# Patient Record
Sex: MALE | Race: WHITE | NOT HISPANIC OR LATINO | Employment: OTHER | URBAN - METROPOLITAN AREA
[De-identification: names, ages, dates, MRNs, and addresses within clinical notes are randomized per-mention and may not be internally consistent; named-entity substitution may affect disease eponyms.]

---

## 2017-01-05 ENCOUNTER — OFFICE VISIT (OUTPATIENT)
Dept: FAMILY MEDICINE CLINIC | Facility: CLINIC | Age: 67
End: 2017-01-05

## 2017-01-05 VITALS
HEIGHT: 72 IN | BODY MASS INDEX: 29.8 KG/M2 | TEMPERATURE: 98.3 F | SYSTOLIC BLOOD PRESSURE: 170 MMHG | DIASTOLIC BLOOD PRESSURE: 88 MMHG | HEART RATE: 69 BPM | WEIGHT: 220 LBS | OXYGEN SATURATION: 98 %

## 2017-01-05 DIAGNOSIS — J40 BRONCHITIS: Primary | ICD-10-CM

## 2017-01-05 PROCEDURE — 99213 OFFICE O/P EST LOW 20 MIN: CPT | Performed by: NURSE PRACTITIONER

## 2017-01-05 RX ORDER — METHYLPREDNISOLONE 4 MG/1
TABLET ORAL
Qty: 21 TABLET | Refills: 0 | Status: SHIPPED | OUTPATIENT
Start: 2017-01-05 | End: 2017-01-23

## 2017-01-05 NOTE — MR AVS SNAPSHOT
William Ritchie   2017 11:00 AM   Office Visit    Provider:  HERLINDA Bourgeois   Department:  [facility] FAMILY MEDICINE   Dept Phone:  [phone]                Your Full Care Plan              Today's Medication Changes          These changes are accurate as of: 17 11:01 AM.  If you have any questions, ask your nurse or doctor.               New Medication(s)Ordered:     MethylPREDNISolone 4 MG tablet   Commonly known as:  MEDROL (MARIA EUGENIA)   Take as directed on package instructions.   Started by:  HERLINDA Bourgeois            Where to Get Your Medications      These medications were sent to [address] - [address] [facility] - [phone]  - [phone] [facility] [address]     Phone:  [phone]     MethylPREDNISolone 4 MG tablet                  Your Updated Medication List          This list is accurate as of: 17 11:01 AM.  Always use your most recent med list.                MethylPREDNISolone 4 MG tablet   Commonly known as:  MEDROL (MARIA EUGENIA)   Take as directed on package instructions.       metoprolol succinate  MG 24 hr tablet   Commonly known as:  TOPROL XL   Take 1 tablet by mouth daily.               You Were Diagnosed With        Codes Comments    Bronchitis    -  Primary ICD-10-CM: J40  ICD-9-CM: 490       Instructions     None    Patient Instructions History      [email or web address] Signup     [facility] [email or web address] allows you to send messages to your doctor, view your test results, renew your prescriptions, schedule appointments, and more. To sign up, go to [employer] and click on the Sign Up Now link in the New User? box. Enter your [email or web address] Activation Code exactly as it appears below along with the last four digits of your Social Security Number and your Date of Birth () to complete the sign-up process. If you do not sign up before the  "expiration date, you must request a new code.    [employer] Activation Code: [email or web address]  Expires: 1/19/2017 11:01 AM    If you have questions, you can email [email or web address] or call [phone] to talk to our [employer] staff. Remember, [employer] is NOT to be used for urgent needs. For medical emergencies, dial 911.               Other Info from Your Visit           Allergies     No Known Allergies      Reason for Visit     Bronchitis mucus with blood, few days      Vital Signs     Blood Pressure Pulse Temperature Height Weight Oxygen Saturation    170/88 69 98.3 °F (36.8 °C) 71.5\" (181.6 cm) 220 lb (99.8 kg) 98%    Body Mass Index Smoking Status                30.26 kg/m2 Never Smoker          Problems and Diagnoses Noted     Bronchitis    -  Primary      "

## 2017-01-05 NOTE — PROGRESS NOTES
Subjective   William Ritchie is a 66 y.o. male.     History of Present Illness   William Ritchie 66 y.o. male who presents for evaluation of acute bronchitis. Symptoms include productive cough.  Onset of symptoms was 3 days ago, gradually worsening since that time. Patient denies shortness of breath, wheezing, fever.   Evaluation to date: none Treatment to date:  none    The following portions of the patient's history were reviewed and updated as appropriate: allergies, current medications, past family history, past medical history, past social history, past surgical history and problem list.    Review of Systems   Constitutional: Negative for chills and fever.   HENT: Negative for congestion.    Respiratory: Positive for cough and chest tightness. Negative for shortness of breath and wheezing.        Objective   Physical Exam   Constitutional: He is oriented to person, place, and time. He appears well-developed and well-nourished.   HENT:   Head: Normocephalic and atraumatic.   Right Ear: Tympanic membrane, external ear and ear canal normal.   Left Ear: Tympanic membrane, external ear and ear canal normal.   Nose: Nose normal.   Mouth/Throat: Uvula is midline and oropharynx is clear and moist.   Cardiovascular: Normal rate and regular rhythm.    Pulmonary/Chest: Effort normal and breath sounds normal.   Neurological: He is alert and oriented to person, place, and time.   Skin: Skin is warm and dry.   Psychiatric: He has a normal mood and affect. Judgment normal.   Vitals reviewed.      Assessment/Plan   William was seen today for bronchitis.    Diagnoses and all orders for this visit:    Bronchitis  -     MethylPREDNISolone (MEDROL, MARIA EUGENIA,) 4 MG tablet; Take as directed on package instructions.

## 2017-01-10 ENCOUNTER — TELEPHONE (OUTPATIENT)
Dept: FAMILY MEDICINE CLINIC | Facility: CLINIC | Age: 67
End: 2017-01-10

## 2017-01-10 RX ORDER — AZITHROMYCIN 250 MG/1
TABLET, FILM COATED ORAL
Qty: 6 TABLET | Refills: 0 | Status: SHIPPED | OUTPATIENT
Start: 2017-01-10 | End: 2017-01-23

## 2017-01-16 DIAGNOSIS — I10 BENIGN HYPERTENSION: ICD-10-CM

## 2017-01-16 DIAGNOSIS — E78.5 HYPERLIPIDEMIA, UNSPECIFIED HYPERLIPIDEMIA TYPE: Primary | ICD-10-CM

## 2017-01-16 DIAGNOSIS — R73.01 IMPAIRED FASTING GLUCOSE: ICD-10-CM

## 2017-01-17 DIAGNOSIS — E78.5 HYPERLIPIDEMIA, UNSPECIFIED HYPERLIPIDEMIA TYPE: ICD-10-CM

## 2017-01-17 DIAGNOSIS — I10 BENIGN HYPERTENSION: ICD-10-CM

## 2017-01-17 DIAGNOSIS — R73.01 IMPAIRED FASTING GLUCOSE: ICD-10-CM

## 2017-01-17 LAB
ALBUMIN SERPL-MCNC: 4.5 G/DL (ref 3.5–5.2)
ALBUMIN/GLOB SERPL: 1.6 G/DL
ALP SERPL-CCNC: 97 U/L (ref 39–117)
ALT SERPL-CCNC: 51 U/L (ref 1–41)
AST SERPL-CCNC: 26 U/L (ref 1–40)
BASOPHILS # BLD AUTO: 0.05 10*3/MM3 (ref 0–0.2)
BASOPHILS NFR BLD AUTO: 0.6 % (ref 0–1.5)
BILIRUB SERPL-MCNC: 0.4 MG/DL (ref 0.1–1.2)
BUN SERPL-MCNC: 18 MG/DL (ref 8–23)
BUN/CREAT SERPL: 18.8 (ref 7–25)
CALCIUM SERPL-MCNC: 9.9 MG/DL (ref 8.6–10.5)
CHLORIDE SERPL-SCNC: 100 MMOL/L (ref 98–107)
CHOLEST SERPL-MCNC: 219 MG/DL (ref 0–200)
CO2 SERPL-SCNC: 29.6 MMOL/L (ref 22–29)
CREAT SERPL-MCNC: 0.96 MG/DL (ref 0.76–1.27)
EOSINOPHIL # BLD AUTO: 0.55 10*3/MM3 (ref 0–0.7)
EOSINOPHIL NFR BLD AUTO: 6.4 % (ref 0.3–6.2)
ERYTHROCYTE [DISTWIDTH] IN BLOOD BY AUTOMATED COUNT: 12.5 % (ref 11.5–14.5)
GLOBULIN SER CALC-MCNC: 2.8 GM/DL
GLUCOSE SERPL-MCNC: 118 MG/DL (ref 65–99)
HBA1C MFR BLD: 5.5 % (ref 4.8–5.6)
HCT VFR BLD AUTO: 47.6 % (ref 40.4–52.2)
HDLC SERPL-MCNC: 49 MG/DL (ref 40–60)
HGB BLD-MCNC: 15.2 G/DL (ref 13.7–17.6)
IMM GRANULOCYTES # BLD: 0.02 10*3/MM3 (ref 0–0.03)
IMM GRANULOCYTES NFR BLD: 0.2 % (ref 0–0.5)
LDLC SERPL CALC-MCNC: 130 MG/DL (ref 0–100)
LDLC/HDLC SERPL: 2.65 {RATIO}
LYMPHOCYTES # BLD AUTO: 2.99 10*3/MM3 (ref 0.9–4.8)
LYMPHOCYTES NFR BLD AUTO: 35 % (ref 19.6–45.3)
MCH RBC QN AUTO: 31.7 PG (ref 27–32.7)
MCHC RBC AUTO-ENTMCNC: 31.9 G/DL (ref 32.6–36.4)
MCV RBC AUTO: 99.2 FL (ref 79.8–96.2)
MONOCYTES # BLD AUTO: 0.51 10*3/MM3 (ref 0.2–1.2)
MONOCYTES NFR BLD AUTO: 6 % (ref 5–12)
NEUTROPHILS # BLD AUTO: 4.43 10*3/MM3 (ref 1.9–8.1)
NEUTROPHILS NFR BLD AUTO: 51.8 % (ref 42.7–76)
PLATELET # BLD AUTO: 282 10*3/MM3 (ref 140–500)
POTASSIUM SERPL-SCNC: 5.1 MMOL/L (ref 3.5–5.2)
PROT SERPL-MCNC: 7.3 G/DL (ref 6–8.5)
RBC # BLD AUTO: 4.8 10*6/MM3 (ref 4.6–6)
SODIUM SERPL-SCNC: 142 MMOL/L (ref 136–145)
TRIGL SERPL-MCNC: 201 MG/DL (ref 0–150)
TSH SERPL DL<=0.005 MIU/L-ACNC: 2.15 MIU/ML (ref 0.27–4.2)
VLDLC SERPL CALC-MCNC: 40.2 MG/DL (ref 5–40)
WBC # BLD AUTO: 8.55 10*3/MM3 (ref 4.5–10.7)

## 2017-01-22 PROCEDURE — 77014 CHG CT GUIDANCE RADIATION THERAPY FLDS PLACEMENT: CPT | Performed by: RADIOLOGY

## 2017-01-23 ENCOUNTER — OFFICE VISIT (OUTPATIENT)
Dept: FAMILY MEDICINE CLINIC | Facility: CLINIC | Age: 67
End: 2017-01-23

## 2017-01-23 VITALS
WEIGHT: 218 LBS | BODY MASS INDEX: 28.89 KG/M2 | TEMPERATURE: 97.6 F | HEIGHT: 73 IN | DIASTOLIC BLOOD PRESSURE: 73 MMHG | HEART RATE: 56 BPM | SYSTOLIC BLOOD PRESSURE: 146 MMHG | RESPIRATION RATE: 16 BRPM

## 2017-01-23 DIAGNOSIS — R73.01 IFG (IMPAIRED FASTING GLUCOSE): ICD-10-CM

## 2017-01-23 DIAGNOSIS — E78.2 MIXED HYPERLIPIDEMIA: Primary | ICD-10-CM

## 2017-01-23 DIAGNOSIS — I10 BENIGN ESSENTIAL HYPERTENSION: ICD-10-CM

## 2017-01-23 PROCEDURE — 99213 OFFICE O/P EST LOW 20 MIN: CPT | Performed by: FAMILY MEDICINE

## 2017-01-23 PROCEDURE — 77014 CHG CT GUIDANCE RADIATION THERAPY FLDS PLACEMENT: CPT | Performed by: RADIOLOGY

## 2017-01-23 RX ORDER — METOPROLOL SUCCINATE 100 MG/1
100 TABLET, EXTENDED RELEASE ORAL DAILY
Qty: 90 TABLET | Refills: 3 | Status: SHIPPED | OUTPATIENT
Start: 2017-01-23 | End: 2017-12-06

## 2017-01-23 NOTE — PROGRESS NOTES
"Subjective   William Ritchie is a 66 y.o. male.     History of Present Illness     Chief Complaint:   Chief Complaint   Patient presents with   • Hypertension     med reifll    • Hyperlipidemia   • lab results       William Ritchie 66 y.o. male who presents today for Medical Management of the below listed issues and medication refills.  he has a history of   Patient Active Problem List   Diagnosis   • Hyperlipidemia   • Benign essential hypertension   • IFG (impaired fasting glucose)   .  Since the last visit, he has overall felt well.  he has been compliant with   Current Outpatient Prescriptions:   •  metoprolol succinate XL (TOPROL XL) 100 MG 24 hr tablet, Take 1 tablet by mouth Daily., Disp: 90 tablet, Rfl: 3.  he denies medication side effects.    All of the chronic condition(s) listed above are stable w/o issues.    Visit Vitals   • /73   • Pulse 56   • Temp 97.6 °F (36.4 °C) (Oral)   • Resp 16   • Ht 73\" (185.4 cm)   • Wt 218 lb (98.9 kg)   • BMI 28.76 kg/m2       Results for orders placed or performed in visit on 01/17/17   Comprehensive metabolic panel   Result Value Ref Range    Glucose 118 (H) 65 - 99 mg/dL    BUN 18 8 - 23 mg/dL    Creatinine 0.96 0.76 - 1.27 mg/dL    eGFR Non African Am 78 >60 mL/min/1.73    eGFR African Am 94 >60 mL/min/1.73    BUN/Creatinine Ratio 18.8 7.0 - 25.0    Sodium 142 136 - 145 mmol/L    Potassium 5.1 3.5 - 5.2 mmol/L    Chloride 100 98 - 107 mmol/L    Total CO2 29.6 (H) 22.0 - 29.0 mmol/L    Calcium 9.9 8.6 - 10.5 mg/dL    Total Protein 7.3 6.0 - 8.5 g/dL    Albumin 4.50 3.50 - 5.20 g/dL    Globulin 2.8 gm/dL    A/G Ratio 1.6 g/dL    Total Bilirubin 0.4 0.1 - 1.2 mg/dL    Alkaline Phosphatase 97 39 - 117 U/L    AST (SGOT) 26 1 - 40 U/L    ALT (SGPT) 51 (H) 1 - 41 U/L   Lipid Panel With LDL/HDL Ratio   Result Value Ref Range    Total Cholesterol 219 (H) 0 - 200 mg/dL    Triglycerides 201 (H) 0 - 150 mg/dL    HDL Cholesterol 49 40 - 60 mg/dL    VLDL Cholesterol " 40.2 (H) 5 - 40 mg/dL    LDL Cholesterol  130 (H) 0 - 100 mg/dL    LDL/HDL Ratio 2.65    TSH   Result Value Ref Range    TSH 2.150 0.270 - 4.200 mIU/mL   Hemoglobin A1c   Result Value Ref Range    Hemoglobin A1C 5.50 4.80 - 5.60 %   CBC and Differential   Result Value Ref Range    WBC 8.55 4.50 - 10.70 10*3/mm3    RBC 4.80 4.60 - 6.00 10*6/mm3    Hemoglobin 15.2 13.7 - 17.6 g/dL    Hematocrit 47.6 40.4 - 52.2 %    MCV 99.2 (H) 79.8 - 96.2 fL    MCH 31.7 27.0 - 32.7 pg    MCHC 31.9 (L) 32.6 - 36.4 g/dL    RDW 12.5 11.5 - 14.5 %    Platelets 282 140 - 500 10*3/mm3    Neutrophil Rel % 51.8 42.7 - 76.0 %    Lymphocyte Rel % 35.0 19.6 - 45.3 %    Monocyte Rel % 6.0 5.0 - 12.0 %    Eosinophil Rel % 6.4 (H) 0.3 - 6.2 %    Basophil Rel % 0.6 0.0 - 1.5 %    Neutrophils Absolute 4.43 1.90 - 8.10 10*3/mm3    Lymphocytes Absolute 2.99 0.90 - 4.80 10*3/mm3    Monocytes Absolute 0.51 0.20 - 1.20 10*3/mm3    Eosinophils Absolute 0.55 0.00 - 0.70 10*3/mm3    Basophils Absolute 0.05 0.00 - 0.20 10*3/mm3    Immature Granulocyte Rel % 0.2 0.0 - 0.5 %    Immature Grans Absolute 0.02 0.00 - 0.03 10*3/mm3         The following portions of the patient's history were reviewed and updated as appropriate: allergies, current medications, past family history, past medical history, past social history, past surgical history and problem list.    Review of Systems   Constitutional: Negative for activity change, chills, fatigue and fever.   Respiratory: Negative for cough and chest tightness.    Cardiovascular: Negative for chest pain and palpitations.   Gastrointestinal: Negative for abdominal pain and nausea.   Endocrine: Negative for cold intolerance and polydipsia.   Psychiatric/Behavioral: Negative for behavioral problems and dysphoric mood.   All other systems reviewed and are negative.      Objective   Physical Exam   Constitutional: He appears well-developed and well-nourished.   Neck: Neck supple. No thyromegaly present.   Cardiovascular:  Normal rate and regular rhythm.    No murmur heard.  Pulmonary/Chest: Effort normal and breath sounds normal.   Abdominal: Bowel sounds are normal.   Psychiatric: He has a normal mood and affect. His behavior is normal.   Nursing note and vitals reviewed.  Labs reviewed with pt today during visit. All questions answered.      Assessment/Plan   William was seen today for hypertension, hyperlipidemia and lab results.    Diagnoses and all orders for this visit:    Mixed hyperlipidemia    Benign essential hypertension  -     metoprolol succinate XL (TOPROL XL) 100 MG 24 hr tablet; Take 1 tablet by mouth Daily.    IFG (impaired fasting glucose)      Diet/exercise/weight loss discussed.

## 2017-01-23 NOTE — MR AVS SNAPSHOT
William Ritchie   1/23/2017 10:00 AM   Office Visit    Provider:  Ameya Sheffield MD   Department:  [facility] FAM[facility] MEDICINE   Dept Phone:  [phone]                Your Full Care Plan              Today's Medication Changes          These changes are accurate as of: 1/23/17 10:32 AM.  If you have any questions, ask your nurse or doctor.               Stop taking medication(s)listed here:     azithromycin 250 MG tablet   Commonly known as:  ZITHROMAX Z-MARIA EUGENIA   Stopped by:  Ameya Sheffield MD           MethylPREDNISolone 4 MG tablet   Commonly known as:  MEDROL (MARIA EUGENIA)   Stopped by:  Ameya Sheffield MD                Where to Get Your Medications      These medications were sent to [facility] - [place], [place] - [address] AT [address] [facility] - [phone] [facility] [phone]   [address] [facility], [facility] [address]     Phone:  [phone]     metoprolol succinate  MG 24 hr tablet                  Your Updated Medication List          This list is accurate as of: 1/23/17 10:32 AM.  Always use your most recent med list.                metoprolol succinate  MG 24 hr tablet   Commonly known as:  TOPROL XL   Take 1 tablet by mouth Daily.               You Were Diagnosed With        Codes Comments    Mixed hyperlipidemia    -  Primary ICD-10-CM: E78.2  ICD-9-CM: 272.2     Benign essential hypertension     ICD-10-CM: I10  ICD-9-CM: 401.1     IFG (impaired fasting glucose)     ICD-10-CM: R73.01  ICD-9-CM: 790.21       Instructions     None    Patient Instructions History      [employer] Signup     [employer] allows you to send messages to your doctor, view your test results, renew your prescriptions, schedule appointments, and more. To sign up, go to [email or web address] and click on the Sign Up Now link in the New User? box. Enter your [employer] Activation Code exactly as it appears below along with the last  "four digits of your Social Security Number and your Date of Birth () to complete the sign-up process. If you do not sign up before the expiration date, you must request a new code.    [employer] Activation Code: [email or web address]  Expires: 2017 10:32 AM    If you have questions, you can email [email or web address] or call [phone] to talk to our [employer] staff. Remember, [employer] is NOT to be used for urgent needs. For medical emergencies, dial 911.               Other Info from Your Visit           Allergies     No Known Allergies      Reason for Visit     Hypertension med reifll     Hyperlipidemia     lab results           Vital Signs     Blood Pressure Pulse Temperature Respirations Height Weight    146/73 56 97.6 °F (36.4 °C) (Oral) 16 73\" (185.4 cm) 218 lb (98.9 kg)    Body Mass Index Smoking Status                28.76 kg/m2 Never Smoker          Problems and Diagnoses Noted     Benign essential hypertension    High cholesterol or triglycerides    IFG (impaired fasting glucose)      "

## 2017-03-01 ENCOUNTER — OFFICE VISIT (OUTPATIENT)
Dept: RETAIL CLINIC | Facility: CLINIC | Age: 67
End: 2017-03-01

## 2017-03-01 VITALS
SYSTOLIC BLOOD PRESSURE: 128 MMHG | DIASTOLIC BLOOD PRESSURE: 78 MMHG | RESPIRATION RATE: 18 BRPM | OXYGEN SATURATION: 95 % | HEART RATE: 56 BPM | TEMPERATURE: 98.4 F

## 2017-03-01 DIAGNOSIS — J40 BRONCHITIS: Primary | ICD-10-CM

## 2017-03-01 DIAGNOSIS — J30.9 ALLERGIC RHINITIS, UNSPECIFIED ALLERGIC RHINITIS TRIGGER, UNSPECIFIED RHINITIS SEASONALITY: ICD-10-CM

## 2017-03-01 PROCEDURE — 99213 OFFICE O/P EST LOW 20 MIN: CPT | Performed by: NURSE PRACTITIONER

## 2017-03-01 RX ORDER — PREDNISONE 20 MG/1
TABLET ORAL
Qty: 20 TABLET | Refills: 0 | Status: SHIPPED | OUTPATIENT
Start: 2017-03-01 | End: 2017-09-12

## 2017-03-01 RX ORDER — FLUTICASONE PROPIONATE 50 MCG
2 SPRAY, SUSPENSION (ML) NASAL DAILY
Qty: 1 BOTTLE | Refills: 0 | Status: SHIPPED | OUTPATIENT
Start: 2017-03-01 | End: 2017-11-16

## 2017-03-01 NOTE — PROGRESS NOTES
Subjective:     William Ritchie is a 66 y.o.     Bronchitis   This is a new problem. The current episode started in the past 7 days. Associated symptoms include coughing and headaches (resolved). Pertinent negatives include no congestion, fever, nausea, sore throat or vomiting. Treatments tried: cough medication prescription left over  The treatment provided mild relief.         The following portions of the patient's history were reviewed and updated as appropriate: allergies, current medications, past family history, past medical history, past social history, past surgical history and problem list.      Review of Systems   Constitutional: Negative for fever.   HENT: Negative for congestion and sore throat.    Respiratory: Positive for shortness of breath (mild). Negative for wheezing.    Gastrointestinal: Negative for nausea and vomiting.   Neurological: Positive for headaches (resolved).         Objective:      Physical Exam   Constitutional: He is oriented to person, place, and time. He appears well-developed and well-nourished. He is cooperative.   HENT:   Head: Normocephalic and atraumatic.   Right Ear: Tympanic membrane and ear canal normal.   Left Ear: Tympanic membrane and ear canal normal.   Nose: Nose normal.   Post nasal drip noted   Eyes: Conjunctivae, EOM and lids are normal. Pupils are equal, round, and reactive to light.   Neck: Normal range of motion. Neck supple.   Cardiovascular: Normal rate, regular rhythm, S1 normal, S2 normal and normal heart sounds.    Pulmonary/Chest: Effort normal. He has rhonchi in the right upper field and the left upper field.   Abdominal: Soft. Normal appearance and bowel sounds are normal. There is no tenderness.   Musculoskeletal: Normal range of motion.   Lymphadenopathy:     He has no cervical adenopathy.   Neurological: He is alert and oriented to person, place, and time.   Skin: Skin is warm, dry and intact.   Psychiatric: He has a normal mood and affect. His  speech is normal and behavior is normal.   Vitals reviewed.          William was seen today for bronchitis.    Diagnoses and all orders for this visit:    Bronchitis    Allergic rhinitis, unspecified allergic rhinitis trigger, unspecified rhinitis seasonality    Other orders  -     predniSONE (DELTASONE) 20 MG tablet; Prednisone 20mg tabs, 3 for 3 days, 2 for 3 days, 1 for 3 days, 1/2 for 3 days take with food or milk  -     fluticasone (FLONASE) 50 MCG/ACT nasal spray; 2 sprays into each nostril Daily. Administer 2 sprays in each nostril for each dose.

## 2017-03-01 NOTE — PATIENT INSTRUCTIONS
Allergic Rhinitis  Allergic rhinitis is when the mucous membranes in the nose respond to allergens. Allergens are particles in the air that cause your body to have an allergic reaction. This causes you to release allergic antibodies. Through a chain of events, these eventually cause you to release histamine into the blood stream. Although meant to protect the body, it is this release of histamine that causes your discomfort, such as frequent sneezing, congestion, and an itchy, runny nose.   CAUSES  Seasonal allergic rhinitis (hay fever) is caused by pollen allergens that may come from grasses, trees, and weeds. Year-round allergic rhinitis (perennial allergic rhinitis) is caused by allergens such as house dust mites, pet dander, and mold spores.  SYMPTOMS  · Nasal stuffiness (congestion).  · Itchy, runny nose with sneezing and tearing of the eyes.  DIAGNOSIS  Your health care provider can help you determine the allergen or allergens that trigger your symptoms. If you and your health care provider are unable to determine the allergen, skin or blood testing may be used. Your health care provider will diagnose your condition after taking your health history and performing a physical exam. Your health care provider may assess you for other related conditions, such as asthma, pink eye, or an ear infection.  TREATMENT  Allergic rhinitis does not have a cure, but it can be controlled by:  · Medicines that block allergy symptoms. These may include allergy shots, nasal sprays, and oral antihistamines.  · Avoiding the allergen.  Hay fever may often be treated with antihistamines in pill or nasal spray forms. Antihistamines block the effects of histamine. There are over-the-counter medicines that may help with nasal congestion and swelling around the eyes. Check with your health care provider before taking or giving this medicine.  If avoiding the allergen or the medicine prescribed do not work, there are many new medicines  your health care provider can prescribe. Stronger medicine may be used if initial measures are ineffective. Desensitizing injections can be used if medicine and avoidance does not work. Desensitization is when a patient is given ongoing shots until the body becomes less sensitive to the allergen. Make sure you follow up with your health care provider if problems continue.  HOME CARE INSTRUCTIONS  It is not possible to completely avoid allergens, but you can reduce your symptoms by taking steps to limit your exposure to them. It helps to know exactly what you are allergic to so that you can avoid your specific triggers.  SEEK MEDICAL CARE IF:  · You have a fever.  · You develop a cough that does not stop easily (persistent).  · You have shortness of breath.  · You start wheezing.  · Symptoms interfere with normal daily activities.     This information is not intended to replace advice given to you by your health care provider. Make sure you discuss any questions you have with your health care provider.     Document Released: 09/12/2002 Document Revised: 01/08/2016 Document Reviewed: 08/25/2014  [employer] Interactive Patient Education ©2016 [employer]  Acute Bronchitis  Bronchitis is inflammation of the airways that extend from the windpipe into the lungs (bronchi). The inflammation often causes mucus to develop. This leads to a cough, which is the most common symptom of bronchitis.   In acute bronchitis, the condition usually develops suddenly and goes away over time, usually in a couple weeks. Smoking, allergies, and asthma can make bronchitis worse. Repeated episodes of bronchitis may cause further lung problems.   CAUSES  Acute bronchitis is most often caused by the same virus that causes a cold. The virus can spread from person to person (contagious) through coughing, sneezing, and touching contaminated objects.  SIGNS AND SYMPTOMS   · Cough.    · Fever.    · Coughing up mucus.    · Body aches.    · Chest  congestion.    · Chills.    · Shortness of breath.    · Sore throat.    DIAGNOSIS   Acute bronchitis is usually diagnosed through a physical exam. Your health care provider will also ask you questions about your medical history. Tests, such as chest X-rays, are sometimes done to rule out other conditions.   TREATMENT   Acute bronchitis usually goes away in a couple weeks. Oftentimes, no medical treatment is necessary. Medicines are sometimes given for relief of fever or cough. Antibiotic medicines are usually not needed but may be prescribed in certain situations. In some cases, an inhaler may be recommended to help reduce shortness of breath and control the cough. A cool mist vaporizer may also be used to help thin bronchial secretions and make it easier to clear the chest.   HOME CARE INSTRUCTIONS  · Get plenty of rest.    · Drink enough fluids to keep your urine clear or pale yellow (unless you have a medical condition that requires fluid restriction). Increasing fluids may help thin your respiratory secretions (sputum) and reduce chest congestion, and it will prevent dehydration.    · Take medicines only as directed by your health care provider.  · If you were prescribed an antibiotic medicine, finish it all even if you start to feel better.  · Avoid smoking and secondhand smoke. Exposure to cigarette smoke or irritating chemicals will make bronchitis worse. If you are a smoker, consider using nicotine gum or skin patches to help control withdrawal symptoms. Quitting smoking will help your lungs heal faster.    · Reduce the chances of another bout of acute bronchitis by washing your hands frequently, avoiding people with cold symptoms, and trying not to touch your hands to your mouth, nose, or eyes.    · Keep all follow-up visits as directed by your health care provider.    SEEK MEDICAL CARE IF:  Your symptoms do not improve after 1 week of treatment.   SEEK IMMEDIATE MEDICAL CARE IF:  · You develop an increased  fever or chills.    · You have chest pain.    · You have severe shortness of breath.  · You have bloody sputum.    · You develop dehydration.  · You faint or repeatedly feel like you are going to pass out.  · You develop repeated vomiting.  · You develop a severe headache.  MAKE SURE YOU:   · Understand these instructions.  · Will watch your condition.  · Will get help right away if you are not doing well or get worse.     This information is not intended to replace advice given to you by your health care provider. Make sure you discuss any questions you have with your health care provider.     Document Released: 01/25/2006 Document Revised: 01/08/2016 Document Reviewed: 06/10/2014  [employer] Interactive Patient Education ©2016 [employer]

## 2017-09-12 ENCOUNTER — OFFICE VISIT (OUTPATIENT)
Dept: FAMILY MEDICINE CLINIC | Facility: CLINIC | Age: 67
End: 2017-09-12

## 2017-09-12 VITALS
DIASTOLIC BLOOD PRESSURE: 77 MMHG | TEMPERATURE: 98 F | BODY MASS INDEX: 28.76 KG/M2 | WEIGHT: 217 LBS | RESPIRATION RATE: 16 BRPM | HEIGHT: 73 IN | HEART RATE: 58 BPM | SYSTOLIC BLOOD PRESSURE: 152 MMHG

## 2017-09-12 DIAGNOSIS — R22.1 NECK MASS: ICD-10-CM

## 2017-09-12 DIAGNOSIS — Z00.00 MEDICARE ANNUAL WELLNESS VISIT, SUBSEQUENT: Primary | ICD-10-CM

## 2017-09-12 PROCEDURE — G0439 PPPS, SUBSEQ VISIT: HCPCS | Performed by: FAMILY MEDICINE

## 2017-09-12 PROCEDURE — 99213 OFFICE O/P EST LOW 20 MIN: CPT | Performed by: FAMILY MEDICINE

## 2017-09-12 NOTE — PROGRESS NOTES
QUICK REFERENCE INFORMATION:  The ABCs of the Annual Wellness Visit    Subsequent Medicare Wellness Visit    HEALTH RISK ASSESSMENT    1950    Recent Hospitalizations:  No hospitalization(s) within the last year..        Current Medical Providers:  Patient Care Team:  Ameya Sheffield MD as PCP - General (Family Medicine)  HERLINDA Robb as PCP - Claims Attributed        Smoking Status:  History   Smoking Status   • Never Smoker   Smokeless Tobacco   • Never Used       Alcohol Consumption:  History   Alcohol Use   • Yes     Comment: rare       Depression Screen:   PHQ-2/PHQ-9 Depression Screening 9/12/2017   Little interest or pleasure in doing things 0   Feeling down, depressed, or hopeless 0   Trouble falling or staying asleep, or sleeping too much -   Feeling tired or having little energy -   Poor appetite or overeating -   Feeling bad about yourself - or that you are a failure or have let yourself or your family down -   Trouble concentrating on things, such as reading the newspaper or watching television -   Moving or speaking so slowly that other people could have noticed. Or the opposite - being so fidgety or restless that you have been moving around a lot more than usual -   Thoughts that you would be better off dead, or of hurting yourself in some way -   Total Score 0       Health Habits and Functional and Cognitive Screening:  Functional & Cognitive Status 9/12/2017   Do you have difficulty preparing food and eating? No   Do you have difficulty bathing yourself? No   Do you have difficulty getting dressed? No   Do you have difficulty using the toilet? No   Do you have difficulty moving around from place to place? No   In the past year have you fallen or experienced a near fall? No   Do you need help using the phone?  No   Are you deaf or do you have serious difficulty hearing?  No   Do you need help with transportation? No   Do you need help shopping? No   Do you need help preparing meals?  No    Do you need help with housework?  No   Do you need help with laundry? No   Do you need help taking your medications? No   Do you need help managing money? No   Do you have difficulty concentrating, remembering or making decisions? No       Health Habits  Current Diet: Well Balanced Diet  Dental Exam: Unknown  Eye Exam: Up to date  Exercise (times per week): 7 times per week  Current Exercise Activities Include: Walking      Does the patient have evidence of cognitive impairment? No    Aspirin use counseling: Does not need ASA (and currently is not on it)      Recent Lab Results:  CMP:  Lab Results   Component Value Date     (H) 01/17/2017    BUN 18 01/17/2017    CREATININE 0.96 01/17/2017    EGFRIFNONA 78 01/17/2017    EGFRIFAFRI 94 01/17/2017    BCR 18.8 01/17/2017     01/17/2017    K 5.1 01/17/2017    CO2 29.6 (H) 01/17/2017    CALCIUM 9.9 01/17/2017    PROTENTOTREF 7.3 01/17/2017    ALBUMIN 4.50 01/17/2017    LABGLOBREF 2.8 01/17/2017    LABIL2 1.6 01/17/2017    BILITOT 0.4 01/17/2017    ALKPHOS 97 01/17/2017    AST 26 01/17/2017    ALT 51 (H) 01/17/2017     Lipid Panel:  Lab Results   Component Value Date    TRIG 201 (H) 01/17/2017    HDL 49 01/17/2017    VLDL 40.2 (H) 01/17/2017    LDLHDL 2.65 01/17/2017     HbA1c:  Lab Results   Component Value Date    HGBA1C 5.50 01/17/2017       Visual Acuity:  No exam data present    Age-appropriate Screening Schedule:  Refer to the list below for future screening recommendations based on patient's age, sex and/or medical conditions. Orders for these recommended tests are listed in the plan section. The patient has been provided with a written plan.    Health Maintenance   Topic Date Due   • INFLUENZA VACCINE  10/11/2017 (Originally 8/1/2017)   • PNEUMOCOCCAL VACCINES (65+ LOW/MEDIUM RISK) (2 of 2 - PPSV23) 10/24/2017 (Originally 1/1/2015)   • LIPID PANEL  01/17/2018   • COLONOSCOPY  10/08/2025   • ZOSTER VACCINE  Completed   • PROSTATE CANCER SCREENING   "Excluded   • TDAP/TD VACCINES  Excluded        Subjective   History of Present Illness    William Ritchie is a 67 y.o. male who presents for an Subsequent Wellness Visit.    The following portions of the patient's history were reviewed and updated as appropriate: allergies, current medications, past family history, past medical history, past social history, past surgical history and problem list.    Outpatient Medications Prior to Visit   Medication Sig Dispense Refill   • fluticasone (FLONASE) 50 MCG/ACT nasal spray 2 sprays into each nostril Daily. Administer 2 sprays in each nostril for each dose. 1 bottle 0   • metoprolol succinate XL (TOPROL XL) 100 MG 24 hr tablet Take 1 tablet by mouth Daily. 90 tablet 3   • predniSONE (DELTASONE) 20 MG tablet Prednisone 20mg tabs, 3 for 3 days, 2 for 3 days, 1 for 3 days, 1/2 for 3 days take with food or milk 20 tablet 0     No facility-administered medications prior to visit.        Patient Active Problem List   Diagnosis   • Hyperlipidemia   • Benign essential hypertension   • IFG (impaired fasting glucose)       Advance Care Planning:  has NO advance directive - information provided to the patient today    Identification of Risk Factors:  Risk factors include: cardiovascular risk.    Review of Systems    Compared to one year ago, the patient feels his physical health is the same.  Compared to one year ago, the patient feels his mental health is the same.    Objective     Physical Exam    Vitals:    09/12/17 1046   BP: 152/77   Pulse: 58   Resp: 16   Temp: 98 °F (36.7 °C)   TempSrc: Oral   Weight: 217 lb (98.4 kg)   Height: 73\" (185.4 cm)   PainSc: 2  Comment: left knee pain       Body mass index is 28.63 kg/(m^2).  Discussed the patient's BMI with him. The BMI is in the acceptable range.    Assessment/Plan   Patient Self-Management and Personalized Health Advice  The patient has been provided with information about: diet, exercise and weight management and preventive " services including:   · Exercise counseling provided, Fall Risk assessment done, Nutrition counseling provided.    Visit Diagnoses:    ICD-10-CM ICD-9-CM   1. Medicare annual wellness visit, subsequent Z00.00 V70.0   2. Neck mass R22.1 784.2       Orders Placed This Encounter   Procedures   • Ambulatory Referral to ENT (Otolaryngology)     Referral Priority:   Routine     Referral Type:   Consultation     Referral Reason:   Specialty Services Required     Requested Specialty:   Otolaryngology     Number of Visits Requested:   1       Outpatient Encounter Prescriptions as of 9/12/2017   Medication Sig Dispense Refill   • fluticasone (FLONASE) 50 MCG/ACT nasal spray 2 sprays into each nostril Daily. Administer 2 sprays in each nostril for each dose. 1 bottle 0   • metoprolol succinate XL (TOPROL XL) 100 MG 24 hr tablet Take 1 tablet by mouth Daily. 90 tablet 3   • [DISCONTINUED] predniSONE (DELTASONE) 20 MG tablet Prednisone 20mg tabs, 3 for 3 days, 2 for 3 days, 1 for 3 days, 1/2 for 3 days take with food or milk 20 tablet 0     No facility-administered encounter medications on file as of 9/12/2017.        Reviewed use of high risk medication in the elderly: not applicable  Reviewed for potential of harmful drug interactions in the elderly: not applicable    Follow Up:  No Follow-up on file.     An After Visit Summary and PPPS with all of these plans were given to the patient.

## 2017-09-12 NOTE — PROGRESS NOTES
Subjective   William Ritchie is a 67 y.o. male.     CC: Neck Nodule    History of Present Illness     Pt comes in today after a nodule popped up on the left side of the neck 2 weeks ago w/o pain. Has not changed since first noticed it. No recent illness. No swallowing issues or ST. No f/c.       The following portions of the patient's history were reviewed and updated as appropriate: allergies, current medications, past family history, past medical history, past social history, past surgical history and problem list.    Review of Systems   Constitutional: Negative for activity change, chills, fatigue and fever.   HENT:        Neck nodule   Respiratory: Negative for cough and shortness of breath.    Cardiovascular: Negative for chest pain and palpitations.   Gastrointestinal: Negative for abdominal pain.   Endocrine: Negative for cold intolerance.   Psychiatric/Behavioral: Negative for behavioral problems and dysphoric mood. The patient is not nervous/anxious.        Objective   Physical Exam   Constitutional: He appears well-developed and well-nourished.   Neck: Neck supple. No thyromegaly present.   3 x 4 cm semi-firm nodule at the mandibular angle.   Cardiovascular: Normal rate and regular rhythm.    No murmur heard.  Pulmonary/Chest: Effort normal and breath sounds normal.   Abdominal: Bowel sounds are normal.   Psychiatric: He has a normal mood and affect. His behavior is normal.   Nursing note and vitals reviewed.      Assessment/Plan   William was seen today for nodule left side of neck and medicare wellness exam.    Diagnoses and all orders for this visit:    Medicare annual wellness visit, subsequent    Neck mass  -     Ambulatory Referral to ENT (Otolaryngology)

## 2017-09-12 NOTE — PATIENT INSTRUCTIONS
Medicare Wellness  Personal Prevention Plan of Service     Date of Office Visit:  2017  Encounter Provider:  Ameya Sheffield MD  Place of Service:  [facility]  Patient Name: William Ritchie  :  1950    As part of the Medicare Wellness portion of your visit today, we are providing you with this personalized preventive plan of services (PPPS). This plan is based upon recommendations of the [place] Preventive Services Task Force (USPSTF) and the Advisory Committee on Immunization Practices (ACIP).    This lists the preventive care services that should be considered, and provides dates of when you are due. Items listed as completed are up-to-date and do not require any further intervention.    Health Maintenance   Topic Date Due   • INFLUENZA VACCINE  10/11/2017 (Originally 2017)   • PNEUMOCOCCAL VACCINES (65+ LOW/MEDIUM RISK) (2 of 2 - PPSV23) 10/24/2017 (Originally 2015)   • LIPID PANEL  2018   • MEDICARE ANNUAL WELLNESS  2018   • COLONOSCOPY  10/08/2025   • ZOSTER VACCINE  Completed   • HEPATITIS C SCREENING  Excluded   • PROSTATE CANCER SCREENING  Excluded   • TDAP/TD VACCINES  Excluded       Orders Placed This Encounter   Procedures   • Ambulatory Referral to ENT (Otolaryngology)     Referral Priority:   Routine     Referral Type:   Consultation     Referral Reason:   Specialty Services Required     Requested Specialty:   Otolaryngology     Number of Visits Requested:   1       Return if symptoms worsen or fail to improve.

## 2017-10-26 ENCOUNTER — LAB (OUTPATIENT)
Dept: LAB | Facility: HOSPITAL | Age: 67
End: 2017-10-26
Attending: OTOLARYNGOLOGY

## 2017-10-26 ENCOUNTER — HOSPITAL ENCOUNTER (OUTPATIENT)
Dept: CARDIOLOGY | Facility: HOSPITAL | Age: 67
Discharge: HOME | End: 2017-10-26
Attending: OTOLARYNGOLOGY | Admitting: OTOLARYNGOLOGY

## 2017-10-26 ENCOUNTER — TRANSCRIBE ORDERS (OUTPATIENT)
Dept: LAB | Facility: HOSPITAL | Age: 67
End: 2017-10-26

## 2017-10-26 ENCOUNTER — HOSPITAL ENCOUNTER (OUTPATIENT)
Dept: GENERAL RADIOLOGY | Facility: HOSPITAL | Age: 67
Discharge: HOME | End: 2017-10-26
Attending: OTOLARYNGOLOGY

## 2017-10-26 DIAGNOSIS — C80.1 DILATED CARDIOMYOPATHY SECONDARY TO MALIGNANCY (HCC): ICD-10-CM

## 2017-10-26 DIAGNOSIS — R22.1 NECK MASS: ICD-10-CM

## 2017-10-26 DIAGNOSIS — I42.0 DILATED CARDIOMYOPATHY SECONDARY TO MALIGNANCY (HCC): ICD-10-CM

## 2017-10-26 DIAGNOSIS — R22.1 NECK MASS: Primary | ICD-10-CM

## 2017-10-26 LAB
ANION GAP SERPL CALCULATED.3IONS-SCNC: 14.4 MMOL/L
BASOPHILS # BLD AUTO: 0.04 10*3/MM3 (ref 0–0.2)
BASOPHILS NFR BLD AUTO: 0.5 % (ref 0–1.5)
BUN BLD-MCNC: 13 MG/DL (ref 8–23)
BUN/CREAT SERPL: 15.5 (ref 7–25)
CALCIUM SPEC-SCNC: 9.7 MG/DL (ref 8.6–10.5)
CHLORIDE SERPL-SCNC: 98 MMOL/L (ref 98–107)
CO2 SERPL-SCNC: 24.6 MMOL/L (ref 22–29)
CREAT BLD-MCNC: 0.84 MG/DL (ref 0.76–1.27)
DEPRECATED RDW RBC AUTO: 41.4 FL (ref 37–54)
EOSINOPHIL # BLD AUTO: 0.35 10*3/MM3 (ref 0–0.7)
EOSINOPHIL NFR BLD AUTO: 4.5 % (ref 0.3–6.2)
ERYTHROCYTE [DISTWIDTH] IN BLOOD BY AUTOMATED COUNT: 12.2 % (ref 11.5–14.5)
GFR SERPL CREATININE-BSD FRML MDRD: 91 ML/MIN/1.73
GLUCOSE BLD-MCNC: 111 MG/DL (ref 65–99)
HCT VFR BLD AUTO: 46.3 % (ref 40.4–52.2)
HGB BLD-MCNC: 15.6 G/DL (ref 13.7–17.6)
IMM GRANULOCYTES # BLD: 0 10*3/MM3 (ref 0–0.03)
IMM GRANULOCYTES NFR BLD: 0 % (ref 0–0.5)
LYMPHOCYTES # BLD AUTO: 2.15 10*3/MM3 (ref 0.9–4.8)
LYMPHOCYTES NFR BLD AUTO: 27.8 % (ref 19.6–45.3)
MCH RBC QN AUTO: 31.8 PG (ref 27–32.7)
MCHC RBC AUTO-ENTMCNC: 33.7 G/DL (ref 32.6–36.4)
MCV RBC AUTO: 94.3 FL (ref 79.8–96.2)
MONOCYTES # BLD AUTO: 0.42 10*3/MM3 (ref 0.2–1.2)
MONOCYTES NFR BLD AUTO: 5.4 % (ref 5–12)
NEUTROPHILS # BLD AUTO: 4.76 10*3/MM3 (ref 1.9–8.1)
NEUTROPHILS NFR BLD AUTO: 61.8 % (ref 42.7–76)
PLATELET # BLD AUTO: 240 10*3/MM3 (ref 140–500)
PMV BLD AUTO: 10.4 FL (ref 6–12)
POTASSIUM BLD-SCNC: 4.3 MMOL/L (ref 3.5–5.2)
RBC # BLD AUTO: 4.91 10*6/MM3 (ref 4.6–6)
SODIUM BLD-SCNC: 137 MMOL/L (ref 136–145)
WBC NRBC COR # BLD: 7.72 10*3/MM3 (ref 4.5–10.7)

## 2017-10-26 PROCEDURE — 93005 ELECTROCARDIOGRAM TRACING: CPT | Performed by: OTOLARYNGOLOGY

## 2017-10-26 PROCEDURE — 80048 BASIC METABOLIC PNL TOTAL CA: CPT

## 2017-10-26 PROCEDURE — 93010 ELECTROCARDIOGRAM REPORT: CPT | Performed by: INTERNAL MEDICINE

## 2017-10-26 PROCEDURE — 85025 COMPLETE CBC W/AUTO DIFF WBC: CPT

## 2017-10-26 PROCEDURE — 36415 COLL VENOUS BLD VENIPUNCTURE: CPT

## 2017-10-26 PROCEDURE — 71020 HC CHEST PA AND LATERAL: CPT

## 2017-10-30 ENCOUNTER — OFFICE VISIT (OUTPATIENT)
Dept: FAMILY MEDICINE CLINIC | Facility: CLINIC | Age: 67
End: 2017-10-30

## 2017-10-30 VITALS
RESPIRATION RATE: 16 BRPM | WEIGHT: 216 LBS | HEART RATE: 70 BPM | TEMPERATURE: 98.3 F | DIASTOLIC BLOOD PRESSURE: 67 MMHG | HEIGHT: 73 IN | BODY MASS INDEX: 28.63 KG/M2 | SYSTOLIC BLOOD PRESSURE: 141 MMHG

## 2017-10-30 DIAGNOSIS — C76.0 HEAD AND NECK CANCER (HCC): Primary | ICD-10-CM

## 2017-10-30 PROCEDURE — 99213 OFFICE O/P EST LOW 20 MIN: CPT | Performed by: FAMILY MEDICINE

## 2017-10-30 NOTE — PROGRESS NOTES
"Subjective   William Ritchie is a 67 y.o. male.     CC: Management of Neck Mass    History of Present Illness     Pt returns today after a whirlwind w/u at the ENT for a left neck mass. Was dx with cancer of the region with unknown primary and is scheduled for surgery Wednesday. He will need an oncologist to f/u with for probable radiation treatments afterwards.      The following portions of the patient's history were reviewed and updated as appropriate: allergies, current medications, past family history, past medical history, past social history, past surgical history and problem list.    Review of Systems   Constitutional: Negative for activity change, chills, fatigue and fever.   Respiratory: Negative for cough and shortness of breath.    Cardiovascular: Negative for chest pain and palpitations.   Gastrointestinal: Negative for abdominal pain.   Endocrine: Negative for cold intolerance.   Psychiatric/Behavioral: Negative for behavioral problems and dysphoric mood. The patient is not nervous/anxious.      /67  Pulse 70  Temp 98.3 °F (36.8 °C) (Oral)   Resp 16  Ht 73\" (185.4 cm)  Wt 216 lb (98 kg)  BMI 28.5 kg/m2    Objective   Physical Exam   Constitutional: He appears well-developed and well-nourished.   Neck: Neck supple. No thyromegaly present.   Cardiovascular: Normal rate and regular rhythm.    No murmur heard.  Pulmonary/Chest: Effort normal and breath sounds normal.   Abdominal: Bowel sounds are normal.   Psychiatric: He has a normal mood and affect. His behavior is normal.   Nursing note and vitals reviewed.  ENT notes independently reviewed today.    Assessment/Plan   William was seen today for to discuss surgery.    Diagnoses and all orders for this visit:    Head and neck cancer  -     Ambulatory Referral to Hematology / Oncology               "

## 2017-11-01 ENCOUNTER — APPOINTMENT (OUTPATIENT)
Dept: ONCOLOGY | Facility: CLINIC | Age: 67
End: 2017-11-01

## 2017-11-01 ENCOUNTER — APPOINTMENT (OUTPATIENT)
Dept: LAB | Facility: HOSPITAL | Age: 67
End: 2017-11-01

## 2017-11-09 ENCOUNTER — TRANSCRIBE ORDERS (OUTPATIENT)
Dept: ADMINISTRATIVE | Facility: HOSPITAL | Age: 67
End: 2017-11-09

## 2017-11-09 DIAGNOSIS — C80.1 MALIGNANT NEOPLASM (HCC): ICD-10-CM

## 2017-11-09 DIAGNOSIS — R22.1 NECK MASS: Primary | ICD-10-CM

## 2017-11-14 ENCOUNTER — HOSPITAL ENCOUNTER (OUTPATIENT)
Dept: PET IMAGING | Facility: HOSPITAL | Age: 67
Discharge: HOME | End: 2017-11-14
Attending: OTOLARYNGOLOGY | Admitting: OTOLARYNGOLOGY

## 2017-11-14 ENCOUNTER — HOSPITAL ENCOUNTER (OUTPATIENT)
Dept: PET IMAGING | Facility: HOSPITAL | Age: 67
Discharge: HOME | End: 2017-11-14
Attending: OTOLARYNGOLOGY

## 2017-11-14 DIAGNOSIS — C80.1 MALIGNANT NEOPLASM (HCC): ICD-10-CM

## 2017-11-14 DIAGNOSIS — R22.1 NECK MASS: ICD-10-CM

## 2017-11-14 PROCEDURE — 82962 GLUCOSE BLOOD TEST: CPT

## 2017-11-14 PROCEDURE — 0 FLUDEOXYGLUCOSE F18 SOLUTION: Performed by: OTOLARYNGOLOGY

## 2017-11-14 PROCEDURE — A9552 F18 FDG: HCPCS | Performed by: OTOLARYNGOLOGY

## 2017-11-14 PROCEDURE — 78815 PET IMAGE W/CT SKULL-THIGH: CPT

## 2017-11-14 RX ADMIN — FLUDEOXYGLUCOSE F18 1 DOSE: 300 INJECTION INTRAVENOUS at 10:55

## 2017-11-15 LAB — GLUCOSE BLDC GLUCOMTR-MCNC: 115 MG/DL (ref 70–130)

## 2017-11-16 ENCOUNTER — CONSULT (OUTPATIENT)
Dept: RADIATION ONCOLOGY | Facility: CLINIC | Age: 67
End: 2017-11-16

## 2017-11-16 ENCOUNTER — LAB (OUTPATIENT)
Dept: LAB | Facility: HOSPITAL | Age: 67
End: 2017-11-16

## 2017-11-16 ENCOUNTER — CONSULT (OUTPATIENT)
Dept: ONCOLOGY | Facility: CLINIC | Age: 67
End: 2017-11-16

## 2017-11-16 ENCOUNTER — APPOINTMENT (OUTPATIENT)
Dept: ONCOLOGY | Facility: CLINIC | Age: 67
End: 2017-11-16

## 2017-11-16 ENCOUNTER — APPOINTMENT (OUTPATIENT)
Dept: RADIATION ONCOLOGY | Facility: HOSPITAL | Age: 67
End: 2017-11-16

## 2017-11-16 VITALS
SYSTOLIC BLOOD PRESSURE: 147 MMHG | OXYGEN SATURATION: 98 % | HEIGHT: 73 IN | DIASTOLIC BLOOD PRESSURE: 82 MMHG | TEMPERATURE: 97.8 F | HEART RATE: 63 BPM | WEIGHT: 206 LBS | BODY MASS INDEX: 27.3 KG/M2

## 2017-11-16 VITALS
SYSTOLIC BLOOD PRESSURE: 130 MMHG | WEIGHT: 206.4 LBS | BODY MASS INDEX: 27.35 KG/M2 | HEART RATE: 72 BPM | TEMPERATURE: 98.4 F | RESPIRATION RATE: 16 BRPM | DIASTOLIC BLOOD PRESSURE: 80 MMHG | HEIGHT: 73 IN

## 2017-11-16 DIAGNOSIS — C10.9 OROPHARYNGEAL CANCER (HCC): Primary | ICD-10-CM

## 2017-11-16 DIAGNOSIS — C76.0 HEAD AND NECK CANCER (HCC): Primary | ICD-10-CM

## 2017-11-16 PROBLEM — C01 CANCER OF BASE OF TONGUE (HCC): Status: ACTIVE | Noted: 2017-11-16

## 2017-11-16 LAB
BASOPHILS # BLD AUTO: 0.03 10*3/MM3 (ref 0–0.1)
BASOPHILS NFR BLD AUTO: 0.4 % (ref 0–1.1)
DEPRECATED RDW RBC AUTO: 37.4 FL (ref 37–49)
EOSINOPHIL # BLD AUTO: 0.11 10*3/MM3 (ref 0–0.36)
EOSINOPHIL NFR BLD AUTO: 1.3 % (ref 1–5)
ERYTHROCYTE [DISTWIDTH] IN BLOOD BY AUTOMATED COUNT: 11.5 % (ref 11.7–14.5)
HCT VFR BLD AUTO: 42.7 % (ref 40–49)
HGB BLD-MCNC: 15.2 G/DL (ref 13.5–16.5)
IMM GRANULOCYTES # BLD: 0.04 10*3/MM3 (ref 0–0.03)
IMM GRANULOCYTES NFR BLD: 0.5 % (ref 0–0.5)
LYMPHOCYTES # BLD AUTO: 2.1 10*3/MM3 (ref 1–3.5)
LYMPHOCYTES NFR BLD AUTO: 25.6 % (ref 20–49)
MCH RBC QN AUTO: 32 PG (ref 27–33)
MCHC RBC AUTO-ENTMCNC: 35.6 G/DL (ref 32–35)
MCV RBC AUTO: 89.9 FL (ref 83–97)
MONOCYTES # BLD AUTO: 0.62 10*3/MM3 (ref 0.25–0.8)
MONOCYTES NFR BLD AUTO: 7.6 % (ref 4–12)
NEUTROPHILS # BLD AUTO: 5.31 10*3/MM3 (ref 1.5–7)
NEUTROPHILS NFR BLD AUTO: 64.6 % (ref 39–75)
NRBC BLD MANUAL-RTO: 0 /100 WBC (ref 0–0)
PLATELET # BLD AUTO: 266 10*3/MM3 (ref 150–375)
PMV BLD AUTO: 9.3 FL (ref 8.9–12.1)
RBC # BLD AUTO: 4.75 10*6/MM3 (ref 4.3–5.5)
WBC NRBC COR # BLD: 8.21 10*3/MM3 (ref 4–10)

## 2017-11-16 PROCEDURE — 99205 OFFICE O/P NEW HI 60 MIN: CPT | Performed by: INTERNAL MEDICINE

## 2017-11-16 PROCEDURE — 77263 THER RADIOLOGY TX PLNG CPLX: CPT | Performed by: RADIOLOGY

## 2017-11-16 PROCEDURE — 77334 RADIATION TREATMENT AID(S): CPT | Performed by: RADIOLOGY

## 2017-11-16 PROCEDURE — 77290 THER RAD SIMULAJ FIELD CPLX: CPT | Performed by: RADIOLOGY

## 2017-11-16 PROCEDURE — G0463 HOSPITAL OUTPT CLINIC VISIT: HCPCS | Performed by: RADIOLOGY

## 2017-11-16 PROCEDURE — 99204 OFFICE O/P NEW MOD 45 MIN: CPT | Performed by: RADIOLOGY

## 2017-11-16 PROCEDURE — 85025 COMPLETE CBC W/AUTO DIFF WBC: CPT | Performed by: INTERNAL MEDICINE

## 2017-11-16 PROCEDURE — 36416 COLLJ CAPILLARY BLOOD SPEC: CPT | Performed by: INTERNAL MEDICINE

## 2017-11-16 PROCEDURE — 77370 RADIATION PHYSICS CONSULT: CPT | Performed by: RADIOLOGY

## 2017-11-16 RX ORDER — DEXAMETHASONE 4 MG/1
TABLET ORAL
Qty: 12 TABLET | Refills: 2 | Status: SHIPPED | OUTPATIENT
Start: 2017-11-16 | End: 2018-02-13

## 2017-11-16 RX ORDER — ONDANSETRON HYDROCHLORIDE 8 MG/1
8 TABLET, FILM COATED ORAL 3 TIMES DAILY PRN
Qty: 30 TABLET | Refills: 5 | Status: SHIPPED | OUTPATIENT
Start: 2017-11-16 | End: 2018-03-02

## 2017-11-16 RX ORDER — PROCHLORPERAZINE MALEATE 10 MG
10 TABLET ORAL EVERY 6 HOURS PRN
Qty: 60 TABLET | Refills: 5 | Status: SHIPPED | OUTPATIENT
Start: 2017-11-16 | End: 2018-03-02

## 2017-11-16 NOTE — PROGRESS NOTES
Subjective     Ameya Sheffield MD     Diagnosis Plan   1. 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 Ritchie is a  67-year-old white male who first noticed a mass in his left neck while shaving in early September of this year.  He was seen by his PCP Dr. Ameya Sheffield and referred to Dr. Zane Del Toro.  A CT of the  the neck at [place] and open on 9/22 described a 3.5 x 3.1 x 2.4 cm mass in the level II jugular chain.  On 11/1 he underwent bilateral tonsillectomy and adenoidectomy with panendoscopy, blind biopsies of the left base of tongue came  back as squamous cell carcinoma.  A PET scan was done 2 days ago however the report is not yet complete.  We are asked to evaluate the patient for radiation therapy as part of the approach to his disease.  As he is a stage YEN, the approach would be combined modality therapy with concurrent radiation therapy and chemotherapy, likely cisplatin.  I discussed the case with Dr. BENOIT Iniguez at the [facility] group and he will be bringing the patient in the next few days.  Will also have to set up a dental consult for dental trays to be made for fluoride gels and a surgical consult for feeding tube placement.  We also arranged for him to be seen in our department at [facility] this morning for  mask construction and CT simulation.                  Review of Systems   Constitutional: Negative.    HENT: Positive for tinnitus.         Left neck mass   Respiratory: Negative.    Cardiovascular: Negative.    Musculoskeletal: Negative.    Neurological: Negative.    Hematological: Positive for adenopathy.         Past Medical History:   Diagnosis Date   • Benign essential hypertension    • H/O complete eye exam due   • Hyperlipidemia    • IFG (impaired fasting glucose)          Past Surgical History:   Procedure Laterality Date   • COLONOSCOPY  2015   • HERNIA REPAIR Left 1994    inguinal   • HERNIA REPAIR Right 1993    inguinal         Social  "History     Social History   • Marital status:      Spouse name: N/A   • Number of children: N/A   • Years of education: N/A     Social History Main Topics   • Smoking status: Never Smoker   • Smokeless tobacco: Never Used   • Alcohol use Yes      Comment: rare   • Drug use: No   • Sexual activity: No     Other Topics Concern   • None     Social History Narrative         Family History   Problem Relation Age of Onset   • Alcohol abuse Father    • Diabetes Father           Objective    Physical Exam  Spontaneous, alert, in no apparent distress.  Palpable mass in left neck.  No supraclavicular lymphadenopathy.    Current Outpatient Prescriptions on File Prior to Visit   Medication Sig Dispense Refill   • fluticasone (FLONASE) 50 MCG/ACT nasal spray 2 sprays into each nostril Daily. Administer 2 sprays in each nostril for each dose. 1 bottle 0   • metoprolol succinate XL (TOPROL XL) 100 MG 24 hr tablet Take 1 tablet by mouth Daily. 90 tablet 3     No current facility-administered medications on file prior to visit.        ALLERGIES:  No Known Allergies    /82  Pulse 63  Temp 97.8 °F (36.6 °C) (Oral)   Ht 73\" (185.4 cm)  Wt 206 lb (93.4 kg)  SpO2 98%  BMI 27.18 kg/m2     No flowsheet data found.      Assessment/Plan   Stage YEN oropharyngeal squamous cell carcinoma, for combined modality therapy.  We will set up a dental consult, feeding tube placement, and medical oncology consult.  Mask and CT simulation have been completed today.  My dose aim would be 70 Gy in 35 fractions.  It may take some time to get the aforementioned consults completed, especially with Thanksgiving next week I think a likely start date would be Monday, December 4.          I spent greater than 45 minutes and face-to-face time with the patient, and greater than 40 minutes of that time was spent in counseling and coordination of care, including review of films, as well as indications, goals, logistics, alternatives, risks both " common and rare, as well as surveillance and potential outcomes.             Ameya Aguirre MD

## 2017-11-16 NOTE — PROGRESS NOTES
Subjective .     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                              REQUESTING PHYSICIAN: Ameya Aguirre MD     RECORDS OBTAINED:  Records of the patients history including those obtained from the referring provider were reviewed and summarized in detail.    HISTORY OF PRESENT ILLNESS:  The patient is a 67 y.o. year old male who is here for initial evaluation with the above-mentioned history.    Mr. Ritchie is here for evaluation accompanied by his wife, referred by radiation oncologist, Dr. Aguirre, because of newly diagnosed squamous cell carcinoma of the left tongue base, stage YEN, [id number].     Patient previously only had history of mild hypertension which was controlled. Recently in early 09/2017 when he was on vacation, he felt a left neck nodule when he was shaving. He denies any pain associated with that. Patient was seen by his primary care physician, Dr. Sheffield, for evaluation and was referred to ENT, Dr. Zane Del Toro. Patient subsequently had CT of the neck examination at [facility] on 09/22/2017. This study reported a left neck mass measuring 3.5 x 3.1 x 2.4 cm with cystic/necrotic changes located at the region of the left level II jugular chain. There were additional small homogeneous cervical lymph nodes but possibly reactive.     Patient subsequently had ultrasound of the neck on 10/13/2017 associated with fine-needle aspiration biopsy at Dr. Del Toro' office. The ultrasound reported 2 nodules in left neck. The large one measured about 3.28 cm x 2.67 cm x 3.28 cm. The 2nd smaller one measures 1.56 cm x 0.99 cm x 1.48 cm, just below the large mass. Patient had fine-needle aspiration biopsy at the same time. Pathology evaluation from the [facility] reported poorly  differentiated squamous cell carcinoma with degeneration and necrosis. The viable tumor cells exhibit strong nuclear reactivity for both p40 and p63.    Patient subsequently had tonsillectomy, biopsy of the left tongue base and adenoidectomy on 11/01/2017 by Dr. Del Toro. Pathology evaluation from [facility] reported moderately differentiated squamous cell carcinoma. The left tonsil has no evidence of malignancy. Right tonsil also was benign. The adenoid tissue was also benign.     Patient reports the left neck mass is growing. He also reports poor appetite after tonsillectomy. For the past couple of weeks since the biopsy, he lost about 6 pounds. He denies nausea or vomiting. He has actually started feeling better with improved appetite. Denies pain. Denies fever or sweating.     This patient reports he never smoked cigarette. He is only a very rare social drinker. He told me the test for HPV is ongoing.     Patient also had PET scan examination obtained on 11/14/2017. This study reported focal hypermetabolism with SUV 15.9 corresponding to the left-sided base of the tongue. There was moderate enlarged left jugular chain lymph node which is hypermetabolic but without measuring SUV. There was also mild hypermetabolism identified within several additional smaller left jugular chain lymph nodes. There was no hypermetabolic lymphadenopathy elsewhere in the neck nor foci in the chest, abdomen or pelvis.        Past Medical History:   Diagnosis Date   • Benign essential hypertension    • H/O complete eye exam due   • Hyperlipidemia    • IFG (impaired fasting glucose)      Past Surgical History:   Procedure Laterality Date   • COLONOSCOPY  2015   • HERNIA REPAIR Left 1994    inguinal   • HERNIA REPAIR Right 1993    inguinal       HEMATOLOGIC/ONCOLOGIC HISTORY:  (History from previous dates can be found in the separate document.)  See history of present illness.     MEDICATIONS    Current Outpatient Prescriptions:   •   fluticasone (FLONASE) 50 MCG/ACT nasal spray, 2 sprays into each nostril Daily. Administer 2 sprays in each nostril for each dose., Disp: 1 bottle, Rfl: 0  •  metoprolol succinate XL (TOPROL XL) 100 MG 24 hr tablet, Take 1 tablet by mouth Daily., Disp: 90 tablet, Rfl: 3    ALLERGIES:   No Known Allergies.  No drug allergy.  Patient has seasonal allergy.    SOCIAL HISTORY:       Social History     Social History   • Marital status:      Spouse name: Janiya    • Number of children: 2    • Years of education: High school education      Occupational History   • Retired  from [employer]      Social History Main Topics   • Smoking status: Never Smoker   • Smokeless tobacco: Never Used   • Alcohol use Yes      Comment: rare   • Drug use: No   • Sexual activity:          FAMILY HISTORY:  Family History   Problem Relation Age of Onset   • Alcohol abuse Father,  in his 80s because combination from alcohol.      • Diabetes Father    Mother  in her 80s because of how sinus disease.  Has 1 brother and 3 sisters in fair or good conditions.  No family history of malignancy.    REVIEW OF SYSTEMS:  GENERAL: See history of present illness.  No fevers, chills, sweats.    SKIN: No nonhealing lesions.  No rashes.  HEME/LYMPH: No easy bruising, bleeding. See history of present illness.   EYES: No vision changes or diplopia.   ENT: No tinnitus, hearing loss, gum bleeding, epistaxis, hoarseness or dysphagia.   RESPIRATORY: No cough, shortness of breath, hemoptysis or wheezing.   CVS: No chest pain, palpitations, orthopnea, dyspnea on exertion or PND.   GI: No melena or hematochezia. No abdominal pain.  No nausea, vomiting, constipation, diarrhea  : No lower tract obstructive symptoms, dysuria or hematuria.   MUSCULOSKELETAL: No bone pain.  No joint stiffness.   NEUROLOGICAL: No global weakness, loss of consciousness or seizures.   PSYCHIATRIC: No increased nervousness, mood changes or depression.  "    Objective    Vitals:    17 1549   BP: 130/80   Pulse: 72   Resp: 16   Temp: 98.4 °F (36.9 °C)   Weight: 206 lb 6.4 oz (93.6 kg)   Height: 72.84\" (185 cm)   PainSc: 1  Comment: throat   ECO       PHYSICAL EXAM:    GENERAL:  Well-developed, well-nourished  male in no acute distress.   SKIN:  Warm, dry without rashes, purpura or petechiae.  EYES:  Pupils equal, round and reactive to light.  EOMs intact.  Conjunctivae normal.  EARS:  Hearing intact.  NOSE:   No nasal discharge.  MOUTH:  Tongue is well-papillated; no stomatitis or ulcers.  Lips normal.  THROAT:  Oropharynx without lesions or exudates.  The left tonsil bed has little yellowish mucus.   NECK:  Supple with good range of motion; no thyromegaly.  Left neck has mass measuring about 4.5 cm in diameter.   LYMPHATICS:  No other cervical, supraclavicular, axillary or inguinal adenopathy.  CHEST:  Lungs clear to auscultation. Good airflow.  CARDIAC:  Regular rate and rhythm without murmurs, rubs or gallops. Normal S1,S2.  ABDOMEN:  Soft, nontender with no hepatosplenomegaly or masses.  Bowel sounds normal.   EXTREMITIES:  No clubbing, cyanosis or edema.  NEUROLOGICAL:  Cranial Nerves II-XII grossly intact.  No focal neurological deficits.  PSYCHIATRIC:  Normal affect and mood.    RECENT LABS:    Lab Results   Component Value Date    WBC 8.21 2017    HGB 15.2 2017    HCT 42.7 2017    MCV 89.9 2017     2017     Lab Results   Component Value Date    NEUTROABS 5.31 2017     Lab Results   Component Value Date    GLUCOSE 111 (H) 10/26/2017    BUN 13 10/26/2017    CREATININE 0.84 10/26/2017    EGFRIFNONA 91 10/26/2017    EGFRIFAFRI 94 2017    BCR 15.5 10/26/2017    K 4.3 10/26/2017    CO2 24.6 10/26/2017    CALCIUM 9.7 10/26/2017    PROTENTOTREF 7.3 2017    ALBUMIN 4.50 2017    LABIL2 1.6 2017    AST 26 2017    ALT 51 (H) 2017     Sodium   Date Value Ref Range Status "   10/26/2017 137 136 - 145 mmol/L Final     Potassium   Date Value Ref Range Status   10/26/2017 4.3 3.5 - 5.2 mmol/L Final     Total Bilirubin   Date Value Ref Range Status   01/17/2017 0.4 0.1 - 1.2 mg/dL Final     Alkaline Phosphatase   Date Value Ref Range Status   01/17/2017 97 39 - 117 U/L Final   ]      Assessment/Plan      Newly diagnosed squamous cell carcinoma of the left base of the tongue, stage YEN (T1Nb) disease.     Patient never smoked cigarettes and he drinks alcohol beverage very rarely. His tumor sample is being tested for HPV. Nevertheless, that will not interfere with the treatment modality. This patient will need concurrent chemoradiation therapy. Because the patient has otherwise pretty healthy medical history except mild hypertension, we will treat him with intention to cure. I recommended cisplatin once every 3 weeks for 3 doses with concurrent radiation therapy. Discussed with the patient and his wife about the side effects associated with cisplatin such as significant nausea, vomiting, and also fatigue, marrow suppression, dehydration, kidney injury/kidney failure among others. I will arrange chemotherapy teaching lesson to further discuss side effects associated with treatment.     This patient will need Port-A-Cath placement for chemotherapy. According to Dr. gAuirre, the time to start radiation therapy would be 12/04/2017 because of the Thanksgiving holiday. We will be on board to start chemotherapy the same time. We will refer patient to Dr. Fischer for Port-A-Cath placement.     PLAN:   1. Arrange chemotherapy teaching lesson for cisplatin 100 mg/cm2. Repeat every 3 weeks for 3 doses.   2. Refer to general surgeon, Dr. Fischer for PowerPort placement. I am aware patient also will need a PEG tube placement for feeding purpose; likely will also be placed by Dr. Fischer.   3. I E-scribed the following medication to his local pharmacy:   1). Dexamethasone 8 mg starting night before  chemotherapy then 8 mg the day after chemotherapy then 8 mg b.i.d. for the next 2 days.   2). Compazine 10 mg p.o. q.6 h. p.r.n. for nausea or vomiting from chemotherapy.   3). Zofran 8 mg q.8 h. p.r.n. for nausea or vomiting.     4. We will arrange patient for reevaluation in the morning on 12/04/2017 prior to starting chemotherapy. We will obtain routine laboratory study.    I independently reviewed his PET scan images.  I also shared those images with the patient and his wife today.     More than 80 min, over half of that time were for counseling.    I discussed the management of this case with Dr. Aguirre today.     DOMENICA LEAL M.D., Ph.D.    11/16/2017        Dictated using Dragon Dictation.

## 2017-11-20 ENCOUNTER — DOCUMENTATION (OUTPATIENT)
Dept: ONCOLOGY | Facility: CLINIC | Age: 67
End: 2017-11-20

## 2017-11-20 NOTE — PROGRESS NOTES
Pt was seen at [facility] on 11/16/17 for an initial radiation oncology consultation by Dr. Aguirre for base of tongue cancer. The pt completed the Distress Questionnaire and scored 0/10, marking no items. The pt will be treated at the [facility] location. Currently, the pt does not have an advanced care directive scanned into his medical record.     Fauzia Dunn [facility]  Oncology Social Worker

## 2017-11-21 ENCOUNTER — DOCUMENTATION (OUTPATIENT)
Dept: ONCOLOGY | Facility: CLINIC | Age: 67
End: 2017-11-21

## 2017-11-21 NOTE — PROGRESS NOTES
LUIS ANTONIO met with the patient and his wife Janiya prior to his consultation with Dr. Ballesteros. His appointment was added to the schedule during the day, in order to have him seen quickly. Pt noticed a lump on the side of his neck on 9/1/17. He went to a doctor and eventually the cancer was diagnosed. He had a tonsillectomy 2 weeks ago. He was told by 2 doctors that there was a 99% chance that this was not malignant. They were very surprised when it was. Pt has been to the radiation center and had a mask made today. A CT has also been done. He said he was surprised to learn that he may need a feeding tube.    Pt and his wife have been  for 21 years, but together for 35. She has a son from an earlier relationship. He has 2 daughters, who are in the area and who are calling daily to check on him. He retired from [employer] in 2009. Janiya works 3 days a week as a dental .    Pt was surprised at this diagnosis, as he had been expecting that it would be benign. He said he is recovering well from surgery. Pt was very open and friendly. He is alert and oriented x 3. LUIS ANTONIO services were explained and assistance offered as needed in the future.

## 2017-11-22 ENCOUNTER — OFFICE VISIT (OUTPATIENT)
Dept: SURGERY | Facility: CLINIC | Age: 67
End: 2017-11-22

## 2017-11-22 ENCOUNTER — OFFICE VISIT (OUTPATIENT)
Dept: ONCOLOGY | Facility: CLINIC | Age: 67
End: 2017-11-22

## 2017-11-22 ENCOUNTER — APPOINTMENT (OUTPATIENT)
Dept: LAB | Facility: HOSPITAL | Age: 67
End: 2017-11-22

## 2017-11-22 VITALS — HEART RATE: 55 BPM | OXYGEN SATURATION: 97 % | WEIGHT: 206 LBS | HEIGHT: 73 IN | BODY MASS INDEX: 27.3 KG/M2

## 2017-11-22 VITALS — WEIGHT: 207.1 LBS | BODY MASS INDEX: 27.45 KG/M2

## 2017-11-22 DIAGNOSIS — C01 CANCER OF BASE OF TONGUE (HCC): ICD-10-CM

## 2017-11-22 DIAGNOSIS — C09.9 CANCER OF TONSIL (HCC): Primary | ICD-10-CM

## 2017-11-22 DIAGNOSIS — C10.9 OROPHARYNGEAL CANCER (HCC): Primary | ICD-10-CM

## 2017-11-22 PROCEDURE — 99203 OFFICE O/P NEW LOW 30 MIN: CPT | Performed by: SURGERY

## 2017-11-22 PROCEDURE — 99215 OFFICE O/P EST HI 40 MIN: CPT | Performed by: NURSE PRACTITIONER

## 2017-11-22 PROCEDURE — G0463 HOSPITAL OUTPT CLINIC VISIT: HCPCS | Performed by: NURSE PRACTITIONER

## 2017-11-22 NOTE — PROGRESS NOTES
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  He underwent a subsequent workup and evaluation which culminated with a tonsillectomy and endoscopy which demonstrated a cancer of the base of the tongue.  He has recovered nicely from his tonsillectomy and still has some mild discomfort but overall is eating pretty well at this time.  Plans going forward though are for radiation and chemotherapy.  The anticipation is that he will have difficulty swallowing and eating and as such I then asked to see him for placement of a percutaneous feeding access and [id number] for chemotherapy.    Past Medical History: Squamous cell cancer of the base of the tongue, hyperlipidemia, hypertension    Past Surgical History: Colonoscopy, bilateral inguinal hernia repair, endoscopy and biopsy of tongue, tonsillectomy, adenoidectomy    Medications: Metoprolol    Allergies: None known    Social History: Nonsmoker, rarely uses alcohol, recently retired    Family History: Positive for diabetes, negative for throat cancer    Review of Systems:  Constitutional: Negative for change in activity, fever, change in appetite or weight  Neck: Positive for runny nose and tinnitus, negative for mouth sores or change in voice  Respiratory: negative for SOB, cough, hemoptysis or wheezing  Cardiovascular: negative for chest pain, palpitations or peripheral edema  Gastrointestinal: Negative for abdominal pain, abdominal distention, constipation or diarrhea      Physical Exam:    General: alert and oriented, appropriate, no acute distress  Neck: Supple without lymphadenopathy or thyromegaly, trachea is in the midline  Respiratory: Lungs are clear bilaterally without wheezing, no use of accessory muscles is noted  Cardiovascular: Regular rate and rhythm without murmur, no peripheral  edema  Gastrointestinal: Soft, benign, no hernia or hepatosplenomegaly, bowel sounds positive    Laboratory data: White blood cell count normal at 8.2.  Hemoglobin 15.2.  Chemistry unremarkable.    Imaging data: PET/CT reviewed.  Hypermetabolic focus in the left base of tongue.  Possible enlarged lymph node in the left jugular chain.  No other evidence of distant metastatic disease.:  Does not appear to interpose between the stomach and anterior abdominal wall.      Assessment and plan:   Squamous cell cancer of the head and neck with plans for further treatment of chemotherapy and radiation.  Need for [id number] placement due to chemotherapy.  Likely need for feeding access percutaneous due to anticipated swallowing difficulties and malnutrition.    Risks and benefits of percutaneous feeding access and port placement discussed with the patient.  Patient and his wife expressed understanding of complications including, but not limited to, bleeding, pneumothorax, infection, device failure and pain.  They also understand that with regard to the feeding access dislodgment of the tube, infectious problems, injury to other intra-abdominal structures including the liver, colon, small intestine and the possible need for revisional surgeries were discussed.      Isma Hercules MD, FACS  General, Minimally Invasive and Endoscopic Surgery  [facility] [place], operated by [facility]    [address], [address]  [place], [place], [address]  P: [phone]  F: [phone]

## 2017-11-28 ENCOUNTER — ANESTHESIA (OUTPATIENT)
Dept: PERIOP | Facility: HOSPITAL | Age: 67
End: 2017-11-28

## 2017-11-28 ENCOUNTER — APPOINTMENT (OUTPATIENT)
Dept: GENERAL RADIOLOGY | Facility: HOSPITAL | Age: 67
End: 2017-11-28

## 2017-11-28 ENCOUNTER — ANESTHESIA EVENT (OUTPATIENT)
Dept: PERIOP | Facility: HOSPITAL | Age: 67
End: 2017-11-28

## 2017-11-28 ENCOUNTER — HOSPITAL ENCOUNTER (OUTPATIENT)
Facility: HOSPITAL | Age: 67
Setting detail: HOSPITAL OUTPATIENT SURGERY
Discharge: HOME | End: 2017-11-28
Attending: SURGERY | Admitting: SURGERY

## 2017-11-28 VITALS
BODY MASS INDEX: 27.21 KG/M2 | DIASTOLIC BLOOD PRESSURE: 82 MMHG | OXYGEN SATURATION: 98 % | SYSTOLIC BLOOD PRESSURE: 151 MMHG | RESPIRATION RATE: 16 BRPM | WEIGHT: 205.31 LBS | TEMPERATURE: 97.5 F | HEART RATE: 54 BPM | HEIGHT: 73 IN

## 2017-11-28 DIAGNOSIS — C09.9 CANCER OF TONSIL (HCC): ICD-10-CM

## 2017-11-28 LAB
ANION GAP SERPL CALCULATED.3IONS-SCNC: 11.4 MMOL/L
BUN BLD-MCNC: 11 MG/DL (ref 8–23)
BUN/CREAT SERPL: 12.9 (ref 7–25)
CALCIUM SPEC-SCNC: 9.8 MG/DL (ref 8.6–10.5)
CHLORIDE SERPL-SCNC: 100 MMOL/L (ref 98–107)
CO2 SERPL-SCNC: 28.6 MMOL/L (ref 22–29)
CREAT BLD-MCNC: 0.85 MG/DL (ref 0.76–1.27)
GFR SERPL CREATININE-BSD FRML MDRD: 89 ML/MIN/1.73
GLUCOSE BLD-MCNC: 122 MG/DL (ref 65–99)
POTASSIUM BLD-SCNC: 4.9 MMOL/L (ref 3.5–5.2)
SODIUM BLD-SCNC: 140 MMOL/L (ref 136–145)

## 2017-11-28 PROCEDURE — 25010000002 PROPOFOL 10 MG/ML EMULSION: Performed by: NURSE ANESTHETIST, CERTIFIED REGISTERED

## 2017-11-28 PROCEDURE — 25010000002 MIDAZOLAM PER 1 MG: Performed by: ANESTHESIOLOGY

## 2017-11-28 PROCEDURE — 77001 FLUOROGUIDE FOR VEIN DEVICE: CPT

## 2017-11-28 PROCEDURE — 25010000002 HEPARIN (PORCINE) PER 1000 UNITS: Performed by: SURGERY

## 2017-11-28 PROCEDURE — 80048 BASIC METABOLIC PNL TOTAL CA: CPT | Performed by: SURGERY

## 2017-11-28 PROCEDURE — C1788 PORT, INDWELLING, IMP: HCPCS | Performed by: SURGERY

## 2017-11-28 PROCEDURE — 43246 EGD PLACE GASTROSTOMY TUBE: CPT | Performed by: SURGERY

## 2017-11-28 PROCEDURE — 25010000002 FENTANYL CITRATE (PF) 100 MCG/2ML SOLUTION: Performed by: NURSE ANESTHETIST, CERTIFIED REGISTERED

## 2017-11-28 PROCEDURE — 36561 INSERT TUNNELED CV CATH: CPT | Performed by: SURGERY

## 2017-11-28 DEVICE — POWERPORT CLEARVUE IMPLANTABLE PORT WITH ATTACHABLE 8F POLYURETHANE OPEN-ENDED SINGLE-LUMEN VENOUS CATHETER INTERMEDIATE KIT
Type: IMPLANTABLE DEVICE | Site: ESOPHAGUS | Status: FUNCTIONAL
Brand: POWERPORT CLEARVUE

## 2017-11-28 RX ORDER — FENTANYL CITRATE 50 UG/ML
50 INJECTION, SOLUTION INTRAMUSCULAR; INTRAVENOUS
Status: DISCONTINUED | OUTPATIENT
Start: 2017-11-28 | End: 2017-11-28 | Stop reason: HOSPADM

## 2017-11-28 RX ORDER — ONDANSETRON 2 MG/ML
4 INJECTION INTRAMUSCULAR; INTRAVENOUS ONCE AS NEEDED
Status: DISCONTINUED | OUTPATIENT
Start: 2017-11-28 | End: 2017-11-28 | Stop reason: HOSPADM

## 2017-11-28 RX ORDER — PROPOFOL 10 MG/ML
VIAL (ML) INTRAVENOUS CONTINUOUS PRN
Status: DISCONTINUED | OUTPATIENT
Start: 2017-11-28 | End: 2017-11-28 | Stop reason: SURG

## 2017-11-28 RX ORDER — FAMOTIDINE 10 MG/ML
20 INJECTION, SOLUTION INTRAVENOUS ONCE
Status: COMPLETED | OUTPATIENT
Start: 2017-11-28 | End: 2017-11-28

## 2017-11-28 RX ORDER — PROMETHAZINE HYDROCHLORIDE 25 MG/ML
5 INJECTION, SOLUTION INTRAMUSCULAR; INTRAVENOUS
Status: DISCONTINUED | OUTPATIENT
Start: 2017-11-28 | End: 2017-11-28 | Stop reason: HOSPADM

## 2017-11-28 RX ORDER — MIDAZOLAM HYDROCHLORIDE 1 MG/ML
2 INJECTION INTRAMUSCULAR; INTRAVENOUS
Status: DISCONTINUED | OUTPATIENT
Start: 2017-11-28 | End: 2017-11-28 | Stop reason: HOSPADM

## 2017-11-28 RX ORDER — HYDROMORPHONE HYDROCHLORIDE 1 MG/ML
0.5 INJECTION, SOLUTION INTRAMUSCULAR; INTRAVENOUS; SUBCUTANEOUS
Status: DISCONTINUED | OUTPATIENT
Start: 2017-11-28 | End: 2017-11-28 | Stop reason: HOSPADM

## 2017-11-28 RX ORDER — MIDAZOLAM HYDROCHLORIDE 1 MG/ML
1 INJECTION INTRAMUSCULAR; INTRAVENOUS
Status: DISCONTINUED | OUTPATIENT
Start: 2017-11-28 | End: 2017-11-28 | Stop reason: HOSPADM

## 2017-11-28 RX ORDER — PROMETHAZINE HYDROCHLORIDE 25 MG/1
25 TABLET ORAL ONCE AS NEEDED
Status: DISCONTINUED | OUTPATIENT
Start: 2017-11-28 | End: 2017-11-28 | Stop reason: HOSPADM

## 2017-11-28 RX ORDER — SODIUM CHLORIDE, SODIUM LACTATE, POTASSIUM CHLORIDE, CALCIUM CHLORIDE 600; 310; 30; 20 MG/100ML; MG/100ML; MG/100ML; MG/100ML
9 INJECTION, SOLUTION INTRAVENOUS CONTINUOUS
Status: DISCONTINUED | OUTPATIENT
Start: 2017-11-28 | End: 2017-11-28 | Stop reason: HOSPADM

## 2017-11-28 RX ORDER — HYDROCODONE BITARTRATE AND ACETAMINOPHEN 7.5; 325 MG/1; MG/1
1 TABLET ORAL ONCE AS NEEDED
Status: DISCONTINUED | OUTPATIENT
Start: 2017-11-28 | End: 2017-11-28 | Stop reason: HOSPADM

## 2017-11-28 RX ORDER — SODIUM CHLORIDE 0.9 % (FLUSH) 0.9 %
1-10 SYRINGE (ML) INJECTION AS NEEDED
Status: DISCONTINUED | OUTPATIENT
Start: 2017-11-28 | End: 2017-11-28 | Stop reason: HOSPADM

## 2017-11-28 RX ORDER — LABETALOL HYDROCHLORIDE 5 MG/ML
5 INJECTION, SOLUTION INTRAVENOUS
Status: DISCONTINUED | OUTPATIENT
Start: 2017-11-28 | End: 2017-11-28 | Stop reason: HOSPADM

## 2017-11-28 RX ORDER — PROMETHAZINE HYDROCHLORIDE 25 MG/1
12.5 TABLET ORAL ONCE AS NEEDED
Status: DISCONTINUED | OUTPATIENT
Start: 2017-11-28 | End: 2017-11-28 | Stop reason: HOSPADM

## 2017-11-28 RX ORDER — FENTANYL CITRATE 50 UG/ML
INJECTION, SOLUTION INTRAMUSCULAR; INTRAVENOUS AS NEEDED
Status: DISCONTINUED | OUTPATIENT
Start: 2017-11-28 | End: 2017-11-28 | Stop reason: SURG

## 2017-11-28 RX ORDER — PROMETHAZINE HYDROCHLORIDE 25 MG/ML
12.5 INJECTION, SOLUTION INTRAMUSCULAR; INTRAVENOUS ONCE AS NEEDED
Status: DISCONTINUED | OUTPATIENT
Start: 2017-11-28 | End: 2017-11-28 | Stop reason: HOSPADM

## 2017-11-28 RX ORDER — FLUMAZENIL 0.1 MG/ML
0.2 INJECTION INTRAVENOUS AS NEEDED
Status: DISCONTINUED | OUTPATIENT
Start: 2017-11-28 | End: 2017-11-28 | Stop reason: HOSPADM

## 2017-11-28 RX ORDER — DIPHENHYDRAMINE HYDROCHLORIDE 50 MG/ML
12.5 INJECTION INTRAMUSCULAR; INTRAVENOUS
Status: DISCONTINUED | OUTPATIENT
Start: 2017-11-28 | End: 2017-11-28 | Stop reason: HOSPADM

## 2017-11-28 RX ORDER — NALOXONE HCL 0.4 MG/ML
0.2 VIAL (ML) INJECTION AS NEEDED
Status: DISCONTINUED | OUTPATIENT
Start: 2017-11-28 | End: 2017-11-28 | Stop reason: HOSPADM

## 2017-11-28 RX ORDER — EPHEDRINE SULFATE 50 MG/ML
5 INJECTION, SOLUTION INTRAVENOUS ONCE AS NEEDED
Status: DISCONTINUED | OUTPATIENT
Start: 2017-11-28 | End: 2017-11-28 | Stop reason: HOSPADM

## 2017-11-28 RX ORDER — PROMETHAZINE HYDROCHLORIDE 25 MG/1
25 SUPPOSITORY RECTAL ONCE AS NEEDED
Status: DISCONTINUED | OUTPATIENT
Start: 2017-11-28 | End: 2017-11-28 | Stop reason: HOSPADM

## 2017-11-28 RX ORDER — HYDRALAZINE HYDROCHLORIDE 20 MG/ML
5 INJECTION INTRAMUSCULAR; INTRAVENOUS
Status: DISCONTINUED | OUTPATIENT
Start: 2017-11-28 | End: 2017-11-28 | Stop reason: HOSPADM

## 2017-11-28 RX ADMIN — PROPOFOL 200 MCG/KG/MIN: 10 INJECTION, EMULSION INTRAVENOUS at 13:23

## 2017-11-28 RX ADMIN — SODIUM CHLORIDE, POTASSIUM CHLORIDE, SODIUM LACTATE AND CALCIUM CHLORIDE: 600; 310; 30; 20 INJECTION, SOLUTION INTRAVENOUS at 14:15

## 2017-11-28 RX ADMIN — MIDAZOLAM 2 MG: 1 INJECTION INTRAMUSCULAR; INTRAVENOUS at 12:45

## 2017-11-28 RX ADMIN — FAMOTIDINE 20 MG: 10 INJECTION, SOLUTION INTRAVENOUS at 12:44

## 2017-11-28 RX ADMIN — FENTANYL CITRATE 50 MCG: 50 INJECTION, SOLUTION INTRAMUSCULAR; INTRAVENOUS at 14:31

## 2017-11-28 RX ADMIN — SODIUM CHLORIDE, POTASSIUM CHLORIDE, SODIUM LACTATE AND CALCIUM CHLORIDE 9 ML/HR: 600; 310; 30; 20 INJECTION, SOLUTION INTRAVENOUS at 12:44

## 2017-11-28 RX ADMIN — AMPICILLIN SODIUM AND SULBACTAM SODIUM 3 G: 2; 1 INJECTION, POWDER, FOR SOLUTION INTRAMUSCULAR; INTRAVENOUS at 13:18

## 2017-11-28 NOTE — DISCHARGE INSTRUCTIONS
Change gauze around Gastrostomy Tube site if drainage present. Call Dr. Hercules if signs and symptoms of infections present (redness around surgical site, increase in drainage, increase in pain, fever, tube becomes dislodged, cannot flush tube, or if you have any questions).

## 2017-11-28 NOTE — ANESTHESIA PREPROCEDURE EVALUATION
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

## 2017-11-28 NOTE — OP NOTE
Operative Note :   MD William Oleary  1950    Procedure Date: 11/28/17    Pre-op Diagnosis:  Squamous cell cancer of the head and neck    Post-Op Diagnosis:  Squamous cell cancer of the head and neck    Procedure:   · Esophagogastroduodenoscopy with placement of percutaneous endoscopic gastrostomy tube  · Left subclavian vein approach [id number] placement    Surgeon: Isma Hercules MD    Assistant: None    Anesthesia:  MAC (monitored anesthetic care)    EBL:   minimal    Specimens:   None    Indications:  · 67-year-old gentleman with squamous cell cancer of the base of the tongue who presents for port placement for chemotherapy and feeding access for anticipated radiation swallowing problems    Findings:   · None    Recommendations:   · Routine care    Description of procedure:    After obtaining informed consent, patient brought to the operating room and was sedated.  The flexible endoscope was inserted into the patient's mouth and the cricopharyngeus into the esophagus and through the GE junction into the stomach and then through the pylorus and into the duodenum.  The junction of the second and third portion of the duodenum was reached.  The duodenum was unremarkable as was the duodenal bulb and pyloric channel.  Stomach was unremarkable.  Stomach was insufflated completely and retroflexed.  No findings were noted.  The anterior wall of the stomach was identified.  I was able to easily palpate an area a couple of centimeters below the costal margin and just to the left of the midline with good one-to-one visualization we were able to transilluminate as well.  The hairs on his abdomen with and clipped.  This area was then sterilely prepped and draped and then infiltrated with a 1% lidocaine solution.  A 5 mm incision was made with an 11 blade and then an 18-gauge needle and sheath were passed through this incision and under visualization into the stomach.  A wire was passed through  the needle and then grasped with the snare and pulled up through the patient's mouth.  The feeding tube was then fed over the wire until the wire was grasped on the other end of the tube.  The wire was then pulled back and the feeding tube was pulled up so that the bumper was snug against the abdominal wall.  The endoscope was then reinserted and the bumper of the G-tube was documented to be against the wall of the stomach at about 3-3-1/2 cm.  Scope was removed after desufflated the stomach.  The feeding tube was secured at 3-1/2 cm.  It was clamped.      Attention was then turned to the patient's chest.  Shoulder roll was placed.  The hairs were clipped and his chest was sterilely prepped and draped.   Head was placed down in the Trendelenburg position.  Left subclavian vein was easily accessed after anesthetizing the region below the left clavicle with a mixture of lidocaine and Marcaine.  A wire was passed under fluoroscopic guidance through the needle and subclavian vein and then into the superior vena cava and right atrium.  The needle was then removed.  A site for the pocket was marked and a skin incision was made and carried through the subcutaneous tissues down onto the pectoralis fascia.  Pocket was bluntly created.  Hemostasis was gained.  The pocket was packed with a dry gauze.  The stick site was then enlarged with a #11 blade to about 3 mm and the vein dilator and sheath were passed over the wire under fluoroscopic guidance into position.  The vein dilator and wire were then removed and were replaced with the 8 Czech catheter which was extended under fluoroscopic guidance to the level of the atrial caval junction.  The peel-away sheath was then removed.  The tunneler was used to pass the catheter down into the pocket.  The catheter was then cut to the appropriate length and attached to the Bard Clearvue port which was then secured to the chest wall with two 3-0 Prolene sutures.  The port was flushed  with the diluted heparin solution and then locked with the concentrated solution.  The incision was closed with interrupted 3-0 Vicryl's and skin glue.  Sterile dressings were applied and the patient was brought back to recovery in good condition.  He tolerated the procedure well.      Isma Hercules MD  General and Endoscopic Surgery  [facility]    [address], [address]  [place], [place], [address]  P: [phone]  F: [phone]

## 2017-11-28 NOTE — INTERVAL H&P NOTE
H&P reviewed. The patient was examined and there are no changes to the H&P.     Isma Hercules MD  General and Endoscopic Surgery  [facility]    [address], [address]  [place], [place], [address]  P: [phone]  F: [phone]

## 2017-11-28 NOTE — ANESTHESIA POSTPROCEDURE EVALUATION
Patient: William Ritchie    Procedure Summary     Date Anesthesia Start Anesthesia Stop Room / Location    11/28/17 1317 1439  [facility] / [facility]       Procedure Diagnosis Surgeon Provider    INSERTION VENOUS ACCESS DEVICE (Left ); ESOPHAGOGASTRODUODENOSCOPY WITH PERCUTANEOUS ENDOSCOPIC GASTROSTOMY TUBE INSERTION (N/A Esophagus) Cancer of tonsil  (Cancer of tonsil [C09.9]) MD Katerine Ga MD          Anesthesia Type: MAC  Last vitals  BP   110/67 (11/28/17 1435)   Temp   36.6 °C (97.8 °F) (11/28/17 1136)   Pulse   68 (11/28/17 1435)   Resp   16 (11/28/17 1435)     SpO2   99 % (11/28/17 1435)     Post Anesthesia Care and Evaluation    Patient location during evaluation: PHASE II  Patient participation: complete - patient participated  Level of consciousness: awake  Pain score: 1  Pain management: adequate  Anesthetic complications: No anesthetic complications  PONV Status: none  Cardiovascular status: acceptable  Respiratory status: acceptable  Hydration status: acceptable

## 2017-11-28 NOTE — PLAN OF CARE
Problem: Patient Care Overview (Adult)  Goal: Plan of Care Review  Outcome: Ongoing (interventions implemented as appropriate)    11/28/17 1200   Coping/Psychosocial Response Interventions   Plan Of Care Reviewed With patient   Patient Care Overview   Progress no change       Goal: Adult Individualization and Mutuality  Outcome: Ongoing (interventions implemented as appropriate)    11/28/17 1200   Individualization   Patient Specific Preferences PT GOES BY [facility]       Goal: Discharge Needs Assessment  Outcome: Ongoing (interventions implemented as appropriate)    Problem: Perioperative Period (Adult)  Goal: Signs and Symptoms of Listed Potential Problems Will be Absent or Manageable (Perioperative Period)  Outcome: Ongoing (interventions implemented as appropriate)    11/28/17 1200   Perioperative Period   Problems Assessed (Perioperative Period) pain;infection   Problems Present (Perioperative Period) none

## 2017-11-28 NOTE — PLAN OF CARE
Problem: Patient Care Overview (Adult)  Goal: Plan of Care Review  Outcome: Outcome(s) achieved Date Met:  11/28/17 11/28/17 1631   Coping/Psychosocial Response Interventions   Plan Of Care Reviewed With patient;spouse   Patient Care Overview   Progress improving   Outcome Evaluation   Outcome Summary/Follow up Plan ready for discharge       Goal: Adult Individualization and Mutuality  Outcome: Outcome(s) achieved Date Met:  11/28/17  Goal: Discharge Needs Assessment  Outcome: Outcome(s) achieved Date Met:  11/28/17    Problem: Perioperative Period (Adult)  Goal: Signs and Symptoms of Listed Potential Problems Will be Absent or Manageable (Perioperative Period)  Outcome: Outcome(s) achieved Date Met:  11/28/17 11/28/17 1631   Perioperative Period   Problems Assessed (Perioperative Period) pain;wound complications;hemorrhage;hypoxia/hypoxemia;perioperative injury;situational response;physiologic stress response   Problems Present (Perioperative Period) pain

## 2017-11-28 NOTE — H&P (VIEW-ONLY)
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  He underwent a subsequent workup and evaluation which culminated with a tonsillectomy and endoscopy which demonstrated a cancer of the base of the tongue.  He has recovered nicely from his tonsillectomy and still has some mild discomfort but overall is eating pretty well at this time.  Plans going forward though are for radiation and chemotherapy.  The anticipation is that he will have difficulty swallowing and eating and as such I then asked to see him for placement of a percutaneous feeding access and [id number]-[id number] for chemotherapy.    Past Medical History: Squamous cell cancer of the base of the tongue, hyperlipidemia, hypertension    Past Surgical History: Colonoscopy, bilateral inguinal hernia repair, endoscopy and biopsy of tongue, tonsillectomy, adenoidectomy    Medications: Metoprolol    Allergies: None known    Social History: Nonsmoker, rarely uses alcohol, recently retired    Family History: Positive for diabetes, negative for throat cancer    Review of Systems:  Constitutional: Negative for change in activity, fever, change in appetite or weight  Neck: Positive for runny nose and tinnitus, negative for mouth sores or change in voice  Respiratory: negative for SOB, cough, hemoptysis or wheezing  Cardiovascular: negative for chest pain, palpitations or peripheral edema  Gastrointestinal: Negative for abdominal pain, abdominal distention, constipation or diarrhea      Physical Exam:    General: alert and oriented, appropriate, no acute distress  Neck: Supple without lymphadenopathy or thyromegaly, trachea is in the midline  Respiratory: Lungs are clear bilaterally without wheezing, no use of accessory muscles is noted  Cardiovascular: Regular rate and rhythm without murmur, no peripheral  edema  Gastrointestinal: Soft, benign, no hernia or hepatosplenomegaly, bowel sounds positive    Laboratory data: White blood cell count normal at 8.2.  Hemoglobin 15.2.  Chemistry unremarkable.    Imaging data: PET/CT reviewed.  Hypermetabolic focus in the left base of tongue.  Possible enlarged lymph node in the left jugular chain.  No other evidence of distant metastatic disease.:  Does not appear to interpose between the stomach and anterior abdominal wall.      Assessment and plan:   Squamous cell cancer of the head and neck with plans for further treatment of chemotherapy and radiation.  Need for Rfftdk-c-Vqcp placement due to chemotherapy.  Likely need for feeding access percutaneous due to anticipated swallowing difficulties and malnutrition.    Risks and benefits of percutaneous feeding access and port placement discussed with the patient.  Patient and his wife expressed understanding of complications including, but not limited to, bleeding, pneumothorax, infection, device failure and pain.  They also understand that with regard to the feeding access dislodgment of the tube, infectious problems, injury to other intra-abdominal structures including the liver, colon, small intestine and the possible need for revisional surgeries were discussed.      Isma Hercules MD, FACS  General, Minimally Invasive and Endoscopic Surgery  [facility]    [address], [address]  [place], [place], [address]  P: [phone]  F: [phone]

## 2017-11-29 ENCOUNTER — DOCUMENTATION (OUTPATIENT)
Dept: NUTRITION | Facility: HOSPITAL | Age: 67
End: 2017-11-29

## 2017-11-29 NOTE — PROGRESS NOTES
"Oncology Nutrition Screening    Patient Name:  William Ritchie  YOB: 1950  MRN: [id number]  Date:  11/29/17  Physician:  Carla Iniguez    Type of Cancer Treatment:   Surgery:   Type:  tonsillectomy  Radiation/Cyberknife: Number of Treatments:  35  Chemotherapy:  Type: Cisplatin  Treatment Schedule: Begins 12/4    Patient Active Problem List   Diagnosis   • Hyperlipidemia   • Benign essential hypertension   • IFG (impaired fasting glucose)   • Oropharyngeal cancer   • Cancer of base of tongue   • Cancer of tonsil       Current Outpatient Prescriptions   Medication Sig Dispense Refill   • dexamethasone (DECADRON) 4 MG tablet Take 2 tablets the night before chemo and the morning after chemo (Day 2), then take 2 tablets twice daily on days 3 & 4.  Take with food. (Patient taking differently: Take 4 mg by mouth. Take 2 tablets the night before chemo and the morning after chemo (Day 2), then take 2 tablets twice daily on days 3 & 4.  Take with food.) 12 tablet 2   • metoprolol succinate XL (TOPROL XL) 100 MG 24 hr tablet Take 1 tablet by mouth Daily. 90 tablet 3   • ondansetron (ZOFRAN) 8 MG tablet Take 1 tablet by mouth 3 (Three) Times a Day As Needed for Nausea or Vomiting. 30 tablet 5   • prochlorperazine (COMPAZINE) 10 MG tablet Take 1 tablet by mouth Every 6 (Six) Hours As Needed for Nausea or Vomiting for up to 60 doses. 60 tablet 5     No current facility-administered medications for this visit.        Glycemic Risk:   Steriods    Weight:   Height: 73\"  Weight:  205 lbs.  Usual Body Weight: 215  lbs.   BMI:27   Weight has decreased 10 pounds over last 2 weeks    Oral Food Intake:  Regular Diet - No Restrictions    Hydration Status:   How many 8 ounce glass of water of fluid do you drink per day?  8    Enteral Feeding:   Location of Feeding Tube:  Stomach  Date of Tube Placement: 11/28/17  PEG and port placed yesterday    Nutrition Symptoms:   Altered Apetite  Constipation  constipation with pain " meds after surgery    Activity:   Normal with no limitations     reports that he has never smoked. He has never used smokeless tobacco. He reports that he drinks alcohol. He reports that he does not use illicit drugs.    Evaluation of Nutritional Risk:   Patient identified to be at nutritional risk and/or for nutritional consultation; will follow patient through course of treatment.   Diagnosis  Nutrition Impact Symptoms  Patient Education     Spoke to patient on the phone today.  He had a PEG and port placed yesterday.  Will begin treatment on Monday 12/4.  I have referred the patient to Jennifer to contact patient for PEG feeding instructions so that he is prepared to use the tube when needed.  I have also sent the patient a packet of information on nutrition during chemoradiation treatment.  Will plan to see patient next week in radiation oncology but encouraged him to call with any questions.  He has lost about 10# after his tonsillectomy and was eating soft foods. He is eating regular food at this time.    Needs assessed at 8148-6007 calories per day, 110-115 grams protein per day, 2400cc water.    Will follow.         Electronically signed by:  Dana Liz RD  1:06 PM

## 2017-11-30 DIAGNOSIS — C10.9 OROPHARYNGEAL CANCER (HCC): ICD-10-CM

## 2017-11-30 PROBLEM — Z45.2 FITTING AND ADJUSTMENT OF VASCULAR CATHETER: Status: ACTIVE | Noted: 2017-11-30

## 2017-11-30 RX ORDER — SODIUM CHLORIDE 0.9 % (FLUSH) 0.9 %
10 SYRINGE (ML) INJECTION AS NEEDED
Status: CANCELLED | OUTPATIENT
Start: 2017-12-28

## 2017-12-04 ENCOUNTER — APPOINTMENT (OUTPATIENT)
Dept: RADIATION ONCOLOGY | Facility: HOSPITAL | Age: 67
End: 2017-12-04

## 2017-12-04 ENCOUNTER — INFUSION (OUTPATIENT)
Dept: ONCOLOGY | Facility: HOSPITAL | Age: 67
End: 2017-12-04

## 2017-12-04 ENCOUNTER — OFFICE VISIT (OUTPATIENT)
Dept: ONCOLOGY | Facility: CLINIC | Age: 67
End: 2017-12-04

## 2017-12-04 VITALS
HEIGHT: 73 IN | RESPIRATION RATE: 16 BRPM | HEART RATE: 74 BPM | TEMPERATURE: 97.9 F | BODY MASS INDEX: 26.93 KG/M2 | WEIGHT: 203.2 LBS | SYSTOLIC BLOOD PRESSURE: 110 MMHG | DIASTOLIC BLOOD PRESSURE: 62 MMHG

## 2017-12-04 VITALS — BODY MASS INDEX: 27.84 KG/M2 | WEIGHT: 210 LBS

## 2017-12-04 DIAGNOSIS — C10.9 OROPHARYNGEAL CANCER (HCC): Primary | ICD-10-CM

## 2017-12-04 DIAGNOSIS — C10.9 OROPHARYNGEAL CANCER (HCC): ICD-10-CM

## 2017-12-04 DIAGNOSIS — R73.9 STEROID-INDUCED HYPERGLYCEMIA: ICD-10-CM

## 2017-12-04 DIAGNOSIS — T38.0X5A STEROID-INDUCED HYPERGLYCEMIA: ICD-10-CM

## 2017-12-04 DIAGNOSIS — E78.5 HYPERLIPIDEMIA, UNSPECIFIED HYPERLIPIDEMIA TYPE: Primary | ICD-10-CM

## 2017-12-04 PROBLEM — C09.9 CANCER OF TONSIL (HCC): Status: RESOLVED | Noted: 2017-11-22 | Resolved: 2017-12-04

## 2017-12-04 LAB
ALBUMIN SERPL-MCNC: 3.9 G/DL (ref 3.5–5.2)
ALBUMIN/GLOB SERPL: 1.2 G/DL (ref 1.1–2.4)
ALP SERPL-CCNC: 107 U/L (ref 38–116)
ALT SERPL W P-5'-P-CCNC: 37 U/L (ref 0–41)
ANION GAP SERPL CALCULATED.3IONS-SCNC: 15.6 MMOL/L
AST SERPL-CCNC: 24 U/L (ref 0–40)
BASOPHILS # BLD AUTO: 0 10*3/MM3 (ref 0–0.1)
BASOPHILS NFR BLD AUTO: 0 % (ref 0–1.1)
BILIRUB SERPL-MCNC: 0.4 MG/DL (ref 0.1–1.2)
BUN BLD-MCNC: 18 MG/DL (ref 6–20)
BUN/CREAT SERPL: 22.2 (ref 7.3–30)
CALCIUM SPEC-SCNC: 9.5 MG/DL (ref 8.5–10.2)
CHLORIDE SERPL-SCNC: 100 MMOL/L (ref 98–107)
CO2 SERPL-SCNC: 22.4 MMOL/L (ref 22–29)
CREAT BLD-MCNC: 0.81 MG/DL (ref 0.7–1.3)
DEPRECATED RDW RBC AUTO: 39.8 FL (ref 37–49)
EOSINOPHIL # BLD AUTO: 0 10*3/MM3 (ref 0–0.36)
EOSINOPHIL NFR BLD AUTO: 0 % (ref 1–5)
ERYTHROCYTE [DISTWIDTH] IN BLOOD BY AUTOMATED COUNT: 11.7 % (ref 11.7–14.5)
GFR SERPL CREATININE-BSD FRML MDRD: 94 ML/MIN/1.73
GLOBULIN UR ELPH-MCNC: 3.2 GM/DL (ref 1.8–3.5)
GLUCOSE BLD-MCNC: 252 MG/DL (ref 74–124)
HCT VFR BLD AUTO: 41.6 % (ref 40–49)
HGB BLD-MCNC: 14.2 G/DL (ref 13.5–16.5)
HOLD SPECIMEN: NORMAL
IMM GRANULOCYTES # BLD: 0.03 10*3/MM3 (ref 0–0.03)
IMM GRANULOCYTES NFR BLD: 0.6 % (ref 0–0.5)
LYMPHOCYTES # BLD AUTO: 0.77 10*3/MM3 (ref 1–3.5)
LYMPHOCYTES NFR BLD AUTO: 14.7 % (ref 20–49)
MAGNESIUM SERPL-MCNC: 2.1 MG/DL (ref 1.8–2.5)
MCH RBC QN AUTO: 32 PG (ref 27–33)
MCHC RBC AUTO-ENTMCNC: 34.1 G/DL (ref 32–35)
MCV RBC AUTO: 93.7 FL (ref 83–97)
MONOCYTES # BLD AUTO: 0.04 10*3/MM3 (ref 0.25–0.8)
MONOCYTES NFR BLD AUTO: 0.8 % (ref 4–12)
NEUTROPHILS # BLD AUTO: 4.4 10*3/MM3 (ref 1.5–7)
NEUTROPHILS NFR BLD AUTO: 83.9 % (ref 39–75)
NRBC BLD MANUAL-RTO: 0 /100 WBC (ref 0–0)
PLATELET # BLD AUTO: 238 10*3/MM3 (ref 150–375)
PMV BLD AUTO: 9.5 FL (ref 8.9–12.1)
POTASSIUM BLD-SCNC: 4.3 MMOL/L (ref 3.5–4.7)
PROT SERPL-MCNC: 7.1 G/DL (ref 6.3–8)
RBC # BLD AUTO: 4.44 10*6/MM3 (ref 4.3–5.5)
SODIUM BLD-SCNC: 138 MMOL/L (ref 134–145)
WBC NRBC COR # BLD: 5.24 10*3/MM3 (ref 4–10)

## 2017-12-04 PROCEDURE — 77338 DESIGN MLC DEVICE FOR IMRT: CPT | Performed by: RADIOLOGY

## 2017-12-04 PROCEDURE — 77301 RADIOTHERAPY DOSE PLAN IMRT: CPT | Performed by: RADIOLOGY

## 2017-12-04 PROCEDURE — 80053 COMPREHEN METABOLIC PANEL: CPT

## 2017-12-04 PROCEDURE — 25010000002 DEXAMETHASONE SODIUM PHOSPHATE 10 MG/ML SOLUTION 1 ML VIAL: Performed by: INTERNAL MEDICINE

## 2017-12-04 PROCEDURE — 77427 RADIATION TX MANAGEMENT X5: CPT | Performed by: RADIOLOGY

## 2017-12-04 PROCEDURE — 25010000002 CISPLATIN PER 50 MG: Performed by: INTERNAL MEDICINE

## 2017-12-04 PROCEDURE — 77386: CPT | Performed by: RADIOLOGY

## 2017-12-04 PROCEDURE — 96367 TX/PROPH/DG ADDL SEQ IV INF: CPT | Performed by: INTERNAL MEDICINE

## 2017-12-04 PROCEDURE — 99215 OFFICE O/P EST HI 40 MIN: CPT | Performed by: INTERNAL MEDICINE

## 2017-12-04 PROCEDURE — 96413 CHEMO IV INFUSION 1 HR: CPT | Performed by: INTERNAL MEDICINE

## 2017-12-04 PROCEDURE — 25010000002 MAGNESIUM SULFATE PER 500 MG OF MAGNESIUM: Performed by: INTERNAL MEDICINE

## 2017-12-04 PROCEDURE — 25010000002 POTASSIUM CHLORIDE PER 2 MEQ OF POTASSIUM: Performed by: INTERNAL MEDICINE

## 2017-12-04 PROCEDURE — 85025 COMPLETE CBC W/AUTO DIFF WBC: CPT

## 2017-12-04 PROCEDURE — 77300 RADIATION THERAPY DOSE PLAN: CPT | Performed by: RADIOLOGY

## 2017-12-04 PROCEDURE — 77386 CHG INTENSITY MODULATED RADIATION TX DLVR COMPLEX: CPT | Performed by: RADIOLOGY

## 2017-12-04 PROCEDURE — 25010000002 FOSAPREPITANT PER 1 MG: Performed by: INTERNAL MEDICINE

## 2017-12-04 PROCEDURE — 96366 THER/PROPH/DIAG IV INF ADDON: CPT | Performed by: INTERNAL MEDICINE

## 2017-12-04 PROCEDURE — 83735 ASSAY OF MAGNESIUM: CPT

## 2017-12-04 PROCEDURE — 25010000002 PALONOSETRON PER 25 MCG: Performed by: INTERNAL MEDICINE

## 2017-12-04 PROCEDURE — 96375 TX/PRO/DX INJ NEW DRUG ADDON: CPT | Performed by: INTERNAL MEDICINE

## 2017-12-04 RX ORDER — PALONOSETRON 0.05 MG/ML
0.25 INJECTION, SOLUTION INTRAVENOUS ONCE
Status: COMPLETED | OUTPATIENT
Start: 2017-12-04 | End: 2017-12-04

## 2017-12-04 RX ORDER — PALONOSETRON 0.05 MG/ML
0.25 INJECTION, SOLUTION INTRAVENOUS ONCE
Status: CANCELLED | OUTPATIENT
Start: 2017-12-04

## 2017-12-04 RX ADMIN — SODIUM CHLORIDE 250 ML: 900 INJECTION, SOLUTION INTRAVENOUS at 09:04

## 2017-12-04 RX ADMIN — SODIUM CHLORIDE 150 MG: 900 INJECTION, SOLUTION INTRAVENOUS at 11:36

## 2017-12-04 RX ADMIN — DEXAMETHASONE SODIUM PHOSPHATE 12 MG: 10 INJECTION, SOLUTION INTRAMUSCULAR; INTRAVENOUS at 12:11

## 2017-12-04 RX ADMIN — MAGNESIUM SULFATE HEPTAHYDRATE 1000 ML: 500 INJECTION, SOLUTION INTRAMUSCULAR; INTRAVENOUS at 13:33

## 2017-12-04 RX ADMIN — MAGNESIUM SULFATE HEPTAHYDRATE 1000 ML: 500 INJECTION, SOLUTION INTRAMUSCULAR; INTRAVENOUS at 09:27

## 2017-12-04 RX ADMIN — PALONOSETRON HYDROCHLORIDE 0.25 MG: 0.25 INJECTION INTRAVENOUS at 12:09

## 2017-12-04 RX ADMIN — CISPLATIN 218 MG: 1 INJECTION, SOLUTION INTRAVENOUS at 12:28

## 2017-12-04 NOTE — PROGRESS NOTES
Subjective .     REASON FOR CONSULTATION:     1.  Provide an opinion on any further workup or treatment of concurrent chemoradiation therapy for newly diagnosed squamous cell carcinoma of the left base of the tongue, stage YEN ([id number]) disease.   2. PEG tube and and left subclavian vein Lyqhtu-g-Tqgm placement on 11/28/2017. 3.  Patient is scheduled to started concurrent chemoradiotherapy on 12/4/2017, with cisplatin repeat every 3 weeks.                               HISTORY OF PRESENT ILLNESS:  The patient is a 67 y.o. year old male who is here for re-evaluation with the above-mentioned history.  Patient is accompanied of eye his wife.     Recently patient had both EGD and a left upper chest Darryl catheter placed by Dr. Hercules on 11/28/2017.  Patient has a well-healed wound.  Denies fever sweating chills.      He is here for reevaluation, prior to initiation of cisplatin chemotherapy.  Radiation therapy will also be started later today.  Patient has no specific complaints.  He already started dexamethasone last night as instructed in preparing for today's chemotherapy.  Laboratory study showed a normal CBC and CMP and magnesium level, except elevated glucose at 252.  He has no history of diabetes.      Past Medical History:   Diagnosis Date   • Benign essential hypertension    • H/O complete eye exam due   • H/O Neck mass     left   • Hyperlipidemia    • IFG (impaired fasting glucose)    • Seasonal allergies    • Squamous cell carcinoma 2017    left base on tongue, stage YEN     Past Surgical History:   Procedure Laterality Date   • COLONOSCOPY N/A 2015    normal colonoscopy-Dr. Galen Morrissey   • COLONOSCOPY N/A 05/04/2011    Normal colonoscopy-Dr. Tobias Emerson   • ENDOSCOPY W/ PEG TUBE PLACEMENT N/A 11/28/2017    Procedure: ESOPHAGOGASTRODUODENOSCOPY WITH PERCUTANEOUS ENDOSCOPIC GASTROSTOMY TUBE INSERTION;  Surgeon: Isma Hercules MD;  Location: [facility];  Service:    • INGUINAL HERNIA REPAIR Left  1994    Dr. Peres   • INGUINAL HERNIA REPAIR Right 1993    Dr. Peres   • LARYNGOPLASTY      Laryngoplasty with biopsy-Dr. Del Toro   • SD INSJ TUNNELED CVC W/O SUBQ PORT/ AGE 5 YR/> Left 11/28/2017    Procedure: INSERTION VENOUS ACCESS DEVICE;  Surgeon: Isma Hercules MD;  Location: [facility];  Service: General   • TONGUE BIOPSY / EXCISION N/A 11/01/2017    Biopsy of the left tongue base   • TONSILLECTOMY AND ADENOIDECTOMY Bilateral 11/01/2017    Dr. Del Toro       HEMATOLOGIC/ONCOLOGIC HISTORY:  Mr. Ritchie is a 67 y.o. year old male who is here on 11/16/2017 for evaluation accompanied by his wife, referred by radiation oncologist, Dr. Aguirre, because of newly diagnosed squamous cell carcinoma of the left tongue base, stage YEN, [id number].     Patient previously only had history of mild hypertension which was controlled. Recently in early 09/2017 when he was on vacation, he felt a left neck nodule when he was shaving. He denies any pain associated with that. Patient was seen by his primary care physician, Dr. Sheffield, for evaluation and was referred to ENT, Dr. Zane Del Toro. Patient subsequently had CT of the neck examination at the [facility] on 09/22/2017. This study reported a left neck mass measuring 3.5 x 3.1 x 2.4 cm with cystic/necrotic changes located at the region of the left level II jugular chain. There were additional small homogeneous cervical lymph nodes but possibly reactive.     Patient subsequently had ultrasound of the neck on 10/13/2017 associated with fine-needle aspiration biopsy at Dr. Del Toro' office. The ultrasound reported 2 nodules in left neck. The large one measured about 3.28 cm x 2.67 cm x 3.28 cm. The 2nd smaller one measures 1.56 cm x 0.99 cm x 1.48 cm, just below the large mass. Patient had fine-needle aspiration biopsy at the same time. Pathology evaluation from the [facility] reported poorly differentiated squamous cell carcinoma with  degeneration and necrosis. The viable tumor cells exhibit strong nuclear reactivity for both p40 and p63.    Patient subsequently had tonsillectomy, biopsy of the left tongue base and adenoidectomy on 11/01/2017 by Dr. Del Toro. Pathology evaluation from [facility] reported moderately differentiated squamous cell carcinoma. The left tonsil has no evidence of malignancy. Right tonsil also was benign. The adenoid tissue was also benign.     Patient reports the left neck mass is growing. He also reports poor appetite after tonsillectomy. For the past couple of weeks since the biopsy, he lost about 6 pounds. He denies nausea or vomiting. He has actually started feeling better with improved appetite. Denies pain. Denies fever or sweating.     This patient reports he never smoked cigarette. He is only a very rare social drinker. He told me the test for HPV is ongoing.     Patient also had PET scan examination obtained on 11/14/2017. This study reported focal hypermetabolism with SUV 15.9 corresponding to the left-sided base of the tongue. There was moderate enlarged left jugular chain lymph node which is hypermetabolic but without measuring SUV. There was also mild hypermetabolism identified within several additional smaller left jugular chain lymph nodes. There was no hypermetabolic lymphadenopathy elsewhere in the neck nor foci in the chest, abdomen or pelvis.        MEDICATIONS    Current Outpatient Prescriptions:   •  dexamethasone (DECADRON) 4 MG tablet, Take 2 tablets the night before chemo and the morning after chemo (Day 2), then take 2 tablets twice daily on days 3 & 4.  Take with food. (Patient taking differently: Take 4 mg by mouth. Take 2 tablets the night before chemo and the morning after chemo (Day 2), then take 2 tablets twice daily on days 3 & 4.  Take with food.), Disp: 12 tablet, Rfl: 2  •  metoprolol succinate XL (TOPROL XL) 100 MG 24 hr tablet, Take 1 tablet by mouth Daily., Disp: 90 tablet, Rfl: 3  •   ondansetron (ZOFRAN) 8 MG tablet, Take 1 tablet by mouth 3 (Three) Times a Day As Needed for Nausea or Vomiting., Disp: 30 tablet, Rfl: 5  •  prochlorperazine (COMPAZINE) 10 MG tablet, Take 1 tablet by mouth Every 6 (Six) Hours As Needed for Nausea or Vomiting for up to 60 doses., Disp: 60 tablet, Rfl: 5  No current facility-administered medications for this visit.     Facility-Administered Medications Ordered in Other Visits:   •  CISplatin (PLATINOL) 218 mg in sodium chloride 0.9 % 718 mL chemo IVPB, 100 mg/m2 (Treatment Plan Recorded), Intravenous, Once, Nuvia Ballesteros MD PhD  •  dexamethasone sodium phosphate 12 mg in sodium chloride 0.9 % IVPB, 12 mg, Intravenous, Once, Nuvia Ballesteros MD PhD, Last Rate: 200 mL/hr at 17 121, 12 mg at 17 1211  •  sodium chloride 0.9 % 1,000 mL with potassium chloride 20 mEq, magnesium sulfate 1 g infusion, 1,000 mL, Intravenous, Once, Nuvia Ballesteros MD PhD    ALLERGIES:   No Known Allergies.  No drug allergy.  Patient has seasonal allergy.    SOCIAL HISTORY:       Social History     Social History   • Marital status:      Spouse name: Janiya    • Number of children: 2    • Years of education: High school education      Occupational History   • Retired  from [employer]      Social History Main Topics   • Smoking status: Never Smoker   • Smokeless tobacco: Never Used   • Alcohol use Yes      Comment: rare   • Drug use: No   • Sexual activity:          FAMILY HISTORY:  Family History   Problem Relation Age of Onset   • Alcohol abuse Father,  in his 80s because combination from alcohol.      • Diabetes Father    Mother  in her 80s because of how sinus disease.  Has 1 brother and 3 sisters in fair or good conditions.  No family history of malignancy.    REVIEW OF SYSTEMS:  GENERAL: See history of present illness.  No fevers, chills, sweats.    SKIN: No nonhealing lesions.  No rashes.  HEME/LYMPH: No easy bruising, bleeding. See  "history of present illness.   EYES: No vision changes or diplopia.   ENT: No tinnitus, hearing loss, gum bleeding, epistaxis, hoarseness or dysphagia.   RESPIRATORY: No cough, shortness of breath, hemoptysis or wheezing.   CVS: No chest pain, palpitations, orthopnea, dyspnea on exertion or PND.   GI: No melena or hematochezia. No abdominal pain.  No nausea, vomiting, constipation, diarrhea  : No lower tract obstructive symptoms, dysuria or hematuria.   MUSCULOSKELETAL: No bone pain.  No joint stiffness.   NEUROLOGICAL: No global weakness, loss of consciousness or seizures.   PSYCHIATRIC: No increased nervousness, mood changes or depression.     Objective    Vitals:    17 0801   BP: 110/62   Pulse: 74   Resp: 16   Temp: 97.9 °F (36.6 °C)   Weight: 203 lb 3.2 oz (92.2 kg)   Height: 72.83\" (185 cm)   PainSc: 0-No pain   ECO       PHYSICAL EXAM:    GENERAL:  Well-developed, well-nourished  male in no acute distress.   SKIN:  Warm, dry without rashes, purpura or petechiae. Left upper chest Port-A-Cath looks benign no evidence of infection.  Abdomen PEG tube in place, no evidence of infection.   EYES:  Pupils equal, round and reactive to light.  EOMs intact.  Conjunctivae normal.  EARS:  Hearing intact.  NOSE:   No nasal discharge.  MOUTH:  Tongue is well-papillated; no stomatitis or ulcers.  Lips normal.  THROAT:  Oropharynx without lesions or exudates.  The left tonsil bed has little yellowish mucus.   NECK:  Supple with good range of motion; no thyromegaly.  Left neck has mass measuring about 4.5 cm in diameter.   LYMPHATICS:  No other cervical, supraclavicular, axillary or inguinal adenopathy.  CHEST:  Lungs clear to auscultation. Good airflow.  CARDIAC:  Regular rate and rhythm without murmurs, rubs or gallops. Normal S1,S2.  ABDOMEN:  Soft, nontender with no hepatosplenomegaly or masses.  Bowel sounds normal.   EXTREMITIES:  No clubbing, cyanosis or edema.  NEUROLOGICAL:  Cranial Nerves II-XII " grossly intact.  No focal neurological deficits.  PSYCHIATRIC:  Normal affect and mood.    RECENT LABS:    Lab Results   Component Value Date    WBC 5.24 12/04/2017    HGB 14.2 12/04/2017    HCT 41.6 12/04/2017    MCV 93.7 12/04/2017     12/04/2017     Lab Results   Component Value Date    NEUTROABS 4.40 12/04/2017     Lab Results   Component Value Date    GLUCOSE 252 (H) 12/04/2017    BUN 18 12/04/2017    CREATININE 0.81 12/04/2017    EGFRIFNONA 94 12/04/2017    EGFRIFAFRI 94 01/17/2017    BCR 22.2 12/04/2017    K 4.3 12/04/2017    CO2 22.4 12/04/2017    CALCIUM 9.5 12/04/2017    PROTENTOTREF 7.3 01/17/2017    ALBUMIN 3.90 12/04/2017    LABIL2 1.2 12/04/2017    AST 24 12/04/2017    ALT 37 12/04/2017     Sodium   Date Value Ref Range Status   12/04/2017 138 134 - 145 mmol/L Final     Potassium   Date Value Ref Range Status   12/04/2017 4.3 3.5 - 4.7 mmol/L Final     Total Bilirubin   Date Value Ref Range Status   12/04/2017 0.4 0.1 - 1.2 mg/dL Final     Alkaline Phosphatase   Date Value Ref Range Status   12/04/2017 107 38 - 116 U/L Final   ]      Assessment/Plan      1.  Newly diagnosed squamous cell carcinoma of the left base of the tongue, stage YEN (T1Nb) disease.     Patient never smoked cigarettes and he drinks alcohol beverage very rarely. His tumor sample is being tested for HPV. Nevertheless, that will not interfere with the treatment modality. This patient will need concurrent chemoradiation therapy. Because the patient has otherwise pretty healthy medical history except mild hypertension, we will treat him with intention to cure. I recommended cisplatin once every 3 weeks for 3 doses with concurrent radiation therapy. Discussed with the patient and his wife about the side effects associated with cisplatin such as significant nausea, vomiting, and also fatigue, marrow suppression, dehydration, kidney injury/kidney failure among others. I will arrange chemotherapy teaching lesson to further discuss  side effects associated with treatment.     This patient had Port-A-Cath and PEG placement on 11/28/2017.     We'll was started chemotherapy today.  Patient already had a chemotherapy teaching lesson.  I discussed with the patient and his wife again today, as him to use antiemetics traditionally, if he is not able to tolerate oral input, he should call us and we will can arrange him for outpatient IV hydration.  If he develops fever over 100.5 Fahrenheit, and is also cause to get a medical attention.      2.  Hyperglycemia secondary to steroids.  No history of diabetes.  We'll monitor in one week.     PLAN:   1.  Proceed ahead with first cycle chemotherapy today with cisplatin 100 mg/cm2.  Repeat every 3 weeks for 3 doses.   2.  Patient was started radiation later today and daily under direction of Dr. Aguirre.  3.  IV hydration was normal saline, together with magnesium sulfate 1 g and a potassium chloride 20 mEq today per protocol.  4.  Continue Dexamethasone 8 mg this evening then 8 mg b.i.d. for the next 2 days.   5. Use Zofran 8 mg q.8 h. scheduled for prophylaxis for nausea or vomiting.  If he has no side effects, can change Zofran to as needed.   6. Use Compazine 10 mg p.o. q.6 h. p.r.n. for nausea or vomiting from chemotherapy.   7. We will arrange patient for toxicity check reevaluation in one week, he will have laboratory study including CBC, BMP and magnesium level.  Labs will be checked weekly.   8.  See M.D. in 3 weeks, on day of scheduled cycle #2 cisplatin.       More than 40 min, over half of that time were for counseling.      DOMENICA LEAL M.D., Ph.D.    12/4/2017        Dictated using Dragon Dictation.

## 2017-12-04 NOTE — PROGRESS NOTES
Lungs clear at dc. Post weight 210. No swelling, voiding well, lungs clear and no SOB. Reviewed with . No new orders. Encouraged pt to call us if he felt SOB or noticed swelling in extremities or felt like he wasn't voiding well.

## 2017-12-05 ENCOUNTER — TELEPHONE (OUTPATIENT)
Dept: FAMILY MEDICINE CLINIC | Facility: CLINIC | Age: 67
End: 2017-12-05

## 2017-12-05 DIAGNOSIS — I10 BENIGN ESSENTIAL HYPERTENSION: ICD-10-CM

## 2017-12-05 PROCEDURE — 77386: CPT | Performed by: RADIOLOGY

## 2017-12-05 PROCEDURE — 77470 SPECIAL RADIATION TREATMENT: CPT | Performed by: RADIOLOGY

## 2017-12-05 PROCEDURE — 77386 CHG INTENSITY MODULATED RADIATION TX DLVR COMPLEX: CPT | Performed by: RADIOLOGY

## 2017-12-05 PROCEDURE — 77014 CHG CT GUIDANCE RADIATION THERAPY FLDS PLACEMENT: CPT | Performed by: RADIOLOGY

## 2017-12-05 RX ORDER — METOPROLOL TARTRATE 50 MG/1
50 TABLET, FILM COATED ORAL 2 TIMES DAILY
Qty: 180 TABLET | Refills: 3 | Status: SHIPPED | OUTPATIENT
Start: 2017-12-05 | End: 2018-08-13 | Stop reason: SDUPTHER

## 2017-12-05 NOTE — TELEPHONE ENCOUNTER
Pt calls and states that he is receiving chemo and radiation now. He states his oncologist wants him to be on a b/p med that he can crush just in case he becomes incapable of swallowing. pls advise?

## 2017-12-05 NOTE — TELEPHONE ENCOUNTER
Good question. I would have him talk with his pharmacist and ask if the Toprol XL he takes now can be crushed. If not, could we change him to the BID version and crush that. Let me know.

## 2017-12-06 ENCOUNTER — RADIATION ONCOLOGY WEEKLY ASSESSMENT (OUTPATIENT)
Dept: RADIATION ONCOLOGY | Facility: HOSPITAL | Age: 67
End: 2017-12-06

## 2017-12-06 VITALS
HEART RATE: 58 BPM | WEIGHT: 214 LBS | SYSTOLIC BLOOD PRESSURE: 191 MMHG | RESPIRATION RATE: 16 BRPM | HEIGHT: 73 IN | BODY MASS INDEX: 28.36 KG/M2 | DIASTOLIC BLOOD PRESSURE: 87 MMHG | OXYGEN SATURATION: 97 %

## 2017-12-06 DIAGNOSIS — C10.9 OROPHARYNGEAL CANCER (HCC): Primary | ICD-10-CM

## 2017-12-06 PROCEDURE — 77386: CPT | Performed by: RADIOLOGY

## 2017-12-06 PROCEDURE — 77014 CHG CT GUIDANCE RADIATION THERAPY FLDS PLACEMENT: CPT | Performed by: RADIOLOGY

## 2017-12-06 PROCEDURE — 77386 CHG INTENSITY MODULATED RADIATION TX DLVR COMPLEX: CPT | Performed by: RADIOLOGY

## 2017-12-06 NOTE — PROGRESS NOTES
Physician Weekly Management Note    Diagnosis:     Diagnosis Plan   1. Oropharyngeal cancer         RT Details:   treatment #3/35    Notes on Treatment course, Films, Medical progress:    Discussed the use of dental fluorides, no treatment-related issues at this time.  Continue as planned.    Weekly Management:  Medication reviewed?   Yes  New medications given?   No  Problemlist reviewed?   Yes  Problem added?   No      Technical aspects reviewed:  Weekly OBI approved?   Yes  Weekly port films approved?   Yes  Change requests noted on port film?   No  Patient setup and plan reviewed?   Yes    Chart Reviewed:  Continue current treatment plan?   Yes  Treatment plan change requested?   No  CBC reviewed?   Yes  Concurrent Chemo?   Yes    Objective     Toxicities:   None     Review of Systems   As above    Vitals:    12/06/17 1505   BP: (!) 191/87   Pulse: 58   Resp: 16   SpO2: 97%       Current Status 12/4/2017   ECOG score 0       Physical Exam  As above      Problem Summary List    Diagnosis:     Diagnosis Plan   1. Oropharyngeal cancer       Pathology:       Past Medical History:   Diagnosis Date   • Benign essential hypertension    • H/O complete eye exam due   • H/O Neck mass     left   • Hyperlipidemia    • IFG (impaired fasting glucose)    • Seasonal allergies    • Squamous cell carcinoma 2017    left base on tongue, stage YEN         Past Surgical History:   Procedure Laterality Date   • COLONOSCOPY N/A 2015    normal colonoscopy-Dr. Galen Morrissey   • COLONOSCOPY N/A 05/04/2011    Normal colonoscopy-Dr. Tobias Emerson   • ENDOSCOPY W/ PEG TUBE PLACEMENT N/A 11/28/2017    Procedure: ESOPHAGOGASTRODUODENOSCOPY WITH PERCUTANEOUS ENDOSCOPIC GASTROSTOMY TUBE INSERTION;  Surgeon: Isma Hercules MD;  Location: [facility];  Service:    • INGUINAL HERNIA REPAIR Left 1994    Dr. Peres   • INGUINAL HERNIA REPAIR Right 1993    Dr. Peres   • LARYNGOPLASTY      Laryngoplasty with biopsy-Dr. Del Toro   • [place] INSJ  TUNNELED CVC W/O SUBQ PORT/ AGE 3 YR/> Left 11/28/2017    Procedure: INSERTION VENOUS ACCESS DEVICE;  Surgeon: Isma Hercules MD;  Location: [facility];  Service: General   • TONGUE BIOPSY / EXCISION N/A 11/01/2017    Biopsy of the left tongue base   • TONSILLECTOMY AND ADENOIDECTOMY Bilateral 11/01/2017    Dr. Del Toro         Current Outpatient Prescriptions on File Prior to Visit   Medication Sig Dispense Refill   • dexamethasone (DECADRON) 4 MG tablet Take 2 tablets the night before chemo and the morning after chemo (Day 2), then take 2 tablets twice daily on days 3 & 4.  Take with food. (Patient taking differently: Take 4 mg by mouth. Take 2 tablets the night before chemo and the morning after chemo (Day 2), then take 2 tablets twice daily on days 3 & 4.  Take with food.) 12 tablet 2   • metoprolol tartrate (LOPRESSOR) 50 MG tablet Take 1 tablet by mouth 2 (Two) Times a Day. 180 tablet 3   • ondansetron (ZOFRAN) 8 MG tablet Take 1 tablet by mouth 3 (Three) Times a Day As Needed for Nausea or Vomiting. 30 tablet 5   • prochlorperazine (COMPAZINE) 10 MG tablet Take 1 tablet by mouth Every 6 (Six) Hours As Needed for Nausea or Vomiting for up to 60 doses. 60 tablet 5   • [DISCONTINUED] metoprolol succinate XL (TOPROL XL) 100 MG 24 hr tablet Take 1 tablet by mouth Daily. 90 tablet 3     No current facility-administered medications on file prior to visit.        No Known Allergies      Primary care MD:    Ameya Sheffield MD    Oncologist: HAFSA Ballesteros M.D.    Seen and approved by:  Ameya Aguirre MD  12/06/2017

## 2017-12-07 ENCOUNTER — DOCUMENTATION (OUTPATIENT)
Dept: NUTRITION | Facility: HOSPITAL | Age: 67
End: 2017-12-07

## 2017-12-07 PROCEDURE — 77386: CPT | Performed by: RADIOLOGY

## 2017-12-07 PROCEDURE — 77014 CHG CT GUIDANCE RADIATION THERAPY FLDS PLACEMENT: CPT | Performed by: RADIOLOGY

## 2017-12-07 PROCEDURE — 77386 CHG INTENSITY MODULATED RADIATION TX DLVR COMPLEX: CPT | Performed by: RADIOLOGY

## 2017-12-07 NOTE — PROGRESS NOTES
ONC Nutrition Follow Up:     Weight 210#. Started treatment on Monday 12/4.      Eating regular diet without difficulty. Has been educated on feeding tube by home infusion company.  He received mailed info.   Will follow closely.

## 2017-12-08 PROCEDURE — 77386: CPT | Performed by: RADIOLOGY

## 2017-12-08 PROCEDURE — 77386 CHG INTENSITY MODULATED RADIATION TX DLVR COMPLEX: CPT | Performed by: RADIOLOGY

## 2017-12-08 PROCEDURE — 77014 CHG CT GUIDANCE RADIATION THERAPY FLDS PLACEMENT: CPT | Performed by: RADIOLOGY

## 2017-12-11 ENCOUNTER — OFFICE VISIT (OUTPATIENT)
Dept: ONCOLOGY | Facility: CLINIC | Age: 67
End: 2017-12-11

## 2017-12-11 ENCOUNTER — DOCUMENTATION (OUTPATIENT)
Dept: NUTRITION | Facility: HOSPITAL | Age: 67
End: 2017-12-11

## 2017-12-11 ENCOUNTER — RADIATION ONCOLOGY WEEKLY ASSESSMENT (OUTPATIENT)
Dept: RADIATION ONCOLOGY | Facility: HOSPITAL | Age: 67
End: 2017-12-11

## 2017-12-11 ENCOUNTER — LAB (OUTPATIENT)
Dept: LAB | Facility: HOSPITAL | Age: 67
End: 2017-12-11

## 2017-12-11 VITALS
HEIGHT: 73 IN | RESPIRATION RATE: 16 BRPM | HEART RATE: 60 BPM | DIASTOLIC BLOOD PRESSURE: 66 MMHG | OXYGEN SATURATION: 97 % | WEIGHT: 199 LBS | BODY MASS INDEX: 26.37 KG/M2 | SYSTOLIC BLOOD PRESSURE: 108 MMHG | TEMPERATURE: 98.2 F

## 2017-12-11 VITALS
HEART RATE: 60 BPM | HEIGHT: 73 IN | SYSTOLIC BLOOD PRESSURE: 108 MMHG | OXYGEN SATURATION: 97 % | DIASTOLIC BLOOD PRESSURE: 66 MMHG | BODY MASS INDEX: 26.32 KG/M2 | WEIGHT: 198.6 LBS | TEMPERATURE: 98.2 F | RESPIRATION RATE: 14 BRPM

## 2017-12-11 DIAGNOSIS — C10.9 OROPHARYNGEAL CANCER (HCC): Primary | ICD-10-CM

## 2017-12-11 DIAGNOSIS — R11.0 CHEMOTHERAPY-INDUCED NAUSEA: ICD-10-CM

## 2017-12-11 DIAGNOSIS — T45.1X5A CHEMOTHERAPY-INDUCED NAUSEA: ICD-10-CM

## 2017-12-11 DIAGNOSIS — N17.9 ACUTE RENAL INJURY (HCC): ICD-10-CM

## 2017-12-11 DIAGNOSIS — C01 CANCER OF BASE OF TONGUE (HCC): Primary | ICD-10-CM

## 2017-12-11 DIAGNOSIS — E86.0 DEHYDRATION: ICD-10-CM

## 2017-12-11 DIAGNOSIS — K21.9 GASTROESOPHAGEAL REFLUX DISEASE, ESOPHAGITIS PRESENCE NOT SPECIFIED: ICD-10-CM

## 2017-12-11 DIAGNOSIS — T45.1X5A ADVERSE EFFECT OF ANTINEOPLASTIC DRUG, INITIAL ENCOUNTER: ICD-10-CM

## 2017-12-11 LAB
ANION GAP SERPL CALCULATED.3IONS-SCNC: 11 MMOL/L
BASOPHILS # BLD AUTO: 0.01 10*3/MM3 (ref 0–0.1)
BASOPHILS NFR BLD AUTO: 0.1 % (ref 0–1.1)
BUN BLD-MCNC: 28 MG/DL (ref 6–20)
BUN/CREAT SERPL: 18.9 (ref 7.3–30)
CALCIUM SPEC-SCNC: 9.5 MG/DL (ref 8.5–10.2)
CHLORIDE SERPL-SCNC: 94 MMOL/L (ref 98–107)
CO2 SERPL-SCNC: 28 MMOL/L (ref 22–29)
CREAT BLD-MCNC: 1.48 MG/DL (ref 0.7–1.3)
DEPRECATED RDW RBC AUTO: 38.5 FL (ref 37–49)
EOSINOPHIL # BLD AUTO: 0.1 10*3/MM3 (ref 0–0.36)
EOSINOPHIL NFR BLD AUTO: 1.2 % (ref 1–5)
ERYTHROCYTE [DISTWIDTH] IN BLOOD BY AUTOMATED COUNT: 11.5 % (ref 11.7–14.5)
GFR SERPL CREATININE-BSD FRML MDRD: 47 ML/MIN/1.73
GLUCOSE BLD-MCNC: 116 MG/DL (ref 74–124)
HCT VFR BLD AUTO: 42.6 % (ref 40–49)
HGB BLD-MCNC: 14.7 G/DL (ref 13.5–16.5)
HOLD SPECIMEN: NORMAL
IMM GRANULOCYTES # BLD: 0.06 10*3/MM3 (ref 0–0.03)
IMM GRANULOCYTES NFR BLD: 0.7 % (ref 0–0.5)
LYMPHOCYTES # BLD AUTO: 1.15 10*3/MM3 (ref 1–3.5)
LYMPHOCYTES NFR BLD AUTO: 13.4 % (ref 20–49)
MAGNESIUM SERPL-MCNC: 2 MG/DL (ref 1.8–2.5)
MCH RBC QN AUTO: 31.5 PG (ref 27–33)
MCHC RBC AUTO-ENTMCNC: 34.5 G/DL (ref 32–35)
MCV RBC AUTO: 91.2 FL (ref 83–97)
MONOCYTES # BLD AUTO: 0.73 10*3/MM3 (ref 0.25–0.8)
MONOCYTES NFR BLD AUTO: 8.5 % (ref 4–12)
NEUTROPHILS # BLD AUTO: 6.54 10*3/MM3 (ref 1.5–7)
NEUTROPHILS NFR BLD AUTO: 76.1 % (ref 39–75)
NRBC BLD MANUAL-RTO: 0 /100 WBC (ref 0–0)
PLATELET # BLD AUTO: 223 10*3/MM3 (ref 150–375)
PMV BLD AUTO: 9.5 FL (ref 8.9–12.1)
POTASSIUM BLD-SCNC: 4.5 MMOL/L (ref 3.5–4.7)
RBC # BLD AUTO: 4.67 10*6/MM3 (ref 4.3–5.5)
SODIUM BLD-SCNC: 133 MMOL/L (ref 134–145)
WBC NRBC COR # BLD: 8.59 10*3/MM3 (ref 4–10)

## 2017-12-11 PROCEDURE — 99214 OFFICE O/P EST MOD 30 MIN: CPT | Performed by: INTERNAL MEDICINE

## 2017-12-11 PROCEDURE — 77336 RADIATION PHYSICS CONSULT: CPT | Performed by: RADIOLOGY

## 2017-12-11 PROCEDURE — 77427 RADIATION TX MANAGEMENT X5: CPT | Performed by: RADIOLOGY

## 2017-12-11 PROCEDURE — 77386 CHG INTENSITY MODULATED RADIATION TX DLVR COMPLEX: CPT | Performed by: RADIOLOGY

## 2017-12-11 PROCEDURE — 77386: CPT | Performed by: RADIOLOGY

## 2017-12-11 PROCEDURE — 83735 ASSAY OF MAGNESIUM: CPT | Performed by: INTERNAL MEDICINE

## 2017-12-11 PROCEDURE — 77014 CHG CT GUIDANCE RADIATION THERAPY FLDS PLACEMENT: CPT | Performed by: RADIOLOGY

## 2017-12-11 PROCEDURE — 36415 COLL VENOUS BLD VENIPUNCTURE: CPT | Performed by: INTERNAL MEDICINE

## 2017-12-11 PROCEDURE — 80048 BASIC METABOLIC PNL TOTAL CA: CPT | Performed by: INTERNAL MEDICINE

## 2017-12-11 PROCEDURE — 85025 COMPLETE CBC W/AUTO DIFF WBC: CPT | Performed by: INTERNAL MEDICINE

## 2017-12-11 RX ORDER — PANTOPRAZOLE SODIUM 40 MG/1
40 TABLET, DELAYED RELEASE ORAL DAILY
Qty: 30 TABLET | Refills: 5 | Status: SHIPPED | OUTPATIENT
Start: 2017-12-11 | End: 2018-03-02

## 2017-12-11 NOTE — PROGRESS NOTES
ONC Nutrition Follow Up:     Weight 198#, decreased about 12# x 1 week. He complains today of a pain in his esophagus when eating. States he was prescribed something from the physician.    Patient has not used PEG for any supplemental feeding. Suggested that he begin to use tube, starting with 60cc for now, increasing gradually each time.  He was given Ensure Enlive and Boost plus last week to use via the tube for now.  Encouraged him to continue to try to eat small frequent meals, optimizing calories and protein.    Will follow closely and be in contact with [facility] when enteral product is needed.

## 2017-12-11 NOTE — PROGRESS NOTES
Physician Weekly Management Note    Diagnosis:     Diagnosis Plan   1. Cancer of base of tongue         RT Details:  Treatment #6/35    Notes on Treatment course, Films, Medical progress:  Overall doing well with us 4 he reports some reflux type symptoms,some minor taste changes, and changes in his saliva becoming a little more foamy.  No throat pain. List is working on obtaining Mugard.  Energy level is intact.    Weekly Management:  Medication reviewed?   Yes  New medications given?   No  Problemlist reviewed?   Yes  Problem added?   No      Technical aspects reviewed:  Weekly OBI approved?   Yes  Weekly port films approved?   Yes  Change requests noted on port film?   No  Patient setup and plan reviewed?   Yes    Chart Reviewed:  Continue current treatment plan?   Yes  Treatment plan change requested?   No  CBC reviewed?   Yes  Concurrent Chemo?   Yes    Objective     Toxicities:   As above     Review of Systems   As above    Vitals:    12/11/17 0856   BP: 108/66   Pulse: 60   Resp: 16   Temp: 98.2 °F (36.8 °C)   SpO2: 97%       Current Status 12/11/2017   ECOG score 0       Physical Exam  As above      Problem Summary List    Diagnosis:     Diagnosis Plan   1. Cancer of base of tongue       Pathology:       Past Medical History:   Diagnosis Date   • Benign essential hypertension    • H/O complete eye exam due   • H/O Neck mass     left   • Hyperlipidemia    • IFG (impaired fasting glucose)    • Seasonal allergies    • Squamous cell carcinoma 2017    left base on tongue, stage YEN         Past Surgical History:   Procedure Laterality Date   • COLONOSCOPY N/A 2015    normal colonoscopy-Dr. Galen Morrissey   • COLONOSCOPY N/A 05/04/2011    Normal colonoscopy-Dr. Tobias Emerson   • ENDOSCOPY W/ PEG TUBE PLACEMENT N/A 11/28/2017    Procedure: ESOPHAGOGASTRODUODENOSCOPY WITH PERCUTANEOUS ENDOSCOPIC GASTROSTOMY TUBE INSERTION;  Surgeon: Isma Hercules MD;  Location: [facility];  Service:    • INGUINAL HERNIA REPAIR  Left 1994    Dr. Peres   • INGUINAL HERNIA REPAIR Right 1993    Dr. Peres   • LARYNGOPLASTY      Laryngoplasty with biopsy-Dr. Del Toro   • [place] INSJ TUNNELED CVC W/O SUBQ PORT/ AGE 3 YR/> Left 11/28/2017    Procedure: INSERTION VENOUS ACCESS DEVICE;  Surgeon: Isma Hercules MD;  Location: [facility];  Service: General   • TONGUE BIOPSY / EXCISION N/A 11/01/2017    Biopsy of the left tongue base   • TONSILLECTOMY AND ADENOIDECTOMY Bilateral 11/01/2017    Dr. Del Toro         Current Outpatient Prescriptions on File Prior to Visit   Medication Sig Dispense Refill   • dexamethasone (DECADRON) 4 MG tablet Take 2 tablets the night before chemo and the morning after chemo (Day 2), then take 2 tablets twice daily on days 3 & 4.  Take with food. (Patient taking differently: Take 4 mg by mouth. Take 2 tablets the night before chemo and the morning after chemo (Day 2), then take 2 tablets twice daily on days 3 & 4.  Take with food.) 12 tablet 2   • metoprolol tartrate (LOPRESSOR) 50 MG tablet Take 1 tablet by mouth 2 (Two) Times a Day. 180 tablet 3   • ondansetron (ZOFRAN) 8 MG tablet Take 1 tablet by mouth 3 (Three) Times a Day As Needed for Nausea or Vomiting. 30 tablet 5   • pantoprazole (PROTONIX) 40 MG EC tablet Take 1 tablet by mouth Daily. 30 tablet 5   • prochlorperazine (COMPAZINE) 10 MG tablet Take 1 tablet by mouth Every 6 (Six) Hours As Needed for Nausea or Vomiting for up to 60 doses. 60 tablet 5     No current facility-administered medications on file prior to visit.        No Known Allergies     Primary care MD:    Ameya Sheffield MD    Oncologist:  HAFSA Ballesteros M.D.    Seen and approved by:  Ameya Aguirre MD  12/11/2017

## 2017-12-12 ENCOUNTER — INFUSION (OUTPATIENT)
Dept: ONCOLOGY | Facility: HOSPITAL | Age: 67
End: 2017-12-12

## 2017-12-12 VITALS
DIASTOLIC BLOOD PRESSURE: 73 MMHG | SYSTOLIC BLOOD PRESSURE: 150 MMHG | HEART RATE: 63 BPM | BODY MASS INDEX: 26.75 KG/M2 | WEIGHT: 201.8 LBS | TEMPERATURE: 98 F

## 2017-12-12 DIAGNOSIS — E86.0 DEHYDRATION: Primary | ICD-10-CM

## 2017-12-12 DIAGNOSIS — C10.9 OROPHARYNGEAL CANCER (HCC): ICD-10-CM

## 2017-12-12 PROBLEM — T45.1X5A ADVERSE EFFECT OF ANTINEOPLASTIC DRUG: Status: ACTIVE | Noted: 2017-12-12

## 2017-12-12 PROBLEM — T45.1X5A CHEMOTHERAPY-INDUCED NAUSEA: Status: ACTIVE | Noted: 2017-12-12

## 2017-12-12 PROBLEM — K21.9 GERD (GASTROESOPHAGEAL REFLUX DISEASE): Status: ACTIVE | Noted: 2017-12-12

## 2017-12-12 PROBLEM — N17.9 ACUTE RENAL INJURY: Status: ACTIVE | Noted: 2017-12-12

## 2017-12-12 PROBLEM — R11.0 CHEMOTHERAPY-INDUCED NAUSEA: Status: ACTIVE | Noted: 2017-12-12

## 2017-12-12 PROCEDURE — 96360 HYDRATION IV INFUSION INIT: CPT | Performed by: INTERNAL MEDICINE

## 2017-12-12 PROCEDURE — 96361 HYDRATE IV INFUSION ADD-ON: CPT | Performed by: INTERNAL MEDICINE

## 2017-12-12 PROCEDURE — 77386 CHG INTENSITY MODULATED RADIATION TX DLVR COMPLEX: CPT | Performed by: RADIOLOGY

## 2017-12-12 PROCEDURE — 77386: CPT | Performed by: RADIOLOGY

## 2017-12-12 PROCEDURE — 77014 CHG CT GUIDANCE RADIATION THERAPY FLDS PLACEMENT: CPT | Performed by: RADIOLOGY

## 2017-12-12 RX ADMIN — SODIUM CHLORIDE 1000 ML: 900 INJECTION, SOLUTION INTRAVENOUS at 13:55

## 2017-12-12 NOTE — PROGRESS NOTES
Subjective .     REASON FOR CONSULTATION:     1.  Provide an opinion on any further workup or treatment of concurrent chemoradiation therapy for newly diagnosed squamous cell carcinoma of the left base of the tongue, stage YEN ([id number]) disease.   2. PEG tube and and left subclavian vein [id number] placement on 11/28/2017. 3.  Patient was started concurrent chemoradiotherapy on 12/4/2017, with cisplatin repeat every 3 weeks.                               HISTORY OF PRESENT ILLNESS:  The patient is a 67 y.o. year old male who is here for toxicity evaluation with the above-mentioned history.      Patient reports he had some nausea after chemotherapy, and was on scheduled every 8 hours Zofran with some relief.  He denies vomiting.  He reports acid reflex symptoms since started concurrent chemoradiation therapy, however denies odynophagia.  He denies oral mucositis, no pains in the mouth.  His appetite is reasonable.  According to a computer test, he does have 3 Ib. weight loss compared to prior chemotherapy time.    Patient otherwise has no other specific complaints.       Past Medical History:   Diagnosis Date   • Benign essential hypertension    • H/O complete eye exam due   • H/O Neck mass     left   • Hyperlipidemia    • IFG (impaired fasting glucose)    • Seasonal allergies    • Squamous cell carcinoma 2017    left base on tongue, stage YEN     Past Surgical History:   Procedure Laterality Date   • COLONOSCOPY N/A 2015    normal colonoscopy-Dr. Galen Morrissey   • COLONOSCOPY N/A 05/04/2011    Normal colonoscopy-Dr. Tobias Emerson   • ENDOSCOPY W/ PEG TUBE PLACEMENT N/A 11/28/2017    Procedure: ESOPHAGOGASTRODUODENOSCOPY WITH PERCUTANEOUS ENDOSCOPIC GASTROSTOMY TUBE INSERTION;  Surgeon: Isma Hercules MD;  Location: [facility] OR;  Service:    • INGUINAL HERNIA REPAIR Left 1994    Dr. Peres   • INGUINAL HERNIA REPAIR Right 1993    Dr. Peres   • LARYNGOPLASTY      Laryngoplasty with biopsy-  [facility]   • [place] INSJ TUNNELED CVC W/O SUBQ PORT/ AGE 3 YR/> Left 11/28/2017    Procedure: INSERTION VENOUS ACCESS DEVICE;  Surgeon: Isma Hercules MD;  Location: [facility] OR;  Service: General   • TONGUE BIOPSY / EXCISION N/A 11/01/2017    Biopsy of the left tongue base   • TONSILLECTOMY AND ADENOIDECTOMY Bilateral 11/01/2017    Dr. Del Toro       HEMATOLOGIC/ONCOLOGIC HISTORY:  Mr. Ritchie is a 67 y.o. year old male who is here on 11/16/2017 for evaluation accompanied by his wife, referred by radiation oncologist, Dr. Aguirre, because of newly diagnosed squamous cell carcinoma of the left tongue base, stage YEN, [id number].     Patient previously only had history of mild hypertension which was controlled. Recently in early 09/2017 when he was on vacation, he felt a left neck nodule when he was shaving. He denies any pain associated with that. Patient was seen by his primary care physician, Dr. Sheffield, for evaluation and was referred to ENT, Dr. Zane Del Toro. Patient subsequently had CT of the neck examination at the [facility] on 09/22/2017. This study reported a left neck mass measuring 3.5 x 3.1 x 2.4 cm with cystic/necrotic changes located at the region of the left level II jugular chain. There were additional small homogeneous cervical lymph nodes but possibly reactive.     Patient subsequently had ultrasound of the neck on 10/13/2017 associated with fine-needle aspiration biopsy at Dr. Del Toro' office. The ultrasound reported 2 nodules in left neck. The large one measured about 3.28 cm x 2.67 cm x 3.28 cm. The 2nd smaller one measures 1.56 cm x 0.99 cm x 1.48 cm, just below the large mass. Patient had fine-needle aspiration biopsy at the same time. Pathology evaluation from the [facility] reported poorly differentiated squamous cell carcinoma with degeneration and necrosis. The viable tumor cells exhibit strong nuclear reactivity for both p40 and p63.    Patient subsequently  had tonsillectomy, biopsy of the left tongue base and adenoidectomy on 11/01/2017 by Dr. Del Toro. Pathology evaluation from [facility] reported moderately differentiated squamous cell carcinoma. The left tonsil has no evidence of malignancy. Right tonsil also was benign. The adenoid tissue was also benign.     Patient reports the left neck mass is growing. He also reports poor appetite after tonsillectomy. For the past couple of weeks since the biopsy, he lost about 6 pounds. He denies nausea or vomiting. He has actually started feeling better with improved appetite. Denies pain. Denies fever or sweating.     This patient reports he never smoked cigarette. He is only a very rare social drinker. He told me the test for HPV is ongoing.     Patient also had PET scan examination obtained on 11/14/2017. This study reported focal hypermetabolism with SUV 15.9 corresponding to the left-sided base of the tongue. There was moderate enlarged left jugular chain lymph node which is hypermetabolic but without measuring SUV. There was also mild hypermetabolism identified within several additional smaller left jugular chain lymph nodes. There was no hypermetabolic lymphadenopathy elsewhere in the neck nor foci in the chest, abdomen or pelvis.        MEDICATIONS    Current Outpatient Prescriptions:   •  dexamethasone (DECADRON) 4 MG tablet, Take 2 tablets the night before chemo and the morning after chemo (Day 2), then take 2 tablets twice daily on days 3 & 4.  Take with food. (Patient taking differently: Take 4 mg by mouth. Take 2 tablets the night before chemo and the morning after chemo (Day 2), then take 2 tablets twice daily on days 3 & 4.  Take with food.), Disp: 12 tablet, Rfl: 2  •  metoprolol tartrate (LOPRESSOR) 50 MG tablet, Take 1 tablet by mouth 2 (Two) Times a Day., Disp: 180 tablet, Rfl: 3  •  ondansetron (ZOFRAN) 8 MG tablet, Take 1 tablet by mouth 3 (Three) Times a Day As Needed for Nausea or Vomiting., Disp: 30  tablet, Rfl: 5  •  prochlorperazine (COMPAZINE) 10 MG tablet, Take 1 tablet by mouth Every 6 (Six) Hours As Needed for Nausea or Vomiting for up to 60 doses., Disp: 60 tablet, Rfl: 5  •  pantoprazole (PROTONIX) 40 MG EC tablet, Take 1 tablet by mouth Daily., Disp: 30 tablet, Rfl: 5    ALLERGIES:   No Known Allergies.  No drug allergy.  Patient has seasonal allergy.    SOCIAL HISTORY:       Social History     Social History   • Marital status:      Spouse name: Janiya    • Number of children: 2    • Years of education: High school education      Occupational History   • Retired  from [employer]      Social History Main Topics   • Smoking status: Never Smoker   • Smokeless tobacco: Never Used   • Alcohol use Yes      Comment: rare   • Drug use: No   • Sexual activity:          FAMILY HISTORY:  Family History   Problem Relation Age of Onset   • Alcohol abuse Father,  in his 80s because combination from alcohol.      • Diabetes Father    Mother  in her 80s because of how sinus disease.  Has 1 brother and 3 sisters in fair or good conditions.  No family history of malignancy.    REVIEW OF SYSTEMS:  GENERAL: See history of present illness.  No fevers, chills, sweats.    SKIN: No nonhealing lesions.  No rashes.  HEME/LYMPH: No easy bruising, bleeding. See history of present illness.   EYES: No vision changes or diplopia.   ENT: No tinnitus, hearing loss, gum bleeding, epistaxis, hoarseness or dysphagia.   RESPIRATORY: No cough, shortness of breath, hemoptysis or wheezing.   CVS: No chest pain, palpitations, orthopnea, dyspnea on exertion or PND.   GI: No melena or hematochezia. No abdominal pain.  No nausea, vomiting, constipation, diarrhea  : No lower tract obstructive symptoms, dysuria or hematuria.   MUSCULOSKELETAL: No bone pain.  No joint stiffness.   NEUROLOGICAL: No global weakness, loss of consciousness or seizures.   PSYCHIATRIC: No increased nervousness, mood changes or  "depression.     Objective    Vitals:    17 0759   BP: 108/66   Pulse: 60   Resp: 14   Temp: 98.2 °F (36.8 °C)   TempSrc: Oral   SpO2: 97%   Weight: 90.1 kg (198 lb 9.6 oz)   Height: 185 cm (72.84\")   PainSc: 0-No pain   ECO       PHYSICAL EXAM:    GENERAL:  Well-developed, well-nourished  male in no acute distress.   SKIN:  Warm, dry without rashes, purpura or petechiae. Left upper chest Port-A-Cath looks benign no evidence of infection.  Abdomen PEG tube in place, no evidence of infection.   EYES:  Pupils equal, round and reactive to light.  EOMs intact.  Conjunctivae normal.  EARS:  Hearing intact.  NOSE:   No nasal discharge.  MOUTH:  Tongue is well-papillated; no stomatitis or ulcers.  Lips normal.  THROAT:  Oropharynx without lesions or exudates.  The left tonsil bed has little yellowish mucus.   NECK:  Supple with good range of motion; no thyromegaly.  Left neck has mass measuring about 4.5 cm in diameter.   LYMPHATICS:  No other cervical, supraclavicular, axillary or inguinal adenopathy.  CHEST:  Lungs clear to auscultation. Good airflow.  CARDIAC:  Regular rate and rhythm without murmurs, rubs or gallops. Normal S1,S2.  ABDOMEN:  Soft, nontender with no hepatosplenomegaly or masses.  Bowel sounds normal.   EXTREMITIES:  No clubbing, cyanosis or edema.  NEUROLOGICAL:  Cranial Nerves II-XII grossly intact.  No focal neurological deficits.  PSYCHIATRIC:  Normal affect and mood.    RECENT LABS:    Lab Results   Component Value Date    WBC 8.59 2017    HGB 14.7 2017    HCT 42.6 2017    MCV 91.2 2017     2017     Lab Results   Component Value Date    NEUTROABS 6.54 2017     CMP and mag level are pending.    Assessment/Plan      1.  Newly diagnosed squamous cell carcinoma of the left base of the tongue, stage YEN (T1Nb) disease.    This patient was started on concurrent chemoradiation therapy on 2017.   He reports some nausea but are manageable " with medication including Zofran and the Compazine.  He denies vomiting.  He is able to eat and drink probably without difficulty.  He does report acid reflex symptom.    His chemistry lab is still pending.  He has normal CBC.    2.  Hyperglycemia secondary to steroids.  No history of diabetes.  Laboratory results is pending today.      3.  Symptom of acid reflex, started after chemoradiation therapy.  Discussed with patient, I recommended starting Protonix 40 mg daily.      PLAN:   1.  Continue daily radiation under direction of Dr. Aguirre.  2.  Laboratory results is pending today.  I will call him with results available.   3.  starting Protonix 40 mg daily, I e- scribed to his pharmacy.    4.  Use Zofran 8 mg q.8 h. as needed for nausea or vomiting.   5. Use Compazine 10 mg p.o. q.6 h. p.r.n. for nausea or vomiting from chemotherapy.   6.Continue weekly CBC, BMP and magnesium level.   7.  See Dr. Ortega in 2 weeks, on day of scheduled cycle #2 cisplatin.  This is one-day delayed because of the Christmas holiday.       More than 25 min were used for patient care, over half of that time were for counseling.      DOMENICA LEAL M.D., Ph.D.    12/11/2017        Addendum:     Lab Results   Component Value Date    GLUCOSE 116 12/11/2017    BUN 28 (H) 12/11/2017    CREATININE 1.48 (H) 12/11/2017    EGFRIFNONA 47 (L) 12/11/2017    EGFRIFAFRI 94 01/17/2017    BCR 18.9 12/11/2017    K 4.5 12/11/2017    CO2 28.0 12/11/2017    CALCIUM 9.5 12/11/2017    PROTENTOTREF 7.3 01/17/2017    ALBUMIN 3.90 12/04/2017    LABIL2 1.2 12/04/2017    AST 24 12/04/2017    ALT 37 12/04/2017     Sodium   Date Value Ref Range Status   12/11/2017 133 (L) 134 - 145 mmol/L Final     Potassium   Date Value Ref Range Status   12/11/2017 4.5 3.5 - 4.7 mmol/L Final     Total Bilirubin   Date Value Ref Range Status   12/04/2017 0.4 0.1 - 1.2 mg/dL Final     Alkaline Phosphatase   Date Value Ref Range Status   12/04/2017 107 38 - 116 U/L Final   ]  Mag  2.0    Patient has evidence of renal injury secondary from chemotherapy cisplatin, and the likely contributed by his dehydration because of nausea, and despite he claims has been drinking water properly, I do think he does not drink as much as he could.  I called and spoke to patient, recommending intravenous IV hydration with normal saline one liter each time for twice a week.  In the meantime I encourage him to drink more water.  Patient voiced understanding.      DOMENICA LEAL M.D., Ph.D.    12/11/2017  At 12:10 PM        Dictated using Dragon Dictation.

## 2017-12-13 PROCEDURE — 77014 CHG CT GUIDANCE RADIATION THERAPY FLDS PLACEMENT: CPT | Performed by: RADIOLOGY

## 2017-12-13 PROCEDURE — 77386: CPT | Performed by: RADIOLOGY

## 2017-12-13 PROCEDURE — 77386 CHG INTENSITY MODULATED RADIATION TX DLVR COMPLEX: CPT | Performed by: RADIOLOGY

## 2017-12-14 ENCOUNTER — DOCUMENTATION (OUTPATIENT)
Dept: RADIATION ONCOLOGY | Facility: HOSPITAL | Age: 67
End: 2017-12-14

## 2017-12-14 ENCOUNTER — TELEPHONE (OUTPATIENT)
Dept: RADIATION ONCOLOGY | Facility: HOSPITAL | Age: 67
End: 2017-12-14

## 2017-12-14 ENCOUNTER — INFUSION (OUTPATIENT)
Dept: ONCOLOGY | Facility: HOSPITAL | Age: 67
End: 2017-12-14

## 2017-12-14 VITALS
BODY MASS INDEX: 26.69 KG/M2 | SYSTOLIC BLOOD PRESSURE: 148 MMHG | DIASTOLIC BLOOD PRESSURE: 76 MMHG | TEMPERATURE: 98.4 F | HEART RATE: 67 BPM | WEIGHT: 201.4 LBS

## 2017-12-14 DIAGNOSIS — E86.0 DEHYDRATION: Primary | ICD-10-CM

## 2017-12-14 DIAGNOSIS — C10.9 OROPHARYNGEAL CANCER (HCC): ICD-10-CM

## 2017-12-14 PROCEDURE — 96361 HYDRATE IV INFUSION ADD-ON: CPT | Performed by: NURSE PRACTITIONER

## 2017-12-14 PROCEDURE — 77386 CHG INTENSITY MODULATED RADIATION TX DLVR COMPLEX: CPT | Performed by: RADIOLOGY

## 2017-12-14 PROCEDURE — 96360 HYDRATION IV INFUSION INIT: CPT | Performed by: NURSE PRACTITIONER

## 2017-12-14 PROCEDURE — 77014 CHG CT GUIDANCE RADIATION THERAPY FLDS PLACEMENT: CPT | Performed by: RADIOLOGY

## 2017-12-14 PROCEDURE — 77386: CPT | Performed by: RADIOLOGY

## 2017-12-14 RX ADMIN — SODIUM CHLORIDE 1000 ML: 900 INJECTION, SOLUTION INTRAVENOUS at 13:46

## 2017-12-14 NOTE — TELEPHONE ENCOUNTER
Returned patient call regarding Mugard and informed him I contacted company and we should hear reply today. Mr Ritchie will go for fluids today a CBC and I instructed him to ask if they have any samples of Mugard since our department is currently out of samples.

## 2017-12-14 NOTE — PROGRESS NOTES
Placed order for Ruth on Dec 7 . Patient calls on Dec 13 and has not heard from company. Contacted [facility] provider this am and spoke to representative Dennys. Dennys states patient is on top of the list and we should be contacted today.

## 2017-12-15 PROCEDURE — 77386: CPT | Performed by: RADIOLOGY

## 2017-12-15 PROCEDURE — 77014 CHG CT GUIDANCE RADIATION THERAPY FLDS PLACEMENT: CPT | Performed by: RADIOLOGY

## 2017-12-15 PROCEDURE — 77386 CHG INTENSITY MODULATED RADIATION TX DLVR COMPLEX: CPT | Performed by: RADIOLOGY

## 2017-12-18 ENCOUNTER — CLINICAL SUPPORT (OUTPATIENT)
Dept: ONCOLOGY | Facility: HOSPITAL | Age: 67
End: 2017-12-18

## 2017-12-18 ENCOUNTER — LAB (OUTPATIENT)
Dept: LAB | Facility: HOSPITAL | Age: 67
End: 2017-12-18

## 2017-12-18 ENCOUNTER — DOCUMENTATION (OUTPATIENT)
Dept: NUTRITION | Facility: HOSPITAL | Age: 67
End: 2017-12-18

## 2017-12-18 VITALS — HEART RATE: 55 BPM | TEMPERATURE: 98.6 F | DIASTOLIC BLOOD PRESSURE: 73 MMHG | SYSTOLIC BLOOD PRESSURE: 145 MMHG

## 2017-12-18 DIAGNOSIS — C10.9 OROPHARYNGEAL CANCER (HCC): ICD-10-CM

## 2017-12-18 LAB
ALBUMIN SERPL-MCNC: 3.6 G/DL (ref 3.5–5.2)
ALBUMIN/GLOB SERPL: 1.3 G/DL (ref 1.1–2.4)
ALP SERPL-CCNC: 77 U/L (ref 38–116)
ALT SERPL W P-5'-P-CCNC: 17 U/L (ref 0–41)
ANION GAP SERPL CALCULATED.3IONS-SCNC: 10.7 MMOL/L
AST SERPL-CCNC: 13 U/L (ref 0–40)
BASOPHILS # BLD AUTO: 0.02 10*3/MM3 (ref 0–0.1)
BASOPHILS NFR BLD AUTO: 0.4 % (ref 0–1.1)
BILIRUB SERPL-MCNC: 0.4 MG/DL (ref 0.1–1.2)
BUN BLD-MCNC: 13 MG/DL (ref 6–20)
BUN/CREAT SERPL: 12.6 (ref 7.3–30)
CALCIUM SPEC-SCNC: 9.2 MG/DL (ref 8.5–10.2)
CHLORIDE SERPL-SCNC: 97 MMOL/L (ref 98–107)
CO2 SERPL-SCNC: 28.3 MMOL/L (ref 22–29)
CREAT BLD-MCNC: 1.03 MG/DL (ref 0.7–1.3)
DEPRECATED RDW RBC AUTO: 40.1 FL (ref 37–49)
EOSINOPHIL # BLD AUTO: 0.05 10*3/MM3 (ref 0–0.36)
EOSINOPHIL NFR BLD AUTO: 1 % (ref 1–5)
ERYTHROCYTE [DISTWIDTH] IN BLOOD BY AUTOMATED COUNT: 11.5 % (ref 11.7–14.5)
GFR SERPL CREATININE-BSD FRML MDRD: 72 ML/MIN/1.73
GLOBULIN UR ELPH-MCNC: 2.7 GM/DL (ref 1.8–3.5)
GLUCOSE BLD-MCNC: 93 MG/DL (ref 74–124)
HCT VFR BLD AUTO: 36.9 % (ref 40–49)
HGB BLD-MCNC: 12.4 G/DL (ref 13.5–16.5)
IMM GRANULOCYTES # BLD: 0.03 10*3/MM3 (ref 0–0.03)
IMM GRANULOCYTES NFR BLD: 0.6 % (ref 0–0.5)
LYMPHOCYTES # BLD AUTO: 0.77 10*3/MM3 (ref 1–3.5)
LYMPHOCYTES NFR BLD AUTO: 14.7 % (ref 20–49)
MAGNESIUM SERPL-MCNC: 1.9 MG/DL (ref 1.8–2.5)
MCH RBC QN AUTO: 31.6 PG (ref 27–33)
MCHC RBC AUTO-ENTMCNC: 33.6 G/DL (ref 32–35)
MCV RBC AUTO: 93.9 FL (ref 83–97)
MONOCYTES # BLD AUTO: 0.54 10*3/MM3 (ref 0.25–0.8)
MONOCYTES NFR BLD AUTO: 10.3 % (ref 4–12)
NEUTROPHILS # BLD AUTO: 3.82 10*3/MM3 (ref 1.5–7)
NEUTROPHILS NFR BLD AUTO: 73 % (ref 39–75)
NRBC BLD MANUAL-RTO: 0 /100 WBC (ref 0–0)
PLATELET # BLD AUTO: 181 10*3/MM3 (ref 150–375)
PMV BLD AUTO: 8.5 FL (ref 8.9–12.1)
POTASSIUM BLD-SCNC: 4.6 MMOL/L (ref 3.5–4.7)
PROT SERPL-MCNC: 6.3 G/DL (ref 6.3–8)
RBC # BLD AUTO: 3.93 10*6/MM3 (ref 4.3–5.5)
SODIUM BLD-SCNC: 136 MMOL/L (ref 134–145)
WBC NRBC COR # BLD: 5.23 10*3/MM3 (ref 4–10)

## 2017-12-18 PROCEDURE — 77014 CHG CT GUIDANCE RADIATION THERAPY FLDS PLACEMENT: CPT | Performed by: RADIOLOGY

## 2017-12-18 PROCEDURE — 36415 COLL VENOUS BLD VENIPUNCTURE: CPT | Performed by: INTERNAL MEDICINE

## 2017-12-18 PROCEDURE — 77336 RADIATION PHYSICS CONSULT: CPT | Performed by: RADIOLOGY

## 2017-12-18 PROCEDURE — 77427 RADIATION TX MANAGEMENT X5: CPT | Performed by: RADIOLOGY

## 2017-12-18 PROCEDURE — 80053 COMPREHEN METABOLIC PANEL: CPT | Performed by: INTERNAL MEDICINE

## 2017-12-18 PROCEDURE — 77386: CPT | Performed by: RADIOLOGY

## 2017-12-18 PROCEDURE — 77386 CHG INTENSITY MODULATED RADIATION TX DLVR COMPLEX: CPT | Performed by: RADIOLOGY

## 2017-12-18 PROCEDURE — 83735 ASSAY OF MAGNESIUM: CPT | Performed by: INTERNAL MEDICINE

## 2017-12-18 PROCEDURE — 85025 COMPLETE CBC W/AUTO DIFF WBC: CPT | Performed by: INTERNAL MEDICINE

## 2017-12-18 NOTE — PROGRESS NOTES
Pt here today for CBC, CMP and Mg. CBC stable, VSS. Pt states he is feeling well. He is drinking plenty of water however he states nothing is tasting good. He is giving himself Ensure via feeding tube per the instruction of the registered dietician. CMP and Mg pending today. Advised pt I would call him with results once available. He v/u. Pt will return tomorrow and Thursday for IVF.    CMP and Mg reviewed and WNL. Called pt and informed him on this. He v/u. No further questions/concerns.

## 2017-12-18 NOTE — PROGRESS NOTES
"Oncology Nutrition  Follow up    Patient Name:  William Ritchie  YOB: 1950  MRN: [id number]  Date:  12/18/17  Physician:  Carla Ballesteros    Type of Cancer Treatment:   Radiation/Cyberknife: Definitive  Number of Treatments:  35  Chemotherapy:  Type: Cisplatin      Patient Active Problem List   Diagnosis   • Hyperlipidemia   • Benign essential hypertension   • IFG (impaired fasting glucose)   • Oropharyngeal cancer   • Cancer of base of tongue   • Fitting and adjustment of vascular catheter   • Steroid-induced hyperglycemia   • Dehydration   • Acute renal injury   • Adverse effect of antineoplastic drug   • Chemotherapy-induced nausea   • GERD (gastroesophageal reflux disease)       Current Outpatient Prescriptions   Medication Sig Dispense Refill   • dexamethasone (DECADRON) 4 MG tablet Take 2 tablets the night before chemo and the morning after chemo (Day 2), then take 2 tablets twice daily on days 3 & 4.  Take with food. (Patient taking differently: Take 4 mg by mouth. Take 2 tablets the night before chemo and the morning after chemo (Day 2), then take 2 tablets twice daily on days 3 & 4.  Take with food.) 12 tablet 2   • metoprolol tartrate (LOPRESSOR) 50 MG tablet Take 1 tablet by mouth 2 (Two) Times a Day. 180 tablet 3   • ondansetron (ZOFRAN) 8 MG tablet Take 1 tablet by mouth 3 (Three) Times a Day As Needed for Nausea or Vomiting. 30 tablet 5   • pantoprazole (PROTONIX) 40 MG EC tablet Take 1 tablet by mouth Daily. 30 tablet 5   • prochlorperazine (COMPAZINE) 10 MG tablet Take 1 tablet by mouth Every 6 (Six) Hours As Needed for Nausea or Vomiting for up to 60 doses. 60 tablet 5     No current facility-administered medications for this visit.        Glycemic Risk:   Steriods    Weight:   Height: 73\"  Weight: 201 lbs.  Usual Body Weight: 212 lbs.   BMI: 27  Normal  Weight has decreased 8 pounds over last 2 weeks    Oral Food Intake:  Regular Diet - No Restrictions  Compared to normal intake, " current food intake is less than normal    Hydration Status:   How many 8 ounce glass of water of fluid do you drink per day?  8    Enteral Feeding:   Location of Feeding Tube:  Stomach  Date of Tube Placement: 11/28    Nutrition Symptoms:   Altered Apetite  Problems Chewing/Swallowing  Altered Taste Perception    Activity:   Not my normal self, but able to be up and about with fairly normal activities     reports that he has never smoked. He has never used smokeless tobacco. He reports that he drinks alcohol. He reports that he does not use illicit drugs.    Evaluation of Nutritional Risk:   Patient identified to be at nutritional risk and/or for nutritional consultation; will follow patient through course of treatment.   Diagnosis  Weight Change  Nutrition Impact Symptoms  Patient Education     Notes: Patient complains of  taste changes and early satiety. PO intake has significantly decreased in the lasst few days.  He was given  gelclair today for mucositis. He is requiring  IVF twice a week for dehydration. The patient is unable to meet his nutrition needs with po alone due to taste changes, reflux symptoms and developing mucositis.  Recommend that patient begin to use the PEG for supplemental feeding due to  decreased intake.  Twocal HN , 5cans per day would provide 2375calories, (100% of calorie needs),  100 grams protein, (100% protein needs), and 830cc free fluid. Suggest 120cc water before and after each bolus ( 4 times per day) plus an additional 240cc water for a total of 2030cc total free water.    Will follow.          Electronically signed by:  Dana Liz RD  3:23 PM

## 2017-12-19 ENCOUNTER — RADIATION ONCOLOGY WEEKLY ASSESSMENT (OUTPATIENT)
Dept: RADIATION ONCOLOGY | Facility: HOSPITAL | Age: 67
End: 2017-12-19

## 2017-12-19 ENCOUNTER — INFUSION (OUTPATIENT)
Dept: ONCOLOGY | Facility: HOSPITAL | Age: 67
End: 2017-12-19

## 2017-12-19 VITALS
HEART RATE: 70 BPM | DIASTOLIC BLOOD PRESSURE: 71 MMHG | BODY MASS INDEX: 26.56 KG/M2 | TEMPERATURE: 98.4 F | SYSTOLIC BLOOD PRESSURE: 128 MMHG | WEIGHT: 200.4 LBS

## 2017-12-19 VITALS
DIASTOLIC BLOOD PRESSURE: 71 MMHG | WEIGHT: 200.4 LBS | BODY MASS INDEX: 26.56 KG/M2 | SYSTOLIC BLOOD PRESSURE: 128 MMHG | HEART RATE: 70 BPM | TEMPERATURE: 98.4 F

## 2017-12-19 DIAGNOSIS — C01 CANCER OF BASE OF TONGUE (HCC): Primary | ICD-10-CM

## 2017-12-19 DIAGNOSIS — C10.9 OROPHARYNGEAL CANCER (HCC): Primary | ICD-10-CM

## 2017-12-19 DIAGNOSIS — E86.0 DEHYDRATION: ICD-10-CM

## 2017-12-19 PROCEDURE — 77386 CHG INTENSITY MODULATED RADIATION TX DLVR COMPLEX: CPT | Performed by: RADIOLOGY

## 2017-12-19 PROCEDURE — 96360 HYDRATION IV INFUSION INIT: CPT | Performed by: NURSE PRACTITIONER

## 2017-12-19 PROCEDURE — 77014 CHG CT GUIDANCE RADIATION THERAPY FLDS PLACEMENT: CPT | Performed by: RADIOLOGY

## 2017-12-19 PROCEDURE — 77386: CPT | Performed by: RADIOLOGY

## 2017-12-19 RX ADMIN — SODIUM CHLORIDE 1000 ML: 900 INJECTION, SOLUTION INTRAVENOUS at 13:48

## 2017-12-19 NOTE — PROGRESS NOTES
Physician Weekly Management Note    Diagnosis:     Diagnosis Plan   1. Cancer of base of tongue         RT Details:  Treatment #12/35    Notes on Treatment course, Films, Medical progress:   Reports xerostomia and taste changes -  energy is intactevery week, no swallowing changes,  no throat pain.  He is using fluoride gels mu garde and salt and soda.  No skin changes on exam left neck mass may be a slightly flatter. Weight stable. Continue as planned.    Weekly Management:  Medication reviewed?   Yes  New medications given?   No  Problemlist reviewed?   Yes  Problem added?   No      Technical aspects reviewed:  Weekly OBI approved?   Yes  Weekly port films approved?   Yes  Change requests noted on port film?   No  Patient setup and plan reviewed?   Yes    Chart Reviewed:  Continue current treatment plan?   Yes  Treatment plan change requested?   No  CBC reviewed?   Yes  Concurrent Chemo?   Yes    Objective     Toxicities:   as above   Review of Systems   As above    Vitals:    12/19/17 1520   BP: 128/71   Pulse: 70   Temp: 98.4 °F (36.9 °C)       Current Status 12/11/2017   ECOG score 0       Physical Exam  As above      Problem Summary List    Diagnosis:     Diagnosis Plan   1. Cancer of base of tongue       Pathology:       Past Medical History:   Diagnosis Date   • Benign essential hypertension    • H/O complete eye exam due   • H/O Neck mass     left   • Hyperlipidemia    • IFG (impaired fasting glucose)    • Seasonal allergies    • Squamous cell carcinoma 2017    left base on tongue, stage YEN         Past Surgical History:   Procedure Laterality Date   • COLONOSCOPY N/A 2015    normal colonoscopy-Dr. Galen Morrissey   • COLONOSCOPY N/A 05/04/2011    Normal colonoscopy-Dr. Tobias Emerson   • ENDOSCOPY W/ PEG TUBE PLACEMENT N/A 11/28/2017    Procedure: ESOPHAGOGASTRODUODENOSCOPY WITH PERCUTANEOUS ENDOSCOPIC GASTROSTOMY TUBE INSERTION;  Surgeon: Isma Hercules MD;  Location: [facility] OR;  Service:    •  INGUINAL HERNIA REPAIR Left 1994    Dr. Peres   • INGUINAL HERNIA REPAIR Right 1993    Dr. Peres   • LARYNGOPLASTY      Laryngoplasty with biopsy-Dr. Del Toro   • [place] INSJ TUNNELED CVC W/O SUBQ PORT/ AGE 3 YR/> Left 11/28/2017    Procedure: INSERTION VENOUS ACCESS DEVICE;  Surgeon: Isma Hercules MD;  Location: [facility];  Service: General   • TONGUE BIOPSY / EXCISION N/A 11/01/2017    Biopsy of the left tongue base   • TONSILLECTOMY AND ADENOIDECTOMY Bilateral 11/01/2017    Dr. Del Toro         Current Outpatient Prescriptions on File Prior to Visit   Medication Sig Dispense Refill   • dexamethasone (DECADRON) 4 MG tablet Take 2 tablets the night before chemo and the morning after chemo (Day 2), then take 2 tablets twice daily on days 3 & 4.  Take with food. (Patient taking differently: Take 4 mg by mouth. Take 2 tablets the night before chemo and the morning after chemo (Day 2), then take 2 tablets twice daily on days 3 & 4.  Take with food.) 12 tablet 2   • metoprolol tartrate (LOPRESSOR) 50 MG tablet Take 1 tablet by mouth 2 (Two) Times a Day. 180 tablet 3   • ondansetron (ZOFRAN) 8 MG tablet Take 1 tablet by mouth 3 (Three) Times a Day As Needed for Nausea or Vomiting. 30 tablet 5   • pantoprazole (PROTONIX) 40 MG EC tablet Take 1 tablet by mouth Daily. 30 tablet 5   • prochlorperazine (COMPAZINE) 10 MG tablet Take 1 tablet by mouth Every 6 (Six) Hours As Needed for Nausea or Vomiting for up to 60 doses. 60 tablet 5     No current facility-administered medications on file prior to visit.        No Known Allergies     Primary care MD:    Ameya Sheffield MD    Oncologist: HAFSA Ballesteros M.D.    Seen and approved by:  Ameya Aguirre MD  12/19/2017

## 2017-12-20 DIAGNOSIS — E86.0 DEHYDRATION: ICD-10-CM

## 2017-12-20 DIAGNOSIS — C10.9 OROPHARYNGEAL CANCER (HCC): ICD-10-CM

## 2017-12-20 PROCEDURE — 77014 CHG CT GUIDANCE RADIATION THERAPY FLDS PLACEMENT: CPT | Performed by: RADIOLOGY

## 2017-12-20 PROCEDURE — 77386: CPT | Performed by: RADIOLOGY

## 2017-12-20 PROCEDURE — 77386 CHG INTENSITY MODULATED RADIATION TX DLVR COMPLEX: CPT | Performed by: RADIOLOGY

## 2017-12-21 ENCOUNTER — INFUSION (OUTPATIENT)
Dept: ONCOLOGY | Facility: HOSPITAL | Age: 67
End: 2017-12-21

## 2017-12-21 VITALS
DIASTOLIC BLOOD PRESSURE: 68 MMHG | HEART RATE: 66 BPM | TEMPERATURE: 98.6 F | SYSTOLIC BLOOD PRESSURE: 131 MMHG | WEIGHT: 200.6 LBS | BODY MASS INDEX: 26.59 KG/M2

## 2017-12-21 DIAGNOSIS — E86.0 DEHYDRATION: Primary | ICD-10-CM

## 2017-12-21 DIAGNOSIS — C10.9 OROPHARYNGEAL CANCER (HCC): ICD-10-CM

## 2017-12-21 PROCEDURE — 96360 HYDRATION IV INFUSION INIT: CPT | Performed by: NURSE PRACTITIONER

## 2017-12-21 PROCEDURE — 77386: CPT | Performed by: RADIOLOGY

## 2017-12-21 PROCEDURE — 77386 CHG INTENSITY MODULATED RADIATION TX DLVR COMPLEX: CPT | Performed by: RADIOLOGY

## 2017-12-21 PROCEDURE — 77014 CHG CT GUIDANCE RADIATION THERAPY FLDS PLACEMENT: CPT | Performed by: RADIOLOGY

## 2017-12-21 RX ADMIN — SODIUM CHLORIDE 1000 ML: 900 INJECTION, SOLUTION INTRAVENOUS at 13:52

## 2017-12-22 PROCEDURE — 77386: CPT | Performed by: RADIOLOGY

## 2017-12-22 PROCEDURE — 77014 CHG CT GUIDANCE RADIATION THERAPY FLDS PLACEMENT: CPT | Performed by: RADIOLOGY

## 2017-12-22 PROCEDURE — 77386 CHG INTENSITY MODULATED RADIATION TX DLVR COMPLEX: CPT | Performed by: RADIOLOGY

## 2017-12-26 ENCOUNTER — INFUSION (OUTPATIENT)
Dept: ONCOLOGY | Facility: HOSPITAL | Age: 67
End: 2017-12-26

## 2017-12-26 ENCOUNTER — RADIATION ONCOLOGY WEEKLY ASSESSMENT (OUTPATIENT)
Dept: RADIATION ONCOLOGY | Facility: HOSPITAL | Age: 67
End: 2017-12-26

## 2017-12-26 ENCOUNTER — OFFICE VISIT (OUTPATIENT)
Dept: ONCOLOGY | Facility: CLINIC | Age: 67
End: 2017-12-26

## 2017-12-26 VITALS
RESPIRATION RATE: 18 BRPM | WEIGHT: 194.8 LBS | TEMPERATURE: 97.7 F | SYSTOLIC BLOOD PRESSURE: 124 MMHG | HEART RATE: 69 BPM | OXYGEN SATURATION: 98 % | DIASTOLIC BLOOD PRESSURE: 62 MMHG | BODY MASS INDEX: 25.82 KG/M2 | HEIGHT: 73 IN

## 2017-12-26 VITALS — WEIGHT: 202.8 LBS | BODY MASS INDEX: 26.88 KG/M2

## 2017-12-26 DIAGNOSIS — C10.9 OROPHARYNGEAL CANCER (HCC): ICD-10-CM

## 2017-12-26 DIAGNOSIS — E86.0 DEHYDRATION: ICD-10-CM

## 2017-12-26 DIAGNOSIS — C01 CANCER OF BASE OF TONGUE (HCC): Primary | ICD-10-CM

## 2017-12-26 DIAGNOSIS — E86.0 DEHYDRATION: Primary | ICD-10-CM

## 2017-12-26 LAB
ALBUMIN SERPL-MCNC: 3.8 G/DL (ref 3.5–5.2)
ALBUMIN/GLOB SERPL: 1.4 G/DL (ref 1.1–2.4)
ALP SERPL-CCNC: 83 U/L (ref 38–116)
ALT SERPL W P-5'-P-CCNC: 13 U/L (ref 0–41)
ANION GAP SERPL CALCULATED.3IONS-SCNC: 12.8 MMOL/L
AST SERPL-CCNC: 12 U/L (ref 0–40)
BASOPHILS # BLD AUTO: 0 10*3/MM3 (ref 0–0.1)
BASOPHILS NFR BLD AUTO: 0 % (ref 0–1.1)
BILIRUB SERPL-MCNC: 0.3 MG/DL (ref 0.1–1.2)
BUN BLD-MCNC: 19 MG/DL (ref 6–20)
BUN/CREAT SERPL: 24.1 (ref 7.3–30)
CALCIUM SPEC-SCNC: 9.2 MG/DL (ref 8.5–10.2)
CHLORIDE SERPL-SCNC: 95 MMOL/L (ref 98–107)
CO2 SERPL-SCNC: 25.2 MMOL/L (ref 22–29)
CREAT BLD-MCNC: 0.79 MG/DL (ref 0.7–1.3)
DEPRECATED RDW RBC AUTO: 39.1 FL (ref 37–49)
EOSINOPHIL # BLD AUTO: 0 10*3/MM3 (ref 0–0.36)
EOSINOPHIL NFR BLD AUTO: 0 % (ref 1–5)
ERYTHROCYTE [DISTWIDTH] IN BLOOD BY AUTOMATED COUNT: 11.6 % (ref 11.7–14.5)
GFR SERPL CREATININE-BSD FRML MDRD: 97 ML/MIN/1.73
GLOBULIN UR ELPH-MCNC: 2.8 GM/DL (ref 1.8–3.5)
GLUCOSE BLD-MCNC: 312 MG/DL (ref 74–124)
HCT VFR BLD AUTO: 36.3 % (ref 40–49)
HGB BLD-MCNC: 12.3 G/DL (ref 13.5–16.5)
HOLD SPECIMEN: NORMAL
IMM GRANULOCYTES # BLD: 0.02 10*3/MM3 (ref 0–0.03)
IMM GRANULOCYTES NFR BLD: 0.6 % (ref 0–0.5)
LYMPHOCYTES # BLD AUTO: 0.28 10*3/MM3 (ref 1–3.5)
LYMPHOCYTES NFR BLD AUTO: 8.3 % (ref 20–49)
MAGNESIUM SERPL-MCNC: 2.1 MG/DL (ref 1.8–2.5)
MCH RBC QN AUTO: 31.1 PG (ref 27–33)
MCHC RBC AUTO-ENTMCNC: 33.9 G/DL (ref 32–35)
MCV RBC AUTO: 91.7 FL (ref 83–97)
MONOCYTES # BLD AUTO: 0.26 10*3/MM3 (ref 0.25–0.8)
MONOCYTES NFR BLD AUTO: 7.7 % (ref 4–12)
NEUTROPHILS # BLD AUTO: 2.82 10*3/MM3 (ref 1.5–7)
NEUTROPHILS NFR BLD AUTO: 83.4 % (ref 39–75)
NRBC BLD MANUAL-RTO: 0 /100 WBC (ref 0–0)
PLATELET # BLD AUTO: 211 10*3/MM3 (ref 150–375)
PMV BLD AUTO: 8.6 FL (ref 8.9–12.1)
POTASSIUM BLD-SCNC: 4.4 MMOL/L (ref 3.5–4.7)
PROT SERPL-MCNC: 6.6 G/DL (ref 6.3–8)
RBC # BLD AUTO: 3.96 10*6/MM3 (ref 4.3–5.5)
SODIUM BLD-SCNC: 133 MMOL/L (ref 134–145)
WBC NRBC COR # BLD: 3.38 10*3/MM3 (ref 4–10)

## 2017-12-26 PROCEDURE — 96413 CHEMO IV INFUSION 1 HR: CPT | Performed by: INTERNAL MEDICINE

## 2017-12-26 PROCEDURE — 25010000002 PALONOSETRON PER 25 MCG: Performed by: INTERNAL MEDICINE

## 2017-12-26 PROCEDURE — 99215 OFFICE O/P EST HI 40 MIN: CPT | Performed by: INTERNAL MEDICINE

## 2017-12-26 PROCEDURE — 77336 RADIATION PHYSICS CONSULT: CPT | Performed by: RADIOLOGY

## 2017-12-26 PROCEDURE — 25010000002 FOSAPREPITANT PER 1 MG: Performed by: INTERNAL MEDICINE

## 2017-12-26 PROCEDURE — 77427 RADIATION TX MANAGEMENT X5: CPT | Performed by: RADIOLOGY

## 2017-12-26 PROCEDURE — 25010000002 DEXAMETHASONE SODIUM PHOSPHATE 10 MG/ML SOLUTION 1 ML VIAL: Performed by: INTERNAL MEDICINE

## 2017-12-26 PROCEDURE — 96366 THER/PROPH/DIAG IV INF ADDON: CPT | Performed by: INTERNAL MEDICINE

## 2017-12-26 PROCEDURE — 80053 COMPREHEN METABOLIC PANEL: CPT | Performed by: INTERNAL MEDICINE

## 2017-12-26 PROCEDURE — 77014 CHG CT GUIDANCE RADIATION THERAPY FLDS PLACEMENT: CPT | Performed by: RADIOLOGY

## 2017-12-26 PROCEDURE — 85025 COMPLETE CBC W/AUTO DIFF WBC: CPT | Performed by: INTERNAL MEDICINE

## 2017-12-26 PROCEDURE — 83735 ASSAY OF MAGNESIUM: CPT | Performed by: INTERNAL MEDICINE

## 2017-12-26 PROCEDURE — 96367 TX/PROPH/DG ADDL SEQ IV INF: CPT | Performed by: INTERNAL MEDICINE

## 2017-12-26 PROCEDURE — 77386 CHG INTENSITY MODULATED RADIATION TX DLVR COMPLEX: CPT | Performed by: RADIOLOGY

## 2017-12-26 PROCEDURE — 25010000002 MAGNESIUM SULFATE PER 500 MG OF MAGNESIUM: Performed by: INTERNAL MEDICINE

## 2017-12-26 PROCEDURE — 25010000002 POTASSIUM CHLORIDE PER 2 MEQ OF POTASSIUM: Performed by: INTERNAL MEDICINE

## 2017-12-26 PROCEDURE — 77386: CPT | Performed by: RADIOLOGY

## 2017-12-26 PROCEDURE — 36415 COLL VENOUS BLD VENIPUNCTURE: CPT | Performed by: INTERNAL MEDICINE

## 2017-12-26 PROCEDURE — 25010000002 CISPLATIN PER 50 MG: Performed by: INTERNAL MEDICINE

## 2017-12-26 PROCEDURE — 96375 TX/PRO/DX INJ NEW DRUG ADDON: CPT | Performed by: INTERNAL MEDICINE

## 2017-12-26 RX ORDER — PALONOSETRON 0.05 MG/ML
0.25 INJECTION, SOLUTION INTRAVENOUS ONCE
Status: COMPLETED | OUTPATIENT
Start: 2017-12-26 | End: 2017-12-26

## 2017-12-26 RX ORDER — PALONOSETRON 0.05 MG/ML
0.25 INJECTION, SOLUTION INTRAVENOUS ONCE
Status: CANCELLED | OUTPATIENT
Start: 2017-12-26

## 2017-12-26 RX ADMIN — DEXAMETHASONE SODIUM PHOSPHATE 12 MG: 10 INJECTION, SOLUTION INTRAMUSCULAR; INTRAVENOUS at 12:23

## 2017-12-26 RX ADMIN — PALONOSETRON HYDROCHLORIDE 0.25 MG: 0.25 INJECTION INTRAVENOUS at 11:38

## 2017-12-26 RX ADMIN — POTASSIUM CHLORIDE 1000 ML: 2 INJECTION, SOLUTION, CONCENTRATE INTRAVENOUS at 09:42

## 2017-12-26 RX ADMIN — SODIUM CHLORIDE 250 ML: 900 INJECTION, SOLUTION INTRAVENOUS at 09:40

## 2017-12-26 RX ADMIN — POTASSIUM CHLORIDE 1000 ML: 2 INJECTION, SOLUTION, CONCENTRATE INTRAVENOUS at 13:56

## 2017-12-26 RX ADMIN — SODIUM CHLORIDE 150 MG: 900 INJECTION, SOLUTION INTRAVENOUS at 11:38

## 2017-12-26 RX ADMIN — CISPLATIN 218 MG: 1 INJECTION, SOLUTION INTRAVENOUS at 12:41

## 2017-12-26 NOTE — PROGRESS NOTES
REVIEWED GLUCOSE  WITH  FAINA HWANG NP.   PATIENT HAS APPT TO SEE FAINA WITH LABS AND FLUIDS ON 1/2/18. SHE WILL REVIEW LABS ON THAT DAY.

## 2017-12-26 NOTE — PROGRESS NOTES
Physician Weekly Management Note    Diagnosis:     Diagnosis Plan   1. Cancer of base of tongue         RT Details:  Treatment #16/35    Notes on Treatment course, Films, Medical progress:   Patient reports complete taste loss, foamy saliva, no throat pain, he is using the feeding tube, is lost about 10 pounds or so since he started.  He reports some constipation is using Senokot.  Neck mass appears to be flattening out, some moderate skin erythema and small rash-like areas on exam.  Continue as planned.     Weekly Management:  Medication reviewed?   Yes  New medications given?   No  Problemlist reviewed?   Yes  Problem added?   No      Technical aspects reviewed:  Weekly OBI approved?   Yes  Weekly port films approved?   Yes  Change requests noted on port film?   No  Patient setup and plan reviewed?   Yes    Chart Reviewed:  Continue current treatment plan?   Yes  Treatment plan change requested?   No  CBC reviewed?   Yes  Concurrent Chemo?   Yes    Objective     Toxicities:   As above     Review of Systems   As above    There were no vitals filed for this visit.    Current Status 12/26/2017   ECOG score 0       Physical Exam  As above      Problem Summary List    Diagnosis:     Diagnosis Plan   1. Cancer of base of tongue       Pathology:       Past Medical History:   Diagnosis Date   • Benign essential hypertension    • H/O complete eye exam due   • H/O Neck mass     left   • Hyperlipidemia    • IFG (impaired fasting glucose)    • Seasonal allergies    • Squamous cell carcinoma 2017    left base on tongue, stage YEN         Past Surgical History:   Procedure Laterality Date   • COLONOSCOPY N/A 2015    normal colonoscopy-Dr. Galen Morrissey   • COLONOSCOPY N/A 05/04/2011    Normal colonoscopy-Dr. Tobias Emerson   • ENDOSCOPY W/ PEG TUBE PLACEMENT N/A 11/28/2017    Procedure: ESOPHAGOGASTRODUODENOSCOPY WITH PERCUTANEOUS ENDOSCOPIC GASTROSTOMY TUBE INSERTION;  Surgeon: Isma Hercules MD;  Location: [facility] OR;   Service:    • INGUINAL HERNIA REPAIR Left 1994    Dr. Peres   • INGUINAL HERNIA REPAIR Right 1993    Dr. Peres   • LARYNGOPLASTY      Laryngoplasty with biopsy-Dr. Del Toro   • [place] INSJ TUNNELED CVC W/O SUBQ PORT/ AGE 3 YR/> Left 11/28/2017    Procedure: INSERTION VENOUS ACCESS DEVICE;  Surgeon: Isma Hercules MD;  Location: [facility];  Service: General   • TONGUE BIOPSY / EXCISION N/A 11/01/2017    Biopsy of the left tongue base   • TONSILLECTOMY AND ADENOIDECTOMY Bilateral 11/01/2017    Dr. Del Toro         Current Outpatient Prescriptions on File Prior to Visit   Medication Sig Dispense Refill   • dexamethasone (DECADRON) 4 MG tablet Take 2 tablets the night before chemo and the morning after chemo (Day 2), then take 2 tablets twice daily on days 3 & 4.  Take with food. (Patient taking differently: Take 4 mg by mouth. Take 2 tablets the night before chemo and the morning after chemo (Day 2), then take 2 tablets twice daily on days 3 & 4.  Take with food.) 12 tablet 2   • metoprolol tartrate (LOPRESSOR) 50 MG tablet Take 1 tablet by mouth 2 (Two) Times a Day. 180 tablet 3   • ondansetron (ZOFRAN) 8 MG tablet Take 1 tablet by mouth 3 (Three) Times a Day As Needed for Nausea or Vomiting. 30 tablet 5   • pantoprazole (PROTONIX) 40 MG EC tablet Take 1 tablet by mouth Daily. 30 tablet 5   • prochlorperazine (COMPAZINE) 10 MG tablet Take 1 tablet by mouth Every 6 (Six) Hours As Needed for Nausea or Vomiting for up to 60 doses. 60 tablet 5     Current Facility-Administered Medications on File Prior to Visit   Medication Dose Route Frequency Provider Last Rate Last Dose   • [COMPLETED] CISplatin (PLATINOL) 218 mg in sodium chloride 0.9 % 718 mL chemo IVPB  100 mg/m2 (Treatment Plan Recorded) Intravenous Once Ameya Ortega II, MD   Stopped at 12/26/17 1350   • [COMPLETED] dexamethasone sodium phosphate 12 mg in sodium chloride 0.9 % IVPB  12 mg Intravenous Once Ameya Ortega II, MD   Stopped at 12/26/17  1238   • [COMPLETED] fosaprepitant (EMEND) 150 mg in sodium chloride 0.9 % 100 mL IVPB  150 mg Intravenous Once Ameya Ortega II, MD   Stopped at 12/26/17 1216   • [COMPLETED] palonosetron (ALOXI) injection 0.25 mg  0.25 mg Intravenous Once Ameya Ortega II, MD   0.25 mg at 12/26/17 1138   • [COMPLETED] sodium chloride 0.9 % 1,000 mL with potassium chloride 20 mEq, magnesium sulfate 1 g infusion  1,000 mL Intravenous Once Ameya Ortega II, MD   Stopped at 12/26/17 1142   • [COMPLETED] sodium chloride 0.9 % 1,000 mL with potassium chloride 20 mEq, magnesium sulfate 1 g infusion  1,000 mL Intravenous Once Ameya Ortega II, MD   Stopped at 12/26/17 1556   • [COMPLETED] sodium chloride 0.9 % 250 mL infusion  250 mL Intravenous Once Ameya Ortega II, MD   Stopped at 12/26/17 1556       No Known Allergies      Primary care MD:    Ameya Sheffield MD    Oncologist:   HAFSA Ballesteros M.D.    Seen and approved by:  Ameya Aguirre MD  12/26/2017

## 2017-12-26 NOTE — PROGRESS NOTES
Subjective .     REASONS FOR FOLLOWUP:  Base of tongue cancer    HISTORY OF PRESENT ILLNESS:  The patient is a 67 y.o. year old male  who is here for follow-up with the above-mentioned history.    States chemotherapy went well.  Has some occasional mild ringing in years but states this was present prior to receiving cisplatin and it did not worsen with cisplatin.  Denies hearing loss.  Denies neuropathy.    Has had some constipation.  Dulcolax and milk of magnesia not quite helped enough.    No significant problems with nausea.  No significant problems with pain.    Past Medical History:   Diagnosis Date   • Benign essential hypertension    • H/O complete eye exam due   • H/O Neck mass     left   • Hyperlipidemia    • IFG (impaired fasting glucose)    • Seasonal allergies    • Squamous cell carcinoma 2017    left base on tongue, stage YEN     Past Surgical History:   Procedure Laterality Date   • COLONOSCOPY N/A 2015    normal colonoscopy-Dr. Galen Morrissey   • COLONOSCOPY N/A 05/04/2011    Normal colonoscopy-Dr. Tobias Emerson   • ENDOSCOPY W/ PEG TUBE PLACEMENT N/A 11/28/2017    Procedure: ESOPHAGOGASTRODUODENOSCOPY WITH PERCUTANEOUS ENDOSCOPIC GASTROSTOMY TUBE INSERTION;  Surgeon: Isma Hercules MD;  Location: [facility];  Service:    • INGUINAL HERNIA REPAIR Left 1994    Dr. Peres   • INGUINAL HERNIA REPAIR Right 1993    Dr. Peres   • LARYNGOPLASTY      Laryngoplasty with biopsy-Dr. Del Toro   • [place] INSJ TUNNELED CVC W/O SUBQ PORT/ AGE 3 YR/> Left 11/28/2017    Procedure: INSERTION VENOUS ACCESS DEVICE;  Surgeon: Isma Hercules MD;  Location: [facility];  Service: General   • TONGUE BIOPSY / EXCISION N/A 11/01/2017    Biopsy of the left tongue base   • TONSILLECTOMY AND ADENOIDECTOMY Bilateral 11/01/2017    Dr. Del Toro       HEMATOLOGIC/ONCOLOGIC HISTORY:  (History from previous dates can be found in the separate document.)    MEDICATIONS    Current Outpatient Prescriptions:   •   dexamethasone (DECADRON) 4 MG tablet, Take 2 tablets the night before chemo and the morning after chemo (Day 2), then take 2 tablets twice daily on days 3 & 4.  Take with food. (Patient taking differently: Take 4 mg by mouth. Take 2 tablets the night before chemo and the morning after chemo (Day 2), then take 2 tablets twice daily on days 3 & 4.  Take with food.), Disp: 12 tablet, Rfl: 2  •  metoprolol tartrate (LOPRESSOR) 50 MG tablet, Take 1 tablet by mouth 2 (Two) Times a Day., Disp: 180 tablet, Rfl: 3  •  ondansetron (ZOFRAN) 8 MG tablet, Take 1 tablet by mouth 3 (Three) Times a Day As Needed for Nausea or Vomiting., Disp: 30 tablet, Rfl: 5  •  pantoprazole (PROTONIX) 40 MG EC tablet, Take 1 tablet by mouth Daily., Disp: 30 tablet, Rfl: 5  •  prochlorperazine (COMPAZINE) 10 MG tablet, Take 1 tablet by mouth Every 6 (Six) Hours As Needed for Nausea or Vomiting for up to 60 doses., Disp: 60 tablet, Rfl: 5  No current facility-administered medications for this visit.     Facility-Administered Medications Ordered in Other Visits:   •  CISplatin (PLATINOL) 218 mg in sodium chloride 0.9 % 718 mL chemo IVPB, 100 mg/m2 (Treatment Plan Recorded), Intravenous, Once, Ameya Ortega II, MD  •  dexamethasone sodium phosphate 12 mg in sodium chloride 0.9 % IVPB, 12 mg, Intravenous, Once, Ameya Ortega II, MD  •  fosaprepitant (EMEND) 150 mg in sodium chloride 0.9 % 100 mL IVPB, 150 mg, Intravenous, Once, Ameya Ortega II, MD  •  palonosetron (ALOXI) injection 0.25 mg, 0.25 mg, Intravenous, Once, Ameya Ortega II, MD  •  sodium chloride 0.9 % 1,000 mL with potassium chloride 20 mEq, magnesium sulfate 1 g infusion, 1,000 mL, Intravenous, Once, Ameya Ortega II, MD, Last Rate: 500 mL/hr at 12/26/17 0942, 1,000 mL at 12/26/17 0942  •  sodium chloride 0.9 % 1,000 mL with potassium chloride 20 mEq, magnesium sulfate 1 g infusion, 1,000 mL, Intravenous, Once, Ameya Ortega II, MD    ALLERGIES:   No Known Allergies    SOCIAL HISTORY:   "     Social History     Social History   • Marital status:      Spouse name: Janiya   • Number of children: 2   • Years of education: High School     Occupational History   •  Retired     [employer]     Social History Main Topics   • Smoking status: Never Smoker   • Smokeless tobacco: Never Used   • Alcohol use Yes      Comment: occassional   • Drug use: No   • Sexual activity: No     Other Topics Concern   • Not on file     Social History Narrative         FAMILY HISTORY:  Family History   Problem Relation Age of Onset   • Alcohol abuse Father    • Diabetes Father    • Cancer Neg Hx    • Malig Hyperthermia Neg Hx        REVIEW OF SYSTEMS:  GENERAL: No change in appetite or weight;   No fevers, chills, sweats.    SKIN: No nonhealing lesions.   No rashes.  HEME/LYMPH: No easy bruising, bleeding.   No swollen nodes.   EYES: No vision changes or diplopia.   ENT: No hearing loss, gum bleeding, epistaxis, hoarseness or dysphagia.   RESPIRATORY: No cough, shortness of breath, hemoptysis or wheezing.   CVS: No chest pain, palpitations, orthopnea, dyspnea on exertion or PND.   GI: No melena or hematochezia.   No abdominal pain.  No nausea, vomiting, constipation, diarrhea  : No lower tract obstructive symptoms, dysuria or hematuria.   MUSCULOSKELETAL: No bone pain.  No joint stiffness.   NEUROLOGICAL: No global weakness, loss of consciousness or seizures.   PSYCHIATRIC: No increased nervousness, mood changes or depression.     Objective    Vitals:    12/26/17 0839   BP: 124/62   Pulse: 69   Resp: 18   Temp: 97.7 °F (36.5 °C)   TempSrc: Oral   SpO2: 98%   Weight: 88.4 kg (194 lb 12.8 oz)   Height: 185 cm (72.84\")   PainSc: 4  Comment: Rash around neck x4 days.   PainLoc: Throat     Current Status 12/26/2017   ECOG score 0      PHYSICAL EXAM:    GENERAL:  Well-developed, well-nourished in no acute distress.   SKIN:  Warm, dry without rashes, purpura or petechiae.  EYES:  Pupils equal, round and reactive to light.  EOMs " intact.  Conjunctivae normal.  EARS:  Hearing intact.  NOSE:  Septum midline.  No excoriations or nasal discharge.  MOUTH:  Tongue is well-papillated; no stomatitis or ulcers.  Lips normal.  THROAT:  Oropharynx without lesions or exudates.  NECK:  Supple with good range of motion; no thyromegaly or masses, no JVD.  LYMPHATICS:  No cervical, supraclavicular, axillary or inguinal adenopathy.  CHEST:  Lungs clear to auscultation. Good airflow.  CARDIAC:  Regular rate and rhythm without murmurs, rubs or gallops. Normal S1,S2.  ABDOMEN:  Soft, nontender with no hepatosplenomegaly or masses.  EXTREMITIES:  No clubbing, cyanosis or edema.  NEUROLOGICAL:  Cranial Nerves II-XII grossly intact.  No focal neurological deficits.  PSYCHIATRIC:  Normal affect and mood.     RECENT LABS:        WBC   Date/Time Value Ref Range Status   12/26/2017 08:10 AM 3.38 (L) 4.00 - 10.00 10*3/mm3 Final     Hemoglobin   Date/Time Value Ref Range Status   12/26/2017 08:10 AM 12.3 (L) 13.5 - 16.5 g/dL Final     Platelets   Date/Time Value Ref Range Status   12/26/2017 08:10  150 - 375 10*3/mm3 Final       Assessment/Plan   Cancer of base of tongue  - Comprehensive Metabolic Panel  - Comprehensive Metabolic Panel  - CBC & Differential  - CBC & Differential    Dehydration    Oropharyngeal cancer    *Left base of tongue cancer.  Stage IV A.  Cisplatin day 1, 22, 43, concurrent with radiation.    *Leukocytopenia, due to chemotherapy.  Not neutropenic.    *Anemia, due to chemotherapy.  Monitor.    *Tinnitus.  He states he had ringing in his years prior to cisplatin.  Ringing did not worsen with cisplatin.   However, I told him more cisplatin increases the risk of permanent worsening of tinnitus, which can be quite troublesome.  He denies hearing loss.  He wants to proceed with cisplatin.  Since he did fine with the first dose of cisplatin, I think it is reasonable to proceed.  However, if ringing worsens, we may need to drop dose  #3.    *Nutrition.  Has PEG.  Has met with Dana.  Goal tube feed is 5 cans per day.  Total of 2 L free water.    *Acute kidney injury due to cisplatin with cycle 1.  Creatinine increased to 1.5.  With IV hydration, this resolved.  He does not think he can adequately keep up with IV hydration at home.  Therefore, he wants to maintain twice per week IV fluids.    *GERD.  Dr. Ballesteros started Protonix 40 mg daily.    Plan  · Continue day 1, 22, 43 cisplatin concurrent with radiation.  · Day 22 cisplatin today  · Weekly CBC and stat CMP.  · M.D. or APRN in 1 week (creatinine increased previously 1 week after cisplatin)  · M.D. or APRN with cisplatin (last dose), 3 weeks  · Maintain every Tuesday, Thursday, 1 L normal saline    We did more today than just assess side effects of chemotherapy.  We also reviewed nutrition.  He is on drug therapy requiring intensive monitoring for toxicity.  Family member assisted with history.

## 2017-12-27 PROCEDURE — 77386: CPT | Performed by: RADIOLOGY

## 2017-12-27 PROCEDURE — 77300 RADIATION THERAPY DOSE PLAN: CPT | Performed by: RADIOLOGY

## 2017-12-27 PROCEDURE — 77386 CHG INTENSITY MODULATED RADIATION TX DLVR COMPLEX: CPT | Performed by: RADIOLOGY

## 2017-12-27 PROCEDURE — 77014 CHG CT GUIDANCE RADIATION THERAPY FLDS PLACEMENT: CPT | Performed by: RADIOLOGY

## 2017-12-28 ENCOUNTER — INFUSION (OUTPATIENT)
Dept: ONCOLOGY | Facility: HOSPITAL | Age: 67
End: 2017-12-28

## 2017-12-28 ENCOUNTER — DOCUMENTATION (OUTPATIENT)
Dept: NUTRITION | Facility: HOSPITAL | Age: 67
End: 2017-12-28

## 2017-12-28 VITALS
TEMPERATURE: 98.6 F | BODY MASS INDEX: 27.14 KG/M2 | DIASTOLIC BLOOD PRESSURE: 81 MMHG | SYSTOLIC BLOOD PRESSURE: 173 MMHG | WEIGHT: 204.8 LBS | HEART RATE: 62 BPM

## 2017-12-28 DIAGNOSIS — E86.0 DEHYDRATION: Primary | ICD-10-CM

## 2017-12-28 DIAGNOSIS — C10.9 OROPHARYNGEAL CANCER (HCC): ICD-10-CM

## 2017-12-28 PROCEDURE — 77014 CHG CT GUIDANCE RADIATION THERAPY FLDS PLACEMENT: CPT | Performed by: RADIOLOGY

## 2017-12-28 PROCEDURE — 96361 HYDRATE IV INFUSION ADD-ON: CPT | Performed by: INTERNAL MEDICINE

## 2017-12-28 PROCEDURE — 96360 HYDRATION IV INFUSION INIT: CPT | Performed by: INTERNAL MEDICINE

## 2017-12-28 PROCEDURE — 77386: CPT | Performed by: RADIOLOGY

## 2017-12-28 PROCEDURE — 77386 CHG INTENSITY MODULATED RADIATION TX DLVR COMPLEX: CPT | Performed by: RADIOLOGY

## 2017-12-28 RX ADMIN — SODIUM CHLORIDE 1000 ML: 900 INJECTION, SOLUTION INTRAVENOUS at 12:50

## 2017-12-28 NOTE — PROGRESS NOTES
"Oncology Nutrition Screening - Follow up    Patient Name:  William Ritchie  YOB: 1950  MRN: [id number]  Date:  12/28/17  Physician:  Carla Ortega    Type of Cancer Treatment:   Surgery:   Type:  tonsillectomy  Radiation/Cyberknife: Number of Treatments:  33  Chemotherapy:  Type: Cisplatin      Patient Active Problem List   Diagnosis   • Hyperlipidemia   • Benign essential hypertension   • IFG (impaired fasting glucose)   • Oropharyngeal cancer   • Cancer of base of tongue   • Fitting and adjustment of vascular catheter   • Steroid-induced hyperglycemia   • Dehydration   • Acute renal injury   • Adverse effect of antineoplastic drug   • Chemotherapy-induced nausea   • GERD (gastroesophageal reflux disease)       Current Outpatient Prescriptions   Medication Sig Dispense Refill   • dexamethasone (DECADRON) 4 MG tablet Take 2 tablets the night before chemo and the morning after chemo (Day 2), then take 2 tablets twice daily on days 3 & 4.  Take with food. (Patient taking differently: Take 4 mg by mouth. Take 2 tablets the night before chemo and the morning after chemo (Day 2), then take 2 tablets twice daily on days 3 & 4.  Take with food.) 12 tablet 2   • metoprolol tartrate (LOPRESSOR) 50 MG tablet Take 1 tablet by mouth 2 (Two) Times a Day. 180 tablet 3   • ondansetron (ZOFRAN) 8 MG tablet Take 1 tablet by mouth 3 (Three) Times a Day As Needed for Nausea or Vomiting. 30 tablet 5   • pantoprazole (PROTONIX) 40 MG EC tablet Take 1 tablet by mouth Daily. 30 tablet 5   • prochlorperazine (COMPAZINE) 10 MG tablet Take 1 tablet by mouth Every 6 (Six) Hours As Needed for Nausea or Vomiting for up to 60 doses. 60 tablet 5     No current facility-administered medications for this visit.        Glycemic Risk:   Steriods-- gluc on 12/26 was 312.  CMP results pending.      Weight:   Height: 72\"  Weight: 204 lbs.  Usual Body Weight: 200 lbs.   BMI:25.8   Weight has been stable    Oral Food Intake:  Compared to " normal intake, current food intake is less than normal    Hydration Status:   How many 8 ounce glass of water of fluid do you drink per day?  6    Enteral Feeding:   Name of Enteral Formula: two toi HN  Method of Delivery:  Bolus  Amount of Enteral Formula Administered per Day: 5 cans per day, 240cc water q bolus, plus 240cc water throughout the day    Nutrition Symptoms:   Altered Apetite  Altered Taste Perception  Esophagitis  Constipation    Activity:   Not my normal self, but able to be up and about with fairly normal activities     reports that he has never smoked. He has never used smokeless tobacco. He reports that he drinks alcohol. He reports that he does not use illicit drugs.    Evaluation of Nutritional Risk:   Patient identified to be at nutritional risk and/or for nutritional consultation; will follow patient through course of treatment.   Glycemic Risk  Nutrition Impact Symptoms  Patient Education    Notes:  The patient is using PEG daily, averaging about 2-3 cans Two Toi per day, with water flushes.  He is attempting to eat but has lost all sense of taste, appetite is decreased.  Noted glucose level 313 on 12/26.  Using senekot daily now.    Weight stabilized.  Will follow for labs, etc.       Electronically signed by:  Dana Liz, ESTEPHANIE  3:28 PM

## 2017-12-29 PROCEDURE — 77386: CPT | Performed by: RADIOLOGY

## 2017-12-29 PROCEDURE — 77386 CHG INTENSITY MODULATED RADIATION TX DLVR COMPLEX: CPT | Performed by: RADIOLOGY

## 2017-12-29 PROCEDURE — 77014 CHG CT GUIDANCE RADIATION THERAPY FLDS PLACEMENT: CPT | Performed by: RADIOLOGY

## 2018-01-02 ENCOUNTER — OFFICE VISIT (OUTPATIENT)
Dept: ONCOLOGY | Facility: CLINIC | Age: 68
End: 2018-01-02

## 2018-01-02 ENCOUNTER — INFUSION (OUTPATIENT)
Dept: ONCOLOGY | Facility: HOSPITAL | Age: 68
End: 2018-01-02

## 2018-01-02 ENCOUNTER — APPOINTMENT (OUTPATIENT)
Dept: RADIATION ONCOLOGY | Facility: HOSPITAL | Age: 68
End: 2018-01-02

## 2018-01-02 VITALS
WEIGHT: 195 LBS | HEART RATE: 59 BPM | HEIGHT: 73 IN | DIASTOLIC BLOOD PRESSURE: 81 MMHG | SYSTOLIC BLOOD PRESSURE: 160 MMHG | BODY MASS INDEX: 25.84 KG/M2 | RESPIRATION RATE: 12 BRPM | OXYGEN SATURATION: 99 %

## 2018-01-02 DIAGNOSIS — N17.9 ACUTE RENAL INJURY (HCC): ICD-10-CM

## 2018-01-02 DIAGNOSIS — E86.0 DEHYDRATION: ICD-10-CM

## 2018-01-02 DIAGNOSIS — C10.9 OROPHARYNGEAL CANCER (HCC): ICD-10-CM

## 2018-01-02 DIAGNOSIS — C10.9 OROPHARYNGEAL CANCER (HCC): Primary | ICD-10-CM

## 2018-01-02 DIAGNOSIS — E86.0 DEHYDRATION: Primary | ICD-10-CM

## 2018-01-02 LAB
ALBUMIN SERPL-MCNC: 3.7 G/DL (ref 3.5–5.2)
ALBUMIN/GLOB SERPL: 1.4 G/DL (ref 1.1–2.4)
ALP SERPL-CCNC: 76 U/L (ref 38–116)
ALT SERPL W P-5'-P-CCNC: 35 U/L (ref 0–41)
ANION GAP SERPL CALCULATED.3IONS-SCNC: 9.7 MMOL/L
AST SERPL-CCNC: 17 U/L (ref 0–40)
BASOPHILS # BLD AUTO: 0.01 10*3/MM3 (ref 0–0.1)
BASOPHILS NFR BLD AUTO: 0.1 % (ref 0–1.1)
BILIRUB SERPL-MCNC: 0.2 MG/DL (ref 0.1–1.2)
BUN BLD-MCNC: 26 MG/DL (ref 6–20)
BUN/CREAT SERPL: 28 (ref 7.3–30)
CALCIUM SPEC-SCNC: 9.1 MG/DL (ref 8.5–10.2)
CHLORIDE SERPL-SCNC: 93 MMOL/L (ref 98–107)
CO2 SERPL-SCNC: 28.3 MMOL/L (ref 22–29)
CREAT BLD-MCNC: 0.93 MG/DL (ref 0.7–1.3)
DEPRECATED RDW RBC AUTO: 38.5 FL (ref 37–49)
EOSINOPHIL # BLD AUTO: 0.03 10*3/MM3 (ref 0–0.36)
EOSINOPHIL NFR BLD AUTO: 0.4 % (ref 1–5)
ERYTHROCYTE [DISTWIDTH] IN BLOOD BY AUTOMATED COUNT: 11.7 % (ref 11.7–14.5)
GFR SERPL CREATININE-BSD FRML MDRD: 81 ML/MIN/1.73
GLOBULIN UR ELPH-MCNC: 2.7 GM/DL (ref 1.8–3.5)
GLUCOSE BLD-MCNC: 101 MG/DL (ref 74–124)
HCT VFR BLD AUTO: 37 % (ref 40–49)
HGB BLD-MCNC: 12.9 G/DL (ref 13.5–16.5)
IMM GRANULOCYTES # BLD: 0.07 10*3/MM3 (ref 0–0.03)
IMM GRANULOCYTES NFR BLD: 1 % (ref 0–0.5)
LYMPHOCYTES # BLD AUTO: 0.65 10*3/MM3 (ref 1–3.5)
LYMPHOCYTES NFR BLD AUTO: 9.7 % (ref 20–49)
MCH RBC QN AUTO: 31.5 PG (ref 27–33)
MCHC RBC AUTO-ENTMCNC: 34.9 G/DL (ref 32–35)
MCV RBC AUTO: 90.5 FL (ref 83–97)
MONOCYTES # BLD AUTO: 0.84 10*3/MM3 (ref 0.25–0.8)
MONOCYTES NFR BLD AUTO: 12.5 % (ref 4–12)
NEUTROPHILS # BLD AUTO: 5.11 10*3/MM3 (ref 1.5–7)
NEUTROPHILS NFR BLD AUTO: 76.3 % (ref 39–75)
NRBC BLD MANUAL-RTO: 0 /100 WBC (ref 0–0)
PLATELET # BLD AUTO: 230 10*3/MM3 (ref 150–375)
PMV BLD AUTO: 8.7 FL (ref 8.9–12.1)
POTASSIUM BLD-SCNC: 4.4 MMOL/L (ref 3.5–4.7)
PROT SERPL-MCNC: 6.4 G/DL (ref 6.3–8)
RBC # BLD AUTO: 4.09 10*6/MM3 (ref 4.3–5.5)
SODIUM BLD-SCNC: 131 MMOL/L (ref 134–145)
WBC NRBC COR # BLD: 6.71 10*3/MM3 (ref 4–10)

## 2018-01-02 PROCEDURE — 77386 CHG INTENSITY MODULATED RADIATION TX DLVR COMPLEX: CPT | Performed by: RADIOLOGY

## 2018-01-02 PROCEDURE — 85025 COMPLETE CBC W/AUTO DIFF WBC: CPT | Performed by: INTERNAL MEDICINE

## 2018-01-02 PROCEDURE — 36415 COLL VENOUS BLD VENIPUNCTURE: CPT | Performed by: INTERNAL MEDICINE

## 2018-01-02 PROCEDURE — 77336 RADIATION PHYSICS CONSULT: CPT | Performed by: RADIOLOGY

## 2018-01-02 PROCEDURE — 77014 CHG CT GUIDANCE RADIATION THERAPY FLDS PLACEMENT: CPT | Performed by: RADIOLOGY

## 2018-01-02 PROCEDURE — 77386: CPT | Performed by: RADIOLOGY

## 2018-01-02 PROCEDURE — 96361 HYDRATE IV INFUSION ADD-ON: CPT | Performed by: NURSE PRACTITIONER

## 2018-01-02 PROCEDURE — 99212 OFFICE O/P EST SF 10 MIN: CPT | Performed by: NURSE PRACTITIONER

## 2018-01-02 PROCEDURE — 96360 HYDRATION IV INFUSION INIT: CPT | Performed by: NURSE PRACTITIONER

## 2018-01-02 PROCEDURE — 80053 COMPREHEN METABOLIC PANEL: CPT

## 2018-01-02 RX ADMIN — SODIUM CHLORIDE 1000 ML: 900 INJECTION, SOLUTION INTRAVENOUS at 15:26

## 2018-01-02 NOTE — PROGRESS NOTES
Subjective .     REASONS FOR FOLLOWUP:  Base of tongue cancer    HISTORY OF PRESENT ILLNESS:  The patient is a 67 y.o. year old male  who is here for follow-up, having received cycle 2 of cisplatin last week.  The patient returns today specifically for scheduled IV fluids.  We reviewed his labs, showing that his BUN has bumped up to 26 and he would indeed benefit from IV fluids.  He is tentatively scheduled for repeated IV fluids later this week on 1/4/18.    The patient is beginning to note a raspy voice but denies any actual pain in his throat.  He is taking very little by mouth this point.  He is using his PEG tube for nutrition taking in 5 bottles of feeding daily.  He denies other concerns today.    Past Medical History:   Diagnosis Date   • Benign essential hypertension    • H/O complete eye exam due   • H/O Neck mass     left   • Hyperlipidemia    • IFG (impaired fasting glucose)    • Seasonal allergies    • Squamous cell carcinoma 2017    left base on tongue, stage YEN     Past Surgical History:   Procedure Laterality Date   • COLONOSCOPY N/A 2015    normal colonoscopy-Dr. aGlen Morrissey   • COLONOSCOPY N/A 05/04/2011    Normal colonoscopy-Dr. Tobias Emerson   • ENDOSCOPY W/ PEG TUBE PLACEMENT N/A 11/28/2017    Procedure: ESOPHAGOGASTRODUODENOSCOPY WITH PERCUTANEOUS ENDOSCOPIC GASTROSTOMY TUBE INSERTION;  Surgeon: Isma Hercules MD;  Location: [facility];  Service:    • INGUINAL HERNIA REPAIR Left 1994    Dr. Peres   • INGUINAL HERNIA REPAIR Right 1993    Dr. Peres   • LARYNGOPLASTY      Laryngoplasty with biopsy-Dr. Del Toro   • [place] INSJ TUNNELED CVC W/O SUBQ PORT/ AGE 3 YR/> Left 11/28/2017    Procedure: INSERTION VENOUS ACCESS DEVICE;  Surgeon: Isma Hercules MD;  Location: [facility];  Service: General   • TONGUE BIOPSY / EXCISION N/A 11/01/2017    Biopsy of the left tongue base   • TONSILLECTOMY AND ADENOIDECTOMY Bilateral 11/01/2017    Dr. Del Toro       HEMATOLOGIC/ONCOLOGIC  HISTORY:  (History from previous dates can be found in the separate document.)    MEDICATIONS    Current Outpatient Prescriptions:   •  dexamethasone (DECADRON) 4 MG tablet, Take 2 tablets the night before chemo and the morning after chemo (Day 2), then take 2 tablets twice daily on days 3 & 4.  Take with food. (Patient taking differently: Take 4 mg by mouth. Take 2 tablets the night before chemo and the morning after chemo (Day 2), then take 2 tablets twice daily on days 3 & 4.  Take with food.), Disp: 12 tablet, Rfl: 2  •  metoprolol tartrate (LOPRESSOR) 50 MG tablet, Take 1 tablet by mouth 2 (Two) Times a Day., Disp: 180 tablet, Rfl: 3  •  ondansetron (ZOFRAN) 8 MG tablet, Take 1 tablet by mouth 3 (Three) Times a Day As Needed for Nausea or Vomiting., Disp: 30 tablet, Rfl: 5  •  pantoprazole (PROTONIX) 40 MG EC tablet, Take 1 tablet by mouth Daily., Disp: 30 tablet, Rfl: 5  •  prochlorperazine (COMPAZINE) 10 MG tablet, Take 1 tablet by mouth Every 6 (Six) Hours As Needed for Nausea or Vomiting for up to 60 doses., Disp: 60 tablet, Rfl: 5  No current facility-administered medications for this visit.     Facility-Administered Medications Ordered in Other Visits:   •  sodium chloride 0.9 % bolus 1,000 mL, 1,000 mL, Intravenous, Once, Ameya Ortega II, MD, Last Rate: 500 mL/hr at 01/02/18 1526, 1,000 mL at 01/02/18 1526    ALLERGIES:   No Known Allergies    SOCIAL HISTORY:       Social History     Social History   • Marital status:      Spouse name: Janiya   • Number of children: 2   • Years of education: High School     Occupational History   •  Retired     [employer]     Social History Main Topics   • Smoking status: Never Smoker   • Smokeless tobacco: Never Used   • Alcohol use Yes      Comment: occassional   • Drug use: No   • Sexual activity: No     Other Topics Concern   • Not on file     Social History Narrative         FAMILY HISTORY:  Family History   Problem Relation Age of Onset   • Alcohol abuse Father   "  • Diabetes Father    • Cancer Neg Hx    • Malig Hyperthermia Neg Hx        REVIEW OF SYSTEMS:  GENERAL: No change in appetite or weight;   No fevers, chills, sweats.    SKIN: No nonhealing lesions.   No rashes.  HEME/LYMPH: No easy bruising, bleeding.   No swollen nodes.   EYES: No vision changes or diplopia.   ENT: See HPI. No hearing loss, gum bleeding, epistaxis, +hoarseness, mild dysphagia.   RESPIRATORY: No cough, shortness of breath, hemoptysis or wheezing.   CVS: No chest pain, palpitations, orthopnea, dyspnea on exertion or PND.   GI: No melena or hematochezia.   No abdominal pain.  No nausea, vomiting, constipation, diarrhea  : No lower tract obstructive symptoms, dysuria or hematuria.   MUSCULOSKELETAL: No bone pain.  No joint stiffness.   NEUROLOGICAL: No global weakness, loss of consciousness or seizures.   PSYCHIATRIC: No increased nervousness, mood changes or depression.     Objective    Vitals:    01/02/18 1523   BP: 160/81   Pulse: 59   Resp: 12   SpO2: 99%   Weight: 88.5 kg (195 lb)   Height: 185 cm (72.84\")   PainSc: 5  Comment: THROAT PAIN     Current Status 1/2/2018   ECOG score 0     PHYSICAL EXAM:    GENERAL:  Well-developed, well-nourished in no acute distress.   SKIN:  Warm, dry without rashes, purpura or petechiae.  MOUTH:  Tongue is well-papillated; mild breakdown of the posterior soft palate  (white patches/erythema). Lips normal.  THROAT: Oropharynx with mild breakdown (white patches/erythema).  NECK:  Supple with good range of motion; no thyromegaly or masses, no JVD. Mild erythroderma noted.  NEUROLOGICAL:  No focal neurological deficits.  PSYCHIATRIC:  Normal affect and mood.     RECENT LABS:        WBC   Date/Time Value Ref Range Status   01/02/2018 02:09 PM 6.71 4.00 - 10.00 10*3/mm3 Final     Hemoglobin   Date/Time Value Ref Range Status   01/02/2018 02:09 PM 12.9 (L) 13.5 - 16.5 g/dL Final     Platelets   Date/Time Value Ref Range Status   01/02/2018 02:09  150 - 375 " 10*3/mm3 Final     Lab Results   Component Value Date    GLUCOSE 101 01/02/2018    BUN 26 (H) 01/02/2018    CREATININE 0.93 01/02/2018    EGFRIFNONA 81 01/02/2018    EGFRIFAFRI 94 01/17/2017    BCR 28.0 01/02/2018    K 4.4 01/02/2018    CO2 28.3 01/02/2018    CALCIUM 9.1 01/02/2018    PROTENTOTREF 7.3 01/17/2017    ALBUMIN 3.70 01/02/2018    LABIL2 1.4 01/02/2018    AST 17 01/02/2018    ALT 35 01/02/2018       Assessment/Plan   There are no diagnoses linked to this encounter.  *Left base of tongue cancer.  Stage IV A.  Cisplatin day 1, 22, 43, concurrent with radiation.    *Leukocytopenia, due to chemotherapy.  Not neutropenic today.    *Anemia, due to chemotherapy.  Stable. Monitor.    *Tinnitus.  He states he had ringing in his years prior to cisplatin.  Ringing did not worsen with cisplatin.   However, I told him more cisplatin increases the risk of permanent worsening of tinnitus, which can be quite troublesome.  He denies hearing loss.  He wants to proceed with cisplatin.  Since he did fine with the first dose of cisplatin, I think it is reasonable to proceed.  However, if ringing worsens, we may need to drop dose #3.    *Nutrition.  Has PEG.  Has met with Dana Liz RD.  Goal tube feed is 5 cans per day.  Total of 2 L free water. Patient managing tube feeds.    *Acute kidney injury due to cisplatin with cycle 1.  Creatinine increased to 1.5.  With IV hydration, this resolved.  Patient therefore scheduled for IV hydration this week and next week.  Creatinine is 0.9 and BUN have bumped up to 26.  Overall not as bad as after cycle 1.  Patient will receive 1000 mL normal saline today and is scheduled again for this on Thursday, 1/4/18.  He'll see Cherry Willard, nurse practitioner on 1/9/18 in follow-up.    *GERD.  Dr. Ballesteros started Protonix 40 mg daily.    Plan  · Continue day 1, 22, 43 cisplatin concurrent with radiation.  · Maintain every Tuesday, Thursday, 1 L normal saline  · APRN in 1 week  · M.D. With  cisplatin (last dose) in 3 weeks

## 2018-01-03 ENCOUNTER — RADIATION ONCOLOGY WEEKLY ASSESSMENT (OUTPATIENT)
Dept: RADIATION ONCOLOGY | Facility: HOSPITAL | Age: 68
End: 2018-01-03

## 2018-01-03 VITALS
RESPIRATION RATE: 16 BRPM | HEART RATE: 59 BPM | BODY MASS INDEX: 25.86 KG/M2 | DIASTOLIC BLOOD PRESSURE: 89 MMHG | OXYGEN SATURATION: 99 % | SYSTOLIC BLOOD PRESSURE: 160 MMHG | WEIGHT: 195.11 LBS

## 2018-01-03 DIAGNOSIS — C10.9 OROPHARYNGEAL CANCER (HCC): Primary | ICD-10-CM

## 2018-01-03 PROCEDURE — 77386: CPT | Performed by: RADIOLOGY

## 2018-01-03 PROCEDURE — 77386 CHG INTENSITY MODULATED RADIATION TX DLVR COMPLEX: CPT | Performed by: RADIOLOGY

## 2018-01-03 PROCEDURE — 77427 RADIATION TX MANAGEMENT X5: CPT | Performed by: RADIOLOGY

## 2018-01-03 PROCEDURE — 77014 CHG CT GUIDANCE RADIATION THERAPY FLDS PLACEMENT: CPT | Performed by: RADIOLOGY

## 2018-01-03 NOTE — PROGRESS NOTES
Physician Weekly Management Note    Diagnosis:   Oropharyngeal cancer   Clinical Stage YEN (T1, N2b, M0)     Reason for Visit: Radiation (21/35)    Concurrent Chemo:       Notes on Treatment course, Films, Medical progress and Plan:  Doing fairly well. Expected hoarseness, no pain - eating some, using feeding tube. Feels pretty well. Encouraged - discussed fluid intake, remedies for thick saliva, etc. Cont on.    ROS -  Other than those listed above, as follows:  Constitutional - Normal - no complaints of fatigue, denies lack of appetite, fever, night sweats or change in weight.  Neck - Normal - denies neck masses, muscle weakness, neck pain, decreased range of motion or swelling.  Cardiovascular - Normal - denies arrhythmias, chest pain, dyspnea, edema, orthopnea or palpitations.  Respiratory - Normal - denies cough, dyspnea, hemoptysis, hiccoughs, pleuritic chest pain or wheezing.  Gastrointestinal - Normal - no complaints of constipation, abdominal pain, diarrhea, heartburn/dyspepsia, hematemesis, hemorrhoids, melena or GI bleeding, nausea, pain or cramping or vomiting.  Neuro - Normal - no new complaints of vision change, headache, nausea, cranial nerve deficit, gait abnormality, syncope, seizure.    PHYSICAL EXAM - Other than changes listed above, as follows:  Vitals:    01/03/18 1527   BP: 160/89   Pulse: 59   Resp: 16   SpO2: 99%   Weight: 88.5 kg (195 lb 1.7 oz)   PainSc: 0-No pain       Constitutional - Normal - no evidence of impaired alertness, inadequate appearance, premature or advanced chronologic age, uncooperativeness, altered mood, affect or disorientation.  Neck - Normal - no evidence of tender or enlarged lymph nodes, neck abnormalities, restricted range of motion or enlarged thyroid.  Chest - Normal - no evidence of chest abnormalities, tender or enlarged lymph nodes.  Respiratory - Normal - no evidence of abnormal breat sounds, chest abnormalities on palpation and chest abnormalities on  "percussion and normal breath sounds.  Hematologic/Lymphatic - Normal - no evidence of tender or enlarged axillary lymph nodes nor tender or enlarged neck nodes.  Neuro - Normal - no evidence of cranial nerve deficit, gait abnormality.    Performance Status:  (2) Ambulatory and capable of self care, unable to carry out work activity, up and about > 50% or waking hours    Problem added:  No problems updated.  Medications added: No orders of the defined types were placed in this encounter.    Ancillary referrals made: No orders of the defined types were placed in this encounter.      Technical aspects reviewed:  Weekly OBI approved if applicable? Yes   Weekly port films approved?  Yes  Change requests noted if applicable?  Yes   Patient setup and plan reviewed?  Yes     Chart Reviewed:  Continue current treatment plan?  Yes  Treatment plan change requested? No    I have reviewed and marked as \"reviewed\" the current medications, allergies and problem list in the patients EMR.    I have reviewed the patient's medical, surgical  history in detail, reviewed any pertinent lab work  and updated the computerized patient record if needed.    Patient's Care Team:  Patient Care Team:  Ameya Sheffield MD as PCP - General (Family Medicine)  Ameya Aguirre MD as Referring Physician (Radiation Oncology)  Nuvia Ballesteros MD PhD as Consulting Physician (Hematology and Oncology)  Ameya Ortega II, MD as Consulting Physician (Hematology and Oncology)    Seen and approved by:  Kyara Thomas MD, 01/03/2018  "

## 2018-01-04 ENCOUNTER — DOCUMENTATION (OUTPATIENT)
Dept: NUTRITION | Facility: HOSPITAL | Age: 68
End: 2018-01-04

## 2018-01-04 ENCOUNTER — INFUSION (OUTPATIENT)
Dept: ONCOLOGY | Facility: HOSPITAL | Age: 68
End: 2018-01-04

## 2018-01-04 VITALS
BODY MASS INDEX: 26 KG/M2 | SYSTOLIC BLOOD PRESSURE: 151 MMHG | WEIGHT: 196.2 LBS | HEART RATE: 67 BPM | DIASTOLIC BLOOD PRESSURE: 72 MMHG | TEMPERATURE: 98.4 F

## 2018-01-04 DIAGNOSIS — R30.0 DYSURIA: ICD-10-CM

## 2018-01-04 DIAGNOSIS — E86.0 DEHYDRATION: Primary | ICD-10-CM

## 2018-01-04 DIAGNOSIS — C10.9 OROPHARYNGEAL CANCER (HCC): ICD-10-CM

## 2018-01-04 LAB
BILIRUB UR QL STRIP: NEGATIVE
CLARITY UR: CLEAR
COLOR UR: YELLOW
GLUCOSE UR STRIP-MCNC: ABNORMAL MG/DL
HGB UR QL STRIP.AUTO: NEGATIVE
KETONES UR QL STRIP: NEGATIVE
LEUKOCYTE ESTERASE UR QL STRIP.AUTO: NEGATIVE
NITRITE UR QL STRIP: NEGATIVE
PH UR STRIP.AUTO: 6 [PH] (ref 4.5–8)
PROT UR QL STRIP: NEGATIVE
SP GR UR STRIP: <=1.005 (ref 1–1.03)
UROBILINOGEN UR QL STRIP: ABNORMAL

## 2018-01-04 PROCEDURE — 96361 HYDRATE IV INFUSION ADD-ON: CPT | Performed by: NURSE PRACTITIONER

## 2018-01-04 PROCEDURE — 87086 URINE CULTURE/COLONY COUNT: CPT | Performed by: NURSE PRACTITIONER

## 2018-01-04 PROCEDURE — 96360 HYDRATION IV INFUSION INIT: CPT | Performed by: NURSE PRACTITIONER

## 2018-01-04 PROCEDURE — 81003 URINALYSIS AUTO W/O SCOPE: CPT

## 2018-01-04 PROCEDURE — 77386: CPT | Performed by: RADIOLOGY

## 2018-01-04 PROCEDURE — 77386 CHG INTENSITY MODULATED RADIATION TX DLVR COMPLEX: CPT | Performed by: RADIOLOGY

## 2018-01-04 PROCEDURE — 77014 CHG CT GUIDANCE RADIATION THERAPY FLDS PLACEMENT: CPT | Performed by: RADIOLOGY

## 2018-01-04 RX ADMIN — SODIUM CHLORIDE 1000 ML: 900 INJECTION, SOLUTION INTRAVENOUS at 12:57

## 2018-01-04 NOTE — PROGRESS NOTES
Pt c/o feeling like he needs to urinate and not being able to much. Also, has some burning and pain with urination. Spoke with HERLINDA Chadwick and per her order UA and she will see pt in infusion area. UA came back fine. HERLINDA Chadwick saw pt in infusion area. Instructed him to increase fluids and call if anything changes. HERLINDA Chadwick sent UA for culture. Pt V/U.

## 2018-01-04 NOTE — PROGRESS NOTES
ONC Nutrition Follow Up:     Weight 196# today.  States he is using 4-5 cans of Two aditi per day on average, drinking ensure too.  He cannot taste anything.  C/o constipation.  Taking senekot and plans to increase dose as discussed with MD.    He is not taking any pain meds.  Labs reviewed.    Will continue to monitor. Encouraged patient to aim for 5 cans Two aditi per day, 2 L water per day. ( 2375cals, 100 g pro, 830cc free water).  Also encouraged patient to continue to eat and drink what he can, despite loss of taste.

## 2018-01-05 PROCEDURE — 77014 CHG CT GUIDANCE RADIATION THERAPY FLDS PLACEMENT: CPT | Performed by: RADIOLOGY

## 2018-01-05 PROCEDURE — 77386: CPT | Performed by: RADIOLOGY

## 2018-01-05 PROCEDURE — 77386 CHG INTENSITY MODULATED RADIATION TX DLVR COMPLEX: CPT | Performed by: RADIOLOGY

## 2018-01-06 LAB — BACTERIA SPEC AEROBE CULT: NO GROWTH

## 2018-01-07 DIAGNOSIS — I10 BENIGN ESSENTIAL HYPERTENSION: ICD-10-CM

## 2018-01-08 ENCOUNTER — RADIATION ONCOLOGY WEEKLY ASSESSMENT (OUTPATIENT)
Dept: RADIATION ONCOLOGY | Facility: HOSPITAL | Age: 68
End: 2018-01-08

## 2018-01-08 VITALS
RESPIRATION RATE: 16 BRPM | DIASTOLIC BLOOD PRESSURE: 76 MMHG | BODY MASS INDEX: 25.71 KG/M2 | HEART RATE: 66 BPM | SYSTOLIC BLOOD PRESSURE: 142 MMHG | OXYGEN SATURATION: 100 % | TEMPERATURE: 98.1 F | WEIGHT: 194 LBS

## 2018-01-08 DIAGNOSIS — C01 CANCER OF BASE OF TONGUE (HCC): Primary | ICD-10-CM

## 2018-01-08 PROCEDURE — 77014 CHG CT GUIDANCE RADIATION THERAPY FLDS PLACEMENT: CPT | Performed by: RADIOLOGY

## 2018-01-08 PROCEDURE — 77386 CHG INTENSITY MODULATED RADIATION TX DLVR COMPLEX: CPT | Performed by: RADIOLOGY

## 2018-01-08 PROCEDURE — 77386: CPT | Performed by: RADIOLOGY

## 2018-01-08 RX ORDER — METOPROLOL SUCCINATE 100 MG/1
TABLET, EXTENDED RELEASE ORAL
Qty: 90 TABLET | Refills: 3 | OUTPATIENT
Start: 2018-01-08

## 2018-01-08 NOTE — PROGRESS NOTES
Physician Weekly Management Note    Diagnosis:     Diagnosis Plan   1. Cancer of base of tongue         RT Details:  Treatment #24/35    Notes on Treatment course, Films, Medical progress:   Reports foamy, thick saliva.  Complete taste loss.  He is being compliant with using the Mugard.  Only minor throat pain.  Energy level is being maintained approximately 90%.  3+ erythema in the neck, no peeling as yet .  That mass continues to flatten.  Discussed probable PET scan .  Small area of mucositis the posterior  Oropharynx. Continue as planned.    Weekly Management:  Medication reviewed?   Yes  New medications given?   No  Problemlist reviewed?   Yes  Problem added?   No      Technical aspects reviewed:  Weekly OBI approved?   Yes  Weekly port films approved?   Yes  Change requests noted on port film?   No  Patient setup and plan reviewed?   Yes    Chart Reviewed:  Continue current treatment plan?   Yes  Treatment plan change requested?   No  CBC reviewed?   Yes  Concurrent Chemo?   Yes    Objective     Toxicities:   As above     Review of Systems   As above    Vitals:    01/08/18 1508   BP: 142/76   Pulse: 66   Resp: 16   Temp: 98.1 °F (36.7 °C)   SpO2: 100%       Current Status 1/2/2018   ECOG score 0       Physical Exam  As above      Problem Summary List    Diagnosis:     Diagnosis Plan   1. Cancer of base of tongue       Pathology:       Past Medical History:   Diagnosis Date   • Benign essential hypertension    • H/O complete eye exam due   • H/O Neck mass     left   • Hyperlipidemia    • IFG (impaired fasting glucose)    • Seasonal allergies    • Squamous cell carcinoma 2017    left base on tongue, stage YEN         Past Surgical History:   Procedure Laterality Date   • COLONOSCOPY N/A 2015    normal colonoscopy-Dr. Galen Morrissey   • COLONOSCOPY N/A 05/04/2011    Normal colonoscopy-Dr. Tobias Emerson   • ENDOSCOPY W/ PEG TUBE PLACEMENT N/A 11/28/2017    Procedure: ESOPHAGOGASTRODUODENOSCOPY WITH PERCUTANEOUS  ENDOSCOPIC GASTROSTOMY TUBE INSERTION;  Surgeon: Isma Hercules MD;  Location: [facility] OR;  Service:    • INGUINAL HERNIA REPAIR Left 1994    Dr. Peres   • INGUINAL HERNIA REPAIR Right 1993    Dr. Peres   • LARYNGOPLASTY      Laryngoplasty with biopsy-Dr. Del Toro   • [place] INSJ TUNNELED CVC W/O SUBQ PORT/ AGE 3 YR/> Left 11/28/2017    Procedure: INSERTION VENOUS ACCESS DEVICE;  Surgeon: Isma Hercules MD;  Location: [facility] OR;  Service: General   • TONGUE BIOPSY / EXCISION N/A 11/01/2017    Biopsy of the left tongue base   • TONSILLECTOMY AND ADENOIDECTOMY Bilateral 11/01/2017    Dr. Del Toro         Current Outpatient Prescriptions on File Prior to Visit   Medication Sig Dispense Refill   • dexamethasone (DECADRON) 4 MG tablet Take 2 tablets the night before chemo and the morning after chemo (Day 2), then take 2 tablets twice daily on days 3 & 4.  Take with food. (Patient taking differently: Take 4 mg by mouth. Take 2 tablets the night before chemo and the morning after chemo (Day 2), then take 2 tablets twice daily on days 3 & 4.  Take with food.) 12 tablet 2   • metoprolol tartrate (LOPRESSOR) 50 MG tablet Take 1 tablet by mouth 2 (Two) Times a Day. 180 tablet 3   • ondansetron (ZOFRAN) 8 MG tablet Take 1 tablet by mouth 3 (Three) Times a Day As Needed for Nausea or Vomiting. 30 tablet 5   • pantoprazole (PROTONIX) 40 MG EC tablet Take 1 tablet by mouth Daily. 30 tablet 5   • prochlorperazine (COMPAZINE) 10 MG tablet Take 1 tablet by mouth Every 6 (Six) Hours As Needed for Nausea or Vomiting for up to 60 doses. 60 tablet 5     No current facility-administered medications on file prior to visit.        No Known Allergies     Primary care MD:    Ameya Sheffield MD    Oncologist: HAFSA Ballesteros M.D.    Seen and approved by:  Ameya Aguirre MD  01/08/2018

## 2018-01-09 ENCOUNTER — INFUSION (OUTPATIENT)
Dept: ONCOLOGY | Facility: HOSPITAL | Age: 68
End: 2018-01-09

## 2018-01-09 ENCOUNTER — OFFICE VISIT (OUTPATIENT)
Dept: ONCOLOGY | Facility: CLINIC | Age: 68
End: 2018-01-09

## 2018-01-09 VITALS
SYSTOLIC BLOOD PRESSURE: 122 MMHG | HEART RATE: 72 BPM | TEMPERATURE: 98.2 F | RESPIRATION RATE: 16 BRPM | HEIGHT: 73 IN | WEIGHT: 192.4 LBS | DIASTOLIC BLOOD PRESSURE: 68 MMHG | BODY MASS INDEX: 25.5 KG/M2

## 2018-01-09 DIAGNOSIS — E86.0 DEHYDRATION: Primary | ICD-10-CM

## 2018-01-09 DIAGNOSIS — C01 CANCER OF BASE OF TONGUE (HCC): Primary | ICD-10-CM

## 2018-01-09 DIAGNOSIS — C10.9 OROPHARYNGEAL CANCER (HCC): ICD-10-CM

## 2018-01-09 DIAGNOSIS — E86.0 DEHYDRATION: ICD-10-CM

## 2018-01-09 LAB
ALBUMIN SERPL-MCNC: 3.7 G/DL (ref 3.5–5.2)
ALBUMIN/GLOB SERPL: 1.4 G/DL (ref 1.1–2.4)
ALP SERPL-CCNC: 84 U/L (ref 38–116)
ALT SERPL W P-5'-P-CCNC: 21 U/L (ref 0–41)
ANION GAP SERPL CALCULATED.3IONS-SCNC: 9.8 MMOL/L
AST SERPL-CCNC: 19 U/L (ref 0–40)
BASOPHILS # BLD AUTO: 0.01 10*3/MM3 (ref 0–0.1)
BASOPHILS NFR BLD AUTO: 0.2 % (ref 0–1.1)
BILIRUB SERPL-MCNC: 0.4 MG/DL (ref 0.1–1.2)
BUN BLD-MCNC: 19 MG/DL (ref 6–20)
BUN/CREAT SERPL: 25.3 (ref 7.3–30)
CALCIUM SPEC-SCNC: 9.1 MG/DL (ref 8.5–10.2)
CHLORIDE SERPL-SCNC: 94 MMOL/L (ref 98–107)
CO2 SERPL-SCNC: 28.2 MMOL/L (ref 22–29)
CREAT BLD-MCNC: 0.75 MG/DL (ref 0.7–1.3)
DEPRECATED RDW RBC AUTO: 39.9 FL (ref 37–49)
EOSINOPHIL # BLD AUTO: 0.01 10*3/MM3 (ref 0–0.36)
EOSINOPHIL NFR BLD AUTO: 0.2 % (ref 1–5)
ERYTHROCYTE [DISTWIDTH] IN BLOOD BY AUTOMATED COUNT: 11.9 % (ref 11.7–14.5)
GFR SERPL CREATININE-BSD FRML MDRD: 103 ML/MIN/1.73
GLOBULIN UR ELPH-MCNC: 2.7 GM/DL (ref 1.8–3.5)
GLUCOSE BLD-MCNC: 230 MG/DL (ref 74–124)
HCT VFR BLD AUTO: 33.3 % (ref 40–49)
HGB BLD-MCNC: 11.5 G/DL (ref 13.5–16.5)
IMM GRANULOCYTES # BLD: 0.01 10*3/MM3 (ref 0–0.03)
IMM GRANULOCYTES NFR BLD: 0.2 % (ref 0–0.5)
LYMPHOCYTES # BLD AUTO: 0.33 10*3/MM3 (ref 1–3.5)
LYMPHOCYTES NFR BLD AUTO: 7.4 % (ref 20–49)
MCH RBC QN AUTO: 31.9 PG (ref 27–33)
MCHC RBC AUTO-ENTMCNC: 34.5 G/DL (ref 32–35)
MCV RBC AUTO: 92.5 FL (ref 83–97)
MONOCYTES # BLD AUTO: 0.4 10*3/MM3 (ref 0.25–0.8)
MONOCYTES NFR BLD AUTO: 8.9 % (ref 4–12)
NEUTROPHILS # BLD AUTO: 3.72 10*3/MM3 (ref 1.5–7)
NEUTROPHILS NFR BLD AUTO: 83.1 % (ref 39–75)
NRBC BLD MANUAL-RTO: 0 /100 WBC (ref 0–0)
PLATELET # BLD AUTO: 159 10*3/MM3 (ref 150–375)
PMV BLD AUTO: 8.4 FL (ref 8.9–12.1)
POTASSIUM BLD-SCNC: 4.6 MMOL/L (ref 3.5–4.7)
PROT SERPL-MCNC: 6.4 G/DL (ref 6.3–8)
RBC # BLD AUTO: 3.6 10*6/MM3 (ref 4.3–5.5)
SODIUM BLD-SCNC: 132 MMOL/L (ref 134–145)
WBC NRBC COR # BLD: 4.48 10*3/MM3 (ref 4–10)

## 2018-01-09 PROCEDURE — 99214 OFFICE O/P EST MOD 30 MIN: CPT | Performed by: NURSE PRACTITIONER

## 2018-01-09 PROCEDURE — 77014 CHG CT GUIDANCE RADIATION THERAPY FLDS PLACEMENT: CPT | Performed by: RADIOLOGY

## 2018-01-09 PROCEDURE — 96361 HYDRATE IV INFUSION ADD-ON: CPT | Performed by: NURSE PRACTITIONER

## 2018-01-09 PROCEDURE — 77336 RADIATION PHYSICS CONSULT: CPT | Performed by: RADIOLOGY

## 2018-01-09 PROCEDURE — 96360 HYDRATION IV INFUSION INIT: CPT | Performed by: NURSE PRACTITIONER

## 2018-01-09 PROCEDURE — 77386: CPT | Performed by: RADIOLOGY

## 2018-01-09 PROCEDURE — 77386 CHG INTENSITY MODULATED RADIATION TX DLVR COMPLEX: CPT | Performed by: RADIOLOGY

## 2018-01-09 PROCEDURE — 80053 COMPREHEN METABOLIC PANEL: CPT

## 2018-01-09 PROCEDURE — 85025 COMPLETE CBC W/AUTO DIFF WBC: CPT

## 2018-01-09 RX ADMIN — SODIUM CHLORIDE 1000 ML: 900 INJECTION, SOLUTION INTRAVENOUS at 12:02

## 2018-01-09 NOTE — PROGRESS NOTES
Subjective .     REASONS FOR FOLLOWUP:  Base of tongue cancer    HISTORY OF PRESENT ILLNESS:  The patient is a 67 y.o. year old male  who is here for follow-up, having received cycle 2 of cisplatin on 12/26/2017.  He has been scheduled for additional IV fluids with each cycle due to a bump in his creatinine from 0.81-1.48 on 12/11/2017.  His creatinine has steadily come down and he is making an effort to drink as much water as possible.  He continues to take all of his nourishment through his G-tube his regimen currently consists of 5 cans daily with 60 cc flush before and after.    Ports feeling relatively well today.  He denies new pain, swelling, shortness of breath, fever, chills, skin changes.  His bowels are moving regularly.  He is urinating regularly.  He does report increased clear secretions but denies mouth ulceration or irritation.          Past Medical History:   Diagnosis Date   • Benign essential hypertension    • H/O complete eye exam due   • H/O Neck mass     left   • Hyperlipidemia    • IFG (impaired fasting glucose)    • Seasonal allergies    • Squamous cell carcinoma 2017    left base on tongue, stage YEN     Past Surgical History:   Procedure Laterality Date   • COLONOSCOPY N/A 2015    normal colonoscopy-Dr. Galen Morrissey   • COLONOSCOPY N/A 05/04/2011    Normal colonoscopy-Dr. Tobias Emerson   • ENDOSCOPY W/ PEG TUBE PLACEMENT N/A 11/28/2017    Procedure: ESOPHAGOGASTRODUODENOSCOPY WITH PERCUTANEOUS ENDOSCOPIC GASTROSTOMY TUBE INSERTION;  Surgeon: Isma Hercules MD;  Location: [facility];  Service:    • INGUINAL HERNIA REPAIR Left 1994    Dr. Peres   • INGUINAL HERNIA REPAIR Right 1993    Dr. Peres   • LARYNGOPLASTY      Laryngoplasty with biopsy-Dr. Del Toro   • [place] INSJ TUNNELED CVC W/O SUBQ PORT/ AGE 3 YR/> Left 11/28/2017    Procedure: INSERTION VENOUS ACCESS DEVICE;  Surgeon: Isma Hercules MD;  Location: [facility];  Service: General   • TONGUE BIOPSY / EXCISION  N/A 11/01/2017    Biopsy of the left tongue base   • TONSILLECTOMY AND ADENOIDECTOMY Bilateral 11/01/2017    Dr. Del Toro       HEMATOLOGIC/ONCOLOGIC HISTORY:  (History from previous dates can be found in the separate document.)    MEDICATIONS    Current Outpatient Prescriptions:   •  dexamethasone (DECADRON) 4 MG tablet, Take 2 tablets the night before chemo and the morning after chemo (Day 2), then take 2 tablets twice daily on days 3 & 4.  Take with food. (Patient taking differently: Take 4 mg by mouth. Take 2 tablets the night before chemo and the morning after chemo (Day 2), then take 2 tablets twice daily on days 3 & 4.  Take with food.), Disp: 12 tablet, Rfl: 2  •  metoprolol tartrate (LOPRESSOR) 50 MG tablet, Take 1 tablet by mouth 2 (Two) Times a Day., Disp: 180 tablet, Rfl: 3  •  ondansetron (ZOFRAN) 8 MG tablet, Take 1 tablet by mouth 3 (Three) Times a Day As Needed for Nausea or Vomiting., Disp: 30 tablet, Rfl: 5  •  pantoprazole (PROTONIX) 40 MG EC tablet, Take 1 tablet by mouth Daily., Disp: 30 tablet, Rfl: 5  •  prochlorperazine (COMPAZINE) 10 MG tablet, Take 1 tablet by mouth Every 6 (Six) Hours As Needed for Nausea or Vomiting for up to 60 doses., Disp: 60 tablet, Rfl: 5    ALLERGIES:   No Known Allergies    SOCIAL HISTORY:       Social History     Social History   • Marital status:      Spouse name: Janiya   • Number of children: 2   • Years of education: High School     Occupational History   •  Retired     [employer]     Social History Main Topics   • Smoking status: Never Smoker   • Smokeless tobacco: Never Used   • Alcohol use Yes      Comment: occassional   • Drug use: No   • Sexual activity: No     Other Topics Concern   • Not on file     Social History Narrative         FAMILY HISTORY:  Family History   Problem Relation Age of Onset   • Alcohol abuse Father    • Diabetes Father    • Cancer Neg Hx    • Malig Hyperthermia Neg Hx        REVIEW OF SYSTEMS:  GENERAL: No change in appetite or weight;  "  No fevers, chills, sweats.    SKIN: No nonhealing lesions.   No rashes.  HEME/LYMPH: No easy bruising, bleeding.   No swollen nodes.   EYES: No vision changes or diplopia.   ENT: See HPI.  RESPIRATORY: No cough, shortness of breath, hemoptysis or wheezing.   CVS: No chest pain, palpitations, orthopnea, dyspnea on exertion or PND.   GI: See history of present illness  No nausea, vomiting, constipation, diarrhea  : No lower tract obstructive symptoms, dysuria or hematuria.   MUSCULOSKELETAL: No bone pain.  No joint stiffness.   NEUROLOGICAL: No global weakness, loss of consciousness or seizures.   PSYCHIATRIC: No increased nervousness, mood changes or depression.     Objective    Vitals:    01/09/18 1133   BP: 122/68   Pulse: 72   Resp: 16   Temp: 98.2 °F (36.8 °C)   Weight: 87.3 kg (192 lb 6.4 oz)   Height: 185 cm (72.84\")   PainSc: 6  Comment: throat     Current Status 1/9/2018   ECOG score 0     PHYSICAL EXAM:    GENERAL:  Well-developed, well-nourished in no acute distress.   SKIN:  Warm, dry without rashes, purpura or petechiae.  MOUTH:  Tongue is well-papillated; mild Erythema of the posterior soft palate  (white patches/erythema). Lips normal.  THROAT: Oropharynx with mild breakdown (white patches/erythema).  NECK:  Hyperpigmentation of the neck and chest related to radiation.  No peeling, cracking or excoriation noted.    JVD. Mild erythroderma noted.  NEUROLOGICAL:  No focal neurological deficits.  PSYCHIATRIC:  Normal affect and mood.     RECENT LABS:        WBC   Date/Time Value Ref Range Status   01/09/2018 11:12 AM 4.48 4.00 - 10.00 10*3/mm3 Final     Hemoglobin   Date/Time Value Ref Range Status   01/09/2018 11:12 AM 11.5 (L) 13.5 - 16.5 g/dL Final     Platelets   Date/Time Value Ref Range Status   01/09/2018 11:12  150 - 375 10*3/mm3 Final     Lab Results   Component Value Date    GLUCOSE 101 01/02/2018    BUN 26 (H) 01/02/2018    CREATININE 0.93 01/02/2018    EGFRIFNONA 81 01/02/2018    " EGFRIFAFRI 94 01/17/2017    BCR 28.0 01/02/2018    K 4.4 01/02/2018    CO2 28.3 01/02/2018    CALCIUM 9.1 01/02/2018    PROTENTOTREF 7.3 01/17/2017    ALBUMIN 3.70 01/02/2018    LABIL2 1.4 01/02/2018    AST 17 01/02/2018    ALT 35 01/02/2018       Assessment/Plan   There are no diagnoses linked to this encounter.  *Left base of tongue cancer.  Stage IV A.  Cisplatin day 1, 22, 43, concurrent with radiation.    *Leukocytopenia, due to chemotherapy.  Not neutropenic today.    *Anemia, due to chemotherapy.  Stable. Monitor.    *Tinnitus.  He states he had ringing in his years prior to cisplatin.  Ringing did not worsen with cisplatin.   However, I told him more cisplatin increases the risk of permanent worsening of tinnitus, which can be quite troublesome.  He denies hearing loss.  He wants to proceed with cisplatin.  Since he did fine with the first dose of cisplatin, I think it is reasonable to proceed.  However, if ringing worsens, we may need to drop dose #3.    *Nutrition.  Has PEG.  Has met with Dana Liz RD.  Goal tube feed is 5 cans per day.  Total of 2 L free water. Patient managing tube feeds.    *Acute kidney injury due to cisplatin with cycle 1.  Creatinine increased to 1.5.  With IV hydration, this resolved.  Patient therefore scheduled for IV hydration today.  We await the results of the CMP today, however, his creatinine on January 2 was 0.93.      *GERD.  Dr. Ballesteros started Protonix 40 mg daily.    Plan  · Continue day 1, 22, 43 cisplatin concurrent with radiation.  · Proceed with fluids as scheduled today.  Maintain every Tuesday, Thursday, 1 L normal saline, he will therefore return again this week, 01/11/2018 for 1 L of normal saline.  · Will return next week, 01/15/2018 see Dr. code in anticipation of cycle 3 which she will receive the following day, 01/16/2018.  · He knows to call the office with any new or worsening symptoms.

## 2018-01-10 DIAGNOSIS — C10.9 OROPHARYNGEAL CANCER (HCC): ICD-10-CM

## 2018-01-10 DIAGNOSIS — E86.0 DEHYDRATION: ICD-10-CM

## 2018-01-10 PROCEDURE — 77338 DESIGN MLC DEVICE FOR IMRT: CPT | Performed by: RADIOLOGY

## 2018-01-10 PROCEDURE — 77386 CHG INTENSITY MODULATED RADIATION TX DLVR COMPLEX: CPT | Performed by: RADIOLOGY

## 2018-01-10 PROCEDURE — 77014 CHG CT GUIDANCE RADIATION THERAPY FLDS PLACEMENT: CPT | Performed by: RADIOLOGY

## 2018-01-10 PROCEDURE — 77386: CPT | Performed by: RADIOLOGY

## 2018-01-11 ENCOUNTER — INFUSION (OUTPATIENT)
Dept: ONCOLOGY | Facility: HOSPITAL | Age: 68
End: 2018-01-11

## 2018-01-11 VITALS
HEART RATE: 103 BPM | DIASTOLIC BLOOD PRESSURE: 75 MMHG | TEMPERATURE: 98.4 F | SYSTOLIC BLOOD PRESSURE: 156 MMHG | WEIGHT: 192 LBS | BODY MASS INDEX: 25.45 KG/M2

## 2018-01-11 DIAGNOSIS — E86.0 DEHYDRATION: Primary | ICD-10-CM

## 2018-01-11 DIAGNOSIS — C10.9 OROPHARYNGEAL CANCER (HCC): ICD-10-CM

## 2018-01-11 PROCEDURE — 77386: CPT | Performed by: RADIOLOGY

## 2018-01-11 PROCEDURE — 77427 RADIATION TX MANAGEMENT X5: CPT | Performed by: RADIOLOGY

## 2018-01-11 PROCEDURE — 77386 CHG INTENSITY MODULATED RADIATION TX DLVR COMPLEX: CPT | Performed by: RADIOLOGY

## 2018-01-11 PROCEDURE — 77014 CHG CT GUIDANCE RADIATION THERAPY FLDS PLACEMENT: CPT | Performed by: RADIOLOGY

## 2018-01-11 PROCEDURE — 96361 HYDRATE IV INFUSION ADD-ON: CPT | Performed by: NURSE PRACTITIONER

## 2018-01-11 PROCEDURE — 96360 HYDRATION IV INFUSION INIT: CPT | Performed by: NURSE PRACTITIONER

## 2018-01-11 RX ADMIN — SODIUM CHLORIDE 1000 ML: 900 INJECTION, SOLUTION INTRAVENOUS at 13:00

## 2018-01-12 PROCEDURE — 77386 CHG INTENSITY MODULATED RADIATION TX DLVR COMPLEX: CPT | Performed by: RADIOLOGY

## 2018-01-12 PROCEDURE — 77014 CHG CT GUIDANCE RADIATION THERAPY FLDS PLACEMENT: CPT | Performed by: RADIOLOGY

## 2018-01-12 PROCEDURE — 77386: CPT | Performed by: RADIOLOGY

## 2018-01-15 ENCOUNTER — OFFICE VISIT (OUTPATIENT)
Dept: ONCOLOGY | Facility: CLINIC | Age: 68
End: 2018-01-15

## 2018-01-15 ENCOUNTER — INFUSION (OUTPATIENT)
Dept: ONCOLOGY | Facility: HOSPITAL | Age: 68
End: 2018-01-15

## 2018-01-15 VITALS
SYSTOLIC BLOOD PRESSURE: 152 MMHG | RESPIRATION RATE: 18 BRPM | WEIGHT: 192.2 LBS | HEART RATE: 76 BPM | DIASTOLIC BLOOD PRESSURE: 64 MMHG | BODY MASS INDEX: 25.47 KG/M2 | HEIGHT: 73 IN | OXYGEN SATURATION: 98 % | TEMPERATURE: 98.8 F

## 2018-01-15 DIAGNOSIS — C01 CANCER OF BASE OF TONGUE (HCC): Primary | ICD-10-CM

## 2018-01-15 DIAGNOSIS — E86.0 DEHYDRATION: ICD-10-CM

## 2018-01-15 DIAGNOSIS — C10.9 OROPHARYNGEAL CANCER (HCC): ICD-10-CM

## 2018-01-15 LAB
ALBUMIN SERPL-MCNC: 3.8 G/DL (ref 3.5–5.2)
ALBUMIN/GLOB SERPL: 1.3 G/DL (ref 1.1–2.4)
ALP SERPL-CCNC: 99 U/L (ref 38–116)
ALT SERPL W P-5'-P-CCNC: 19 U/L (ref 0–41)
ANION GAP SERPL CALCULATED.3IONS-SCNC: 7.7 MMOL/L
AST SERPL-CCNC: 16 U/L (ref 0–40)
BASOPHILS # BLD AUTO: 0.01 10*3/MM3 (ref 0–0.1)
BASOPHILS NFR BLD AUTO: 0.5 % (ref 0–1.1)
BILIRUB SERPL-MCNC: 0.3 MG/DL (ref 0.1–1.2)
BUN BLD-MCNC: 20 MG/DL (ref 6–20)
BUN/CREAT SERPL: 26 (ref 7.3–30)
CALCIUM SPEC-SCNC: 9.4 MG/DL (ref 8.5–10.2)
CHLORIDE SERPL-SCNC: 93 MMOL/L (ref 98–107)
CO2 SERPL-SCNC: 32.3 MMOL/L (ref 22–29)
CREAT BLD-MCNC: 0.77 MG/DL (ref 0.7–1.3)
DEPRECATED RDW RBC AUTO: 42.2 FL (ref 37–49)
EOSINOPHIL # BLD AUTO: 0.02 10*3/MM3 (ref 0–0.36)
EOSINOPHIL NFR BLD AUTO: 1.1 % (ref 1–5)
ERYTHROCYTE [DISTWIDTH] IN BLOOD BY AUTOMATED COUNT: 12.4 % (ref 11.7–14.5)
GFR SERPL CREATININE-BSD FRML MDRD: 100 ML/MIN/1.73
GLOBULIN UR ELPH-MCNC: 2.9 GM/DL (ref 1.8–3.5)
GLUCOSE BLD-MCNC: 260 MG/DL (ref 74–124)
HCT VFR BLD AUTO: 34.1 % (ref 40–49)
HGB BLD-MCNC: 11.5 G/DL (ref 13.5–16.5)
IMM GRANULOCYTES # BLD: 0.01 10*3/MM3 (ref 0–0.03)
IMM GRANULOCYTES NFR BLD: 0.5 % (ref 0–0.5)
LYMPHOCYTES # BLD AUTO: 0.31 10*3/MM3 (ref 1–3.5)
LYMPHOCYTES NFR BLD AUTO: 17 % (ref 20–49)
MAGNESIUM SERPL-MCNC: 2.1 MG/DL (ref 1.8–2.5)
MCH RBC QN AUTO: 31.9 PG (ref 27–33)
MCHC RBC AUTO-ENTMCNC: 33.7 G/DL (ref 32–35)
MCV RBC AUTO: 94.7 FL (ref 83–97)
MONOCYTES # BLD AUTO: 0.51 10*3/MM3 (ref 0.25–0.8)
MONOCYTES NFR BLD AUTO: 28 % (ref 4–12)
NEUTROPHILS # BLD AUTO: 0.96 10*3/MM3 (ref 1.5–7)
NEUTROPHILS NFR BLD AUTO: 52.9 % (ref 39–75)
NRBC BLD MANUAL-RTO: 0 /100 WBC (ref 0–0)
PLATELET # BLD AUTO: 235 10*3/MM3 (ref 150–375)
PMV BLD AUTO: 8.4 FL (ref 8.9–12.1)
POTASSIUM BLD-SCNC: 4.8 MMOL/L (ref 3.5–4.7)
PROT SERPL-MCNC: 6.7 G/DL (ref 6.3–8)
RBC # BLD AUTO: 3.6 10*6/MM3 (ref 4.3–5.5)
SODIUM BLD-SCNC: 133 MMOL/L (ref 134–145)
WBC NRBC COR # BLD: 1.82 10*3/MM3 (ref 4–10)

## 2018-01-15 PROCEDURE — 99215 OFFICE O/P EST HI 40 MIN: CPT | Performed by: INTERNAL MEDICINE

## 2018-01-15 PROCEDURE — 85025 COMPLETE CBC W/AUTO DIFF WBC: CPT

## 2018-01-15 PROCEDURE — 77386: CPT | Performed by: RADIOLOGY

## 2018-01-15 PROCEDURE — 77386 CHG INTENSITY MODULATED RADIATION TX DLVR COMPLEX: CPT | Performed by: RADIOLOGY

## 2018-01-15 PROCEDURE — 77338 DESIGN MLC DEVICE FOR IMRT: CPT | Performed by: RADIOLOGY

## 2018-01-15 PROCEDURE — 83735 ASSAY OF MAGNESIUM: CPT

## 2018-01-15 PROCEDURE — 36415 COLL VENOUS BLD VENIPUNCTURE: CPT | Performed by: INTERNAL MEDICINE

## 2018-01-15 PROCEDURE — 80053 COMPREHEN METABOLIC PANEL: CPT

## 2018-01-15 PROCEDURE — 77014 CHG CT GUIDANCE RADIATION THERAPY FLDS PLACEMENT: CPT | Performed by: RADIOLOGY

## 2018-01-15 NOTE — PROGRESS NOTES
Subjective .     REASONS FOR FOLLOWUP:  Base of tongue cancer    HISTORY OF PRESENT ILLNESS:  The patient is a 67 y.o. year old male  who is here for follow-up with the above-mentioned history.    Constipation is cleared with medication adjustment.  Denies nausea.  Discomfort is increasing, but does not want any narcotics.  States he has no narcotics.  States the narcotics he had after his tonsillectomy he disposed of because they were causing constipation.    Denies neuropathy.    Tinnitus did not worsen after last dose of cisplatin.    Nutrition almost completely from feeding tube.  Very little by mouth.  States he continues to do poorly with oral fluid intake and wants to continue twice a week IV fluids.    Past Medical History:   Diagnosis Date   • Benign essential hypertension    • H/O complete eye exam due   • H/O Neck mass     left   • Hyperlipidemia    • IFG (impaired fasting glucose)    • Seasonal allergies    • Squamous cell carcinoma 2017    left base on tongue, stage YEN     Past Surgical History:   Procedure Laterality Date   • COLONOSCOPY N/A 2015    normal colonoscopy-Dr. Galen Morrissey   • COLONOSCOPY N/A 05/04/2011    Normal colonoscopy-Dr. Tobias Emerson   • ENDOSCOPY W/ PEG TUBE PLACEMENT N/A 11/28/2017    Procedure: ESOPHAGOGASTRODUODENOSCOPY WITH PERCUTANEOUS ENDOSCOPIC GASTROSTOMY TUBE INSERTION;  Surgeon: Isma Hercules MD;  Location: [facility];  Service:    • INGUINAL HERNIA REPAIR Left 1994    Dr. Peres   • INGUINAL HERNIA REPAIR Right 1993    Dr. Peres   • LARYNGOPLASTY      Laryngoplasty with biopsy-Dr. Del Toro   • [place] INSJ TUNNELED CVC W/O SUBQ PORT/ AGE 3 YR/> Left 11/28/2017    Procedure: INSERTION VENOUS ACCESS DEVICE;  Surgeon: Isma Hercules MD;  Location: [facility];  Service: General   • TONGUE BIOPSY / EXCISION N/A 11/01/2017    Biopsy of the left tongue base   • TONSILLECTOMY AND ADENOIDECTOMY Bilateral 11/01/2017    Dr. Del Toro        HEMATOLOGIC/ONCOLOGIC HISTORY:  (History from previous dates can be found in the separate document.)    MEDICATIONS    Current Outpatient Prescriptions:   •  dexamethasone (DECADRON) 4 MG tablet, Take 2 tablets the night before chemo and the morning after chemo (Day 2), then take 2 tablets twice daily on days 3 & 4.  Take with food. (Patient taking differently: Take 4 mg by mouth. Take 2 tablets the night before chemo and the morning after chemo (Day 2), then take 2 tablets twice daily on days 3 & 4.  Take with food.), Disp: 12 tablet, Rfl: 2  •  metoprolol tartrate (LOPRESSOR) 50 MG tablet, Take 1 tablet by mouth 2 (Two) Times a Day., Disp: 180 tablet, Rfl: 3  •  ondansetron (ZOFRAN) 8 MG tablet, Take 1 tablet by mouth 3 (Three) Times a Day As Needed for Nausea or Vomiting., Disp: 30 tablet, Rfl: 5  •  pantoprazole (PROTONIX) 40 MG EC tablet, Take 1 tablet by mouth Daily., Disp: 30 tablet, Rfl: 5  •  prochlorperazine (COMPAZINE) 10 MG tablet, Take 1 tablet by mouth Every 6 (Six) Hours As Needed for Nausea or Vomiting for up to 60 doses., Disp: 60 tablet, Rfl: 5    ALLERGIES:   No Known Allergies    SOCIAL HISTORY:       Social History     Social History   • Marital status:      Spouse name: Janiya   • Number of children: 2   • Years of education: High School     Occupational History   •  Retired     [employer]     Social History Main Topics   • Smoking status: Never Smoker   • Smokeless tobacco: Never Used   • Alcohol use Yes      Comment: occassional   • Drug use: No   • Sexual activity: No     Other Topics Concern   • Not on file     Social History Narrative         FAMILY HISTORY:  Family History   Problem Relation Age of Onset   • Alcohol abuse Father    • Diabetes Father    • Cancer Neg Hx    • Malig Hyperthermia Neg Hx        REVIEW OF SYSTEMS:  GENERAL: No change in appetite or weight;   No fevers, chills, sweats.    SKIN: No nonhealing lesions.   No rashes.  HEME/LYMPH: No easy bruising, bleeding.   No  "swollen nodes.   EYES: No vision changes or diplopia.   ENT: No hearing loss, gum bleeding, epistaxis, hoarseness or dysphagia.   RESPIRATORY: No cough, shortness of breath, hemoptysis or wheezing.   CVS: No chest pain, palpitations, orthopnea, dyspnea on exertion or PND.   GI: No melena or hematochezia.   No abdominal pain.  No nausea, vomiting, constipation, diarrhea  : No lower tract obstructive symptoms, dysuria or hematuria.   MUSCULOSKELETAL: No bone pain.  No joint stiffness.   NEUROLOGICAL: No global weakness, loss of consciousness or seizures.   PSYCHIATRIC: No increased nervousness, mood changes or depression.     Objective    Vitals:    01/15/18 1120   BP: 152/64   Pulse: 76   Resp: 18   Temp: 98.8 °F (37.1 °C)   TempSrc: Oral   SpO2: 98%   Weight: 87.2 kg (192 lb 3.2 oz)   Height: 185 cm (72.84\")   PainSc: 9  Comment: Radiation burn on neck.Foaming in mouth.   PainLoc: Neck     Current Status 1/15/2018   ECOG score 0      PHYSICAL EXAM:    GENERAL:  Well-developed, well-nourished in no acute distress.   SKIN:  Warm, dry without rashes, purpura or petechiae.  EYES:  Pupils equal, round and reactive to light.  EOMs intact.  Conjunctivae normal.  EARS:  Hearing intact.  NOSE:  Septum midline.  No excoriations or nasal discharge.  MOUTH:  Tongue is well-papillated; no stomatitis or ulcers.  Lips normal.  THROAT:  Oropharynx without lesions or exudates.  NECK:  Supple with good range of motion; no thyromegaly or masses, no JVD.  LYMPHATICS:  No cervical, supraclavicular, axillary or inguinal adenopathy.  CHEST:  Lungs clear to auscultation. Good airflow.  CARDIAC:  Regular rate and rhythm without murmurs, rubs or gallops. Normal S1,S2.  ABDOMEN:  Soft, nontender with no hepatosplenomegaly or masses.  EXTREMITIES:  No clubbing, cyanosis or edema.  NEUROLOGICAL:  Cranial Nerves II-XII grossly intact.  No focal neurological deficits.  PSYCHIATRIC:  Normal affect and mood.     RECENT LABS:        WBC "   Date/Time Value Ref Range Status   01/15/2018 11:04 AM 1.82 (L) 4.00 - 10.00 10*3/mm3 Final     Hemoglobin   Date/Time Value Ref Range Status   01/15/2018 11:04 AM 11.5 (L) 13.5 - 16.5 g/dL Final     Platelets   Date/Time Value Ref Range Status   01/15/2018 11:04  150 - 375 10*3/mm3 Final       Assessment/Plan   Cancer of base of tongue  - CBC & Differential  - Comprehensive Metabolic Panel  *Left base of tongue cancer.  Stage IV A.  Cisplatin day 1, 22, 43, concurrent with radiation.  Will not receive day 43 cisplatin as planned tomorrow, 1/16/18 due to neutropenia.  .  He tells me radiation is scheduled to complete 1/24/18.  Therefore, we will not plan cisplatin next week either since that will be the day before radiation completes.  Continue twice per week IV fluids.    *Neutropenia, due to chemotherapy.    *Leukocytopenia, due to chemotherapy.      *Anemia, due to chemotherapy.  Monitor.    *Tinnitus.  He states he had ringing in his years prior to cisplatin.  Ringing did not worsen with dose one or dose 2 of cisplatin    *Nutrition.  Has PEG.  Has met with Dana.  Goal tube feed is 5 cans per day.  Total of 2 L free water.    *Acute kidney injury due to cisplatin with cycle 1.  Creatinine increased to 1.5.  With IV hydration, this resolved.  He does not think he can adequately keep up with IV hydration at home.  Therefore, he wants to maintain twice per week IV fluids.    *GERD.  Dr. Ballesteros started Protonix 40 mg daily.    Plan  · Likely plan no more chemotherapy  · Maintain every Tuesday, Thursday, 1 L normal saline  · Appointment with Dr. Ballesteros 1 week.  At that visit, posttreatment imaging can be planned.      We did more today than just assess side effects of chemotherapy.  We also reviewed nutrition.  He is on drug therapy requiring intensive monitoring for toxicity.

## 2018-01-16 ENCOUNTER — RADIATION ONCOLOGY WEEKLY ASSESSMENT (OUTPATIENT)
Dept: RADIATION ONCOLOGY | Facility: HOSPITAL | Age: 68
End: 2018-01-16

## 2018-01-16 ENCOUNTER — INFUSION (OUTPATIENT)
Dept: ONCOLOGY | Facility: HOSPITAL | Age: 68
End: 2018-01-16

## 2018-01-16 ENCOUNTER — APPOINTMENT (OUTPATIENT)
Dept: ONCOLOGY | Facility: HOSPITAL | Age: 68
End: 2018-01-16

## 2018-01-16 VITALS
BODY MASS INDEX: 25.45 KG/M2 | OXYGEN SATURATION: 97 % | RESPIRATION RATE: 16 BRPM | DIASTOLIC BLOOD PRESSURE: 88 MMHG | TEMPERATURE: 98 F | HEART RATE: 94 BPM | SYSTOLIC BLOOD PRESSURE: 162 MMHG | WEIGHT: 192.02 LBS

## 2018-01-16 VITALS — WEIGHT: 192 LBS | BODY MASS INDEX: 25.45 KG/M2

## 2018-01-16 DIAGNOSIS — C10.9 OROPHARYNGEAL CANCER (HCC): ICD-10-CM

## 2018-01-16 DIAGNOSIS — C01 CANCER OF BASE OF TONGUE (HCC): ICD-10-CM

## 2018-01-16 DIAGNOSIS — E86.0 DEHYDRATION: Primary | ICD-10-CM

## 2018-01-16 DIAGNOSIS — C10.9 OROPHARYNGEAL CANCER (HCC): Primary | ICD-10-CM

## 2018-01-16 PROCEDURE — 96361 HYDRATE IV INFUSION ADD-ON: CPT | Performed by: INTERNAL MEDICINE

## 2018-01-16 PROCEDURE — 77336 RADIATION PHYSICS CONSULT: CPT | Performed by: RADIOLOGY

## 2018-01-16 PROCEDURE — 96360 HYDRATION IV INFUSION INIT: CPT | Performed by: INTERNAL MEDICINE

## 2018-01-16 PROCEDURE — 77014 CHG CT GUIDANCE RADIATION THERAPY FLDS PLACEMENT: CPT | Performed by: RADIOLOGY

## 2018-01-16 PROCEDURE — 77386 CHG INTENSITY MODULATED RADIATION TX DLVR COMPLEX: CPT | Performed by: RADIOLOGY

## 2018-01-16 PROCEDURE — 77386: CPT | Performed by: RADIOLOGY

## 2018-01-16 RX ORDER — OXYCODONE AND ACETAMINOPHEN 7.5; 325 MG/1; MG/1
1 TABLET ORAL EVERY 6 HOURS PRN
Qty: 30 TABLET | Refills: 0 | Status: SHIPPED | OUTPATIENT
Start: 2018-01-16 | End: 2018-01-22 | Stop reason: SDUPTHER

## 2018-01-16 RX ADMIN — SODIUM CHLORIDE 1000 ML: 900 INJECTION, SOLUTION INTRAVENOUS at 12:25

## 2018-01-16 NOTE — PROGRESS NOTES
Physician Weekly Management Note    Diagnosis:     Diagnosis Plan   1. Oropharyngeal cancer         RT Details:  Treatment #30/35    Notes on Treatment course, Films, Medical progress:    Started develop some throat pain over the past week.  I wrote him a prescription for Percocet 7.5 mg tablets.  He also was having difficulty with a foamy over production of saliva which makes him cough and makes it difficult for him to sleep.  On exam he has some dry desquamation of the neck bilaterally.    Weekly Management:  Medication reviewed?   Yes  New medications given?   Yes  Problemlist reviewed?   Yes  Problem added?   No      Technical aspects reviewed:  Weekly OBI approved?   Yes  Weekly port films approved?   Yes  Change requests noted on port film?   No  Patient setup and plan reviewed?   Yes    Chart Reviewed:  Continue current treatment plan?   Yes  Treatment plan change requested?   No  CBC reviewed?   Yes  Concurrent Chemo?   Yes    Objective     Toxicities:   As above     Review of Systems   As above    Vitals:    01/16/18 1527   BP: 162/88   Pulse: 94   Resp: 16   Temp: 98 °F (36.7 °C)   SpO2: 97%       Current Status 1/15/2018   ECOG score 0       Physical Exam  As above      Problem Summary List    Diagnosis:     Diagnosis Plan   1. Oropharyngeal cancer       Pathology:       Past Medical History:   Diagnosis Date   • Benign essential hypertension    • H/O complete eye exam due   • H/O Neck mass     left   • Hyperlipidemia    • IFG (impaired fasting glucose)    • Seasonal allergies    • Squamous cell carcinoma 2017    left base on tongue, stage YEN         Past Surgical History:   Procedure Laterality Date   • COLONOSCOPY N/A 2015    normal colonoscopy-Dr. Galen Morrissey   • COLONOSCOPY N/A 05/04/2011    Normal colonoscopy-Dr. Tobias Emerosn   • ENDOSCOPY W/ PEG TUBE PLACEMENT N/A 11/28/2017    Procedure: ESOPHAGOGASTRODUODENOSCOPY WITH PERCUTANEOUS ENDOSCOPIC GASTROSTOMY TUBE INSERTION;  Surgeon: Isma RAINEY  MD Diomedes;  Location: [facility];  Service:    • INGUINAL HERNIA REPAIR Left 1994    Dr. Peres   • INGUINAL HERNIA REPAIR Right 1993    Dr. Peres   • LARYNGOPLASTY      Laryngoplasty with biopsy-Dr. Del Toro   • [place] INSJ TUNNELED CVC W/O SUBQ PORT/ AGE 3 YR/> Left 11/28/2017    Procedure: INSERTION VENOUS ACCESS DEVICE;  Surgeon: Isma Hercules MD;  Location: [facility];  Service: General   • TONGUE BIOPSY / EXCISION N/A 11/01/2017    Biopsy of the left tongue base   • TONSILLECTOMY AND ADENOIDECTOMY Bilateral 11/01/2017    Dr. Del Toro         Current Outpatient Prescriptions on File Prior to Visit   Medication Sig Dispense Refill   • dexamethasone (DECADRON) 4 MG tablet Take 2 tablets the night before chemo and the morning after chemo (Day 2), then take 2 tablets twice daily on days 3 & 4.  Take with food. (Patient taking differently: Take 4 mg by mouth. Take 2 tablets the night before chemo and the morning after chemo (Day 2), then take 2 tablets twice daily on days 3 & 4.  Take with food.) 12 tablet 2   • metoprolol tartrate (LOPRESSOR) 50 MG tablet Take 1 tablet by mouth 2 (Two) Times a Day. 180 tablet 3   • ondansetron (ZOFRAN) 8 MG tablet Take 1 tablet by mouth 3 (Three) Times a Day As Needed for Nausea or Vomiting. 30 tablet 5   • pantoprazole (PROTONIX) 40 MG EC tablet Take 1 tablet by mouth Daily. 30 tablet 5   • prochlorperazine (COMPAZINE) 10 MG tablet Take 1 tablet by mouth Every 6 (Six) Hours As Needed for Nausea or Vomiting for up to 60 doses. 60 tablet 5     No current facility-administered medications on file prior to visit.        No Known Allergies    Primary care MD:    Ameya Sheffield MD    Oncologist: HAFSA Ballesteros M.D.    Seen and approved by:  Ameya Aguirre MD  01/16/2018

## 2018-01-17 ENCOUNTER — LAB (OUTPATIENT)
Dept: LAB | Facility: HOSPITAL | Age: 68
End: 2018-01-17

## 2018-01-17 DIAGNOSIS — C01 CANCER OF BASE OF TONGUE (HCC): ICD-10-CM

## 2018-01-17 LAB
ALBUMIN SERPL-MCNC: 3.5 G/DL (ref 3.5–5.2)
ALBUMIN/GLOB SERPL: 1.2 G/DL (ref 1.1–2.4)
ALP SERPL-CCNC: 94 U/L (ref 38–116)
ALT SERPL W P-5'-P-CCNC: 21 U/L (ref 0–41)
ANION GAP SERPL CALCULATED.3IONS-SCNC: 6.7 MMOL/L
AST SERPL-CCNC: 15 U/L (ref 0–40)
BASOPHILS # BLD AUTO: 0.01 10*3/MM3 (ref 0–0.1)
BASOPHILS NFR BLD AUTO: 0.3 % (ref 0–1.1)
BILIRUB SERPL-MCNC: 0.2 MG/DL (ref 0.1–1.2)
BUN BLD-MCNC: 19 MG/DL (ref 6–20)
BUN/CREAT SERPL: 25.3 (ref 7.3–30)
CALCIUM SPEC-SCNC: 9.2 MG/DL (ref 8.5–10.2)
CHLORIDE SERPL-SCNC: 93 MMOL/L (ref 98–107)
CO2 SERPL-SCNC: 33.3 MMOL/L (ref 22–29)
CREAT BLD-MCNC: 0.75 MG/DL (ref 0.7–1.3)
DEPRECATED RDW RBC AUTO: 43.2 FL (ref 37–49)
EOSINOPHIL # BLD AUTO: 0.06 10*3/MM3 (ref 0–0.36)
EOSINOPHIL NFR BLD AUTO: 2 % (ref 1–5)
ERYTHROCYTE [DISTWIDTH] IN BLOOD BY AUTOMATED COUNT: 12.6 % (ref 11.7–14.5)
GFR SERPL CREATININE-BSD FRML MDRD: 103 ML/MIN/1.73
GLOBULIN UR ELPH-MCNC: 3 GM/DL (ref 1.8–3.5)
GLUCOSE BLD-MCNC: 152 MG/DL (ref 74–124)
HCT VFR BLD AUTO: 31.8 % (ref 40–49)
HGB BLD-MCNC: 10.5 G/DL (ref 13.5–16.5)
IMM GRANULOCYTES # BLD: 0.12 10*3/MM3 (ref 0–0.03)
IMM GRANULOCYTES NFR BLD: 4.1 % (ref 0–0.5)
LYMPHOCYTES # BLD AUTO: 0.42 10*3/MM3 (ref 1–3.5)
LYMPHOCYTES NFR BLD AUTO: 14.3 % (ref 20–49)
MCH RBC QN AUTO: 31.5 PG (ref 27–33)
MCHC RBC AUTO-ENTMCNC: 33 G/DL (ref 32–35)
MCV RBC AUTO: 95.5 FL (ref 83–97)
MONOCYTES # BLD AUTO: 0.99 10*3/MM3 (ref 0.25–0.8)
MONOCYTES NFR BLD AUTO: 33.7 % (ref 4–12)
NEUTROPHILS # BLD AUTO: 1.34 10*3/MM3 (ref 1.5–7)
NEUTROPHILS NFR BLD AUTO: 45.6 % (ref 39–75)
NRBC BLD MANUAL-RTO: 0 /100 WBC (ref 0–0)
PLATELET # BLD AUTO: 339 10*3/MM3 (ref 150–375)
PMV BLD AUTO: 8.4 FL (ref 8.9–12.1)
POTASSIUM BLD-SCNC: 4.3 MMOL/L (ref 3.5–4.7)
PROT SERPL-MCNC: 6.5 G/DL (ref 6.3–8)
RBC # BLD AUTO: 3.33 10*6/MM3 (ref 4.3–5.5)
SODIUM BLD-SCNC: 133 MMOL/L (ref 134–145)
WBC NRBC COR # BLD: 2.94 10*3/MM3 (ref 4–10)

## 2018-01-17 PROCEDURE — 80053 COMPREHEN METABOLIC PANEL: CPT | Performed by: INTERNAL MEDICINE

## 2018-01-17 PROCEDURE — 77386: CPT | Performed by: RADIOLOGY

## 2018-01-17 PROCEDURE — 36415 COLL VENOUS BLD VENIPUNCTURE: CPT | Performed by: INTERNAL MEDICINE

## 2018-01-17 PROCEDURE — 85025 COMPLETE CBC W/AUTO DIFF WBC: CPT | Performed by: INTERNAL MEDICINE

## 2018-01-17 PROCEDURE — 77014 CHG CT GUIDANCE RADIATION THERAPY FLDS PLACEMENT: CPT | Performed by: RADIOLOGY

## 2018-01-17 PROCEDURE — 77386 CHG INTENSITY MODULATED RADIATION TX DLVR COMPLEX: CPT | Performed by: RADIOLOGY

## 2018-01-18 ENCOUNTER — INFUSION (OUTPATIENT)
Dept: ONCOLOGY | Facility: HOSPITAL | Age: 68
End: 2018-01-18

## 2018-01-18 ENCOUNTER — DOCUMENTATION (OUTPATIENT)
Dept: NUTRITION | Facility: HOSPITAL | Age: 68
End: 2018-01-18

## 2018-01-18 VITALS
SYSTOLIC BLOOD PRESSURE: 148 MMHG | BODY MASS INDEX: 25.66 KG/M2 | WEIGHT: 193.6 LBS | HEART RATE: 87 BPM | DIASTOLIC BLOOD PRESSURE: 66 MMHG | TEMPERATURE: 98.4 F

## 2018-01-18 DIAGNOSIS — C10.9 OROPHARYNGEAL CANCER (HCC): ICD-10-CM

## 2018-01-18 DIAGNOSIS — E86.0 DEHYDRATION: Primary | ICD-10-CM

## 2018-01-18 PROCEDURE — 77386: CPT | Performed by: RADIOLOGY

## 2018-01-18 PROCEDURE — 96361 HYDRATE IV INFUSION ADD-ON: CPT | Performed by: INTERNAL MEDICINE

## 2018-01-18 PROCEDURE — 77427 RADIATION TX MANAGEMENT X5: CPT | Performed by: RADIOLOGY

## 2018-01-18 PROCEDURE — 77386 CHG INTENSITY MODULATED RADIATION TX DLVR COMPLEX: CPT | Performed by: RADIOLOGY

## 2018-01-18 PROCEDURE — 77014 CHG CT GUIDANCE RADIATION THERAPY FLDS PLACEMENT: CPT | Performed by: RADIOLOGY

## 2018-01-18 PROCEDURE — 96360 HYDRATION IV INFUSION INIT: CPT | Performed by: INTERNAL MEDICINE

## 2018-01-18 RX ADMIN — SODIUM CHLORIDE 1000 ML: 900 INJECTION, SOLUTION INTRAVENOUS at 12:56

## 2018-01-18 NOTE — PROGRESS NOTES
ONC Nutrition Follow Up:     Weight 193#, stable.  Overall, decreased 12# since beginning treatment.  No oral intake now.  Using feeding tube for 100% of nutrition and hydration. He is using pain meds now for throat pain. C/o thick foamy saliva.    He is using 5 cans Two aditi per PEG with 120cc water before and after each feeding.  He asked if he could increase to 6 if needed.  I told him that if he can get 6 cans in per day he could increase it.  He finishes radiation 1/23, next Tuesday.   Will continue to follow.

## 2018-01-19 PROCEDURE — 77386: CPT | Performed by: RADIOLOGY

## 2018-01-19 PROCEDURE — 77014 CHG CT GUIDANCE RADIATION THERAPY FLDS PLACEMENT: CPT | Performed by: RADIOLOGY

## 2018-01-19 PROCEDURE — 77386 CHG INTENSITY MODULATED RADIATION TX DLVR COMPLEX: CPT | Performed by: RADIOLOGY

## 2018-01-22 ENCOUNTER — RADIATION ONCOLOGY WEEKLY ASSESSMENT (OUTPATIENT)
Dept: RADIATION ONCOLOGY | Facility: HOSPITAL | Age: 68
End: 2018-01-22

## 2018-01-22 VITALS
RESPIRATION RATE: 16 BRPM | SYSTOLIC BLOOD PRESSURE: 140 MMHG | WEIGHT: 191.4 LBS | OXYGEN SATURATION: 98 % | DIASTOLIC BLOOD PRESSURE: 80 MMHG | HEART RATE: 85 BPM | BODY MASS INDEX: 25.37 KG/M2 | TEMPERATURE: 98 F

## 2018-01-22 DIAGNOSIS — C01 MALIGNANT NEOPLASM OF BASE OF TONGUE (HCC): ICD-10-CM

## 2018-01-22 DIAGNOSIS — D49.0 OROPHARYNGEAL NEOPLASM: Primary | ICD-10-CM

## 2018-01-22 PROCEDURE — 77386 CHG INTENSITY MODULATED RADIATION TX DLVR COMPLEX: CPT | Performed by: RADIOLOGY

## 2018-01-22 PROCEDURE — 77386: CPT | Performed by: RADIOLOGY

## 2018-01-22 RX ORDER — OXYCODONE AND ACETAMINOPHEN 7.5; 325 MG/1; MG/1
1 TABLET ORAL EVERY 6 HOURS PRN
Qty: 30 TABLET | Refills: 0 | Status: SHIPPED | OUTPATIENT
Start: 2018-01-22 | End: 2018-03-02

## 2018-01-22 NOTE — PROGRESS NOTES
Physician Weekly Management Note    Diagnosis:     Diagnosis Plan   1. Oropharyngeal neoplasm  NM Pet Whole Body   2. Malignant neoplasm of base of tongue   NM Pet Whole Body       RT Details:  Treatment #34/35 - base of tongue - concurrent chemotherapy.    Notes on Treatment course, Films, Medical progress:  Persistent xerostomia, taste changes, mucositis, erythema and superficial peeling on the low neck.  Using the tube, Gelclair,  fluoride gels, Percocet. He understands the xerostomia will be permanent, taste changes may take 3 or 4 months to get back up to normal, throat pain should resolve in about 3 weeks or so as will the peeling on the neck.I scheduled him for a follow-up PET scan in 8 weeks.      Weekly Management:  Medication reviewed?   Yes  New medications given?   No  Problemlist reviewed?   Yes  Problem added?   No      Technical aspects reviewed:  Weekly OBI approved?   Yes  Weekly port films approved?   Yes  Change requests noted on port film?   No  Patient setup and plan reviewed?   Yes    Chart Reviewed:  Continue current treatment plan?   Yes  Treatment plan change requested?   No  CBC reviewed?   Yes  Concurrent Chemo?   Yes    Objective     Toxicities:    As above     Review of Systems   As above    Vitals:    01/22/18 1454   BP: 140/80   Pulse: 85   Resp: 16   Temp: 98 °F (36.7 °C)   SpO2: 98%       Current Status 1/15/2018   ECOG score 0       Physical Exam  As above      Problem Summary List    Diagnosis:     Diagnosis Plan   1. Oropharyngeal neoplasm  NM Pet Whole Body   2. Malignant neoplasm of base of tongue   NM Pet Whole Body     Pathology:       Past Medical History:   Diagnosis Date   • Benign essential hypertension    • H/O complete eye exam due   • H/O Neck mass     left   • Hyperlipidemia    • IFG (impaired fasting glucose)    • Seasonal allergies    • Squamous cell carcinoma 2017    left base on tongue, stage YEN         Past Surgical History:   Procedure Laterality Date   •  COLONOSCOPY N/A 2015    normal colonoscopy-Dr. Galen Morrissey   • COLONOSCOPY N/A 05/04/2011    Normal colonoscopy-Dr. Tobias Emerson   • ENDOSCOPY W/ PEG TUBE PLACEMENT N/A 11/28/2017    Procedure: ESOPHAGOGASTRODUODENOSCOPY WITH PERCUTANEOUS ENDOSCOPIC GASTROSTOMY TUBE INSERTION;  Surgeon: Isma Hercules MD;  Location: [facility];  Service:    • INGUINAL HERNIA REPAIR Left 1994    Dr. Peres   • INGUINAL HERNIA REPAIR Right 1993    Dr. Peres   • LARYNGOPLASTY      Laryngoplasty with biopsy-Dr. Del Toro   • [place] INSJ TUNNELED CVC W/O SUBQ PORT/ AGE 3 YR/> Left 11/28/2017    Procedure: INSERTION VENOUS ACCESS DEVICE;  Surgeon: Isma Hercules MD;  Location: [facility];  Service: General   • TONGUE BIOPSY / EXCISION N/A 11/01/2017    Biopsy of the left tongue base   • TONSILLECTOMY AND ADENOIDECTOMY Bilateral 11/01/2017    Dr. Del Toro         Current Outpatient Prescriptions on File Prior to Visit   Medication Sig Dispense Refill   • dexamethasone (DECADRON) 4 MG tablet Take 2 tablets the night before chemo and the morning after chemo (Day 2), then take 2 tablets twice daily on days 3 & 4.  Take with food. (Patient taking differently: Take 4 mg by mouth. Take 2 tablets the night before chemo and the morning after chemo (Day 2), then take 2 tablets twice daily on days 3 & 4.  Take with food.) 12 tablet 2   • metoprolol tartrate (LOPRESSOR) 50 MG tablet Take 1 tablet by mouth 2 (Two) Times a Day. 180 tablet 3   • ondansetron (ZOFRAN) 8 MG tablet Take 1 tablet by mouth 3 (Three) Times a Day As Needed for Nausea or Vomiting. 30 tablet 5   • pantoprazole (PROTONIX) 40 MG EC tablet Take 1 tablet by mouth Daily. 30 tablet 5   • prochlorperazine (COMPAZINE) 10 MG tablet Take 1 tablet by mouth Every 6 (Six) Hours As Needed for Nausea or Vomiting for up to 60 doses. 60 tablet 5   • [DISCONTINUED] oxyCODONE-acetaminophen (PERCOCET) 7.5-325 MG per tablet Take 1 tablet by mouth Every 6 (Six) Hours As Needed (1-2  tablets every 4-6 hours when necessary). 30 tablet 0     No current facility-administered medications on file prior to visit.        No Known Allergies     Primary care MD:    Ameya Sheffield MD    Oncologist: HAFSA Ballesteros M.D.    Seen and approved by:  Ameya Aguirre MD  01/22/2018

## 2018-01-23 ENCOUNTER — OFFICE VISIT (OUTPATIENT)
Dept: ONCOLOGY | Facility: CLINIC | Age: 68
End: 2018-01-23

## 2018-01-23 ENCOUNTER — INFUSION (OUTPATIENT)
Dept: ONCOLOGY | Facility: HOSPITAL | Age: 68
End: 2018-01-23

## 2018-01-23 VITALS
WEIGHT: 193.4 LBS | BODY MASS INDEX: 25.63 KG/M2 | HEART RATE: 71 BPM | DIASTOLIC BLOOD PRESSURE: 62 MMHG | TEMPERATURE: 98.7 F | RESPIRATION RATE: 12 BRPM | OXYGEN SATURATION: 99 % | HEIGHT: 73 IN | SYSTOLIC BLOOD PRESSURE: 120 MMHG

## 2018-01-23 DIAGNOSIS — C01 CANCER OF BASE OF TONGUE (HCC): ICD-10-CM

## 2018-01-23 DIAGNOSIS — D70.1 CHEMOTHERAPY INDUCED NEUTROPENIA (HCC): ICD-10-CM

## 2018-01-23 DIAGNOSIS — D64.81 ANEMIA ASSOCIATED WITH CHEMOTHERAPY: ICD-10-CM

## 2018-01-23 DIAGNOSIS — T66.XXXS ADVERSE EFFECT OF RADIATION THERAPY, SEQUELA: ICD-10-CM

## 2018-01-23 DIAGNOSIS — E86.0 DEHYDRATION: Primary | ICD-10-CM

## 2018-01-23 DIAGNOSIS — C10.9 OROPHARYNGEAL CANCER (HCC): Primary | ICD-10-CM

## 2018-01-23 DIAGNOSIS — T45.1X5A ANEMIA ASSOCIATED WITH CHEMOTHERAPY: ICD-10-CM

## 2018-01-23 DIAGNOSIS — J02.9 ACUTE PHARYNGITIS, UNSPECIFIED ETIOLOGY: ICD-10-CM

## 2018-01-23 DIAGNOSIS — T45.1X5D ADVERSE EFFECT OF ANTINEOPLASTIC AND IMMUNOSUPPRESSIVE DRUGS, SUBSEQUENT ENCOUNTER: ICD-10-CM

## 2018-01-23 DIAGNOSIS — C10.9 OROPHARYNGEAL CANCER (HCC): ICD-10-CM

## 2018-01-23 DIAGNOSIS — T45.1X5A CHEMOTHERAPY INDUCED NEUTROPENIA (HCC): ICD-10-CM

## 2018-01-23 PROBLEM — T66.XXXA ADVERSE EFFECT OF RADIATION THERAPY: Status: ACTIVE | Noted: 2018-01-23

## 2018-01-23 LAB
ALBUMIN SERPL-MCNC: 3.7 G/DL (ref 3.5–5.2)
ALBUMIN/GLOB SERPL: 1.2 G/DL (ref 1.1–2.4)
ALP SERPL-CCNC: 104 U/L (ref 38–116)
ALT SERPL W P-5'-P-CCNC: 21 U/L (ref 0–41)
ANION GAP SERPL CALCULATED.3IONS-SCNC: 8.4 MMOL/L
AST SERPL-CCNC: 14 U/L (ref 0–40)
BASOPHILS # BLD AUTO: 0.02 10*3/MM3 (ref 0–0.1)
BASOPHILS NFR BLD AUTO: 0.3 % (ref 0–1.1)
BILIRUB SERPL-MCNC: <0.2 MG/DL (ref 0.1–1.2)
BUN BLD-MCNC: 23 MG/DL (ref 6–20)
BUN/CREAT SERPL: 31.5 (ref 7.3–30)
CALCIUM SPEC-SCNC: 9.3 MG/DL (ref 8.5–10.2)
CHLORIDE SERPL-SCNC: 95 MMOL/L (ref 98–107)
CO2 SERPL-SCNC: 32.6 MMOL/L (ref 22–29)
CREAT BLD-MCNC: 0.73 MG/DL (ref 0.7–1.3)
DEPRECATED RDW RBC AUTO: 43.6 FL (ref 37–49)
EOSINOPHIL # BLD AUTO: 0.04 10*3/MM3 (ref 0–0.36)
EOSINOPHIL NFR BLD AUTO: 0.5 % (ref 1–5)
ERYTHROCYTE [DISTWIDTH] IN BLOOD BY AUTOMATED COUNT: 12.7 % (ref 11.7–14.5)
GFR SERPL CREATININE-BSD FRML MDRD: 107 ML/MIN/1.73
GLOBULIN UR ELPH-MCNC: 3 GM/DL (ref 1.8–3.5)
GLUCOSE BLD-MCNC: 178 MG/DL (ref 74–124)
HCT VFR BLD AUTO: 32.7 % (ref 40–49)
HGB BLD-MCNC: 11.1 G/DL (ref 13.5–16.5)
IMM GRANULOCYTES # BLD: 0.09 10*3/MM3 (ref 0–0.03)
IMM GRANULOCYTES NFR BLD: 1.2 % (ref 0–0.5)
LYMPHOCYTES # BLD AUTO: 0.49 10*3/MM3 (ref 1–3.5)
LYMPHOCYTES NFR BLD AUTO: 6.4 % (ref 20–49)
MCH RBC QN AUTO: 32.2 PG (ref 27–33)
MCHC RBC AUTO-ENTMCNC: 33.9 G/DL (ref 32–35)
MCV RBC AUTO: 94.8 FL (ref 83–97)
MONOCYTES # BLD AUTO: 0.94 10*3/MM3 (ref 0.25–0.8)
MONOCYTES NFR BLD AUTO: 12.3 % (ref 4–12)
NEUTROPHILS # BLD AUTO: 6.09 10*3/MM3 (ref 1.5–7)
NEUTROPHILS NFR BLD AUTO: 79.3 % (ref 39–75)
NRBC BLD MANUAL-RTO: 0 /100 WBC (ref 0–0)
PLATELET # BLD AUTO: 335 10*3/MM3 (ref 150–375)
PMV BLD AUTO: 8.4 FL (ref 8.9–12.1)
POTASSIUM BLD-SCNC: 4.6 MMOL/L (ref 3.5–4.7)
PROT SERPL-MCNC: 6.7 G/DL (ref 6.3–8)
RBC # BLD AUTO: 3.45 10*6/MM3 (ref 4.3–5.5)
SODIUM BLD-SCNC: 136 MMOL/L (ref 134–145)
WBC NRBC COR # BLD: 7.67 10*3/MM3 (ref 4–10)

## 2018-01-23 PROCEDURE — 36415 COLL VENOUS BLD VENIPUNCTURE: CPT | Performed by: INTERNAL MEDICINE

## 2018-01-23 PROCEDURE — 96361 HYDRATE IV INFUSION ADD-ON: CPT | Performed by: INTERNAL MEDICINE

## 2018-01-23 PROCEDURE — 80053 COMPREHEN METABOLIC PANEL: CPT | Performed by: INTERNAL MEDICINE

## 2018-01-23 PROCEDURE — 85025 COMPLETE CBC W/AUTO DIFF WBC: CPT | Performed by: INTERNAL MEDICINE

## 2018-01-23 PROCEDURE — 99215 OFFICE O/P EST HI 40 MIN: CPT | Performed by: INTERNAL MEDICINE

## 2018-01-23 PROCEDURE — 77336 RADIATION PHYSICS CONSULT: CPT | Performed by: RADIOLOGY

## 2018-01-23 PROCEDURE — 77386: CPT | Performed by: RADIOLOGY

## 2018-01-23 PROCEDURE — 96360 HYDRATION IV INFUSION INIT: CPT | Performed by: INTERNAL MEDICINE

## 2018-01-23 PROCEDURE — 77386 CHG INTENSITY MODULATED RADIATION TX DLVR COMPLEX: CPT | Performed by: RADIOLOGY

## 2018-01-23 RX ORDER — SUCRALFATE ORAL 1 G/10ML
1 SUSPENSION ORAL 4 TIMES DAILY
Qty: 420 ML | Refills: 1 | Status: SHIPPED | OUTPATIENT
Start: 2018-01-23 | End: 2018-01-26 | Stop reason: SDUPTHER

## 2018-01-23 RX ADMIN — SODIUM CHLORIDE 1000 ML: 900 INJECTION, SOLUTION INTRAVENOUS at 10:00

## 2018-01-24 DIAGNOSIS — E86.0 DEHYDRATION: ICD-10-CM

## 2018-01-24 DIAGNOSIS — C10.9 OROPHARYNGEAL CANCER (HCC): ICD-10-CM

## 2018-01-24 NOTE — PROGRESS NOTES
Subjective .     REASONS FOR FOLLOWUP:    1.  Base of tongue cancer, stage YEN, undergoing concurrent chemoradiation therapy.    2.  Moderate neutropenia secondary to concurrent chemoradiotherapy.  Cycle #3 cisplatin was delayed and then canceled.    HISTORY OF PRESENT ILLNESS:  The patient is a 67 y.o. year old male  who is here for follow-up with the above-mentioned history.    Patient is accompanied by his wife will help with history.  Patient complains of pain in the throat and difficulty with swallowing, even with his own saliva, for the past several weeks.  He is completely dependent on his PEG tube for feeding and nutrition support.  He is able to put 5 canes liquid and nutrition support, for about total 2 L water a day.  Patient had Percocet prescribed by Dr. Aguirre, radiation oncologist.  Patient is finishing his radiation therapy today as last fraction.  His cycle 3 cisplatin was on hold because of neutropenia with ANC kayce 960 on 11/15/2018 and 1340 on 11/17/2018.    His wife reports patient recently had episodes of upper respiratory infection/sinusitis and finished 1 week antibiotics about a week ago.  This morning he actually had some nausea but no vomiting.  He denies fever sweating chills.    He denies neuropathy.  His pre-existing tinnitus did not worsen after last dose of cisplatin.    He wants to continue twice a week IV fluids.    Past Medical History:   Diagnosis Date   • Benign essential hypertension    • H/O complete eye exam due   • H/O Neck mass     left   • Hyperlipidemia    • IFG (impaired fasting glucose)    • Seasonal allergies    • Squamous cell carcinoma 2017    left base on tongue, stage YEN     Past Surgical History:   Procedure Laterality Date   • COLONOSCOPY N/A 2015    normal colonoscopy-Dr. Galen Morrissey   • COLONOSCOPY N/A 05/04/2011    Normal colonoscopy-Dr. Tobias Emerson   • ENDOSCOPY W/ PEG TUBE PLACEMENT N/A 11/28/2017    Procedure: ESOPHAGOGASTRODUODENOSCOPY WITH  PERCUTANEOUS ENDOSCOPIC GASTROSTOMY TUBE INSERTION;  Surgeon: Isma Hercules MD;  Location: [facility] OR;  Service:    • INGUINAL HERNIA REPAIR Left 1994    Dr. Peres   • INGUINAL HERNIA REPAIR Right 1993    Dr. Peres   • LARYNGOPLASTY      Laryngoplasty with biopsy-Dr. Del Toro   • [place] INSJ TUNNELED CVC W/O SUBQ PORT/ AGE 3 YR/> Left 11/28/2017    Procedure: INSERTION VENOUS ACCESS DEVICE;  Surgeon: Isma Hercules MD;  Location: [facility] OR;  Service: General   • TONGUE BIOPSY / EXCISION N/A 11/01/2017    Biopsy of the left tongue base   • TONSILLECTOMY AND ADENOIDECTOMY Bilateral 11/01/2017    Dr. Del Toro       HEMATOLOGIC/ONCOLOGIC HISTORY:  (History from previous dates can be found in the separate document.)    MEDICATIONS    Current Outpatient Prescriptions:   •  dexamethasone (DECADRON) 4 MG tablet, Take 2 tablets the night before chemo and the morning after chemo (Day 2), then take 2 tablets twice daily on days 3 & 4.  Take with food. (Patient taking differently: Take 4 mg by mouth. Take 2 tablets the night before chemo and the morning after chemo (Day 2), then take 2 tablets twice daily on days 3 & 4.  Take with food.), Disp: 12 tablet, Rfl: 2  •  metoprolol tartrate (LOPRESSOR) 50 MG tablet, Take 1 tablet by mouth 2 (Two) Times a Day., Disp: 180 tablet, Rfl: 3  •  ondansetron (ZOFRAN) 8 MG tablet, Take 1 tablet by mouth 3 (Three) Times a Day As Needed for Nausea or Vomiting., Disp: 30 tablet, Rfl: 5  •  oxyCODONE-acetaminophen (PERCOCET) 7.5-325 MG per tablet, Take 1 tablet by mouth Every 6 (Six) Hours As Needed (1-2 tablets every 4-6 hours when necessary)., Disp: 30 tablet, Rfl: 0  •  pantoprazole (PROTONIX) 40 MG EC tablet, Take 1 tablet by mouth Daily., Disp: 30 tablet, Rfl: 5  •  prochlorperazine (COMPAZINE) 10 MG tablet, Take 1 tablet by mouth Every 6 (Six) Hours As Needed for Nausea or Vomiting for up to 60 doses., Disp: 60 tablet, Rfl: 5  •  sucralfate (CARAFATE) 1 GM/10ML  "suspension, Take 10 mL by mouth 4 (Four) Times a Day for 21 days., Disp: 420 mL, Rfl: 1  No current facility-administered medications for this visit.     ALLERGIES:   No Known Allergies    SOCIAL HISTORY:       Social History     Social History   • Marital status:      Spouse name: Janiya   • Number of children: 2   • Years of education: High School     Occupational History   •  Retired     [employer]     Social History Main Topics   • Smoking status: Never Smoker   • Smokeless tobacco: Never Used   • Alcohol use Yes      Comment: occassional   • Drug use: No   • Sexual activity: No       FAMILY HISTORY:  Family History   Problem Relation Age of Onset   • Alcohol abuse Father    • Diabetes Father    • Cancer Neg Hx    • Malig Hyperthermia Neg Hx        REVIEW OF SYSTEMS:  GENERAL: No change in appetite or weight;   No fevers, chills, sweats.    SKIN: No nonhealing lesions. No rashes.  HEME/LYMPH: No easy bruising, bleeding.   No swollen nodes.   EYES: No vision changes or diplopia.   ENT: No hearing loss, gum bleeding, epistaxis, hoarseness or dysphagia.   RESPIRATORY: No cough, shortness of breath, hemoptysis or wheezing.   CVS: No chest pain, palpitations, orthopnea, dyspnea on exertion or PND.   GI: No melena or hematochezia.   No abdominal pain.  No nausea, vomiting, constipation, diarrhea  : No lower tract obstructive symptoms, dysuria or hematuria.   MUSCULOSKELETAL: No bone pain.  No joint stiffness.   NEUROLOGICAL: No global weakness, loss of consciousness or seizures.   PSYCHIATRIC: No increased nervousness, mood changes or depression.     Objective    Vitals:    01/23/18 0927   BP: 120/62   Pulse: 71   Resp: 12   Temp: 98.7 °F (37.1 °C)   TempSrc: Oral   SpO2: 99%   Weight: 87.7 kg (193 lb 6.4 oz)   Height: 185 cm (72.84\")   PainSc: 8  Comment: throat pain     Current Status 1/23/2018   ECOG score 0      PHYSICAL EXAM:    GENERAL:  Well-developed, well-nourished male in no acute distress.   SKIN:  Warm, " dry without rashes, purpura or petechiae.  EYES:  Pupils equal, round and reactive to light.  EOMs intact.  Conjunctivae normal.  EARS:  Hearing intact.  NOSE: No nasal discharge.  MOUTH:  Tongue is well-papillated; no stomatitis or ulcers.  No evidence of fungal infection.  Lips normal.  THROAT:  Oropharynx without lesions or exudates.    NECK:  Supple with good range of motion; no thyromegaly or masses.  LYMPHATICS:  No cervical, supraclavicular adenopathy.    CHEST:  Lungs clear to auscultation. Good airflow.  CARDIAC:  Regular rate and rhythm without murmurs, rubs or gallops. Normal S1,S2.  ABDOMEN:  Soft, nontender with no hepatosplenomegaly or masses.  PEG tube in place, there is no leakage around the PEG tube, and no evidence of infection.  Bowel sounds normal.  EXTREMITIES:  No clubbing, cyanosis or edema.  NEUROLOGICAL:  Cranial Nerves II-XII grossly intact.  No focal neurological deficits.  PSYCHIATRIC:  Normal affect and mood.     RECENT LABS:    Lab Results   Component Value Date    WBC 7.67 01/23/2018    HGB 11.1 (L) 01/23/2018    HCT 32.7 (L) 01/23/2018    MCV 94.8 01/23/2018     01/23/2018     Lab Results   Component Value Date    NEUTROABS 6.09 01/23/2018     Lab Results   Component Value Date    GLUCOSE 178 (H) 01/23/2018    BUN 23 (H) 01/23/2018    CREATININE 0.73 01/23/2018    EGFRIFNONA 107 01/23/2018    EGFRIFAFRI 94 01/17/2017    BCR 31.5 (H) 01/23/2018    K 4.6 01/23/2018    CO2 32.6 (H) 01/23/2018    CALCIUM 9.3 01/23/2018    PROTENTOTREF 7.3 01/17/2017    ALBUMIN 3.70 01/23/2018    LABIL2 1.2 01/23/2018    AST 14 01/23/2018    ALT 21 01/23/2018     Sodium   Date Value Ref Range Status   01/23/2018 136 134 - 145 mmol/L Final     Potassium   Date Value Ref Range Status   01/23/2018 4.6 3.5 - 4.7 mmol/L Final     Total Bilirubin   Date Value Ref Range Status   01/23/2018 <0.2 0.1 - 1.2 mg/dL Final     Alkaline Phosphatase   Date Value Ref Range Status   01/23/2018 104 38 - 116 U/L Final    ]  Assessment/Plan   Oropharyngeal cancer  *Left base of tongue cancer.  Stage IV A.  Cisplatin day 1, 22, 43, concurrent with radiation.  Did not receive day 43 cisplatin as planned on 1/16/18 due to neutropenia.  .     Patient is finishing his radiation therapy today on 1/23/2018.  Patient and his wife inquired about last dose cisplatin.  I explained to them, since he is finishing radiation therapy today, there is no point to give him this last dose of cisplatin despite his neutrophil has normalized today, considering his side effects and not much benefits to harvest.  We'll forego the day #43 cisplatin treatment.      *Acute pharyngitis/mucositis secondary to chemoradiotherapy.  Poor oral intake due to significant pains in the throat, well known side effects from chemoradiation therapy. Continue twice per week IV fluids with normal saline.  I also recommended to start Carafate 4 times a day protective to see but it helps with severe pain in the throat caused by chemoradiation therapy.    *Neutropenia and leukocytopenia, due to chemotherapy.  This has completely resolved today.     *Anemia, due to chemotherapy.  Monitor.    *Tinnitus.  He states he had ringing in his ears prior to cisplatin.  Ringing did not worsen with dose 1 or dose 2 of cisplatin    *Nutrition.  Has PEG.  Has met with Dana.  Goal tube feed is 5 cans per day.  Total of 2 L free water.      *Acute kidney injury due to cisplatin with cycle 1.  Creatinine increased to 1.5.  With IV hydration, this resolved.  He does not think he can adequately keep up with IV hydration at home.  Therefore, he wants to maintain twice per week IV fluids.    *GERD.  We started him on Protonix 40 mg daily.     *Power-port-a-catheter.  Patient inquired about a catheter.  I advised keeping her for now, we'll continue after PET scan examination and to reassess.  If no need then we can remove it at that time.    Plan  · Normal saline 1 L IV infusion today and  repeat it twice a week for total 3 weeks.   · No need of for cycle #3 cisplatin.  We'll cancel his last cycle cisplatin.  · Start Carafate 1 g 4 times a day for pain..  I e- scribed liquid Carafate 2 his pharmacy.     · Appointment with NP in 3 weeks with lab check CBC and BMP.     · PET scan in 8 weeks, to be scheduled by Dr. Aguirre's office.       More than 40 min for patient care, over half of that time were for counseling.    DOMENICA LEAL M.D., Ph.D.    1/23/2018

## 2018-01-25 ENCOUNTER — DOCUMENTATION (OUTPATIENT)
Dept: ONCOLOGY | Facility: CLINIC | Age: 68
End: 2018-01-25

## 2018-01-25 ENCOUNTER — INFUSION (OUTPATIENT)
Dept: ONCOLOGY | Facility: HOSPITAL | Age: 68
End: 2018-01-25

## 2018-01-25 ENCOUNTER — TELEPHONE (OUTPATIENT)
Dept: ONCOLOGY | Facility: HOSPITAL | Age: 68
End: 2018-01-25

## 2018-01-25 VITALS
SYSTOLIC BLOOD PRESSURE: 154 MMHG | TEMPERATURE: 98.4 F | BODY MASS INDEX: 25.79 KG/M2 | DIASTOLIC BLOOD PRESSURE: 70 MMHG | WEIGHT: 194.6 LBS | HEART RATE: 84 BPM

## 2018-01-25 DIAGNOSIS — E86.0 DEHYDRATION: Primary | ICD-10-CM

## 2018-01-25 DIAGNOSIS — C10.9 OROPHARYNGEAL CANCER (HCC): ICD-10-CM

## 2018-01-25 PROCEDURE — 96360 HYDRATION IV INFUSION INIT: CPT | Performed by: INTERNAL MEDICINE

## 2018-01-25 PROCEDURE — 96361 HYDRATE IV INFUSION ADD-ON: CPT | Performed by: INTERNAL MEDICINE

## 2018-01-25 RX ORDER — SUCRALFATE 1 G/1
1 TABLET ORAL 4 TIMES DAILY
Qty: 120 TABLET | Refills: 1 | Status: SHIPPED | OUTPATIENT
Start: 2018-01-25 | End: 2018-01-26 | Stop reason: SDUPTHER

## 2018-01-25 RX ADMIN — SODIUM CHLORIDE 1000 ML: 900 INJECTION, SOLUTION INTRAVENOUS at 13:41

## 2018-01-25 NOTE — PROGRESS NOTES
Fax rec from [facility] stating the Carafate needed a PA. I have submitted the request to Alf through [email or web address]    Awaiting decision (1-3 days).

## 2018-01-25 NOTE — PROGRESS NOTES
Carafate approved from 10/27/17 to 1/25/2019-[facility]    [facility] notified by fax [phone]

## 2018-01-25 NOTE — TELEPHONE ENCOUNTER
Pts wife is calling and states that even with their insurance coverage that Carafate is over $100. Wanting to know if there is anything else we could use. Spoke with Dr. Ballesteros and per his order see if pill form is cheaper. Per pharmacy need to send in new script before they can run it and see how much it would cost. Okay to send new script per Dr. Ballesteros. E-scribed to pts pharmacy. Called them and they ran it. I will be $47.00 for a 30 day supply of Carafate pills. Called pts wife back. Spoke to pt and he is going to inform his wife of this.

## 2018-01-26 ENCOUNTER — TELEPHONE (OUTPATIENT)
Dept: ONCOLOGY | Facility: HOSPITAL | Age: 68
End: 2018-01-26

## 2018-01-26 RX ORDER — SUCRALFATE ORAL 1 G/10ML
1 SUSPENSION ORAL 4 TIMES DAILY
Qty: 420 ML | Refills: 1 | Status: SHIPPED | OUTPATIENT
Start: 2018-01-26 | End: 2018-02-16

## 2018-01-26 RX ORDER — SUCRALFATE 1 G/1
1 TABLET ORAL 4 TIMES DAILY
Qty: 120 TABLET | Refills: 1 | Status: SHIPPED | OUTPATIENT
Start: 2018-01-26 | End: 2018-02-13

## 2018-01-26 NOTE — TELEPHONE ENCOUNTER
Wife called and wanted the Carafate (tablet and suspension) sent to a different pharmacy due to other pharmacy was out of network.  Wife wanted both tablet and suspension of carafate sent to see which would be cheaper.  Both sent to new pharmacy - [facility] in [facility]- this was verified with wife.

## 2018-01-30 ENCOUNTER — INFUSION (OUTPATIENT)
Dept: ONCOLOGY | Facility: HOSPITAL | Age: 68
End: 2018-01-30

## 2018-01-30 VITALS
DIASTOLIC BLOOD PRESSURE: 69 MMHG | SYSTOLIC BLOOD PRESSURE: 141 MMHG | HEART RATE: 80 BPM | TEMPERATURE: 98.6 F | BODY MASS INDEX: 25.71 KG/M2 | WEIGHT: 194 LBS

## 2018-01-30 DIAGNOSIS — C10.9 OROPHARYNGEAL CANCER (HCC): ICD-10-CM

## 2018-01-30 DIAGNOSIS — E86.0 DEHYDRATION: Primary | ICD-10-CM

## 2018-01-30 PROCEDURE — 96361 HYDRATE IV INFUSION ADD-ON: CPT | Performed by: INTERNAL MEDICINE

## 2018-01-30 PROCEDURE — 96360 HYDRATION IV INFUSION INIT: CPT | Performed by: INTERNAL MEDICINE

## 2018-01-30 RX ADMIN — SODIUM CHLORIDE 1000 ML: 900 INJECTION, SOLUTION INTRAVENOUS at 13:15

## 2018-02-01 ENCOUNTER — INFUSION (OUTPATIENT)
Dept: ONCOLOGY | Facility: HOSPITAL | Age: 68
End: 2018-02-01

## 2018-02-01 VITALS
WEIGHT: 194.2 LBS | SYSTOLIC BLOOD PRESSURE: 145 MMHG | HEART RATE: 93 BPM | TEMPERATURE: 98 F | DIASTOLIC BLOOD PRESSURE: 72 MMHG | BODY MASS INDEX: 25.74 KG/M2

## 2018-02-01 DIAGNOSIS — E86.0 DEHYDRATION: Primary | ICD-10-CM

## 2018-02-01 DIAGNOSIS — C10.9 OROPHARYNGEAL CANCER (HCC): ICD-10-CM

## 2018-02-01 PROCEDURE — 96360 HYDRATION IV INFUSION INIT: CPT | Performed by: INTERNAL MEDICINE

## 2018-02-01 PROCEDURE — 96361 HYDRATE IV INFUSION ADD-ON: CPT | Performed by: INTERNAL MEDICINE

## 2018-02-01 RX ADMIN — SODIUM CHLORIDE 1000 ML: 900 INJECTION, SOLUTION INTRAVENOUS at 13:14

## 2018-02-02 ENCOUNTER — TELEPHONE (OUTPATIENT)
Dept: RADIATION ONCOLOGY | Facility: HOSPITAL | Age: 68
End: 2018-02-02

## 2018-02-02 NOTE — TELEPHONE ENCOUNTER
Returned Mr Chau's wife's phone call asking about Carafate and if he needs to take it. Cost is over 100$ and patient is not swallowing. Explained therapy of Carafate and that it may help in his healing from Radiation so he has less pain with swallowing. She voiced understanding.

## 2018-02-06 ENCOUNTER — INFUSION (OUTPATIENT)
Dept: ONCOLOGY | Facility: HOSPITAL | Age: 68
End: 2018-02-06

## 2018-02-06 VITALS
TEMPERATURE: 98.4 F | BODY MASS INDEX: 25.87 KG/M2 | WEIGHT: 195.2 LBS | HEART RATE: 83 BPM | DIASTOLIC BLOOD PRESSURE: 69 MMHG | SYSTOLIC BLOOD PRESSURE: 148 MMHG

## 2018-02-06 DIAGNOSIS — E86.0 DEHYDRATION: Primary | ICD-10-CM

## 2018-02-06 DIAGNOSIS — C10.9 OROPHARYNGEAL CANCER (HCC): ICD-10-CM

## 2018-02-06 PROCEDURE — 96360 HYDRATION IV INFUSION INIT: CPT | Performed by: INTERNAL MEDICINE

## 2018-02-06 RX ADMIN — SODIUM CHLORIDE 1000 ML: 900 INJECTION, SOLUTION INTRAVENOUS at 13:38

## 2018-02-08 ENCOUNTER — INFUSION (OUTPATIENT)
Dept: ONCOLOGY | Facility: HOSPITAL | Age: 68
End: 2018-02-08

## 2018-02-08 VITALS
TEMPERATURE: 98.2 F | BODY MASS INDEX: 26.06 KG/M2 | SYSTOLIC BLOOD PRESSURE: 141 MMHG | WEIGHT: 196.6 LBS | HEART RATE: 82 BPM | DIASTOLIC BLOOD PRESSURE: 67 MMHG

## 2018-02-08 DIAGNOSIS — C10.9 OROPHARYNGEAL CANCER (HCC): ICD-10-CM

## 2018-02-08 DIAGNOSIS — E86.0 DEHYDRATION: Primary | ICD-10-CM

## 2018-02-08 PROCEDURE — 96360 HYDRATION IV INFUSION INIT: CPT | Performed by: INTERNAL MEDICINE

## 2018-02-08 PROCEDURE — 96361 HYDRATE IV INFUSION ADD-ON: CPT | Performed by: INTERNAL MEDICINE

## 2018-02-08 RX ADMIN — SODIUM CHLORIDE 1000 ML: 900 INJECTION, SOLUTION INTRAVENOUS at 13:35

## 2018-02-13 ENCOUNTER — OFFICE VISIT (OUTPATIENT)
Dept: ONCOLOGY | Facility: CLINIC | Age: 68
End: 2018-02-13

## 2018-02-13 ENCOUNTER — INFUSION (OUTPATIENT)
Dept: ONCOLOGY | Facility: HOSPITAL | Age: 68
End: 2018-02-13

## 2018-02-13 VITALS
RESPIRATION RATE: 12 BRPM | SYSTOLIC BLOOD PRESSURE: 136 MMHG | BODY MASS INDEX: 26.19 KG/M2 | WEIGHT: 197.6 LBS | HEIGHT: 73 IN | DIASTOLIC BLOOD PRESSURE: 72 MMHG | TEMPERATURE: 98.4 F | OXYGEN SATURATION: 100 % | HEART RATE: 72 BPM

## 2018-02-13 DIAGNOSIS — C01 CANCER OF BASE OF TONGUE (HCC): ICD-10-CM

## 2018-02-13 DIAGNOSIS — J02.9 ACUTE PHARYNGITIS, UNSPECIFIED ETIOLOGY: ICD-10-CM

## 2018-02-13 DIAGNOSIS — Z45.2 FITTING AND ADJUSTMENT OF VASCULAR CATHETER: ICD-10-CM

## 2018-02-13 DIAGNOSIS — E86.0 DEHYDRATION: ICD-10-CM

## 2018-02-13 DIAGNOSIS — C10.9 OROPHARYNGEAL CANCER (HCC): Primary | ICD-10-CM

## 2018-02-13 DIAGNOSIS — C10.9 OROPHARYNGEAL CANCER (HCC): ICD-10-CM

## 2018-02-13 DIAGNOSIS — E86.0 DEHYDRATION: Primary | ICD-10-CM

## 2018-02-13 LAB
ANION GAP SERPL CALCULATED.3IONS-SCNC: 9.6 MMOL/L
BASOPHILS # BLD AUTO: 0.03 10*3/MM3 (ref 0–0.1)
BASOPHILS NFR BLD AUTO: 0.4 % (ref 0–1.1)
BUN BLD-MCNC: 21 MG/DL (ref 6–20)
BUN/CREAT SERPL: 30.9 (ref 7.3–30)
CALCIUM SPEC-SCNC: 9.6 MG/DL (ref 8.5–10.2)
CHLORIDE SERPL-SCNC: 95 MMOL/L (ref 98–107)
CO2 SERPL-SCNC: 31.4 MMOL/L (ref 22–29)
CREAT BLD-MCNC: 0.68 MG/DL (ref 0.7–1.3)
DEPRECATED RDW RBC AUTO: 46.9 FL (ref 37–49)
EOSINOPHIL # BLD AUTO: 0.3 10*3/MM3 (ref 0–0.36)
EOSINOPHIL NFR BLD AUTO: 4.3 % (ref 1–5)
ERYTHROCYTE [DISTWIDTH] IN BLOOD BY AUTOMATED COUNT: 13.2 % (ref 11.7–14.5)
GFR SERPL CREATININE-BSD FRML MDRD: 116 ML/MIN/1.73
GLUCOSE BLD-MCNC: 208 MG/DL (ref 74–124)
HCT VFR BLD AUTO: 34 % (ref 40–49)
HGB BLD-MCNC: 11.5 G/DL (ref 13.5–16.5)
HOLD SPECIMEN: NORMAL
IMM GRANULOCYTES # BLD: 0.04 10*3/MM3 (ref 0–0.03)
IMM GRANULOCYTES NFR BLD: 0.6 % (ref 0–0.5)
LYMPHOCYTES # BLD AUTO: 0.97 10*3/MM3 (ref 1–3.5)
LYMPHOCYTES NFR BLD AUTO: 13.8 % (ref 20–49)
MCH RBC QN AUTO: 32.8 PG (ref 27–33)
MCHC RBC AUTO-ENTMCNC: 33.8 G/DL (ref 32–35)
MCV RBC AUTO: 96.9 FL (ref 83–97)
MONOCYTES # BLD AUTO: 0.6 10*3/MM3 (ref 0.25–0.8)
MONOCYTES NFR BLD AUTO: 8.5 % (ref 4–12)
NEUTROPHILS # BLD AUTO: 5.08 10*3/MM3 (ref 1.5–7)
NEUTROPHILS NFR BLD AUTO: 72.4 % (ref 39–75)
NRBC BLD MANUAL-RTO: 0 /100 WBC (ref 0–0)
PLATELET # BLD AUTO: 262 10*3/MM3 (ref 150–375)
PMV BLD AUTO: 9.1 FL (ref 8.9–12.1)
POTASSIUM BLD-SCNC: 4 MMOL/L (ref 3.5–4.7)
RBC # BLD AUTO: 3.51 10*6/MM3 (ref 4.3–5.5)
SODIUM BLD-SCNC: 136 MMOL/L (ref 134–145)
WBC NRBC COR # BLD: 7.02 10*3/MM3 (ref 4–10)

## 2018-02-13 PROCEDURE — 99213 OFFICE O/P EST LOW 20 MIN: CPT | Performed by: NURSE PRACTITIONER

## 2018-02-13 PROCEDURE — 80048 BASIC METABOLIC PNL TOTAL CA: CPT | Performed by: INTERNAL MEDICINE

## 2018-02-13 PROCEDURE — 36415 COLL VENOUS BLD VENIPUNCTURE: CPT | Performed by: INTERNAL MEDICINE

## 2018-02-13 PROCEDURE — 96523 IRRIG DRUG DELIVERY DEVICE: CPT | Performed by: NURSE PRACTITIONER

## 2018-02-13 PROCEDURE — 85025 COMPLETE CBC W/AUTO DIFF WBC: CPT | Performed by: INTERNAL MEDICINE

## 2018-02-13 PROCEDURE — 25010000002 HEPARIN FLUSH (PORCINE) 100 UNIT/ML SOLUTION: Performed by: NURSE PRACTITIONER

## 2018-02-13 RX ORDER — SODIUM CHLORIDE 0.9 % (FLUSH) 0.9 %
10 SYRINGE (ML) INJECTION AS NEEDED
Status: CANCELLED | OUTPATIENT
Start: 2018-02-13

## 2018-02-13 RX ORDER — SODIUM CHLORIDE 0.9 % (FLUSH) 0.9 %
10 SYRINGE (ML) INJECTION AS NEEDED
Status: DISCONTINUED | OUTPATIENT
Start: 2018-02-13 | End: 2018-02-13 | Stop reason: HOSPADM

## 2018-02-13 RX ADMIN — SODIUM CHLORIDE, PRESERVATIVE FREE 500 UNITS: 5 INJECTION INTRAVENOUS at 13:17

## 2018-02-13 RX ADMIN — Medication 10 ML: at 13:17

## 2018-02-13 NOTE — PROGRESS NOTES
Subjective .     REASONS FOR FOLLOWUP:    1.  Base of tongue cancer, stage YEN, undergoing concurrent chemoradiation therapy.    2.  Moderate neutropenia secondary to concurrent chemoradiotherapy.  Cycle #3 cisplatin was delayed and then canceled.    HISTORY OF PRESENT ILLNESS:  The patient is a 67 y.o. year old male  who is here for follow-up with the above-mentioned history.    Patient comes in today for follow-up nutritional status.  He is beginning to eat and drink small amounts.  He drinks 2 cups of coffee and a bottle of water yesterday along with eating applesauce.  He continues 5 cans of tube feeds daily.  He is up 1 pound.  He continues Carafate.  He denies fevers, nausea, or trouble with his bowels.    Past Medical History:   Diagnosis Date   • Benign essential hypertension    • H/O complete eye exam due   • H/O Neck mass     left   • Hyperlipidemia    • IFG (impaired fasting glucose)    • Seasonal allergies    • Squamous cell carcinoma 2017    left base on tongue, stage YEN     Past Surgical History:   Procedure Laterality Date   • COLONOSCOPY N/A 2015    normal colonoscopy-Dr. Galen Morrissey   • COLONOSCOPY N/A 05/04/2011    Normal colonoscopy-Dr. Tobias Emerson   • ENDOSCOPY W/ PEG TUBE PLACEMENT N/A 11/28/2017    Procedure: ESOPHAGOGASTRODUODENOSCOPY WITH PERCUTANEOUS ENDOSCOPIC GASTROSTOMY TUBE INSERTION;  Surgeon: Isma Hercules MD;  Location: [facility];  Service:    • INGUINAL HERNIA REPAIR Left 1994    Dr. Peres   • INGUINAL HERNIA REPAIR Right 1993    Dr. Peres   • LARYNGOPLASTY      Laryngoplasty with biopsy-Dr. Del Toro   • [place] INSJ TUNNELED CVC W/O SUBQ PORT/ AGE 3 YR/> Left 11/28/2017    Procedure: INSERTION VENOUS ACCESS DEVICE;  Surgeon: Isma Hercules MD;  Location: [facility];  Service: General   • TONGUE BIOPSY / EXCISION N/A 11/01/2017    Biopsy of the left tongue base   • TONSILLECTOMY AND ADENOIDECTOMY Bilateral 11/01/2017    Dr. Del Toro        HEMATOLOGIC/ONCOLOGIC HISTORY:  (History from previous dates can be found in the separate document.)    MEDICATIONS    Current Outpatient Prescriptions:   •  metoprolol tartrate (LOPRESSOR) 50 MG tablet, Take 1 tablet by mouth 2 (Two) Times a Day., Disp: 180 tablet, Rfl: 3  •  ondansetron (ZOFRAN) 8 MG tablet, Take 1 tablet by mouth 3 (Three) Times a Day As Needed for Nausea or Vomiting., Disp: 30 tablet, Rfl: 5  •  oxyCODONE-acetaminophen (PERCOCET) 7.5-325 MG per tablet, Take 1 tablet by mouth Every 6 (Six) Hours As Needed (1-2 tablets every 4-6 hours when necessary)., Disp: 30 tablet, Rfl: 0  •  pantoprazole (PROTONIX) 40 MG EC tablet, Take 1 tablet by mouth Daily., Disp: 30 tablet, Rfl: 5  •  prochlorperazine (COMPAZINE) 10 MG tablet, Take 1 tablet by mouth Every 6 (Six) Hours As Needed for Nausea or Vomiting for up to 60 doses., Disp: 60 tablet, Rfl: 5  •  sucralfate (CARAFATE) 1 GM/10ML suspension, Take 10 mL by mouth 4 (Four) Times a Day for 21 days., Disp: 420 mL, Rfl: 1  No current facility-administered medications for this visit.     ALLERGIES:   No Known Allergies    SOCIAL HISTORY:       Social History     Social History   • Marital status:      Spouse name: Janiya   • Number of children: 2   • Years of education: High School     Occupational History   •  Retired     [employer]     Social History Main Topics   • Smoking status: Never Smoker   • Smokeless tobacco: Never Used   • Alcohol use Yes      Comment: occassional   • Drug use: No   • Sexual activity: No       FAMILY HISTORY:  Family History   Problem Relation Age of Onset   • Alcohol abuse Father    • Diabetes Father    • Cancer Neg Hx    • Malig Hyperthermia Neg Hx        REVIEW OF SYSTEMS:  GENERAL: No change in appetite or weight;   No fevers, chills, sweats.    SKIN: No nonhealing lesions. No rashes.  HEME/LYMPH: No easy bruising, bleeding.   No swollen nodes.   EYES: No vision changes or diplopia.   ENT: No hearing loss, gum bleeding,  "epistaxis, hoarseness or dysphagia.   RESPIRATORY: No cough, shortness of breath, hemoptysis or wheezing.   CVS: No chest pain, palpitations, orthopnea, dyspnea on exertion or PND.   GI: No melena or hematochezia.   No abdominal pain.  No nausea, vomiting, constipation, diarrhea  : No lower tract obstructive symptoms, dysuria or hematuria.   MUSCULOSKELETAL: No bone pain.  No joint stiffness.   NEUROLOGICAL: No global weakness, loss of consciousness or seizures.   PSYCHIATRIC: No increased nervousness, mood changes or depression.     Objective    Vitals:    02/13/18 1331   BP: 136/72   Pulse: 72   Resp: 12   Temp: 98.4 °F (36.9 °C)   TempSrc: Oral   SpO2: 100%   Weight: 89.6 kg (197 lb 9.6 oz)   Height: 185 cm (72.84\")   PainSc: 0-No pain     Current Status 2/13/2018   ECOG score 0      PHYSICAL EXAM:    GENERAL:  Well-developed, well-nourished male in no acute distress.   SKIN:  Warm, dry without rashes, purpura or petechiae.  EYES:  Pupils equal, round and reactive to light.  EOMs intact.  Conjunctivae normal.  EARS:  Hearing intact.  NOSE: No nasal discharge.  MOUTH:  Tongue is well-papillated; no stomatitis or ulcers.  No evidence of fungal infection.  Lips normal.  THROAT:  Oropharynx without lesions or exudates.    NECK:  Supple with good range of motion; no thyromegaly or masses.  LYMPHATICS:  No cervical, supraclavicular adenopathy.    CHEST:  Lungs clear to auscultation. Good airflow.  CARDIAC:  Regular rate and rhythm without murmurs, rubs or gallops. Normal S1,S2.  ABDOMEN:  Soft, nontender with no hepatosplenomegaly or masses.  PEG tube in place, there is no leakage around the PEG tube, and no evidence of infection.  Bowel sounds normal.  EXTREMITIES:  No clubbing, cyanosis or edema.  NEUROLOGICAL:  Cranial Nerves II-XII grossly intact.  No focal neurological deficits.  PSYCHIATRIC:  Normal affect and mood.     RECENT LABS:    Lab Results   Component Value Date    WBC 7.02 02/13/2018    HGB 11.5 (L) " 02/13/2018    HCT 34.0 (L) 02/13/2018    MCV 96.9 02/13/2018     02/13/2018     Lab Results   Component Value Date    NEUTROABS 5.08 02/13/2018     Lab Results   Component Value Date    GLUCOSE 208 (H) 02/13/2018    BUN 21 (H) 02/13/2018    CREATININE 0.68 (L) 02/13/2018    EGFRIFNONA 116 02/13/2018    EGFRIFAFRI 94 01/17/2017    BCR 30.9 (H) 02/13/2018    K 4.0 02/13/2018    CO2 31.4 (H) 02/13/2018    CALCIUM 9.6 02/13/2018    PROTENTOTREF 7.3 01/17/2017    ALBUMIN 3.70 01/23/2018    LABIL2 1.2 01/23/2018    AST 14 01/23/2018    ALT 21 01/23/2018     Sodium   Date Value Ref Range Status   02/13/2018 136 134 - 145 mmol/L Final     Potassium   Date Value Ref Range Status   02/13/2018 4.0 3.5 - 4.7 mmol/L Final     Total Bilirubin   Date Value Ref Range Status   01/23/2018 <0.2 0.1 - 1.2 mg/dL Final     Alkaline Phosphatase   Date Value Ref Range Status   01/23/2018 104 38 - 116 U/L Final   ]  Assessment/Plan   Oropharyngeal cancer  - Comprehensive Metabolic Panel  - CBC & Differential  - Magnesium  *Left base of tongue cancer.  Stage IV A.  Cisplatin day 1, 22, 43, concurrent with radiation.  Did not receive day 43 cisplatin as planned on 1/16/18 due to neutropenia.  .     Patient Finished radiation therapy on 1/23/2018.  We did not give day 43 cisplatin treatment as it was on the last day of radiation.      *Acute pharyngitis/mucositis secondary to chemoradiotherapy.  The patient continues Carafate.  His symptoms are improving.  His main complaint is thick secretions.    *Neutropenia and leukocytopenia, due to chemotherapy.  This has completely resolved today.     *Anemia, due to chemotherapy.  Monitor.    *Tinnitus.  He states he had ringing in his ears prior to cisplatin.  Ringing did not worsen with dose 1 or dose 2 of cisplatin    *Nutrition.  Has PEG.  Has met with Dana.  He continues tube feed is 5 cans per day.  Total of 2 L free water.      *Acute kidney injury due to cisplatin with cycle 1.   Creatinine increased to 1.5.  With IV hydration, this resolved.  Creatinine 0.68 today.    The patient is beginning to take in oral fluids and therefore we will not add IV fluids on this week.  He will return in 1 week for labs with nurse review and in 2 weeks for review by Dr. Ballesteros.    *GERD.  We started him on Protonix 40 mg daily.     *Power-port-a-catheter.  This can be removed after PET scan is reviewed.    Plan  · Continue continue pushing oral fluids at home.  If this is declining he is to call the office for IV fluids.   · Continue Carafate 1 g at home.    · Return in 1 week for RN review with lab check CBC and BMP.     · Follow up with Dr. Ballesteros in 2 weeks.  · PET scan in 4 weeks, to be scheduled by Dr. Aguirre's office.       The plan of care was discussed with Dr. Ballesteros today.

## 2018-02-15 ENCOUNTER — DOCUMENTATION (OUTPATIENT)
Dept: RADIATION ONCOLOGY | Facility: HOSPITAL | Age: 68
End: 2018-02-15

## 2018-02-20 ENCOUNTER — LAB (OUTPATIENT)
Dept: ONCOLOGY | Facility: HOSPITAL | Age: 68
End: 2018-02-20

## 2018-02-20 ENCOUNTER — CLINICAL SUPPORT (OUTPATIENT)
Dept: ONCOLOGY | Facility: HOSPITAL | Age: 68
End: 2018-02-20

## 2018-02-20 DIAGNOSIS — Z45.2 FITTING AND ADJUSTMENT OF VASCULAR CATHETER: ICD-10-CM

## 2018-02-20 DIAGNOSIS — C10.9 OROPHARYNGEAL CANCER (HCC): Primary | ICD-10-CM

## 2018-02-20 LAB
ALBUMIN SERPL-MCNC: 3.9 G/DL (ref 3.5–5.2)
ALBUMIN/GLOB SERPL: 1.4 G/DL (ref 1.1–2.4)
ALP SERPL-CCNC: 87 U/L (ref 38–116)
ALT SERPL W P-5'-P-CCNC: 15 U/L (ref 0–41)
ANION GAP SERPL CALCULATED.3IONS-SCNC: 9.9 MMOL/L
AST SERPL-CCNC: 15 U/L (ref 0–40)
BASOPHILS # BLD AUTO: 0.03 10*3/MM3 (ref 0–0.1)
BASOPHILS NFR BLD AUTO: 0.5 % (ref 0–1.1)
BILIRUB SERPL-MCNC: 0.4 MG/DL (ref 0.1–1.2)
BUN BLD-MCNC: 17 MG/DL (ref 6–20)
BUN/CREAT SERPL: 25 (ref 7.3–30)
CALCIUM SPEC-SCNC: 9.7 MG/DL (ref 8.5–10.2)
CHLORIDE SERPL-SCNC: 98 MMOL/L (ref 98–107)
CO2 SERPL-SCNC: 29.1 MMOL/L (ref 22–29)
CREAT BLD-MCNC: 0.68 MG/DL (ref 0.7–1.3)
DEPRECATED RDW RBC AUTO: 45.1 FL (ref 37–49)
EOSINOPHIL # BLD AUTO: 0.3 10*3/MM3 (ref 0–0.36)
EOSINOPHIL NFR BLD AUTO: 4.9 % (ref 1–5)
ERYTHROCYTE [DISTWIDTH] IN BLOOD BY AUTOMATED COUNT: 12.8 % (ref 11.7–14.5)
GFR SERPL CREATININE-BSD FRML MDRD: 116 ML/MIN/1.73
GLOBULIN UR ELPH-MCNC: 2.8 GM/DL (ref 1.8–3.5)
GLUCOSE BLD-MCNC: 108 MG/DL (ref 74–124)
HCT VFR BLD AUTO: 32.8 % (ref 40–49)
HGB BLD-MCNC: 11.2 G/DL (ref 13.5–16.5)
IMM GRANULOCYTES # BLD: 0.03 10*3/MM3 (ref 0–0.03)
IMM GRANULOCYTES NFR BLD: 0.5 % (ref 0–0.5)
LYMPHOCYTES # BLD AUTO: 1.18 10*3/MM3 (ref 1–3.5)
LYMPHOCYTES NFR BLD AUTO: 19.4 % (ref 20–49)
MAGNESIUM SERPL-MCNC: 1.9 MG/DL (ref 1.8–2.5)
MCH RBC QN AUTO: 32.8 PG (ref 27–33)
MCHC RBC AUTO-ENTMCNC: 34.1 G/DL (ref 32–35)
MCV RBC AUTO: 96.2 FL (ref 83–97)
MONOCYTES # BLD AUTO: 0.58 10*3/MM3 (ref 0.25–0.8)
MONOCYTES NFR BLD AUTO: 9.5 % (ref 4–12)
NEUTROPHILS # BLD AUTO: 3.96 10*3/MM3 (ref 1.5–7)
NEUTROPHILS NFR BLD AUTO: 65.2 % (ref 39–75)
NRBC BLD MANUAL-RTO: 0 /100 WBC (ref 0–0)
PLATELET # BLD AUTO: 217 10*3/MM3 (ref 150–375)
PMV BLD AUTO: 8.9 FL (ref 8.9–12.1)
POTASSIUM BLD-SCNC: 4 MMOL/L (ref 3.5–4.7)
PROT SERPL-MCNC: 6.7 G/DL (ref 6.3–8)
RBC # BLD AUTO: 3.41 10*6/MM3 (ref 4.3–5.5)
SODIUM BLD-SCNC: 137 MMOL/L (ref 134–145)
WBC NRBC COR # BLD: 6.08 10*3/MM3 (ref 4–10)

## 2018-02-20 PROCEDURE — 83735 ASSAY OF MAGNESIUM: CPT | Performed by: NURSE PRACTITIONER

## 2018-02-20 PROCEDURE — 96523 IRRIG DRUG DELIVERY DEVICE: CPT | Performed by: NURSE PRACTITIONER

## 2018-02-20 PROCEDURE — 85025 COMPLETE CBC W/AUTO DIFF WBC: CPT | Performed by: NURSE PRACTITIONER

## 2018-02-20 PROCEDURE — 80053 COMPREHEN METABOLIC PANEL: CPT | Performed by: NURSE PRACTITIONER

## 2018-02-20 PROCEDURE — 25010000002 HEPARIN FLUSH (PORCINE) 100 UNIT/ML SOLUTION: Performed by: INTERNAL MEDICINE

## 2018-02-20 PROCEDURE — 36415 COLL VENOUS BLD VENIPUNCTURE: CPT | Performed by: NURSE PRACTITIONER

## 2018-02-20 RX ORDER — SODIUM CHLORIDE 0.9 % (FLUSH) 0.9 %
10 SYRINGE (ML) INJECTION AS NEEDED
Status: CANCELLED | OUTPATIENT
Start: 2018-02-20

## 2018-02-20 RX ORDER — SODIUM CHLORIDE 0.9 % (FLUSH) 0.9 %
10 SYRINGE (ML) INJECTION AS NEEDED
Status: DISCONTINUED | OUTPATIENT
Start: 2018-02-20 | End: 2018-02-20 | Stop reason: HOSPADM

## 2018-02-20 RX ADMIN — SODIUM CHLORIDE, PRESERVATIVE FREE 500 UNITS: 5 INJECTION INTRAVENOUS at 13:31

## 2018-02-20 RX ADMIN — Medication 10 ML: at 13:31

## 2018-02-20 NOTE — PROGRESS NOTES
CBC reviewed with pt. Copy of labs given to pt. Pt states he has started to drink more orally and eating some soft foods such as yogurt and pudding, he continues his tube feeds. States he is voiding more than he was. Pain is minimal in throat, biggest complaint is that food does not taste good.     Await CMP and Mag results. Pt does not feel like he needs IV hydration. Informed pt that I will call him with results.     Lab Results   Component Value Date    WBC 6.08 02/20/2018    HGB 11.2 (L) 02/20/2018    HCT 32.8 (L) 02/20/2018    MCV 96.2 02/20/2018     02/20/2018     Called and informed pt CMP and mag were unremarkable. Pt v/u

## 2018-02-28 ENCOUNTER — TELEPHONE (OUTPATIENT)
Dept: NUTRITION | Facility: HOSPITAL | Age: 68
End: 2018-02-28

## 2018-02-28 NOTE — PROGRESS NOTES
[facility] Nutrition Follow Up:     Weight 197#, 2/13/18    Spoke to patient on the phone.  He is eating some foods, still has taste issues which is keeping him from eating more.  He continues to use the feeding tube, Two aditi 5 cans per day with water flushes.  Encouraged patient to keep trying to eat more. Will follow.

## 2018-03-02 ENCOUNTER — OFFICE VISIT (OUTPATIENT)
Dept: ONCOLOGY | Facility: CLINIC | Age: 68
End: 2018-03-02

## 2018-03-02 ENCOUNTER — LAB (OUTPATIENT)
Dept: ONCOLOGY | Facility: HOSPITAL | Age: 68
End: 2018-03-02

## 2018-03-02 VITALS
WEIGHT: 194.2 LBS | DIASTOLIC BLOOD PRESSURE: 42 MMHG | BODY MASS INDEX: 25.74 KG/M2 | SYSTOLIC BLOOD PRESSURE: 112 MMHG | TEMPERATURE: 98.2 F | HEIGHT: 73 IN | RESPIRATION RATE: 14 BRPM | HEART RATE: 69 BPM | OXYGEN SATURATION: 97 %

## 2018-03-02 DIAGNOSIS — C10.9 OROPHARYNGEAL CANCER (HCC): Primary | ICD-10-CM

## 2018-03-02 DIAGNOSIS — J02.9 ACUTE PHARYNGITIS, UNSPECIFIED ETIOLOGY: ICD-10-CM

## 2018-03-02 DIAGNOSIS — C01 CANCER OF BASE OF TONGUE (HCC): Primary | ICD-10-CM

## 2018-03-02 DIAGNOSIS — D64.81 ANEMIA ASSOCIATED WITH CHEMOTHERAPY: ICD-10-CM

## 2018-03-02 DIAGNOSIS — Z45.2 FITTING AND ADJUSTMENT OF VASCULAR CATHETER: ICD-10-CM

## 2018-03-02 DIAGNOSIS — T66.XXXS ADVERSE EFFECT OF RADIATION THERAPY, SEQUELA: ICD-10-CM

## 2018-03-02 DIAGNOSIS — T45.1X5A ANEMIA ASSOCIATED WITH CHEMOTHERAPY: ICD-10-CM

## 2018-03-02 LAB
ANION GAP SERPL CALCULATED.3IONS-SCNC: 10.5 MMOL/L
BASOPHILS # BLD AUTO: 0.03 10*3/MM3 (ref 0–0.1)
BASOPHILS NFR BLD AUTO: 0.5 % (ref 0–1.1)
BUN BLD-MCNC: 17 MG/DL (ref 6–20)
BUN/CREAT SERPL: 25.4 (ref 7.3–30)
CALCIUM SPEC-SCNC: 9.4 MG/DL (ref 8.5–10.2)
CHLORIDE SERPL-SCNC: 98 MMOL/L (ref 98–107)
CO2 SERPL-SCNC: 28.5 MMOL/L (ref 22–29)
CREAT BLD-MCNC: 0.67 MG/DL (ref 0.7–1.3)
DEPRECATED RDW RBC AUTO: 44 FL (ref 37–49)
EOSINOPHIL # BLD AUTO: 0.33 10*3/MM3 (ref 0–0.36)
EOSINOPHIL NFR BLD AUTO: 5.3 % (ref 1–5)
ERYTHROCYTE [DISTWIDTH] IN BLOOD BY AUTOMATED COUNT: 12.1 % (ref 11.7–14.5)
GFR SERPL CREATININE-BSD FRML MDRD: 118 ML/MIN/1.73
GLUCOSE BLD-MCNC: 216 MG/DL (ref 74–124)
HCT VFR BLD AUTO: 35.1 % (ref 40–49)
HGB BLD-MCNC: 12 G/DL (ref 13.5–16.5)
HOLD SPECIMEN: NORMAL
IMM GRANULOCYTES # BLD: 0.01 10*3/MM3 (ref 0–0.03)
IMM GRANULOCYTES NFR BLD: 0.2 % (ref 0–0.5)
LYMPHOCYTES # BLD AUTO: 0.7 10*3/MM3 (ref 1–3.5)
LYMPHOCYTES NFR BLD AUTO: 11.3 % (ref 20–49)
MCH RBC QN AUTO: 33.2 PG (ref 27–33)
MCHC RBC AUTO-ENTMCNC: 34.2 G/DL (ref 32–35)
MCV RBC AUTO: 97.2 FL (ref 83–97)
MONOCYTES # BLD AUTO: 0.53 10*3/MM3 (ref 0.25–0.8)
MONOCYTES NFR BLD AUTO: 8.5 % (ref 4–12)
NEUTROPHILS # BLD AUTO: 4.62 10*3/MM3 (ref 1.5–7)
NEUTROPHILS NFR BLD AUTO: 74.2 % (ref 39–75)
NRBC BLD MANUAL-RTO: 0 /100 WBC (ref 0–0)
PLATELET # BLD AUTO: 208 10*3/MM3 (ref 150–375)
PMV BLD AUTO: 9 FL (ref 8.9–12.1)
POTASSIUM BLD-SCNC: 4 MMOL/L (ref 3.5–4.7)
RBC # BLD AUTO: 3.61 10*6/MM3 (ref 4.3–5.5)
SODIUM BLD-SCNC: 137 MMOL/L (ref 134–145)
WBC NRBC COR # BLD: 6.22 10*3/MM3 (ref 4–10)

## 2018-03-02 PROCEDURE — 99214 OFFICE O/P EST MOD 30 MIN: CPT | Performed by: INTERNAL MEDICINE

## 2018-03-02 PROCEDURE — 25010000002 HEPARIN FLUSH (PORCINE) 100 UNIT/ML SOLUTION: Performed by: INTERNAL MEDICINE

## 2018-03-02 PROCEDURE — 80048 BASIC METABOLIC PNL TOTAL CA: CPT | Performed by: INTERNAL MEDICINE

## 2018-03-02 PROCEDURE — 96523 IRRIG DRUG DELIVERY DEVICE: CPT | Performed by: INTERNAL MEDICINE

## 2018-03-02 PROCEDURE — 85025 COMPLETE CBC W/AUTO DIFF WBC: CPT | Performed by: INTERNAL MEDICINE

## 2018-03-02 RX ORDER — SODIUM CHLORIDE 0.9 % (FLUSH) 0.9 %
10 SYRINGE (ML) INJECTION AS NEEDED
Status: CANCELLED | OUTPATIENT
Start: 2018-03-02

## 2018-03-02 RX ORDER — SODIUM CHLORIDE 0.9 % (FLUSH) 0.9 %
10 SYRINGE (ML) INJECTION AS NEEDED
Status: DISCONTINUED | OUTPATIENT
Start: 2018-03-02 | End: 2018-03-02 | Stop reason: HOSPADM

## 2018-03-02 RX ORDER — DIPHENHYDRAMINE HCL 25 MG
25 TABLET ORAL AS NEEDED
COMMUNITY
End: 2018-03-23

## 2018-03-02 RX ADMIN — SODIUM CHLORIDE, PRESERVATIVE FREE 500 UNITS: 5 INJECTION INTRAVENOUS at 12:40

## 2018-03-02 RX ADMIN — Medication 10 ML: at 12:40

## 2018-03-02 NOTE — PROGRESS NOTES
Subjective .     REASONS FOR FOLLOWUP:    1.  Squamous cell carcinoma of the base of tongue, stage YEN, started concurrent chemoradiation therapy on 12/4/2017 using cisplatin repeating every 3 weeks.    2.  Moderate neutropenia secondary to concurrent chemoradiotherapy.  Cycle #3 cisplatin was delayed and then canceled.    3.  Radiation therapy finished on 1/23/2018.     HISTORY OF PRESENT ILLNESS:  The patient is a 67 y.o. year old male who is here for follow-up with the above-mentioned history.   Patient is accompanied by his wife who helped with history.     Patient reports his pain in the throat has much improved, after starting liquid Carafate.  He is able to eat and drink a little bit through by mouth, however he still relies on his PEG tube for almost all nutrition support.  He is able to maintain his weight.  His performance status is ECOG 1-0.  Denies fever sweating chills.        Past Medical History:   Diagnosis Date   • Benign essential hypertension    • H/O complete eye exam due   • H/O Neck mass     left   • Hyperlipidemia    • IFG (impaired fasting glucose)    • Seasonal allergies    • Squamous cell carcinoma 2017    left base on tongue, stage YEN     Past Surgical History:   Procedure Laterality Date   • COLONOSCOPY N/A 2015    normal colonoscopy-Dr. Galen Morrissey   • COLONOSCOPY N/A 05/04/2011    Normal colonoscopy-Dr. Tobias Emerson   • ENDOSCOPY W/ PEG TUBE PLACEMENT N/A 11/28/2017    Procedure: ESOPHAGOGASTRODUODENOSCOPY WITH PERCUTANEOUS ENDOSCOPIC GASTROSTOMY TUBE INSERTION;  Surgeon: Isma Hercules MD;  Location: [facility];  Service:    • INGUINAL HERNIA REPAIR Left 1994    Dr. Peres   • INGUINAL HERNIA REPAIR Right 1993    Dr. Peres   • LARYNGOPLASTY      Laryngoplasty with biopsy-Dr. Del Toro   • [place] INSJ TUNNELED CVC W/O SUBQ PORT/ AGE 3 YR/> Left 11/28/2017    Procedure: INSERTION VENOUS ACCESS DEVICE;  Surgeon: Isma Hercules MD;  Location: [facility];  Service:  General   • TONGUE BIOPSY / EXCISION N/A 11/01/2017    Biopsy of the left tongue base   • TONSILLECTOMY AND ADENOIDECTOMY Bilateral 11/01/2017    Dr. Del Toro       HEMATOLOGIC/ONCOLOGIC HISTORY:  (History from previous dates can be found in the separate document.)    MEDICATIONS    Current Outpatient Prescriptions:   •  diphenhydrAMINE (BENADRYL) 25 MG tablet, Take 25 mg by mouth As Needed for Itching., Disp: , Rfl:   •  metoprolol tartrate (LOPRESSOR) 50 MG tablet, Take 1 tablet by mouth 2 (Two) Times a Day., Disp: 180 tablet, Rfl: 3  No current facility-administered medications for this visit.     ALLERGIES:   No Known Allergies    SOCIAL HISTORY:       Social History     Social History   • Marital status:      Spouse name: Janiya   • Number of children: 2   • Years of education: High School     Occupational History   •  Retired     [employer]     Social History Main Topics   • Smoking status: Never Smoker   • Smokeless tobacco: Never Used   • Alcohol use Yes      Comment: occassional   • Drug use: No   • Sexual activity: No       FAMILY HISTORY:  Family History   Problem Relation Age of Onset   • Alcohol abuse Father    • Diabetes Father    • Cancer Neg Hx    • Malig Hyperthermia Neg Hx        REVIEW OF SYSTEMS:  GENERAL: No change in appetite or weight;   No fevers, chills, sweats.    SKIN: No nonhealing lesions. No rashes.  HEME/LYMPH: No easy bruising, bleeding.   No swollen nodes.   EYES: No vision changes or diplopia.   ENT: No hearing loss, gum bleeding, epistaxis, hoarseness or dysphagia.   RESPIRATORY: No cough, shortness of breath, hemoptysis or wheezing.   CVS: No chest pain, palpitations, orthopnea, dyspnea on exertion or PND.   GI: No melena or hematochezia.   No abdominal pain.  No nausea, vomiting, constipation, diarrhea  : No lower tract obstructive symptoms, dysuria or hematuria.   MUSCULOSKELETAL: No bone pain.  No joint stiffness.   NEUROLOGICAL: No global weakness, loss of consciousness or  "seizures.   PSYCHIATRIC: No increased nervousness, mood changes or depression.     Objective    Vitals:    03/02/18 1251   BP: 112/42   Pulse: 69   Resp: 14   Temp: 98.2 °F (36.8 °C)   TempSrc: Oral   SpO2: 97%   Weight: 88.1 kg (194 lb 3.2 oz)   Height: 185 cm (72.84\")   PainSc: 0-No pain     Current Status 3/2/2018   ECOG score 0      PHYSICAL EXAM:    GENERAL:  Well-developed, well-nourished male in no acute distress.   SKIN:  Warm, dry without rashes, purpura or petechiae. There is skin pigmentation in the neck secondary to radiation therapy.   EYES:  Pupils equal, round and reactive to light.  EOMs intact.  Conjunctivae normal.  EARS:  Hearing intact.  NOSE: No nasal discharge.  MOUTH:  Tongue is well-papillated; no stomatitis or ulcers.  No evidence of fungal infection.  Lips normal.  THROAT:  Oropharynx without lesions or exudates.    NECK:  Supple with good range of motion; no thyromegaly or masses.  LYMPHATICS:  No cervical, supraclavicular adenopathy.    CHEST:  Lungs clear to auscultation. Good airflow.  CARDIAC:  Regular rate and rhythm without murmurs, rubs or gallops. Normal S1,S2.  ABDOMEN:  Soft, nontender with no hepatosplenomegaly or masses.  PEG tube in place, there is no leakage around the PEG tube, and no evidence of infection.  Bowel sounds normal.  EXTREMITIES:  No clubbing, cyanosis or edema.  NEUROLOGICAL:  Cranial Nerves II-XII grossly intact.  No focal neurological deficits.  PSYCHIATRIC:  Normal affect and mood.     RECENT LABS:    Lab Results   Component Value Date    WBC 6.22 03/02/2018    HGB 12.0 (L) 03/02/2018    HCT 35.1 (L) 03/02/2018    MCV 97.2 (H) 03/02/2018     03/02/2018     Lab Results   Component Value Date    NEUTROABS 4.62 03/02/2018     Lab Results   Component Value Date    GLUCOSE 216 (H) 03/02/2018    BUN 17 03/02/2018    CREATININE 0.67 (L) 03/02/2018    EGFRIFNONA 118 03/02/2018    EGFRIFAFRI 94 01/17/2017    BCR 25.4 03/02/2018    K 4.0 03/02/2018    CO2 28.5 " 03/02/2018    CALCIUM 9.4 03/02/2018    PROTENTOTREF 7.3 01/17/2017    ALBUMIN 3.90 02/20/2018    LABIL2 1.4 02/20/2018    AST 15 02/20/2018    ALT 15 02/20/2018     Sodium   Date Value Ref Range Status   03/02/2018 137 134 - 145 mmol/L Final     Potassium   Date Value Ref Range Status   03/02/2018 4.0 3.5 - 4.7 mmol/L Final     Total Bilirubin   Date Value Ref Range Status   02/20/2018 0.4 0.1 - 1.2 mg/dL Final     Alkaline Phosphatase   Date Value Ref Range Status   02/20/2018 87 38 - 116 U/L Final   ]    Assessment/Plan     *Left base of tongue cancer.  Stage IV A.  Finished the chemotherapy Cisplatin day 1, 22, with concurrent with radiation.  Did not receive day 43 cisplatin as planned on 1/16/18 due to neutropenia.  .     Patient  finished his radiation therapy on 1/23/2018.      He is scheduled to have a PET scan on 3/15/2018.      *Acute pharyngitis/mucositis secondary to chemoradiotherapy.  This has much improved after he uses liquid Carafate.  He is able to eating and drink water slightly.       *Anemia, due to chemotherapy. His hemoglobin has slightly improved.  Monitor.    *Tinnitus.  He states he had ringing in his ears prior to cisplatin.  Ringing did not worsen with dose 1 or dose 2 of cisplatin    *Nutrition.  Has PEG.  Has met with Dana last week and I also spoke to Dana a few days ago.  Patient will continue to use tube feed is 5 cans per day.       *Acute kidney injury due to cisplatin with cycle 1.  This has completely resolved with IV hydration and improved oral intake.     *GERD.  We started him on Protonix 40 mg daily.     *Power-port-a-catheter.  I advised keeping it for now, we'll reassess after PET scan examination.   If no need then we can remove it.      Plan  · Keep appointment for PET scan on 3/15/2018.   · Patient will return in 3 weeks for M.D. reevaluation.  We'll check CBC at that time.         I spoke to dietitian Dana Liz recently about nutrition support for this  patient.     His wife helped with history today.   More than 25 min for patient care, over half of that time were for counseling.    DOMENICA LEAL M.D., Ph.D.    3/2/2018

## 2018-03-15 ENCOUNTER — HOSPITAL ENCOUNTER (OUTPATIENT)
Dept: PET IMAGING | Facility: HOSPITAL | Age: 68
Discharge: HOME | End: 2018-03-15
Attending: RADIOLOGY | Admitting: RADIOLOGY

## 2018-03-15 ENCOUNTER — HOSPITAL ENCOUNTER (OUTPATIENT)
Dept: PET IMAGING | Facility: HOSPITAL | Age: 68
Discharge: HOME | End: 2018-03-15
Attending: RADIOLOGY

## 2018-03-15 DIAGNOSIS — D49.0 OROPHARYNGEAL NEOPLASM: ICD-10-CM

## 2018-03-15 DIAGNOSIS — C01 MALIGNANT NEOPLASM OF BASE OF TONGUE (HCC): ICD-10-CM

## 2018-03-15 LAB — GLUCOSE BLDC GLUCOMTR-MCNC: 97 MG/DL (ref 70–130)

## 2018-03-15 PROCEDURE — 82962 GLUCOSE BLOOD TEST: CPT

## 2018-03-15 PROCEDURE — 78815 PET IMAGE W/CT SKULL-THIGH: CPT

## 2018-03-15 PROCEDURE — 0 FLUDEOXYGLUCOSE F18 SOLUTION: Performed by: RADIOLOGY

## 2018-03-15 PROCEDURE — A9552 F18 FDG: HCPCS | Performed by: RADIOLOGY

## 2018-03-15 RX ADMIN — FLUDEOXYGLUCOSE F18 1 DOSE: 300 INJECTION INTRAVENOUS at 12:33

## 2018-03-23 ENCOUNTER — OFFICE VISIT (OUTPATIENT)
Dept: ONCOLOGY | Facility: CLINIC | Age: 68
End: 2018-03-23

## 2018-03-23 ENCOUNTER — LAB (OUTPATIENT)
Dept: OTHER | Facility: HOSPITAL | Age: 68
End: 2018-03-23

## 2018-03-23 VITALS
TEMPERATURE: 98.9 F | HEART RATE: 67 BPM | RESPIRATION RATE: 12 BRPM | OXYGEN SATURATION: 98 % | SYSTOLIC BLOOD PRESSURE: 124 MMHG | WEIGHT: 192 LBS | HEIGHT: 73 IN | DIASTOLIC BLOOD PRESSURE: 68 MMHG | BODY MASS INDEX: 25.45 KG/M2

## 2018-03-23 DIAGNOSIS — K21.9 GASTROESOPHAGEAL REFLUX DISEASE, ESOPHAGITIS PRESENCE NOT SPECIFIED: ICD-10-CM

## 2018-03-23 DIAGNOSIS — D64.81 ANEMIA ASSOCIATED WITH CHEMOTHERAPY: ICD-10-CM

## 2018-03-23 DIAGNOSIS — T66.XXXS ADVERSE EFFECT OF RADIATION THERAPY, SEQUELA: ICD-10-CM

## 2018-03-23 DIAGNOSIS — T45.1X5A ANEMIA ASSOCIATED WITH CHEMOTHERAPY: ICD-10-CM

## 2018-03-23 DIAGNOSIS — C01 CANCER OF BASE OF TONGUE (HCC): ICD-10-CM

## 2018-03-23 DIAGNOSIS — C10.9 OROPHARYNGEAL CANCER (HCC): Primary | ICD-10-CM

## 2018-03-23 LAB
BASOPHILS # BLD AUTO: 0.05 10*3/MM3 (ref 0–0.2)
BASOPHILS NFR BLD AUTO: 0.7 % (ref 0–1.5)
DEPRECATED RDW RBC AUTO: 38.8 FL (ref 37–54)
EOSINOPHIL # BLD AUTO: 0.61 10*3/MM3 (ref 0–0.7)
EOSINOPHIL NFR BLD AUTO: 8.9 % (ref 0.3–6.2)
ERYTHROCYTE [DISTWIDTH] IN BLOOD BY AUTOMATED COUNT: 11.4 % (ref 11.5–14.5)
HCT VFR BLD AUTO: 35.7 % (ref 40.4–52.2)
HGB BLD-MCNC: 12.7 G/DL (ref 13.7–17.6)
IMM GRANULOCYTES # BLD: 0.04 10*3/MM3 (ref 0–0.03)
IMM GRANULOCYTES NFR BLD: 0.6 % (ref 0–0.5)
LYMPHOCYTES # BLD AUTO: 0.98 10*3/MM3 (ref 0.9–4.8)
LYMPHOCYTES NFR BLD AUTO: 14.4 % (ref 19.6–45.3)
MCH RBC QN AUTO: 32.9 PG (ref 27–32.7)
MCHC RBC AUTO-ENTMCNC: 35.6 G/DL (ref 32.6–36.4)
MCV RBC AUTO: 92.5 FL (ref 79.8–96.2)
MONOCYTES # BLD AUTO: 0.59 10*3/MM3 (ref 0.2–1.2)
MONOCYTES NFR BLD AUTO: 8.7 % (ref 5–12)
NEUTROPHILS # BLD AUTO: 4.55 10*3/MM3 (ref 1.9–8.1)
NEUTROPHILS NFR BLD AUTO: 66.7 % (ref 42.7–76)
NRBC BLD MANUAL-RTO: 0 /100 WBC (ref 0–0)
PLATELET # BLD AUTO: 253 10*3/MM3 (ref 140–500)
PMV BLD AUTO: 9.2 FL (ref 6–12)
RBC # BLD AUTO: 3.86 10*6/MM3 (ref 4.6–6)
WBC NRBC COR # BLD: 6.82 10*3/MM3 (ref 4.5–10.7)

## 2018-03-23 PROCEDURE — 85025 COMPLETE CBC W/AUTO DIFF WBC: CPT | Performed by: INTERNAL MEDICINE

## 2018-03-23 PROCEDURE — 36415 COLL VENOUS BLD VENIPUNCTURE: CPT

## 2018-03-23 PROCEDURE — 99215 OFFICE O/P EST HI 40 MIN: CPT | Performed by: INTERNAL MEDICINE

## 2018-03-23 NOTE — PROGRESS NOTES
Subjective .     REASONS FOR FOLLOWUP:    1.  Squamous cell carcinoma of the base of tongue, stage YEN (T1N2b), HPV status is not clear.  Started concurrent chemoradiation therapy on 12/4/2017 using cisplatin repeating every 3 weeks.    2.  Moderate neutropenia secondary to concurrent chemoradiotherapy.  Cycle #3 cisplatin was delayed and then canceled.    3.  Radiation therapy finished on 1/23/2018.     HISTORY OF PRESENT ILLNESS:  The patient is a 67 y.o. year old male who is here for follow-up with the above-mentioned history.  He is here to discuss the results of PET scan obtained on 3/15/2018.  Patient is accompanied by his wife who helped with history.     Patient reports his pain in the throat has much improved, he is able to drinking water properly.  He reports poor taste in the mouth.  However he still is not able to eat regular food, relies on his PEG tube for nutrition support.  He is able to maintain his weight.  His performance status is ECOG 1-0.  Denies fever sweating chills.    PET scan on 3/15/2018 reported essentially complete resolution of hypermetabolic lesion at the left tongue base.  The left and neck lymphadenopathy also showing, with blood pool activity.    Past Medical History:   Diagnosis Date   • Benign essential hypertension    • H/O complete eye exam due   • H/O Neck mass     left   • Hyperlipidemia    • IFG (impaired fasting glucose)    • Seasonal allergies    • Squamous cell carcinoma 2017    left base on tongue, stage YEN     Past Surgical History:   Procedure Laterality Date   • COLONOSCOPY N/A 2015    normal colonoscopy-Dr. Galen Morrissey   • COLONOSCOPY N/A 05/04/2011    Normal colonoscopy-Dr. Tobias Emerson   • ENDOSCOPY W/ PEG TUBE PLACEMENT N/A 11/28/2017    Procedure: ESOPHAGOGASTRODUODENOSCOPY WITH PERCUTANEOUS ENDOSCOPIC GASTROSTOMY TUBE INSERTION;  Surgeon: Isma Hercules MD;  Location: [facility];  Service:    • INGUINAL HERNIA REPAIR Left 1994    Dr. Peres   •  INGUINAL HERNIA REPAIR Right 1993    Dr. Peres   • LARYNGOPLASTY      Laryngoplasty with biopsy-Dr. Del Toro   • [place] INSJ TUNNELED CVC W/O SUBQ PORT/ AGE 3 YR/> Left 11/28/2017    Procedure: INSERTION VENOUS ACCESS DEVICE;  Surgeon: Isma Hercules MD;  Location: [facility] OR;  Service: General   • TONGUE BIOPSY / EXCISION N/A 11/01/2017    Biopsy of the left tongue base   • TONSILLECTOMY AND ADENOIDECTOMY Bilateral 11/01/2017    Dr. Del Toro     HEMATOLOGIC/ONCOLOGIC HISTORY:  Mr. Ritchie is a 67 y.o. male who is here on 11/16/2017 for evaluation accompanied by his wife, referred by radiation oncologist, Dr. Aguirre, because of newly diagnosed squamous cell carcinoma of the left tongue base, stage YEN, [id number].      Patient previously only had history of mild hypertension which was controlled. Recently in early 09/2017 when he was on vacation, he felt a left neck nodule when he was shaving. He denies any pain associated with that. Patient was seen by his primary care physician, Dr. Sheffield, for evaluation and was referred to ENT, Dr. Zane Del Toro. Patient subsequently had CT of the neck examination at the [facility] on 09/22/2017. This study reported a left neck mass measuring 3.5 x 3.1 x 2.4 cm with cystic/necrotic changes located at the region of the left level II jugular chain. There were additional small homogeneous cervical lymph nodes but possibly reactive.      Patient subsequently had ultrasound of the neck on 10/13/2017 associated with fine-needle aspiration biopsy at Dr. Del Toro' office. The ultrasound reported 2 nodules in left neck. The large one measured about 3.28 cm x 2.67 cm x 3.28 cm. The 2nd smaller one measures 1.56 cm x 0.99 cm x 1.48 cm, just below the large mass. Patient had fine-needle aspiration biopsy at the same time. Pathology evaluation from the [facility] reported poorly differentiated squamous cell carcinoma with degeneration and necrosis. The  viable tumor cells exhibit strong nuclear reactivity for both p40 and p63.     Patient subsequently had tonsillectomy, biopsy of the left tongue base and adenoidectomy on 11/01/2017 by Dr. Del Toro. Pathology evaluation from [facility] reported moderately differentiated squamous cell carcinoma. The left tonsil has no evidence of malignancy. Right tonsil also was benign. The adenoid tissue was also benign.      Patient reports the left neck mass is growing. He also reports poor appetite after tonsillectomy. For the past couple of weeks since the biopsy, he lost about 6 pounds. He denies nausea or vomiting. He has actually started feeling better with improved appetite. Denies pain. Denies fever or sweating.      This patient reports he never smoked cigarette. He is only a very rare social drinker. He told me the test for HPV is ongoing.      Patient also had PET scan examination obtained on 11/14/2017. This study reported focal hypermetabolism with SUV 15.9 corresponding to the left-sided base of the tongue. There was moderate enlarged left jugular chain lymph node which is hypermetabolic but without measuring SUV. There was also mild hypermetabolism identified within several additional smaller left jugular chain lymph nodes. There was no hypermetabolic lymphadenopathy elsewhere in the neck nor foci in the chest, abdomen or pelvis.      MEDICATIONS    Current Outpatient Prescriptions:   •  metoprolol tartrate (LOPRESSOR) 50 MG tablet, Take 1 tablet by mouth 2 (Two) Times a Day., Disp: 180 tablet, Rfl: 3    ALLERGIES:   No Known Allergies    SOCIAL HISTORY:       Social History     Social History   • Marital status:      Spouse name: Janiya   • Number of children: 2   • Years of education: High School     Occupational History   •  Retired     [employer]     Social History Main Topics   • Smoking status: Never Smoker   • Smokeless tobacco: Never Used   • Alcohol use Yes      Comment: occassional   • Drug use: No   • Sexual  "activity: No       FAMILY HISTORY:  Family History   Problem Relation Age of Onset   • Alcohol abuse Father    • Diabetes Father    • Cancer Neg Hx    • Malig Hyperthermia Neg Hx        REVIEW OF SYSTEMS:  GENERAL: No change weight; No fevers, chills, sweats.    SKIN: No nonhealing lesions. No rashes.  HEME/LYMPH: No easy bruising, bleeding.   EYES: No vision changes or diplopia.   ENT: No hearing loss, gum bleeding, epistaxis, hoarseness or dysphagia.   RESPIRATORY: No cough, shortness of breath, hemoptysis or wheezing.   CVS: No chest pain, palpitations, orthopnea, dyspnea on exertion or PND.   GI: No melena or hematochezia. No abdominal pain.  No nausea, vomiting, constipation, diarrhea  : No lower tract obstructive symptoms, dysuria or hematuria.   MUSCULOSKELETAL: No bone pain.  No joint stiffness.   NEUROLOGICAL: No global weakness, loss of consciousness or seizures.   PSYCHIATRIC: No increased nervousness, mood changes or depression.     Objective    Vitals:    03/23/18 1500   BP: 124/68   Pulse: 67   Resp: 12   Temp: 98.9 °F (37.2 °C)   TempSrc: Oral   SpO2: 98%   Weight: 87.1 kg (192 lb)   Height: 185 cm (72.84\")   PainSc: 0-No pain     Current Status 3/23/2018   ECOG score 0      PHYSICAL EXAM:    GENERAL:  Well-developed, well-nourished male in no acute distress.   SKIN:  Warm, dry without rashes, purpura or petechiae. There is skin pigmentation in the neck secondary to radiation therapy.   EYES:  Pupils equal, round and reactive to light.  EOMs intact.  Conjunctivae normal.  EARS:  Hearing intact.  NOSE: No nasal discharge.  MOUTH:  Tongue is well-papillated; no stomatitis or ulcers.  No evidence of fungal infection.  Lips normal.  THROAT:  Oropharynx without lesions or exudates.    NECK:  Supple with good range of motion; no thyromegaly.   LYMPHATICS:  Left neck has a 2 cm lymph node fixed no tenderness at the anterior SCM surface inferior to the left ear.  No other cervical, supraclavicular " adenopathy.    CHEST:  Lungs clear to auscultation. Good airflow.  CARDIAC:  Regular rate and rhythm without murmurs, rubs or gallops. Normal S1,S2.  ABDOMEN:  Soft, nontender with no hepatosplenomegaly or masses. Bowel sounds normal. PEG tube in place, there is no leakage around the PEG tube, and no evidence of infection.    EXTREMITIES:  No clubbing, cyanosis or edema.  NEUROLOGICAL:  Cranial Nerves II-XII grossly intact.  No focal neurological deficits.  PSYCHIATRIC:  Normal affect and mood.     RECENT LABS:    Lab Results   Component Value Date    WBC 6.82 03/23/2018    HGB 12.7 (L) 03/23/2018    HCT 35.7 (L) 03/23/2018    MCV 92.5 03/23/2018     03/23/2018     Lab Results   Component Value Date    NEUTROABS 4.55 03/23/2018     Lab Results   Component Value Date    GLUCOSE 216 (H) 03/02/2018    BUN 17 03/02/2018    CREATININE 0.67 (L) 03/02/2018    EGFRIFNONA 118 03/02/2018    EGFRIFAFRI 94 01/17/2017    BCR 25.4 03/02/2018    K 4.0 03/02/2018    CO2 28.5 03/02/2018    CALCIUM 9.4 03/02/2018    PROTENTOTREF 7.3 01/17/2017    ALBUMIN 3.90 02/20/2018    LABIL2 1.4 02/20/2018    AST 15 02/20/2018    ALT 15 02/20/2018     Sodium   Date Value Ref Range Status   03/02/2018 137 134 - 145 mmol/L Final     Potassium   Date Value Ref Range Status   03/02/2018 4.0 3.5 - 4.7 mmol/L Final     Total Bilirubin   Date Value Ref Range Status   02/20/2018 0.4 0.1 - 1.2 mg/dL Final     Alkaline Phosphatase   Date Value Ref Range Status   02/20/2018 87 38 - 116 U/L Final   ]    IMAGE STUDY:   FDG PET/CT IMAGING SKULL BASE TO MID THIGH  3/15/2018     HISTORY: Oropharyngeal carcinoma involving the base of the tongue with  localized metastatic disease in cervical lymph nodes. Status post  chemotherapy and radiation therapy. Restaging.     TECHNIQUE: Blood glucose level at time of injection of FDG was 97 mg/dl.   7.1 mCi of FDG was injected. Approximately 90 minutes later, PET images  were obtained. CT images were acquired for  attenuation correction and  anatomic localization.     COMPARISON: Previous PET/CT study dated 11/14/2017.     FINDINGS: Primary neoplasm within the left side of the base of the  tongue demonstrates no residual hypermetabolism. The enlarged centrally  necrotic left jugular chain lymph node has decreased in size slightly,  and demonstrates only low-level blood pool activity with maximum SUV in  the range of 2.9 g/mL. The lungs now measures 2.3 x 2.0 cm in diameter.  Previous measurements were 2.8 x 2.7 cm. Additional smaller mildly  hypermetabolic left jugular chain lymph nodes shown on the previous  PET/CT study has essentially resolved. No new hypermetabolic  lymphadenopathy is identified in the neck. There continues to be no  metabolic evidence of metastatic disease within the chest, abdomen and  pelvis. A small left adrenal mass consistent with an adenoma is  unchanged.     IMPRESSION:  Essentially complete metabolic response to chemoradiation.  The primary neoplasm involving the base of the tongue shows no residual  hypermetabolism. A moderately enlarged left jugular chain lymph node has  decreased in size, and shows only low-level blood pool activity. Other  smaller hypermetabolic left jugular chain lymph nodes have resolved.  There continues to be no metabolic evidence of distant metastatic  disease within the chest, abdomen or pelvis.        Assessment/Plan     *Left base of tongue cancer.  Stage IV A.  Finished chemotherapy Cisplatin day 1 and day 22, with concurrent radiation.  Did not receive day 43 cisplatin as planned due to neutropenia.     Patient  finished his radiation therapy on 1/23/2018.      His PET scan on 3/15/2018 showed essentially complete metabolic response.  The residual left neck lymph node shows only low-level blood pool activity.     This left neck lymph node is physically palpable.  Discussed with the patient and his wife, I recommend repeating neck CT scan in 3 months for  reassessment.  If there is evidence for active disease, this patient will need neck dissection.     *Acute pharyngitis/mucositis secondary to chemoradiotherapy.  This has much improved after he uses liquid Carafate.  He is able to drink water probably however not able to eat regular food yet.  He will continue using PEG tube for nutrition support.     *Anemia, due to chemotherapy. His hemoglobin has gradually improved.  Monitor.    *Tinnitus.  He states he had ringing in his ears prior to cisplatin.  Ringing did not worsen with dose 1 or dose 2 of cisplatin.    *Acute kidney injury due to cisplatin with cycle 1.  This has completely resolved with IV hydration and improved oral intake.     *GERD.  We started him on Protonix 40 mg daily.  Improved symptom.     *Power-port-a-catheter.  I advised keeping it for now.     * Risk of developing hypothyroidism.  This is very, for patient received radiation therapy to the neck area.  I will obtain TSH in 3 months.      Plan:   1.  Port flush in 6 weeks.    2.  Keep appointment with Dr. Aguirre Week.   3.  If his oral intake improves and able to eat regular diet, he will contact Dr. Hercules's office directly to remove the PEG tube.   4.  CT of neck with iv contrast in 3 month together with cbc and CMP, add TSH also.   5.  Patient will return in 3 months for M.D. Reevaluation, one week after CT scan and labs.      I independently reviewed his PET scan images from 3/15/2018 and compared to the previous one from 11/14/2017.  I also sheared images with patient and his wife.      His wife helped with history today.      More than 40 min for patient care, over half of that time were for counseling.    DOMENICA LEAL M.D., Ph.D.    3/23/2018       CC:  Zane Del Toro M.D. / Frank Marques M.D.   Ameya Sheffield M.D.    Ameya Aguirre M.D.    Isma Hercules M.D.         Dictated using Dragon Dictation.

## 2018-03-28 ENCOUNTER — DOCUMENTATION (OUTPATIENT)
Dept: NUTRITION | Facility: HOSPITAL | Age: 68
End: 2018-03-28

## 2018-03-28 ENCOUNTER — OFFICE VISIT (OUTPATIENT)
Dept: RADIATION ONCOLOGY | Facility: HOSPITAL | Age: 68
End: 2018-03-28

## 2018-03-28 ENCOUNTER — APPOINTMENT (OUTPATIENT)
Dept: RADIATION ONCOLOGY | Facility: HOSPITAL | Age: 68
End: 2018-03-28

## 2018-03-28 VITALS
HEIGHT: 73 IN | OXYGEN SATURATION: 98 % | RESPIRATION RATE: 16 BRPM | HEART RATE: 74 BPM | BODY MASS INDEX: 25.98 KG/M2 | TEMPERATURE: 98.5 F | DIASTOLIC BLOOD PRESSURE: 80 MMHG | SYSTOLIC BLOOD PRESSURE: 141 MMHG | WEIGHT: 196 LBS

## 2018-03-28 DIAGNOSIS — C10.9 OROPHARYNGEAL CANCER (HCC): Primary | ICD-10-CM

## 2018-03-28 PROCEDURE — 99024 POSTOP FOLLOW-UP VISIT: CPT | Performed by: RADIOLOGY

## 2018-03-28 NOTE — PROGRESS NOTES
Subjective         No diagnosis found.                  [id number] squamous cell carcinoma of the base of tongue     HPI     Mr. Ritchie is seen back in follow-up today 8 weeks after completing a course of combined modality therapy to include radiation therapy plus concurrent cisplatin. He received a total dose of 70 Gy in 35 fractions completing on 1/23/18. He comes having completed a follow-up PET scan on 3/15 which describes complete resolution of the hypermetabolic uptake in the at the site of the primary tumor in the left base of tongue.  The enlarged centrally necrotic left jugular chain lymph node is noted to have decreased in size slightly with only low level blood pool activity.  The node is described to now measure 2.3 x 2.0 cm compared to previous at 2.8 x 2.7 cm.  Additional smaller mildly hypermetabolic left jugular chain nodes on the pretreatment PET have essentially resolved.  The patient was seen in follow-up by Dr. Ballesteros several days ago, and plan is noted to include repeat CT of the soft tissue neck in 3 months, with the possibility of referral for neck dissection based on the results of that study.  The patient also needs ongoing  ENT follow-up, and they asked me to make a referral to a new ENT here at [facility]  I discussed the case with Dr. Bhaskar Browning and he has agreed to see the patient.  We forwarded all necessary records.                    Objective     Family History   Problem Relation Age of Onset   • Alcohol abuse Father    • Diabetes Father    • Cancer Neg Hx    • Malig Hyperthermia Neg Hx          Past Medical History:   Diagnosis Date   • Benign essential hypertension    • H/O complete eye exam due   • H/O Neck mass     left   • Hyperlipidemia    • IFG (impaired fasting glucose)    • Seasonal allergies    • Squamous cell carcinoma 2017    left base on tongue, stage YEN         Past Surgical History:   Procedure Laterality Date   • COLONOSCOPY N/A 2015    normal colonoscopy-Dr. Aaron  Yuni   • COLONOSCOPY N/A 05/04/2011    Normal colonoscopy-Dr. Tobias Emerson   • ENDOSCOPY W/ PEG TUBE PLACEMENT N/A 11/28/2017    Procedure: ESOPHAGOGASTRODUODENOSCOPY WITH PERCUTANEOUS ENDOSCOPIC GASTROSTOMY TUBE INSERTION;  Surgeon: Isma Hercules MD;  Location: [facility] OR;  Service:    • INGUINAL HERNIA REPAIR Left 1994    Dr. Peres   • INGUINAL HERNIA REPAIR Right 1993    Dr. Peres   • LARYNGOPLASTY      Laryngoplasty with biopsy-Dr. Del Toro   • DC INSJ TUNNELED CVC W/O SUBQ PORT/ AGE 3 YR/> Left 11/28/2017    Procedure: INSERTION VENOUS ACCESS DEVICE;  Surgeon: Isma Hercules MD;  Location: [facility] OR;  Service: General   • TONGUE BIOPSY / EXCISION N/A 11/01/2017    Biopsy of the left tongue base   • TONSILLECTOMY AND ADENOIDECTOMY Bilateral 11/01/2017    Dr. Del Toro         Review of Systems - Oncology  Xerostomia, taste loss, pharyngitis, helped with Carafate, still using PEG tube . Weight stable over the past 8 weeks. Patient is using his fluoride gels.    Physical Exam  Residual palpable lymphadenopathy in the left neck.  Oral cavity is clear no evidence of mucositis, treatment-related related dry desquamation in the low neck has resolved. No palpable evidence of supraclavicular lymphadenopathy.    Assessment/Plan   T1N2b squamous cell carcinoma left base of tongue, 8 weeks post combined modality therapy, with complete metabolic response on PET, but with residual frank enlargement on the left -  for CT soft tissue neck in 3 months.  Patient will be seen in routine ENT follow-up with Dr. Bhaskar Browning and as well as Dr. Ballesteros at the [facility].                    Ameya Aguirre MD      Cc:  No ref. provider found

## 2018-03-28 NOTE — PROGRESS NOTES
ONC Nutrition Follow Up:     Weight 196#, stable.    Spoke to patient on the phone today. He is decreasing the TF via the PEG and is eating lthough he still complains food has no taste, makes him gag.  He will continue to try to eat more.  He is using about 3 cans Two aditi per PEG at this time.  He asked if he could drink the Two aditi and I told him that he could.  Will continue to follow.

## 2018-04-12 ENCOUNTER — DOCUMENTATION (OUTPATIENT)
Dept: NUTRITION | Facility: HOSPITAL | Age: 68
End: 2018-04-12

## 2018-04-12 NOTE — PROGRESS NOTES
ONC Nutrition Follow Up:     Left message for patient. Noted PEG scheduled to be removed 5/9.    Will continue to follow up.

## 2018-04-16 ENCOUNTER — DOCUMENTATION (OUTPATIENT)
Dept: NUTRITION | Facility: HOSPITAL | Age: 68
End: 2018-04-16

## 2018-04-16 NOTE — PROGRESS NOTES
ONC Nutrition Follow Up:     Spoke to patient on phone.  He is using about 4 cans Two aditi via the PEG each day and trying to eat but still having issues with lack of taste. He is going to make milkshakes of the Two aditi and drink them. Still planning on PEG removal next month. Weight has remained stable at home. Will follow.

## 2018-05-01 ENCOUNTER — INFUSION (OUTPATIENT)
Dept: ONCOLOGY | Facility: HOSPITAL | Age: 68
End: 2018-05-01

## 2018-05-01 DIAGNOSIS — Z45.2 FITTING AND ADJUSTMENT OF VASCULAR CATHETER: Primary | ICD-10-CM

## 2018-05-01 PROCEDURE — 25010000002 HEPARIN FLUSH (PORCINE) 100 UNIT/ML SOLUTION: Performed by: INTERNAL MEDICINE

## 2018-05-01 PROCEDURE — 96523 IRRIG DRUG DELIVERY DEVICE: CPT | Performed by: INTERNAL MEDICINE

## 2018-05-01 RX ORDER — SODIUM CHLORIDE 0.9 % (FLUSH) 0.9 %
10 SYRINGE (ML) INJECTION AS NEEDED
Status: DISCONTINUED | OUTPATIENT
Start: 2018-05-01 | End: 2018-05-01 | Stop reason: HOSPADM

## 2018-05-01 RX ORDER — SODIUM CHLORIDE 0.9 % (FLUSH) 0.9 %
10 SYRINGE (ML) INJECTION AS NEEDED
Status: CANCELLED | OUTPATIENT
Start: 2018-05-01

## 2018-05-01 RX ADMIN — Medication 10 ML: at 11:27

## 2018-05-01 RX ADMIN — SODIUM CHLORIDE, PRESERVATIVE FREE 500 UNITS: 5 INJECTION INTRAVENOUS at 11:27

## 2018-05-02 ENCOUNTER — OFFICE VISIT (OUTPATIENT)
Dept: SURGERY | Facility: CLINIC | Age: 68
End: 2018-05-02

## 2018-05-02 VITALS — HEIGHT: 73 IN | BODY MASS INDEX: 25.42 KG/M2 | WEIGHT: 191.8 LBS

## 2018-05-02 DIAGNOSIS — C09.9 CANCER OF TONSIL (HCC): Primary | ICD-10-CM

## 2018-05-02 PROCEDURE — 99212 OFFICE O/P EST SF 10 MIN: CPT | Performed by: SURGERY

## 2018-05-02 NOTE — PROGRESS NOTES
Follow-up placement of percutaneous endoscopic feeding tube    Subjective:  No complaints at this time.  Patient has been through his chemoradiation is overall feeling pretty well.  He still says that food doesn't taste very good but he is able to swallow and is getting adequate calories by mouth.    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  Further workup is still ongoing, but he has been approved for removal of his feeding tube.  The patient states he is eating adequately and so I think it's reasonable to take it out.  It was removed in the office today with minimal difficulty.  The flange was removed intact.  There was minimal drainage and a sterile dressing was placed over this.    He may come back for port removal in the office when it is appropriate.    Isma Hercules MD  General and Endoscopic Surgery  [facility], [place] by [facility]    [address], [address]  [place], [place], [address]  P: [phone]  F: [phone]

## 2018-06-11 ENCOUNTER — INFUSION (OUTPATIENT)
Dept: ONCOLOGY | Facility: HOSPITAL | Age: 68
End: 2018-06-11

## 2018-06-11 ENCOUNTER — HOSPITAL ENCOUNTER (OUTPATIENT)
Dept: PET IMAGING | Facility: HOSPITAL | Age: 68
Discharge: HOME | End: 2018-06-11
Attending: INTERNAL MEDICINE | Admitting: INTERNAL MEDICINE

## 2018-06-11 DIAGNOSIS — C10.9 OROPHARYNGEAL CANCER (HCC): ICD-10-CM

## 2018-06-11 DIAGNOSIS — T45.1X5A ANEMIA ASSOCIATED WITH CHEMOTHERAPY: ICD-10-CM

## 2018-06-11 DIAGNOSIS — D64.81 ANEMIA ASSOCIATED WITH CHEMOTHERAPY: ICD-10-CM

## 2018-06-11 LAB
ALBUMIN SERPL-MCNC: 3.9 G/DL (ref 3.5–5.2)
ALBUMIN/GLOB SERPL: 1.4 G/DL (ref 1.1–2.4)
ALP SERPL-CCNC: 92 U/L (ref 38–116)
ALT SERPL W P-5'-P-CCNC: 16 U/L (ref 0–41)
ANION GAP SERPL CALCULATED.3IONS-SCNC: 9.5 MMOL/L
AST SERPL-CCNC: 15 U/L (ref 0–40)
BASOPHILS # BLD AUTO: 0.03 10*3/MM3 (ref 0–0.1)
BASOPHILS NFR BLD AUTO: 0.5 % (ref 0–1.1)
BILIRUB SERPL-MCNC: 0.3 MG/DL (ref 0.1–1.2)
BUN BLD-MCNC: 15 MG/DL (ref 6–20)
BUN/CREAT SERPL: 20.3 (ref 7.3–30)
CALCIUM SPEC-SCNC: 9.5 MG/DL (ref 8.5–10.2)
CHLORIDE SERPL-SCNC: 99 MMOL/L (ref 98–107)
CO2 SERPL-SCNC: 28.5 MMOL/L (ref 22–29)
CREAT BLD-MCNC: 0.74 MG/DL (ref 0.7–1.3)
CREAT BLDA-MCNC: 0.7 MG/DL (ref 0.6–1.3)
DEPRECATED RDW RBC AUTO: 39.8 FL (ref 37–49)
EOSINOPHIL # BLD AUTO: 0.48 10*3/MM3 (ref 0–0.36)
EOSINOPHIL NFR BLD AUTO: 7.6 % (ref 1–5)
ERYTHROCYTE [DISTWIDTH] IN BLOOD BY AUTOMATED COUNT: 11.7 % (ref 11.7–14.5)
GFR SERPL CREATININE-BSD FRML MDRD: 105 ML/MIN/1.73
GLOBULIN UR ELPH-MCNC: 2.8 GM/DL (ref 1.8–3.5)
GLUCOSE BLD-MCNC: 107 MG/DL (ref 74–124)
HCT VFR BLD AUTO: 35.9 % (ref 40–49)
HGB BLD-MCNC: 12.4 G/DL (ref 13.5–16.5)
IMM GRANULOCYTES # BLD: 0.03 10*3/MM3 (ref 0–0.03)
IMM GRANULOCYTES NFR BLD: 0.5 % (ref 0–0.5)
LYMPHOCYTES # BLD AUTO: 1.06 10*3/MM3 (ref 1–3.5)
LYMPHOCYTES NFR BLD AUTO: 16.7 % (ref 20–49)
MCH RBC QN AUTO: 32.3 PG (ref 27–33)
MCHC RBC AUTO-ENTMCNC: 34.5 G/DL (ref 32–35)
MCV RBC AUTO: 93.5 FL (ref 83–97)
MONOCYTES # BLD AUTO: 0.54 10*3/MM3 (ref 0.25–0.8)
MONOCYTES NFR BLD AUTO: 8.5 % (ref 4–12)
NEUTROPHILS # BLD AUTO: 4.19 10*3/MM3 (ref 1.5–7)
NEUTROPHILS NFR BLD AUTO: 66.2 % (ref 39–75)
NRBC BLD MANUAL-RTO: 0 /100 WBC (ref 0–0)
PLATELET # BLD AUTO: 224 10*3/MM3 (ref 150–375)
PMV BLD AUTO: 9.2 FL (ref 8.9–12.1)
POTASSIUM BLD-SCNC: 4.1 MMOL/L (ref 3.5–4.7)
PROT SERPL-MCNC: 6.7 G/DL (ref 6.3–8)
RBC # BLD AUTO: 3.84 10*6/MM3 (ref 4.3–5.5)
SODIUM BLD-SCNC: 137 MMOL/L (ref 134–145)
WBC NRBC COR # BLD: 6.33 10*3/MM3 (ref 4–10)

## 2018-06-11 PROCEDURE — 82565 ASSAY OF CREATININE: CPT

## 2018-06-11 PROCEDURE — 70491 CT SOFT TISSUE NECK W/DYE: CPT

## 2018-06-11 PROCEDURE — 25010000002 IOPAMIDOL 61 % SOLUTION: Performed by: INTERNAL MEDICINE

## 2018-06-11 PROCEDURE — 96523 IRRIG DRUG DELIVERY DEVICE: CPT | Performed by: INTERNAL MEDICINE

## 2018-06-11 PROCEDURE — 85025 COMPLETE CBC W/AUTO DIFF WBC: CPT

## 2018-06-11 PROCEDURE — 80053 COMPREHEN METABOLIC PANEL: CPT

## 2018-06-11 RX ADMIN — IOPAMIDOL 75 ML: 612 INJECTION, SOLUTION INTRAVENOUS at 14:11

## 2018-06-12 ENCOUNTER — TELEPHONE (OUTPATIENT)
Dept: INTERVENTIONAL RADIOLOGY/VASCULAR | Facility: HOSPITAL | Age: 68
End: 2018-06-12

## 2018-06-12 NOTE — TELEPHONE ENCOUNTER
Patient called and stated He had a ct scan yesterday with contrast and woke up this morning with a rash. The rash is on both sides of his chest and down his leg. Spoke with Dr. Strickland and informed patient to take benadryl and to let us know if the rash does not get any better per MD instructions.

## 2018-06-14 ENCOUNTER — APPOINTMENT (OUTPATIENT)
Dept: OTHER | Facility: HOSPITAL | Age: 68
End: 2018-06-14

## 2018-06-14 ENCOUNTER — OFFICE VISIT (OUTPATIENT)
Dept: ONCOLOGY | Facility: CLINIC | Age: 68
End: 2018-06-14

## 2018-06-14 VITALS
OXYGEN SATURATION: 98 % | HEIGHT: 73 IN | SYSTOLIC BLOOD PRESSURE: 142 MMHG | TEMPERATURE: 98 F | HEART RATE: 72 BPM | RESPIRATION RATE: 16 BRPM | BODY MASS INDEX: 25.5 KG/M2 | DIASTOLIC BLOOD PRESSURE: 88 MMHG | WEIGHT: 192.4 LBS

## 2018-06-14 DIAGNOSIS — D64.81 ANEMIA ASSOCIATED WITH CHEMOTHERAPY: ICD-10-CM

## 2018-06-14 DIAGNOSIS — T45.1X5A ANEMIA ASSOCIATED WITH CHEMOTHERAPY: ICD-10-CM

## 2018-06-14 DIAGNOSIS — E03.4 HYPOTHYROIDISM DUE TO ACQUIRED ATROPHY OF THYROID: ICD-10-CM

## 2018-06-14 DIAGNOSIS — C10.9 OROPHARYNGEAL CANCER (HCC): Primary | ICD-10-CM

## 2018-06-14 PROCEDURE — G0463 HOSPITAL OUTPT CLINIC VISIT: HCPCS | Performed by: INTERNAL MEDICINE

## 2018-06-14 PROCEDURE — 99215 OFFICE O/P EST HI 40 MIN: CPT | Performed by: INTERNAL MEDICINE

## 2018-06-14 NOTE — PROGRESS NOTES
Subjective .     REASONS FOR FOLLOWUP:    1.  Squamous cell carcinoma of the base of tongue, stage YEN (T1N2b), HPV status is not clear.  Started concurrent chemoradiation therapy on 12/4/2017 using cisplatin repeating every 3 weeks.    2.  Moderate neutropenia secondary to concurrent chemoradiotherapy.  Cycle #3 cisplatin was delayed and then canceled.    3.  Radiation therapy finished on 1/23/2018.   4. PET scan on 3/15/2018 reported essentially complete resolution of hypermetabolic lesion at the left tongue base.  The left neck lymphadenopathy also shew blood pool activity.      HISTORY OF PRESENT ILLNESS:  The patient is a 67 y.o. year old male who is here for 3-month follow-up with the above-mentioned history.  He is here to discuss the results of neck CT scan obtained on 6/11/2018.  Patient is accompanied by his wife who helped with history.     Patient reports his PEG tube was removed in early May 2018.  He's been eating well with good appetite and able to maintain his weight.  He does have xerostomia but denies odynophagia.     His performance status is ECOG 0.  Denies fever sweating chills.    His CT scan examination of the neck on 6/11/2018 reported no evidence of local disease recurrence, a stable left neck adenopathy, maybe 2 mm smaller compared to the PET scan obtained in March 2018.  Laboratory study on the same day reported normal renal function with a creatinine 0.73 normal electrolytes and liver function panel, stable mild anemia with hemoglobin 12.4 and normal WBC and platelets.      Past Medical History:   Diagnosis Date   • Benign essential hypertension    • H/O complete eye exam due   • H/O Neck mass     left   • Hyperlipidemia    • IFG (impaired fasting glucose)    • Seasonal allergies    • Tongue cancer 11/01/2017    Lef base of tongue squamous cell carcinoma, stage YEN, recieved chemo and radiation therapy     Past Surgical History:   Procedure Laterality Date   • COLONOSCOPY N/A 2015     normal colonoscopy-Dr. Galen Morrissey   • COLONOSCOPY N/A 05/04/2011    Normal colonoscopy-Dr. Tobias Emerson   • ENDOSCOPY W/ PEG TUBE PLACEMENT N/A 11/28/2017    Procedure: ESOPHAGOGASTRODUODENOSCOPY WITH PERCUTANEOUS ENDOSCOPIC GASTROSTOMY TUBE INSERTION;  Surgeon: Isma Hercules MD;  Location: [facility];  Service:    • FINE NEEDLE ASPIRATION Left 10/13/2017    Ultrasound guidance for fine needle biopsy and fine needle aspiration with ultrasonic guidance of left neck mass-Dr. Frank Marques   • INGUINAL HERNIA REPAIR Left 1994    Dr. Peres   • INGUINAL HERNIA REPAIR Right 1993    Dr. Peres   • LARYNGOPLASTY      Laryngoplasty with biopsy-Dr. Del Toro   • MT INSJ TUNNELED CVC W/O SUBQ PORT/ AGE 3 YR/> Left 11/28/2017    Procedure: INSERTION VENOUS ACCESS DEVICE;  Surgeon: Isma Hercules MD;  Location: [facility];  Service: General   • TONGUE BIOPSY / EXCISION N/A 11/01/2017    Biopsy of the left tongue base   • TONSILLECTOMY AND ADENOIDECTOMY Bilateral 11/01/2017    Dr. Del Toro   Removal PEG tube on 5/2/2018     HEMATOLOGIC/ONCOLOGIC HISTORY:  Mr. Ritchie is a 67 y.o. male who is here on 11/16/2017 for evaluation accompanied by his wife, referred by radiation oncologist, Dr. Aguirre, because of newly diagnosed squamous cell carcinoma of the left tongue base, stage YEN, [id number].      Patient previously only had history of mild hypertension which was controlled. Recently in early 09/2017 when he was on vacation, he felt a left neck nodule when he was shaving. He denies any pain associated with that. Patient was seen by his primary care physician, Dr. Sheffield, for evaluation and was referred to ENT, Dr. Zane Del Toro. Patient subsequently had CT of the neck examination at the [facility] on 09/22/2017. This study reported a left neck mass measuring 3.5 x 3.1 x 2.4 cm with cystic/necrotic changes located at the region of the left level II jugular chain. There were additional  small homogeneous cervical lymph nodes but possibly reactive.      Patient subsequently had ultrasound of the neck on 10/13/2017 associated with fine-needle aspiration biopsy at Dr. Del Toro' office. The ultrasound reported 2 nodules in left neck. The large one measured about 3.28 cm x 2.67 cm x 3.28 cm. The 2nd smaller one measures 1.56 cm x 0.99 cm x 1.48 cm, just below the large mass. Patient had fine-needle aspiration biopsy at the same time. Pathology evaluation from the [facility] reported poorly differentiated squamous cell carcinoma with degeneration and necrosis. The viable tumor cells exhibit strong nuclear reactivity for both p40 and p63.     Patient subsequently had tonsillectomy, biopsy of the left tongue base and adenoidectomy on 11/01/2017 by Dr. Del Toro. Pathology evaluation from [facility] reported moderately differentiated squamous cell carcinoma. The left tonsil has no evidence of malignancy. Right tonsil also was benign. The adenoid tissue was also benign.      Patient reports the left neck mass is growing. He also reports poor appetite after tonsillectomy. For the past couple of weeks since the biopsy, he lost about 6 pounds. He denies nausea or vomiting. He has actually started feeling better with improved appetite. Denies pain. Denies fever or sweating.      This patient reports he never smoked cigarette. He is only a very rare social drinker. He told me the test for HPV is ongoing.      Patient also had PET scan examination obtained on 11/14/2017. This study reported focal hypermetabolism with SUV 15.9 corresponding to the left-sided base of the tongue. There was moderate enlarged left jugular chain lymph node which is hypermetabolic but without measuring SUV. There was also mild hypermetabolism identified within several additional smaller left jugular chain lymph nodes. There was no hypermetabolic lymphadenopathy elsewhere in the neck nor foci in the chest, abdomen or pelvis.    Patient  was started on concurrent chemoradiation therapy with cisplatin every 3 weeks.  He received 2 cycles chemotherapy and due to poor tolerance with acute renal injury and neutropenia, cycle #3 cisplatin was canceled.     Radiation therapy finished on 1/23/2018.    PET scan on 3/15/2018 reported essentially complete resolution of hypermetabolic lesion at the left tongue base.  The left and neck lymphadenopathy also showing, with blood pool activity.      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    SKIN: No nonhealing lesions. No rashes.  HEME/LYMPH: No easy bruising, bleeding.   EYES: No vision changes or diplopia.   ENT: No hearing loss, gum bleeding, epistaxis, hoarseness or dysphagia.   RESPIRATORY: No cough, shortness of breath, hemoptysis or wheezing.   CVS: No chest pain, palpitations, orthopnea, dyspnea on exertion or PND.   GI: No melena or hematochezia. No abdominal pain.  No nausea, vomiting, constipation, diarrhea  : No lower tract obstructive symptoms, dysuria or hematuria.   MUSCULOSKELETAL:  No bone pain.  No joint stiffness.   NEUROLOGICAL: No global weakness, loss of consciousness or seizures.   PSYCHIATRIC: No increased nervousness, mood changes or depression.     Objective    There were no vitals filed for this visit.  Current Status 3/23/2018   ECOG score 0      PHYSICAL EXAM:    GENERAL:  Well-developed, well-nourished male in no acute distress.   SKIN:  Warm, dry without rashes, purpura or petechiae. There is skin pigmentation in the neck secondary to radiation therapy.   EYES:  Pupils equal, round and reactive to light.  EOMs intact.  Conjunctivae normal.  EARS:  Hearing intact.  NOSE: No nasal discharge.  MOUTH:  Tongue is well-papillated; no stomatitis or ulcers.  No evidence of fungal infection.  Lips normal.  THROAT:  Oropharynx without lesions or exudates.    NECK:  Supple with good range of motion; no thyromegaly.   LYMPHATICS:  Left neck has a 2 cm lymph node fixed no tenderness at the anterior SCM surface inferior to the left ear.  No other cervical, supraclavicular adenopathy.    CHEST:  Lungs clear to auscultation. Good airflow.  CARDIAC:  Regular rate and rhythm without murmurs, rubs or gallops. Normal S1,S2.  ABDOMEN:  Soft, nontender with no hepatosplenomegaly or masses. Bowel sounds normal. PEG tube in place, there is no leakage around the PEG tube, and no evidence of infection.    EXTREMITIES:  No clubbing, cyanosis or edema.  NEUROLOGICAL:  Cranial Nerves II-XII grossly intact.  No focal neurological deficits.  PSYCHIATRIC:  Normal affect and mood.     RECENT LABS:    Lab Results   Component Value Date    WBC 6.33 06/11/2018    HGB 12.4 (L) 06/11/2018    HCT 35.9 (L) 06/11/2018    MCV 93.5 06/11/2018     06/11/2018     Lab Results   Component Value Date    NEUTROABS 4.19 06/11/2018         IMAGE STUDY:   CONTRAST ENHANCED CT SCAN OF NECK 06/11/2018     CLINICAL HISTORY: Oropharyngeal squamous cell carcinoma follow-up  radiation chemotherapy.     This is correlated to an  outside neck CT 09/22/2017 and a whole body PET  scan from [facility] on 03/15/2018.     FINDINGS: There is complete opacification of the right frontal sinus,  right frontal recess and there is a hypoplastic right maxillary sinus  that is completely opacified.  There is mild mucosal thickening of the  inferior left maxillary sinus.  The remainder of the paranasal sinuses  and mastoid air cells and middle ear cavities are clear. The nasopharynx  is normal in appearance.  Since prior outside neck CT there has been  resolution in the asymmetric enhancing soft tissue in the left posterior  lateral tongue base extending to the left lateral oropharynx.  No  residual discernible mass at this site. There has been an increase in  enhancement of the mucosa and some mild submucosal edema felt to be  postradiation change. There has been a decrease in size in the parotid  and submandibular glands from postradiation chronic sialoadenitis  change. The level 2 suprahilar left jugulodigastric chain lymph node  along the anterior medial edge of the left sternocleidomastoid muscle at  the C2-3 cervical level has substantially decreased in size, previously  on 09/22/2017 it measured 3 x 2.3 cm and now it measures 2 x 1.8 cm with  diffuse central low-density, is felt to be cystic necrosis in the note.   No additional pathologic lymphadenopathy is seen. There is left  subclavian Mediport in place.  Mediport catheter courses into the  superior vena cava and its distal tip is not assessed on the field of  imaging on this exam. The lung apices are clear.      IMPRESSION:  1. No significant change when compared to whole body PET scan 03/15/2018  which demonstrated essentially complete metabolic response in the left  tongue base-lateral oropharyngeal squamous cell carcinoma and the  metastatic node to the left jugulodigastric chain and there has been a  significant improvement when compared to the presenting neck CT  09/22/2017  prior to the treatment. There has been resolution in the  enhancing mass in the left posterior lateral tongue base extending into  the left lateral oropharynx. Since neck CT 09/22/2017 there has been a  significant decrease in the size of the pathologic level 2 left  jugulodigastric chain lymph node that on 09/22/2017 measured 3 x 2.3 cm  and now measures 2 x 1.8 cm and has central low density throughout, felt  to be cystic necrosis.  This lymph node is unchanged to perhaps 2 mm  smaller than on recent whole body PET scan 03/15/2018 where it measured  2.2 x 2 cm and had no significant hypermetabolism within it. There is  postradiation changes in the neck with decrease in size of the  submandibular and parotid glands from postradiation sialoadenitis and  there is slight increase in the mucosal enhancement in the pharynx and  some minimal submucosal edema all felt to be a postradiation effect.   2. Persistent complete opacification of the right frontal sinus, right  frontal recess and hypoplastic right maxillary sinus.  The remainder of  the neck CT is unremarkable.         Assessment/Plan     *Left base of tongue cancer.  Stage IV A.  Finished chemotherapy Cisplatin day 1 and day 22, with concurrent radiation.  Did not receive day 43 cisplatin as planned due to neutropenia.     Patient finished his radiation therapy on 1/23/2018.      His PET scan on 3/15/2018 showed essentially complete metabolic response.  The residual left neck lymph node shows only low-level blood pool activity.     Patient had a CT scan examination of the neck on 6/11/2018 which showed stable left neck lymphadenopathy, probably related smaller by 2 mm compared to the PET scan on 3/15/2018.  I personally reviewed CT scan images.  This lesion is still probably dying down.      Patient switched his ENT care to Dr. Browning, and has appointment next week.  I think getting excisional biopsy of this left neck lymph node is very reasonable.     I propose  repeated CT scan of the neck in 6 months.      *Anemia, due to chemotherapy. His hemoglobin is stable for the past 3 months.  We'll obtain laboratory studies in 3 months for evaluation.     *Tinnitus.  He states he had ringing in his ears prior to cisplatin.  Ringing did not worsen with dose 1 or dose 2 of cisplatin.    *GERD.  We started him on Protonix 40 mg daily.  Improved symptom.     *Power-port-a-catheter.  I advised keeping it for now.  We'll continue every 6 weeks avni flush.     * Risk of developing hypothyroidism.  There is very risk for patient received radiation therapy to the neck area.  We will obtain TSH in 3 months.      Plan:   1.  Port flush in 6 weeks.    2.  Keep appointment with Dr. Bhaskar Browning.    3.  Patient will return in 3 months for M.D. Reevaluation.  We obtain laboratory study for CBC CMP ferritin, iron profile, vitamin B12 level and TSH.  4.  We'll likely need CT of neck with iv contrast in 6 months together with cbc and CMP, add TSH also.       I independently reviewed his CT scan examination images from 6/11/2018.     More than 40 min for patient care, over half of that time were for counseling.    DOMENICA LEAL M.D., Ph.D.    6/14/2018      CC:  Zane Del Toro M.D. / Frank Marques M.D.   Ameya Sheffield M.D.    Ameya Aguirre M.D.    Isma Hercules M.D.      Bhaskar Browning M.D.      Dictated using Dragon Dictation.

## 2018-06-17 PROBLEM — E03.4 HYPOTHYROIDISM DUE TO ACQUIRED ATROPHY OF THYROID: Status: ACTIVE | Noted: 2018-06-17

## 2018-06-17 PROBLEM — E03.8 TSH (THYROID-STIMULATING HORMONE DEFICIENCY): Status: ACTIVE | Noted: 2018-06-17

## 2018-07-05 ENCOUNTER — HOSPITAL ENCOUNTER (OUTPATIENT)
Facility: HOSPITAL | Age: 68
LOS: 1 days | Discharge: HOME | End: 2018-07-06
Attending: EMERGENCY MEDICINE | Admitting: SURGERY

## 2018-07-05 ENCOUNTER — APPOINTMENT (OUTPATIENT)
Dept: GENERAL RADIOLOGY | Facility: HOSPITAL | Age: 68
End: 2018-07-05

## 2018-07-05 ENCOUNTER — ANESTHESIA (OUTPATIENT)
Dept: PERIOP | Facility: HOSPITAL | Age: 68
End: 2018-07-05

## 2018-07-05 ENCOUNTER — ANESTHESIA EVENT (OUTPATIENT)
Dept: PERIOP | Facility: HOSPITAL | Age: 68
End: 2018-07-05

## 2018-07-05 ENCOUNTER — APPOINTMENT (OUTPATIENT)
Dept: ULTRASOUND IMAGING | Facility: HOSPITAL | Age: 68
End: 2018-07-05

## 2018-07-05 DIAGNOSIS — K80.20 CALCULUS OF GALLBLADDER WITHOUT CHOLECYSTITIS WITHOUT OBSTRUCTION: Primary | ICD-10-CM

## 2018-07-05 LAB
ALBUMIN SERPL-MCNC: 4.6 G/DL (ref 3.5–5.2)
ALBUMIN/GLOB SERPL: 1.6 G/DL
ALP SERPL-CCNC: 94 U/L (ref 39–117)
ALT SERPL W P-5'-P-CCNC: 22 U/L (ref 1–41)
ANION GAP SERPL CALCULATED.3IONS-SCNC: 14.5 MMOL/L
AST SERPL-CCNC: 15 U/L (ref 1–40)
BASOPHILS # BLD AUTO: 0.03 10*3/MM3 (ref 0–0.2)
BASOPHILS NFR BLD AUTO: 0.2 % (ref 0–1.5)
BILIRUB SERPL-MCNC: 0.2 MG/DL (ref 0.1–1.2)
BUN BLD-MCNC: 22 MG/DL (ref 8–23)
BUN/CREAT SERPL: 25 (ref 7–25)
CALCIUM SPEC-SCNC: 10.7 MG/DL (ref 8.6–10.5)
CHLORIDE SERPL-SCNC: 99 MMOL/L (ref 98–107)
CO2 SERPL-SCNC: 29.5 MMOL/L (ref 22–29)
CREAT BLD-MCNC: 0.88 MG/DL (ref 0.76–1.27)
DEPRECATED RDW RBC AUTO: 42.8 FL (ref 37–54)
EOSINOPHIL # BLD AUTO: 0.49 10*3/MM3 (ref 0–0.7)
EOSINOPHIL NFR BLD AUTO: 3.5 % (ref 0.3–6.2)
ERYTHROCYTE [DISTWIDTH] IN BLOOD BY AUTOMATED COUNT: 12.1 % (ref 11.5–14.5)
GFR SERPL CREATININE-BSD FRML MDRD: 86 ML/MIN/1.73
GLOBULIN UR ELPH-MCNC: 2.9 GM/DL
GLUCOSE BLD-MCNC: 194 MG/DL (ref 65–99)
HCT VFR BLD AUTO: 40.1 % (ref 40.4–52.2)
HGB BLD-MCNC: 13.3 G/DL (ref 13.7–17.6)
HOLD SPECIMEN: NORMAL
HOLD SPECIMEN: NORMAL
IMM GRANULOCYTES # BLD: 0.03 10*3/MM3 (ref 0–0.03)
IMM GRANULOCYTES NFR BLD: 0.2 % (ref 0–0.5)
LIPASE SERPL-CCNC: 21 U/L (ref 13–60)
LYMPHOCYTES # BLD AUTO: 1.85 10*3/MM3 (ref 0.9–4.8)
LYMPHOCYTES NFR BLD AUTO: 13.3 % (ref 19.6–45.3)
MCH RBC QN AUTO: 32.1 PG (ref 27–32.7)
MCHC RBC AUTO-ENTMCNC: 33.2 G/DL (ref 32.6–36.4)
MCV RBC AUTO: 96.9 FL (ref 79.8–96.2)
MONOCYTES # BLD AUTO: 0.58 10*3/MM3 (ref 0.2–1.2)
MONOCYTES NFR BLD AUTO: 4.2 % (ref 5–12)
NEUTROPHILS # BLD AUTO: 10.98 10*3/MM3 (ref 1.9–8.1)
NEUTROPHILS NFR BLD AUTO: 78.6 % (ref 42.7–76)
PLATELET # BLD AUTO: 219 10*3/MM3 (ref 140–500)
PMV BLD AUTO: 9.7 FL (ref 6–12)
POTASSIUM BLD-SCNC: 3.9 MMOL/L (ref 3.5–5.2)
PROT SERPL-MCNC: 7.5 G/DL (ref 6–8.5)
RBC # BLD AUTO: 4.14 10*6/MM3 (ref 4.6–6)
SODIUM BLD-SCNC: 143 MMOL/L (ref 136–145)
TROPONIN T SERPL-MCNC: <0.01 NG/ML (ref 0–0.03)
WBC NRBC COR # BLD: 13.96 10*3/MM3 (ref 4.5–10.7)
WHOLE BLOOD HOLD SPECIMEN: NORMAL
WHOLE BLOOD HOLD SPECIMEN: NORMAL

## 2018-07-05 PROCEDURE — 25010000002 HYDROMORPHONE PER 4 MG: Performed by: SURGERY

## 2018-07-05 PROCEDURE — 25010000002 PROPOFOL 10 MG/ML EMULSION: Performed by: ANESTHESIOLOGY

## 2018-07-05 PROCEDURE — 88304 TISSUE EXAM BY PATHOLOGIST: CPT | Performed by: SURGERY

## 2018-07-05 PROCEDURE — 96376 TX/PRO/DX INJ SAME DRUG ADON: CPT

## 2018-07-05 PROCEDURE — 25010000002 ONDANSETRON PER 1 MG: Performed by: SURGERY

## 2018-07-05 PROCEDURE — 85025 COMPLETE CBC W/AUTO DIFF WBC: CPT | Performed by: EMERGENCY MEDICINE

## 2018-07-05 PROCEDURE — 47563 LAPARO CHOLECYSTECTOMY/GRAPH: CPT | Performed by: PHYSICIAN ASSISTANT

## 2018-07-05 PROCEDURE — 74300 X-RAY BILE DUCTS/PANCREAS: CPT

## 2018-07-05 PROCEDURE — A9270 NON-COVERED ITEM OR SERVICE: HCPCS | Performed by: SURGERY

## 2018-07-05 PROCEDURE — 83690 ASSAY OF LIPASE: CPT | Performed by: EMERGENCY MEDICINE

## 2018-07-05 PROCEDURE — 93010 ELECTROCARDIOGRAM REPORT: CPT | Performed by: INTERNAL MEDICINE

## 2018-07-05 PROCEDURE — 63710000001 OXYCODONE-ACETAMINOPHEN 5-325 MG TABLET: Performed by: SURGERY

## 2018-07-05 PROCEDURE — 25010000002 ONDANSETRON PER 1 MG: Performed by: EMERGENCY MEDICINE

## 2018-07-05 PROCEDURE — 71046 X-RAY EXAM CHEST 2 VIEWS: CPT

## 2018-07-05 PROCEDURE — 0 IOTHALAMATE 60 % SOLUTION: Performed by: SURGERY

## 2018-07-05 PROCEDURE — 25010000002 PIPERACILLIN SOD-TAZOBACTAM PER 1 G: Performed by: SURGERY

## 2018-07-05 PROCEDURE — 99285 EMERGENCY DEPT VISIT HI MDM: CPT

## 2018-07-05 PROCEDURE — 93005 ELECTROCARDIOGRAM TRACING: CPT

## 2018-07-05 PROCEDURE — G0378 HOSPITAL OBSERVATION PER HR: HCPCS

## 2018-07-05 PROCEDURE — 25010000002 MIDAZOLAM PER 1 MG: Performed by: ANESTHESIOLOGY

## 2018-07-05 PROCEDURE — 76705 ECHO EXAM OF ABDOMEN: CPT

## 2018-07-05 PROCEDURE — 99222 1ST HOSP IP/OBS MODERATE 55: CPT | Performed by: SURGERY

## 2018-07-05 PROCEDURE — 25010000002 MORPHINE PER 10 MG: Performed by: EMERGENCY MEDICINE

## 2018-07-05 PROCEDURE — 25010000002 DEXAMETHASONE PER 1 MG: Performed by: ANESTHESIOLOGY

## 2018-07-05 PROCEDURE — 96374 THER/PROPH/DIAG INJ IV PUSH: CPT

## 2018-07-05 PROCEDURE — 84484 ASSAY OF TROPONIN QUANT: CPT | Performed by: EMERGENCY MEDICINE

## 2018-07-05 PROCEDURE — 96375 TX/PRO/DX INJ NEW DRUG ADDON: CPT

## 2018-07-05 PROCEDURE — 47563 LAPARO CHOLECYSTECTOMY/GRAPH: CPT | Performed by: SURGERY

## 2018-07-05 PROCEDURE — 25010000002 HYDROMORPHONE PER 4 MG: Performed by: EMERGENCY MEDICINE

## 2018-07-05 PROCEDURE — 25010000002 MORPHINE PER 10 MG: Performed by: ANESTHESIOLOGY

## 2018-07-05 PROCEDURE — 63710000001 METOPROLOL TARTRATE 50 MG TABLET: Performed by: SURGERY

## 2018-07-05 PROCEDURE — 80053 COMPREHEN METABOLIC PANEL: CPT | Performed by: EMERGENCY MEDICINE

## 2018-07-05 PROCEDURE — 93005 ELECTROCARDIOGRAM TRACING: CPT | Performed by: EMERGENCY MEDICINE

## 2018-07-05 RX ORDER — HYDROCODONE BITARTRATE AND ACETAMINOPHEN 5; 325 MG/1; MG/1
1 TABLET ORAL ONCE AS NEEDED
Status: DISCONTINUED | OUTPATIENT
Start: 2018-07-05 | End: 2018-07-05 | Stop reason: HOSPADM

## 2018-07-05 RX ORDER — SODIUM CHLORIDE 9 MG/ML
INJECTION, SOLUTION INTRAVENOUS AS NEEDED
Status: DISCONTINUED | OUTPATIENT
Start: 2018-07-05 | End: 2018-07-05 | Stop reason: HOSPADM

## 2018-07-05 RX ORDER — ONDANSETRON 2 MG/ML
4 INJECTION INTRAMUSCULAR; INTRAVENOUS EVERY 6 HOURS PRN
Status: DISCONTINUED | OUTPATIENT
Start: 2018-07-05 | End: 2018-07-06 | Stop reason: HOSPADM

## 2018-07-05 RX ORDER — SODIUM CHLORIDE 0.9 % (FLUSH) 0.9 %
1-10 SYRINGE (ML) INJECTION AS NEEDED
Status: DISCONTINUED | OUTPATIENT
Start: 2018-07-05 | End: 2018-07-05 | Stop reason: HOSPADM

## 2018-07-05 RX ORDER — MIDAZOLAM HYDROCHLORIDE 1 MG/ML
1 INJECTION INTRAMUSCULAR; INTRAVENOUS
Status: DISCONTINUED | OUTPATIENT
Start: 2018-07-05 | End: 2018-07-05 | Stop reason: HOSPADM

## 2018-07-05 RX ORDER — MORPHINE SULFATE 1 MG/ML
INJECTION, SOLUTION EPIDURAL; INTRATHECAL; INTRAVENOUS AS NEEDED
Status: DISCONTINUED | OUTPATIENT
Start: 2018-07-05 | End: 2018-07-05 | Stop reason: SURG

## 2018-07-05 RX ORDER — MORPHINE SULFATE 2 MG/ML
2 INJECTION, SOLUTION INTRAMUSCULAR; INTRAVENOUS
Status: DISCONTINUED | OUTPATIENT
Start: 2018-07-05 | End: 2018-07-05 | Stop reason: HOSPADM

## 2018-07-05 RX ORDER — ROCURONIUM BROMIDE 10 MG/ML
INJECTION, SOLUTION INTRAVENOUS AS NEEDED
Status: DISCONTINUED | OUTPATIENT
Start: 2018-07-05 | End: 2018-07-05 | Stop reason: SURG

## 2018-07-05 RX ORDER — SODIUM CHLORIDE 0.9 % (FLUSH) 0.9 %
1-10 SYRINGE (ML) INJECTION AS NEEDED
Status: DISCONTINUED | OUTPATIENT
Start: 2018-07-05 | End: 2018-07-06 | Stop reason: HOSPADM

## 2018-07-05 RX ORDER — MEPERIDINE HYDROCHLORIDE 25 MG/ML
12.5 INJECTION INTRAMUSCULAR; INTRAVENOUS; SUBCUTANEOUS ONCE
Status: DISCONTINUED | OUTPATIENT
Start: 2018-07-05 | End: 2018-07-05 | Stop reason: HOSPADM

## 2018-07-05 RX ORDER — DEXAMETHASONE SODIUM PHOSPHATE 10 MG/ML
INJECTION INTRAMUSCULAR; INTRAVENOUS AS NEEDED
Status: DISCONTINUED | OUTPATIENT
Start: 2018-07-05 | End: 2018-07-05 | Stop reason: SURG

## 2018-07-05 RX ORDER — SODIUM CHLORIDE 0.9 % (FLUSH) 0.9 %
10 SYRINGE (ML) INJECTION AS NEEDED
Status: DISCONTINUED | OUTPATIENT
Start: 2018-07-05 | End: 2018-07-05

## 2018-07-05 RX ORDER — MIDAZOLAM HYDROCHLORIDE 1 MG/ML
2 INJECTION INTRAMUSCULAR; INTRAVENOUS
Status: DISCONTINUED | OUTPATIENT
Start: 2018-07-05 | End: 2018-07-05 | Stop reason: HOSPADM

## 2018-07-05 RX ORDER — ASPIRIN 325 MG
325 TABLET ORAL ONCE
Status: COMPLETED | OUTPATIENT
Start: 2018-07-05 | End: 2018-07-05

## 2018-07-05 RX ORDER — BUPIVACAINE HYDROCHLORIDE AND EPINEPHRINE 5; 5 MG/ML; UG/ML
INJECTION, SOLUTION EPIDURAL; INTRACAUDAL; PERINEURAL AS NEEDED
Status: DISCONTINUED | OUTPATIENT
Start: 2018-07-05 | End: 2018-07-05 | Stop reason: HOSPADM

## 2018-07-05 RX ORDER — ONDANSETRON 2 MG/ML
4 INJECTION INTRAMUSCULAR; INTRAVENOUS ONCE
Status: COMPLETED | OUTPATIENT
Start: 2018-07-05 | End: 2018-07-05

## 2018-07-05 RX ORDER — DEXTROSE, SODIUM CHLORIDE, AND POTASSIUM CHLORIDE 5; .45; .15 G/100ML; G/100ML; G/100ML
100 INJECTION INTRAVENOUS CONTINUOUS
Status: DISCONTINUED | OUTPATIENT
Start: 2018-07-05 | End: 2018-07-06

## 2018-07-05 RX ORDER — OXYCODONE HYDROCHLORIDE AND ACETAMINOPHEN 5; 325 MG/1; MG/1
1 TABLET ORAL EVERY 4 HOURS PRN
Status: DISCONTINUED | OUTPATIENT
Start: 2018-07-05 | End: 2018-07-06 | Stop reason: HOSPADM

## 2018-07-05 RX ORDER — ONDANSETRON 2 MG/ML
4 INJECTION INTRAMUSCULAR; INTRAVENOUS ONCE AS NEEDED
Status: DISCONTINUED | OUTPATIENT
Start: 2018-07-05 | End: 2018-07-05 | Stop reason: HOSPADM

## 2018-07-05 RX ORDER — FENTANYL CITRATE 50 UG/ML
25 INJECTION, SOLUTION INTRAMUSCULAR; INTRAVENOUS
Status: DISCONTINUED | OUTPATIENT
Start: 2018-07-05 | End: 2018-07-05 | Stop reason: HOSPADM

## 2018-07-05 RX ORDER — LABETALOL HYDROCHLORIDE 5 MG/ML
5 INJECTION, SOLUTION INTRAVENOUS
Status: DISCONTINUED | OUTPATIENT
Start: 2018-07-05 | End: 2018-07-05 | Stop reason: HOSPADM

## 2018-07-05 RX ORDER — PROPOFOL 10 MG/ML
VIAL (ML) INTRAVENOUS AS NEEDED
Status: DISCONTINUED | OUTPATIENT
Start: 2018-07-05 | End: 2018-07-05 | Stop reason: SURG

## 2018-07-05 RX ORDER — FAMOTIDINE 10 MG/ML
20 INJECTION, SOLUTION INTRAVENOUS ONCE
Status: COMPLETED | OUTPATIENT
Start: 2018-07-05 | End: 2018-07-05

## 2018-07-05 RX ORDER — NALOXONE HCL 0.4 MG/ML
0.4 VIAL (ML) INJECTION
Status: DISCONTINUED | OUTPATIENT
Start: 2018-07-05 | End: 2018-07-06

## 2018-07-05 RX ORDER — SODIUM CHLORIDE, SODIUM LACTATE, POTASSIUM CHLORIDE, CALCIUM CHLORIDE 600; 310; 30; 20 MG/100ML; MG/100ML; MG/100ML; MG/100ML
100 INJECTION, SOLUTION INTRAVENOUS CONTINUOUS
Status: DISCONTINUED | OUTPATIENT
Start: 2018-07-05 | End: 2018-07-06

## 2018-07-05 RX ORDER — METOPROLOL TARTRATE 50 MG/1
50 TABLET, FILM COATED ORAL EVERY 12 HOURS SCHEDULED
Status: DISCONTINUED | OUTPATIENT
Start: 2018-07-05 | End: 2018-07-06 | Stop reason: HOSPADM

## 2018-07-05 RX ORDER — EPHEDRINE SULFATE 50 MG/ML
5 INJECTION, SOLUTION INTRAVENOUS ONCE AS NEEDED
Status: DISCONTINUED | OUTPATIENT
Start: 2018-07-05 | End: 2018-07-05 | Stop reason: HOSPADM

## 2018-07-05 RX ORDER — SODIUM CHLORIDE, SODIUM LACTATE, POTASSIUM CHLORIDE, CALCIUM CHLORIDE 600; 310; 30; 20 MG/100ML; MG/100ML; MG/100ML; MG/100ML
9 INJECTION, SOLUTION INTRAVENOUS CONTINUOUS
Status: DISCONTINUED | OUTPATIENT
Start: 2018-07-05 | End: 2018-07-05

## 2018-07-05 RX ORDER — NALOXONE HCL 0.4 MG/ML
0.4 VIAL (ML) INJECTION AS NEEDED
Status: DISCONTINUED | OUTPATIENT
Start: 2018-07-05 | End: 2018-07-05 | Stop reason: HOSPADM

## 2018-07-05 RX ADMIN — ONDANSETRON 4 MG: 2 INJECTION INTRAMUSCULAR; INTRAVENOUS at 21:28

## 2018-07-05 RX ADMIN — ONDANSETRON 4 MG: 2 INJECTION INTRAMUSCULAR; INTRAVENOUS at 17:45

## 2018-07-05 RX ADMIN — FAMOTIDINE 20 MG: 10 INJECTION INTRAVENOUS at 02:16

## 2018-07-05 RX ADMIN — SUGAMMADEX 180 MG: 100 INJECTION, SOLUTION INTRAVENOUS at 18:00

## 2018-07-05 RX ADMIN — ONDANSETRON 4 MG: 2 INJECTION, SOLUTION INTRAMUSCULAR; INTRAVENOUS at 01:22

## 2018-07-05 RX ADMIN — POTASSIUM CHLORIDE, DEXTROSE MONOHYDRATE AND SODIUM CHLORIDE 100 ML/HR: 150; 5; 450 INJECTION, SOLUTION INTRAVENOUS at 05:36

## 2018-07-05 RX ADMIN — FAMOTIDINE 20 MG: 10 INJECTION, SOLUTION INTRAVENOUS at 10:12

## 2018-07-05 RX ADMIN — DEXAMETHASONE SODIUM PHOSPHATE 8 MG: 10 INJECTION INTRAMUSCULAR; INTRAVENOUS at 17:00

## 2018-07-05 RX ADMIN — FAMOTIDINE 20 MG: 10 INJECTION INTRAVENOUS at 16:49

## 2018-07-05 RX ADMIN — ALFENTANIL HYDROCHLORIDE 1000 MCG: 500 INJECTION INTRAVENOUS at 17:00

## 2018-07-05 RX ADMIN — FAMOTIDINE 20 MG: 10 INJECTION, SOLUTION INTRAVENOUS at 21:28

## 2018-07-05 RX ADMIN — MORPHINE SULFATE 4 MG: 4 INJECTION INTRAVENOUS at 01:22

## 2018-07-05 RX ADMIN — SODIUM CHLORIDE, POTASSIUM CHLORIDE, SODIUM LACTATE AND CALCIUM CHLORIDE: 600; 310; 30; 20 INJECTION, SOLUTION INTRAVENOUS at 16:55

## 2018-07-05 RX ADMIN — PROPOFOL 200 MG: 10 INJECTION, EMULSION INTRAVENOUS at 17:00

## 2018-07-05 RX ADMIN — TAZOBACTAM SODIUM AND PIPERACILLIN SODIUM 3.38 G: 375; 3 INJECTION, SOLUTION INTRAVENOUS at 12:37

## 2018-07-05 RX ADMIN — MORPHINE SULFATE 4 MG: 1 INJECTION EPIDURAL; INTRATHECAL; INTRAVENOUS at 17:27

## 2018-07-05 RX ADMIN — HYDROMORPHONE HYDROCHLORIDE 0.5 MG: 1 INJECTION, SOLUTION INTRAMUSCULAR; INTRAVENOUS; SUBCUTANEOUS at 10:12

## 2018-07-05 RX ADMIN — HYDROMORPHONE HYDROCHLORIDE 0.5 MG: 1 INJECTION, SOLUTION INTRAMUSCULAR; INTRAVENOUS; SUBCUTANEOUS at 06:43

## 2018-07-05 RX ADMIN — SODIUM CHLORIDE, POTASSIUM CHLORIDE, SODIUM LACTATE AND CALCIUM CHLORIDE 9 ML/HR: 600; 310; 30; 20 INJECTION, SOLUTION INTRAVENOUS at 16:49

## 2018-07-05 RX ADMIN — MIDAZOLAM 1 MG: 1 INJECTION INTRAMUSCULAR; INTRAVENOUS at 16:49

## 2018-07-05 RX ADMIN — METOPROLOL TARTRATE 50 MG: 50 TABLET, FILM COATED ORAL at 10:11

## 2018-07-05 RX ADMIN — OXYCODONE HYDROCHLORIDE AND ACETAMINOPHEN 1 TABLET: 5; 325 TABLET ORAL at 21:28

## 2018-07-05 RX ADMIN — HYDROMORPHONE HYDROCHLORIDE 0.5 MG: 1 INJECTION, SOLUTION INTRAMUSCULAR; INTRAVENOUS; SUBCUTANEOUS at 13:51

## 2018-07-05 RX ADMIN — METOPROLOL TARTRATE 50 MG: 50 TABLET, FILM COATED ORAL at 21:28

## 2018-07-05 RX ADMIN — HYDROMORPHONE HYDROCHLORIDE 0.5 MG: 1 INJECTION, SOLUTION INTRAMUSCULAR; INTRAVENOUS; SUBCUTANEOUS at 04:01

## 2018-07-05 RX ADMIN — ROCURONIUM BROMIDE 40 MG: 10 INJECTION INTRAVENOUS at 17:00

## 2018-07-05 RX ADMIN — ASPIRIN 325 MG: 325 TABLET ORAL at 01:20

## 2018-07-05 NOTE — ED PROVIDER NOTES
EMERGENCY DEPARTMENT ENCOUNTER    CHIEF COMPLAINT  Chief Complaint: Chest pain  History given by: Patient  History limited by: None  Room Number: 14/14  PMD: Ameya Sheffield MD      HPI:  Pt is a 67 y.o. male who presents complaining of chest pain since 2200. Pt also c/o 4x episodes of vomiting. Pt had throat cancer and finished chemotherapy in January 2018. Pt has no active cancer at this time.    Duration: Since 2300  Onset: Gradual  Timing: Constant  Location: Chest  Radiation: None  Intensity/Severity: Moderate  Progression: Unchanged  Associated Symptoms: Vomiting  Previous Episodes: None  Treatment before arrival: None    PAST MEDICAL HISTORY  Active Ambulatory Problems     Diagnosis Date Noted   • Hyperlipidemia    • Benign essential hypertension    • IFG (impaired fasting glucose)    • Oropharyngeal cancer (CMS/HCC) 11/16/2017   • Cancer of base of tongue (CMS/HCC) 11/16/2017   • Fitting and adjustment of vascular catheter 11/30/2017   • Steroid-induced hyperglycemia 12/04/2017   • Dehydration 12/11/2017   • Acute renal injury (CMS/HCC) 12/12/2017   • Anemia associated with chemotherapy 12/12/2017   • Chemotherapy-induced nausea 12/12/2017   • GERD (gastroesophageal reflux disease) 12/12/2017   • Chemotherapy induced neutropenia (CMS/HCC) 01/23/2018   • Adverse effect of antineoplastic and immunosuppressive drugs 01/23/2018   • Adverse effect of radiation therapy 01/23/2018   • Pharyngitis, acute 01/23/2018   • Hypothyroidism due to acquired atrophy of thyroid 06/17/2018   • TSH (thyroid-stimulating hormone deficiency) 06/17/2018     Resolved Ambulatory Problems     Diagnosis Date Noted   • 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 Galen Morrissey   • COLONOSCOPY N/A 05/04/2011    Normal colonoscopy-Dr. Tobias Emerson   • ENDOSCOPY W/ PEG TUBE PLACEMENT N/A 11/28/2017    Procedure: ESOPHAGOGASTRODUODENOSCOPY WITH PERCUTANEOUS ENDOSCOPIC GASTROSTOMY TUBE INSERTION;  Surgeon: Isma Hercules MD;  Location: [facility];  Service:    • FINE NEEDLE ASPIRATION Left 10/13/2017    Ultrasound guidance for fine needle biopsy and fine needle aspiration with ultrasonic guidance of left neck mass-Dr. Frank Marques   • INGUINAL HERNIA REPAIR Left 1994    Dr. Peres   • INGUINAL HERNIA REPAIR Right 1993    Dr. Peres   • LARYNGOPLASTY      Laryngoplasty with biopsy-Dr. Del Toro   • NJ INSJ TUNNELED CVC W/O SUBQ PORT/ AGE 3 YR/> Left 11/28/2017    Procedure: INSERTION VENOUS ACCESS DEVICE;  Surgeon: Isma Hercules MD;  Location: [facility];  Service: General   • TONGUE BIOPSY / EXCISION N/A 11/01/2017    Biopsy of the left tongue base   • TONSILLECTOMY AND ADENOIDECTOMY Bilateral 11/01/2017    Dr. Del Toro       FAMILY HISTORY  Family History   Problem Relation Age of Onset   • Alcohol abuse Father    • Diabetes Father    • Cancer Neg Hx    • Malig Hyperthermia Neg Hx        SOCIAL HISTORY  Social History     Social History   • Marital status:      Spouse name: Janiya   • Number of children: 2   • Years of education: High School     Occupational History   •  Retired     [employer]     Social History Main Topics   • Smoking status: Never Smoker   • Smokeless tobacco: Never Used   • Alcohol use Yes      Comment: occassional   • Drug use: No   • Sexual activity: No     Other Topics Concern   • Not on file     Social History Narrative   • No narrative on file       ALLERGIES  Iodinated diagnostic agents    REVIEW OF SYSTEMS  Review of Systems   Constitutional: Negative for activity change, appetite change and fever.   HENT: Negative for congestion and  sore throat.    Eyes: Negative.    Respiratory: Negative for cough and shortness of breath.    Cardiovascular: Positive for chest pain. Negative for leg swelling.   Gastrointestinal: Positive for vomiting (4x episodes). Negative for abdominal pain and diarrhea.   Endocrine: Negative.    Genitourinary: Negative for decreased urine volume and dysuria.   Musculoskeletal: Negative for neck pain.   Skin: Negative for rash and wound.   Allergic/Immunologic: Negative.    Neurological: Negative for weakness, numbness and headaches.   Hematological: Negative.    Psychiatric/Behavioral: Negative.    All other systems reviewed and are negative.      PHYSICAL EXAM  ED Triage Vitals [07/05/18 0030]   Temp Heart Rate Resp BP SpO2   95.7 °F (35.4 °C) 60 18 -- 98 %      Temp src Heart Rate Source Patient Position BP Location FiO2 (%)   Tympanic Monitor -- -- --       Physical Exam   Constitutional: He is oriented to person, place, and time. No distress.   HENT:   Head: Normocephalic and atraumatic.   Eyes: EOM are normal. Pupils are equal, round, and reactive to light.   Neck: Normal range of motion. Neck supple.   Cardiovascular: Normal rate, regular rhythm and normal heart sounds.    Pulmonary/Chest: Effort normal and breath sounds normal. No respiratory distress.   Lungs CTAB   Abdominal: Soft. There is tenderness (moderate epigastric). There is no rebound and no guarding.   Musculoskeletal: Normal range of motion. He exhibits no edema.   Neurological: He is alert and oriented to person, place, and time. He has normal sensation and normal strength.   Skin: Skin is warm and dry.   Psychiatric: Mood and affect normal.   Nursing note and vitals reviewed.      LAB RESULTS  Lab Results (last 24 hours)     Procedure Component Value Units Date/Time    CBC & Differential [[id number]] Collected:  07/05/18 0112    Specimen:  Blood Updated:  07/05/18 0129    Narrative:       The following orders were created for panel order CBC &  Differential.  Procedure                               Abnormality         Status                     ---------                               -----------         ------                     CBC Auto Differential[[id number]]        Abnormal            Final result                 Please view results for these tests on the individual orders.    Comprehensive Metabolic Panel [[id number]]  (Abnormal) Collected:  07/05/18 0112    Specimen:  Blood from Arm, Right Updated:  07/05/18 0152     Glucose 194 (H) mg/dL      BUN 22 mg/dL      Creatinine 0.88 mg/dL      Sodium 143 mmol/L      Potassium 3.9 mmol/L      Chloride 99 mmol/L      CO2 29.5 (H) mmol/L      Calcium 10.7 (H) mg/dL      Total Protein 7.5 g/dL      Albumin 4.60 g/dL      ALT (SGPT) 22 U/L      AST (SGOT) 15 U/L      Alkaline Phosphatase 94 U/L      Total Bilirubin 0.2 mg/dL      eGFR Non African Amer 86 mL/min/1.73      Globulin 2.9 gm/dL      A/G Ratio 1.6 g/dL      BUN/Creatinine Ratio 25.0     Anion Gap 14.5 mmol/L     Troponin [[id number]]  (Normal) Collected:  07/05/18 0112    Specimen:  Blood from Arm, Right Updated:  07/05/18 0152     Troponin T <0.010 ng/mL     Narrative:       Troponin T Reference Ranges:  Less than 0.03 ng/mL:    Negative for AMI  0.03 to 0.09 ng/mL:      Indeterminant for AMI  Greater than 0.09 ng/mL: Positive for AMI    CBC Auto Differential [[id number]]  (Abnormal) Collected:  07/05/18 0112    Specimen:  Blood from Arm, Right Updated:  07/05/18 0129     WBC 13.96 (H) 10*3/mm3      RBC 4.14 (L) 10*6/mm3      Hemoglobin 13.3 (L) g/dL      Hematocrit 40.1 (L) %      MCV 96.9 (H) fL      MCH 32.1 pg      MCHC 33.2 g/dL      RDW 12.1 %      RDW-SD 42.8 fl      MPV 9.7 fL      Platelets 219 10*3/mm3      Neutrophil % 78.6 (H) %      Lymphocyte % 13.3 (L) %      Monocyte % 4.2 (L) %      Eosinophil % 3.5 %      Basophil % 0.2 %      Immature Grans % 0.2 %      Neutrophils, Absolute 10.98 (H) 10*3/mm3      Lymphocytes, Absolute 1.85 10*3/mm3       Monocytes, Absolute 0.58 10*3/mm3      Eosinophils, Absolute 0.49 10*3/mm3      Basophils, Absolute 0.03 10*3/mm3      Immature Grans, Absolute 0.03 10*3/mm3     Lipase [[id number]]  (Normal) Collected:  07/05/18 0112    Specimen:  Blood from Arm, Right Updated:  07/05/18 0152     Lipase 21 U/L           I ordered the above labs and reviewed the results    RADIOLOGY  US Gallbladder   Preliminary Result   Dilated gallbladder with sludge and calculi, no evidence for acute   cholecystitis.   2.1 cm hemangioma of the right hepatic lobe.           XR Chest 2 View   Preliminary Result   No acute findings.                   I ordered the above noted radiological studies. Interpreted by radiologist. Reviewed by me in PACS.       PROCEDURES  Procedures  EKG           EKG time: 0036  Rhythm/Rate: Sinus rhythm rate 60  P waves and WY: Nml  QRS, axis: Nml   ST and T waves: Nml     Interpreted Contemporaneously by me, independently viewed  Unchanged compared to prior 10/26/17    PROGRESS AND CONSULTS   0032 Ordered EKG for further evaluation.    0047 Ordered protocol labs, troponin, and CXR for further evaluation. Ordered ASA for treatment of sxs.    0116 Ordered zofran for nausea and morphine for pain.    0158 Ordered pepcid for treatment of sxs and US gallbladder for further evaluation.    0326 Rechecked with pt and discussed mildly elevated WBC and stones found in gallbladder. I have offered pt the choice to consult with surgeon tonight for admission or discharge pt to f/u with GI. Pt requests a call placed to surgery for admision. Pt understands and agrees with the plan, all questions answered. Pt states he is still having pain.    0328 Placed call to surgery for admission. Ordered dilaudid for pain.    0353 Discussed pt with Dr. Fischer, surgeon, who will admit.    MEDICAL DECISION MAKING  Results were reviewed/discussed with the patient and they were also made aware of online access. Pt also made aware that some labs,  such as cultures, will not be resulted during ER visit and follow up with PMD is necessary.     MDM  Number of Diagnoses or Management Options  Calculus of gallbladder without cholecystitis without obstruction:      Amount and/or Complexity of Data Reviewed  Clinical lab tests: ordered and reviewed (Troponin= <0.010, WBC= 13.96, Hemoglobin= 13.3, Glucose= 194, neutrophils= 10.98)  Tests in the radiology section of CPT®: ordered and reviewed (CXR shows no acute findings. US Gallbladder shows dilated gallbladder with sludge and calculi, no evidence for acute cholecystitis. 2.1 cm hemangioma of the right hepatic lobe. )  Tests in the medicine section of CPT®: reviewed and ordered (See procedure notes for EKG.)  Decide to obtain previous medical records or to obtain history from someone other than the patient: yes    Patient Progress  Patient progress: stable         DIAGNOSIS  Final diagnoses:   Calculus of gallbladder without cholecystitis without obstruction       DISPOSITION  ADMISSION    Discussed treatment plan and reason for admission with pt/family and admitting physician.  Pt/family voiced understanding of the plan for admission for further testing/treatment as needed.     Latest Documented Vital Signs:  As of 3:54 AM  BP- 104/63 HR- 57 Temp- 95.7 °F (35.4 °C) (Tympanic) O2 sat- 100%    --  Documentation assistance provided by mary Montoya for Dr. Wakefield.  Information recorded by the scrtomi was done at my direction and has been verified and validated by me.     Ruth Montoya  07/05/18 0355       Deangelo Wakefield MD  07/05/18 2890

## 2018-07-05 NOTE — ANESTHESIA PREPROCEDURE EVALUATION
Anesthesia Evaluation                  Airway   Mallampati: II  TM distance: >3 FB  Neck ROM: full  Dental      Comment: Bridge upper front teeth    Pulmonary    (-) shortness of breath, recent URI, sleep apnea, not a smoker  Cardiovascular     (+) hypertension, hyperlipidemia,   (-) dysrhythmias, angina      Neuro/Psych  (-) seizures, dizziness/light headedness, syncope  GI/Hepatic/Renal/Endo    (+)  GERD,    (-) diabetes    Musculoskeletal     Abdominal    Substance History      OB/GYN          Other      history of cancer (oral/pharngeal CA tx with chemo and radiation)                    Anesthesia Plan    ASA 3     general     intravenous induction   Anesthetic plan and risks discussed with patient.

## 2018-07-05 NOTE — ED NOTES
Pt reports having central substernal chest pain that started 2 hours ago that is at the lower part of the chest. Pt is diaphoretic. Pt denies hearts hx and states that he is having a little difficulty breathing.      Leonor Blackmon RN  07/05/18 0030

## 2018-07-05 NOTE — H&P
"General Surgery H&P    CC: Abdominal pain    HPI:   The patient is a very pleasant 67 y.o. male that presented to the hospital emergency room last night with the acute onset of epigastric abdominal pain which began around 10 PM.  The pain has been constant in duration and progressively worsening in severity since its onset.  He has never had pain like this before and describes it as a sharp stabbing sensation.  He denies any fevers, but has been \"drenched in sweat\" since the pain began last night.  He has also had nausea with multiple episodes of emesis.  In the emergency room, he was found to have multiple gallstones on ultrasonography and an elevated white blood cell count of 13,000.  Of note, he has a history of squamous cell carcinoma of the base of the tongue status post chemotherapy and radiation with his most recent chemotherapy administered in January of this year.  He also had a previous percutaneous endoscopic gastrostomy tube which has been removed.    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, stage YEN status post chemotherapy and radiation  Hyperlipidemia  Hypertension    Past Surgical History:   Power port placement  Left inguinal hernia repair (1994)  Right inguinal hernia repair (1993)  Percutaneous endoscopic gastrostomy tube placement  Tonsillectomy and adenoidectomy    Medications:  Metoprolol 50 mg twice daily    Allergies: Iodine contrast agents (itching and rash)    Social History:  with 2 children, retired from [employer], nonsmoker, occasional alcohol use    Family History: Father with history of alcohol abuse and diabetes, no family history of gallbladder pathology    Review of Systems:  A comprehensive review of systems was negative except for the following positives: Nausea, vomiting, abdominal pain, and chills/sweats     Physical Exam:   Vitals:    07/05/18 0500   BP: 151/77   Pulse: 86   Resp: 20   Temp: 97.2 °F (36.2 °C)   SpO2: 99% "     GENERAL: alert, well appearing, and in no distress  HEENT: normocephalic, atraumatic, no scleral icterus, moist mucous membranes.  NECK: Supple, there is no thyromegaly or lymphadenopathy  CHEST: clear to auscultation, no wheezes, rales or rhonchi, left chest wall power port  CARDIAC: regular rate and rhythm    ABDOMEN: Soft, focal tenderness within the right upper quadrant, nondistended, left upper quadrant dimple from previous PEG tube  EXTREMITIES: no cyanosis, clubbing, or edema   NEURO: alert and oriented, normal speech, cranial nerves 2-12 grossly intact, no focal deficits   SKIN: Moist, warm, no rashes, no jaundice    Diagnostic workup:     Pertinent labs:     Results from last 7 days  Lab Units 07/05/18  0112   WBC 10*3/mm3 13.96*   HEMOGLOBIN g/dL 13.3*   HEMATOCRIT % 40.1*   PLATELETS 10*3/mm3 219       Results from last 7 days  Lab Units 07/05/18  0112   SODIUM mmol/L 143   POTASSIUM mmol/L 3.9   CHLORIDE mmol/L 99   CO2 mmol/L 29.5*   BUN mg/dL 22   CREATININE mg/dL 0.88   CALCIUM mg/dL 10.7*   BILIRUBIN mg/dL 0.2   ALK PHOS U/L 94   ALT (SGPT) U/L 22   AST (SGOT) U/L 15   GLUCOSE mg/dL 194*       IMAGING:  GALLBLADDER ULTRASOUND:  FINDINGS:  Liver does not demonstrate a mass or intrahepatic biliary ductal  dilatation. 2.1 cm hyperechoic focus in the right hepatic lobe is  suggestive of a hemangioma.    Dilated gallbladder with multiple stones and sludge. Sonographic  Browning's sign is negative. Gallbladder wall thickness measures 0.3.  Common bile duct measures 0.5.    Limited evaluation of pancreas is unremarkable, no mass.    Right kidney measures 11.9 x 4.9 x 5.5 cm, no mass, hydronephrosis or  calculus.     I personally have reviewed the above imaging and found the following additional findings: multiple gallstones within the lumen of the gallbladder with slight gallbladder wall thickening on my interpretation of the imaging          Assessment and plan:     The patient is a 67 y.o. male with  symptomatic cholelithiasis and possible cholecystitis.     I reviewed his gallbladder ultrasound and suspect there is slight gallbladder wall thickening.  Given his leukocytosis and subjective chills, he likely has the beginnings of acute cholecystitis.  I have recommended we proceed with a laparoscopic versus open cholecystectomy with intraoperative cholangiogram today.  I discussed the risks of the procedure to include bleeding, possible common bile duct injury, possible postoperative bile leak, and possible wound infection.  Despite these risks, he has consented to proceed.  He should remain NPO and I have begun empiric Zosyn.    Ashley Fischer MD  General and Endoscopic Surgery  [facility]    [address], [address]  [place], [place], [address]  P: [phone]  F: [phone]

## 2018-07-05 NOTE — ED NOTES
Pt ambulated back to room. Pt began vomiting upon getting to room. Pt wife reports that pt took medication for nausea prior to coming in. Pt wife reports that pt has a hx of throat cancer.      Leonor Blackmon RN  07/05/18 0102

## 2018-07-05 NOTE — PROGRESS NOTES
Discharge Planning Assessment  [facility]     Patient Name: William Ritchie  MRN: [id number]  Today's Date: 7/5/2018    Admit Date: 7/5/2018          Discharge Needs Assessment     Row Name 07/05/18 1130       Living Environment    Lives With spouse    Current Living Arrangements home/apartment/condo    Primary Care Provided by spouse/significant other    Provides Primary Care For no one    Family Caregiver if Needed none    Quality of Family Relationships involved;supportive;helpful       Resource/Environmental Concerns    Resource/Environmental Concerns none    Transportation Concerns car, none       Transition Planning    Patient/Family Anticipates Transition to home;home with family    Patient/Family Anticipated Services at Transition none    Transportation Anticipated family or friend will provide       Discharge Needs Assessment    Readmission Within the Last 30 Days no previous admission in last 30 days    Concerns to be Addressed no discharge needs identified    Equipment Currently Used at Home none    Anticipated Changes Related to Illness none    Equipment Needed After Discharge none            Discharge Plan     Row Name 07/05/18 1130       Plan    Plan home with spouse by auto    Patient/Family in Agreement with Plan yes    Plan Comments Spoke with the patient and spouse at bedside and the discharge plan is home with spouse by auto. Surgery scheduled for today. KRISTEN Friedman RN, CCP.         Destination     No service coordination in this encounter.      Durable Medical Equipment     No service coordination in this encounter.      Dialysis/Infusion     No service coordination in this encounter.      Home Medical Care     No service coordination in this encounter.      Social Care     No service coordination in this encounter.                Demographic Summary     Row Name 07/05/18 113       General Information    Admission Type observation    Arrived From home    Required Notices Provided  Observation Status Notice    Referral Source admission list    Reason for Consult discharge planning    Preferred Language English       Contact Information    Permission Granted to Share Info With     Contact Information Obtained for             Functional Status     Row Name 07/05/18 1131       Functional Status    Usual Activity Tolerance excellent    Current Activity Tolerance excellent       Functional Status, IADL    Medications independent    Meal Preparation independent    Housekeeping independent    Laundry independent    Shopping independent       Mental Status    General Appearance WDL WDL       Mental Status Summary    Recent Changes in Mental Status/Cognitive Functioning no changes            Psychosocial    No documentation.           Abuse/Neglect    No documentation.           Legal    No documentation.           Substance Abuse    No documentation.           Patient Forms     Row Name 07/05/18 1120       Patient Forms    Patient Observation Letter Delivered    Delivered to Patient    Method of delivery In person   Wrote on white board observation status. The patient was given a copy also. The patient completed the [facility] observation letter.           Brielle Friedman RN

## 2018-07-05 NOTE — OP NOTE
Operative Note :  Ashley Fischer MD    William LABOY Chau  1950    Procedure Date: 07/05/18    Pre-op Diagnosis:  Calculus of gallbladder without cholecystitis without obstruction [K80.20]    Post-Op Diagnosis:  Calculus of gallbladder with acute cholecystitis without obstruction [K80.20]    Procedure:   · Laparoscopic cholecystectomy with intraoperative cholangiogram    Surgeon: Ashley Fischer MD    Assistant: Andre JACOBO    Anesthesia:  General (general endotracheal tube)    Estimated Blood Loss: minimal    Specimens:  Gallbladder    Complications: None    Indications:  · Mr. Ritchie is a 67-year-old gentleman who presented to the emergency room early this morning with acute onset of epigastric abdominal pain, nausea, vomiting, and chills.  He was found to have multiple gallstones and what appeared to be a thickened gallbladder wall on ultrasonography.  He was admitted for cholecystectomy today.  He has been counseled on the risks of the procedure to include bleeding, possible wound infection, possible postoperative bile leak, and possible common bile duct injury.  Despite these risks, he has consented to proceed.    Findings:   · Normal cholangiogram, acute inflammation of the gallbladder    Description of procedure:  The patient was brought to the operating room and placed on the OR table in supine position.  An endotracheal tube was inserted, and general anesthesia was induced.  The anterior abdominal wall was shaved, prepped, and draped in a sterile fashion.  A surgical timeout was then completed.  A curvilinear infraumbilical skin incision was made after instillation of 0.5% Marcaine with epinephrine.  The umbilical stalk was grasped and elevated, and a longitudinal fasciotomy made.  A Fuentes trocar was inserted and secured with 2 separate 0 Vicryl stay sutures.  The abdomen was then insufflated.  Under direct visualization, 3 additional 5 mm trochars were then placed within the subxiphoid  and subcostal space on the right.  The gallbladder was retracted cephalad and infundibulum retracted inferiorly.  The cystic duct and cystic artery were slowly dissected out circumferentially until a firm critical view was obtained.  A single Hemoclip was placed across the cystic duct at its junction with the gallbladder infundibulum and 3 hemoclips were placed across the cystic artery.  A small hole was created on the anterior wall of the cystic duct, and there was a small stone evident within the cystic duct.  This was milked backwards and suctioned out of the duct.  A cholangiogram catheter inserted through a right upper quadrant angiocatheter.  The catheter was secured within the duct with an additional Hemoclip and a cholangiogram was then obtained.  The cholangiogram showed normal filling of the cystic duct and common duct, retrograde filling of the right and left intrahepatic ducts, and easy spillage into the duodenum with no filling defects appreciable.  The catheter was then withdrawn and 2 additional hemoclips placed across the cystic duct.  The cystic duct and artery were then transected, leaving 2 clips behind on both of the stumps.  The gallbladder was then slowly dissected off of the surface of the liver using electrocautery and it was removed from the peritoneal cavity within an Endo Catch bag at the umbilical trocar site.  The gallbladder was passed off to pathology.  The abdomen was then reinsufflated and right upper quadrant inspected.  The gallbladder fossa appeared hemostatic.  1 L of warm normal saline was irrigated throughout the right upper quadrant and suctioned dry.  All trochars were then removed under direct visualization and the abdomen desufflated.  The umbilical fascial incision was closed using the previously placed 0 Vicryl stay sutures, all skin incisions closed with 4-0 Vicryl subcuticular stitches, and then each was dressed with Dermabond.  The patient was then extubated and  transferred to PACU in stable condition.  All counts were correct per nursing.    Ashley Fischer MD  General and Endoscopic Surgery  [facility]    [address], [address]  [place], [place], [address]  P: [phone]  F: [phone]

## 2018-07-05 NOTE — PLAN OF CARE
Problem: Patient Care Overview  Goal: Plan of Care Review  Outcome: Ongoing (interventions implemented as appropriate)   07/05/18 0529   Coping/Psychosocial   Plan of Care Reviewed With patient   Plan of Care Review   Progress improving   OTHER   Outcome Summary Benign essential HYN controlled with med. pain well controlled.        Problem: Fall Risk (Adult)  Goal: Absence of Fall  Outcome: Ongoing (interventions implemented as appropriate)   07/05/18 0583   Fall Risk (Adult)   Absence of Fall making progress toward outcome

## 2018-07-05 NOTE — ANESTHESIA PROCEDURE NOTES
Airway  Urgency: elective    Date/Time: 7/5/2018 5:14 PM    General Information and Staff    Patient location during procedure: OR  Anesthesiologist: NIRMAL IGNACIO    Indications and Patient Condition    Preoxygenated: yes  Mask difficulty assessment: 1 - vent by mask    Final Airway Details  Final airway type: endotracheal airway      Successful airway: ETT  Cuffed: yes   Successful intubation technique: direct laryngoscopy  Facilitating devices/methods: intubating stylet  Blade: Chad  Blade size: #3  ETT size: 7.5 mm  Cormack-Lehane Classification: grade I - full view of glottis  Placement verified by: capnometry   Measured from: teeth  Number of attempts at approach: 1    Additional Comments  Larnyx red swollen, anatomy distorted

## 2018-07-05 NOTE — ANESTHESIA POSTPROCEDURE EVALUATION
Patient: William Ritchie    Procedure Summary     Date:  07/05/18 Room / Location:  [facility] OR  / [facility] MAIN OR    Anesthesia Start:  1657 Anesthesia Stop:  1822    Procedure:  Laparoscopic cholecystectomy with intraoperative cholangiogram  (N/A Abdomen) Diagnosis:       Calculus of gallbladder without cholecystitis without obstruction      (Calculus of gallbladder without cholecystitis without obstruction [K80.20])    Surgeon:  Ashley Fischer MD Provider:  Ana Blackmon MD    Anesthesia Type:  general ASA Status:  3          Anesthesia Type: general  Last vitals  BP   147/73 (07/05/18 1905)   Temp   36.7 °C (98 °F) (07/05/18 1818)   Pulse   82 (07/05/18 1905)   Resp   16 (07/05/18 1905)     SpO2   93 % (07/05/18 1905)     Post Anesthesia Care and Evaluation    Patient location during evaluation: bedside  Patient participation: complete - patient participated  Level of consciousness: awake  Pain score: 1  Pain management: adequate  Airway patency: patent  Anesthetic complications: No anesthetic complications    Cardiovascular status: acceptable  Respiratory status: acceptable  Hydration status: acceptable    Comments: --------------------            07/05/18 1905     --------------------   BP:       147/73     Pulse:      82       Resp:       16       Temp:                SpO2:      93%      --------------------

## 2018-07-06 VITALS
HEART RATE: 70 BPM | HEIGHT: 72 IN | BODY MASS INDEX: 25.63 KG/M2 | SYSTOLIC BLOOD PRESSURE: 123 MMHG | TEMPERATURE: 97.1 F | RESPIRATION RATE: 16 BRPM | OXYGEN SATURATION: 100 % | WEIGHT: 189.2 LBS | DIASTOLIC BLOOD PRESSURE: 62 MMHG

## 2018-07-06 LAB
ALBUMIN SERPL-MCNC: 3.8 G/DL (ref 3.5–5.2)
ALBUMIN/GLOB SERPL: 1.7 G/DL
ALP SERPL-CCNC: 80 U/L (ref 39–117)
ALT SERPL W P-5'-P-CCNC: 78 U/L (ref 1–41)
ANION GAP SERPL CALCULATED.3IONS-SCNC: 10.5 MMOL/L
AST SERPL-CCNC: 58 U/L (ref 1–40)
BASOPHILS # BLD AUTO: 0 10*3/MM3 (ref 0–0.2)
BASOPHILS NFR BLD AUTO: 0 % (ref 0–1.5)
BILIRUB SERPL-MCNC: 0.9 MG/DL (ref 0.1–1.2)
BUN BLD-MCNC: 16 MG/DL (ref 8–23)
BUN/CREAT SERPL: 19.8 (ref 7–25)
CALCIUM SPEC-SCNC: 8.9 MG/DL (ref 8.6–10.5)
CHLORIDE SERPL-SCNC: 103 MMOL/L (ref 98–107)
CO2 SERPL-SCNC: 27.5 MMOL/L (ref 22–29)
CREAT BLD-MCNC: 0.81 MG/DL (ref 0.76–1.27)
DEPRECATED RDW RBC AUTO: 42.6 FL (ref 37–54)
EOSINOPHIL # BLD AUTO: 0 10*3/MM3 (ref 0–0.7)
EOSINOPHIL NFR BLD AUTO: 0 % (ref 0.3–6.2)
ERYTHROCYTE [DISTWIDTH] IN BLOOD BY AUTOMATED COUNT: 12.2 % (ref 11.5–14.5)
GFR SERPL CREATININE-BSD FRML MDRD: 94 ML/MIN/1.73
GLOBULIN UR ELPH-MCNC: 2.3 GM/DL
GLUCOSE BLD-MCNC: 147 MG/DL (ref 65–99)
HCT VFR BLD AUTO: 34.3 % (ref 40.4–52.2)
HGB BLD-MCNC: 11.5 G/DL (ref 13.7–17.6)
IMM GRANULOCYTES # BLD: 0.02 10*3/MM3 (ref 0–0.03)
IMM GRANULOCYTES NFR BLD: 0.2 % (ref 0–0.5)
LYMPHOCYTES # BLD AUTO: 0.66 10*3/MM3 (ref 0.9–4.8)
LYMPHOCYTES NFR BLD AUTO: 7.1 % (ref 19.6–45.3)
MCH RBC QN AUTO: 32.2 PG (ref 27–32.7)
MCHC RBC AUTO-ENTMCNC: 33.5 G/DL (ref 32.6–36.4)
MCV RBC AUTO: 96.1 FL (ref 79.8–96.2)
MONOCYTES # BLD AUTO: 0.53 10*3/MM3 (ref 0.2–1.2)
MONOCYTES NFR BLD AUTO: 5.7 % (ref 5–12)
NEUTROPHILS # BLD AUTO: 8.07 10*3/MM3 (ref 1.9–8.1)
NEUTROPHILS NFR BLD AUTO: 87.2 % (ref 42.7–76)
PLATELET # BLD AUTO: 195 10*3/MM3 (ref 140–500)
PMV BLD AUTO: 9.8 FL (ref 6–12)
POTASSIUM BLD-SCNC: 4.3 MMOL/L (ref 3.5–5.2)
PROT SERPL-MCNC: 6.1 G/DL (ref 6–8.5)
RBC # BLD AUTO: 3.57 10*6/MM3 (ref 4.6–6)
SODIUM BLD-SCNC: 141 MMOL/L (ref 136–145)
WBC NRBC COR # BLD: 9.26 10*3/MM3 (ref 4.5–10.7)

## 2018-07-06 PROCEDURE — 63710000001 OXYCODONE-ACETAMINOPHEN 5-325 MG TABLET: Performed by: SURGERY

## 2018-07-06 PROCEDURE — 25010000002 PIPERACILLIN SOD-TAZOBACTAM PER 1 G: Performed by: SURGERY

## 2018-07-06 PROCEDURE — 85025 COMPLETE CBC W/AUTO DIFF WBC: CPT | Performed by: SURGERY

## 2018-07-06 PROCEDURE — G0378 HOSPITAL OBSERVATION PER HR: HCPCS

## 2018-07-06 PROCEDURE — A9270 NON-COVERED ITEM OR SERVICE: HCPCS | Performed by: SURGERY

## 2018-07-06 PROCEDURE — 80053 COMPREHEN METABOLIC PANEL: CPT | Performed by: SURGERY

## 2018-07-06 PROCEDURE — 63710000001 METOPROLOL TARTRATE 50 MG TABLET: Performed by: SURGERY

## 2018-07-06 RX ORDER — OXYCODONE HYDROCHLORIDE AND ACETAMINOPHEN 5; 325 MG/1; MG/1
1 TABLET ORAL EVERY 4 HOURS PRN
Qty: 30 TABLET | Refills: 0 | Status: SHIPPED | OUTPATIENT
Start: 2018-07-06 | End: 2018-07-19

## 2018-07-06 RX ADMIN — TAZOBACTAM SODIUM AND PIPERACILLIN SODIUM 3.38 G: 375; 3 INJECTION, SOLUTION INTRAVENOUS at 03:06

## 2018-07-06 RX ADMIN — OXYCODONE HYDROCHLORIDE AND ACETAMINOPHEN 1 TABLET: 5; 325 TABLET ORAL at 06:43

## 2018-07-06 RX ADMIN — OXYCODONE HYDROCHLORIDE AND ACETAMINOPHEN 1 TABLET: 5; 325 TABLET ORAL at 14:28

## 2018-07-06 RX ADMIN — METOPROLOL TARTRATE 50 MG: 50 TABLET, FILM COATED ORAL at 09:56

## 2018-07-06 NOTE — PLAN OF CARE
Problem: Patient Care Overview  Goal: Plan of Care Review  Outcome: Ongoing (interventions implemented as appropriate)   07/06/18 0227   Coping/Psychosocial   Plan of Care Reviewed With patient   Plan of Care Review   Progress improving   OTHER   Outcome Summary client POD 0 lap clemente. VSS, pain well controlled with prn percocet, intermittent nausea alleviated with prn zofran. voiding spontaneously per urinal, wife at bedside. plans to d/c home 7/6. will continue to monitor.      Goal: Individualization and Mutuality  Outcome: Ongoing (interventions implemented as appropriate)    Goal: Discharge Needs Assessment  Outcome: Ongoing (interventions implemented as appropriate)      Problem: Fall Risk (Adult)  Goal: Identify Related Risk Factors and Signs and Symptoms  Outcome: Outcome(s) achieved Date Met: 07/06/18    Goal: Absence of Fall  Outcome: Ongoing (interventions implemented as appropriate)      Problem: Pain, Acute (Adult)  Goal: Acceptable Pain Control/Comfort Level  Outcome: Ongoing (interventions implemented as appropriate)      Problem: Cholecystectomy (Adult)  Goal: Signs and Symptoms of Listed Potential Problems Will be Absent, Minimized or Managed (Cholecystectomy)  Outcome: Ongoing (interventions implemented as appropriate)    Goal: Anesthesia/Sedation Recovery  Outcome: Ongoing (interventions implemented as appropriate)

## 2018-07-06 NOTE — PLAN OF CARE
Problem: Patient Care Overview  Goal: Plan of Care Review  Outcome: Ongoing (interventions implemented as appropriate)   07/05/18 2010   Coping/Psychosocial   Plan of Care Reviewed With patient   Plan of Care Review   Progress improving   OTHER   Outcome Summary Pt. had some pain today that dilaudid was given and it alleviated his pain. Went to surgery in the afternoon. Will continue to monitor.     Goal: Individualization and Mutuality  Outcome: Ongoing (interventions implemented as appropriate)    Goal: Discharge Needs Assessment  Outcome: Ongoing (interventions implemented as appropriate)      Problem: Fall Risk (Adult)  Goal: Absence of Fall  Outcome: Ongoing (interventions implemented as appropriate)      Problem: Pain, Acute (Adult)  Goal: Acceptable Pain Control/Comfort Level  Outcome: Ongoing (interventions implemented as appropriate)

## 2018-07-06 NOTE — PROGRESS NOTES
Case Management Discharge Note    Final Note: Discharged to home on 7/6/18. KRISTEN Friedman RN, CCP    Destination     No service has been selected for the patient.      Durable Medical Equipment     No service has been selected for the patient.      Dialysis/Infusion     No service has been selected for the patient.      Home Medical Care     No service has been selected for the patient.      Social Care     No service has been selected for the patient.        Other: Other (private auto)    Final Discharge Disposition Code: 01 - home or self-care

## 2018-07-06 NOTE — PLAN OF CARE
Problem: Pain, Acute (Adult)  Goal: Identify Related Risk Factors and Signs and Symptoms  Outcome: Outcome(s) achieved Date Met: 07/05/18 07/05/18 2009   Pain, Acute (Adult)   Related Risk Factors (Acute Pain) persistent pain

## 2018-07-06 NOTE — DISCHARGE SUMMARY
Discharge Summary    Patient name: William Ritchie    Medical record number: [id number]    Admission date: 7/5/2018  Discharge date: 7/6/2018     Attending physician: Ashley Fischer MD    Primary care physician: Ameya Sheffield MD    Referring physician: Ashley Fischer MD  [address] [address]    Consulting physician(s): None    Condition on discharge: improving    Admitting diagnosis:   Patient Active Problem List   Diagnosis   • Hyperlipidemia   • Benign essential hypertension   • IFG (impaired fasting glucose)   • Oropharyngeal cancer (CMS/HCC)   • Cancer of base of tongue (CMS/HCC)   • Fitting and adjustment of vascular catheter   • Steroid-induced hyperglycemia   • Dehydration   • Acute renal injury (CMS/HCC)   • Anemia associated with chemotherapy   • Chemotherapy-induced nausea   • GERD (gastroesophageal reflux disease)   • Chemotherapy induced neutropenia (CMS/HCC)   • Adverse effect of antineoplastic and immunosuppressive drugs   • Adverse effect of radiation therapy   • Pharyngitis, acute   • Hypothyroidism due to acquired atrophy of thyroid   • TSH (thyroid-stimulating hormone deficiency)   • Calculus of gallbladder without cholecystitis without obstruction       Final diagnosis: Acute cholecystitis due to cholelithiasis    Procedures: Laparoscopic cholecystectomy with intraoperative cholangiogram performed 7/5/2018    History of present illness: The patient is a  67 y.o. male that was admitted to the hospital with acute onset of severe epigastric abdominal pain. He was found to have multiple gallstones on ultrasonography and was admitted for further management.    Hospital course: Given his leukocytosis in the setting of gallstones, he was admitted and placed on empiric Zosyn for presumed acute cholecystitis. He was taken on the day of admission to the operating room for a laparoscopic cholecystectomy where his gallbladder was indeed found to be acutely inflamed. The  surgery was completed without issue, and his cholangiogram showed no signs of a filling defect within his common bile duct.  Postoperatively, he was kept overnight for observation and is being discharged home on postoperative day #1 in stable condition.  He is tolerating a regular diet, voiding and inability independently, and has instructions to return in 2 weeks for follow-up..    Discharge medications:      Discharge Medications      New Medications      Instructions Start Date   oxyCODONE-acetaminophen 5-325 MG per tablet  Commonly known as:  PERCOCET   1 tablet, Oral, Every 4 Hours PRN         Continue These Medications      Instructions Start Date   metoprolol tartrate 50 MG tablet  Commonly known as:  LOPRESSOR   50 mg, Oral, 2 Times Daily             Discharge instructions:    - Resume regular diet as tolerated. Avoid spicy or fatty/oily foods for the first 2 weeks post-op.  - No activity restrictions post-op.  - No driving while taking narcotic pain medications.  - You may resume showering today, no tub bathing for 2 weeks.  - Wash your incisions with soap and water daily in the shower. Allow the DermaBond surgical glue to wear off on its own.      Follow-up appointment: Follow up with Ashley Fischer MD in the office in 2 weeks. Call for appointment at [phone].    CODE STATUS: Full code

## 2018-07-07 LAB
CYTO UR: NORMAL
LAB AP CASE REPORT: NORMAL
PATH REPORT.FINAL DX SPEC: NORMAL
PATH REPORT.GROSS SPEC: NORMAL

## 2018-07-09 ENCOUNTER — EPISODE CHANGES (OUTPATIENT)
Dept: CASE MANAGEMENT | Facility: OTHER | Age: 68
End: 2018-07-09

## 2018-07-19 ENCOUNTER — OFFICE VISIT (OUTPATIENT)
Dept: SURGERY | Facility: CLINIC | Age: 68
End: 2018-07-19

## 2018-07-19 DIAGNOSIS — K80.00 ACUTE CHOLECYSTITIS DUE TO BILIARY CALCULUS: Primary | ICD-10-CM

## 2018-07-19 PROCEDURE — 99024 POSTOP FOLLOW-UP VISIT: CPT | Performed by: SURGERY

## 2018-07-19 NOTE — PROGRESS NOTES
CHIEF COMPLAINT:   Chief Complaint   Patient presents with   • Post-op     PO Laparoscopic cholecystectomy with intraoperative cholangiogram 7/5/18       HISTORY OF PRESENT ILLNESS:  This is a 67 y.o. male who presents for a post-operative visit after undergoing a laparoscopic cholecystectomy with intraoperative cholangiogram on 7/5/2018 for acute cholecystitis. He feels fantastic, with no real pain after he was discharged and no diarrhea, nausea, vomiting, jaundice, or scleral icterus.  He denies any fevers or chills.    Pathology:   1.  GALLBLADDER, CHOLECYSTECTOMY SPECIMEN:                ACUTE CHOLECYSTITIS WITH GANGRENOUS NECROSIS.               CHOLELITHIASIS.        PHYSICAL EXAM:  Lungs: Clear  Heart: RRR  ABD: Incisions are healing well without any erythema or signs of infection.  Ext: no significant edema, calves nontender    A/P:  This is a 67 y.o. male patient who is S/P laparoscopic cholecystectomy with intraoperative cholangiogram on 7/5/2018 for acute cholecystitis    He is healing beautifully.  I discussed his benign pathology findings including the gangrenous necrosis of portions of the gallbladder wall.  He can follow-up with me as needed, and I told him I'll be happy to remove his port for him here in the office whenever he is cleared following his next PET scan in September.    Ashley Fischer MD  General and Endoscopic Surgery  [facility]    [address], [address]  [place], [place], [address]  P: [phone]  F: [phone]

## 2018-07-20 ENCOUNTER — OFFICE VISIT (OUTPATIENT)
Dept: FAMILY MEDICINE CLINIC | Facility: CLINIC | Age: 68
End: 2018-07-20

## 2018-07-20 ENCOUNTER — TELEPHONE (OUTPATIENT)
Dept: ONCOLOGY | Facility: CLINIC | Age: 68
End: 2018-07-20

## 2018-07-20 VITALS
HEIGHT: 72 IN | SYSTOLIC BLOOD PRESSURE: 131 MMHG | RESPIRATION RATE: 16 BRPM | BODY MASS INDEX: 25.87 KG/M2 | TEMPERATURE: 97.9 F | WEIGHT: 191 LBS | OXYGEN SATURATION: 98 % | DIASTOLIC BLOOD PRESSURE: 65 MMHG | HEART RATE: 60 BPM

## 2018-07-20 DIAGNOSIS — R25.2 CRAMP IN MUSCLE: Primary | ICD-10-CM

## 2018-07-20 PROCEDURE — 99213 OFFICE O/P EST LOW 20 MIN: CPT | Performed by: NURSE PRACTITIONER

## 2018-07-20 RX ORDER — CYCLOBENZAPRINE HCL 5 MG
5 TABLET ORAL NIGHTLY PRN
Qty: 30 TABLET | Refills: 0 | Status: SHIPPED | OUTPATIENT
Start: 2018-07-20 | End: 2018-08-13 | Stop reason: SDUPTHER

## 2018-07-20 NOTE — TELEPHONE ENCOUNTER
Pt has been having a pain in his calf on and off this morning. No redness in calf, no swelling, no warmth to area. Pt not on any treatment, only port flushes and observation. Told him to stay hydrated and get some electrolytes in, if it persist to call his PCP.     ----- Message from Ruth Ramos sent at 7/20/2018  9:14 AM EDT -----  Contact: [phone]  Pt having pain in his right calf and leg area.

## 2018-07-20 NOTE — PROGRESS NOTES
Subjective   William Ritchie is a 67 y.o. male.     History of Present Illness   Patient complains of right calf pain. The symptoms began this morning around 5 AM. .  Pain is a result of no known event. Pain is located upper right calf region. Discomfort is described as cramping, aching and spasm. Patient states it woke him if from sleep. He got out of bed and walked around. States it did not go away quickly the way a muscle cramp typically does.  It has gradually subsided.  He started drinking gatorade and reports pain has almost resolved within the last couple of hours.  He denies swelling, redness or warmth.  Reports being active but has not done anything to injury it or strain it.  States he tries to drink enough water but wonders if he was slightly dehydrated.      The following portions of the patient's history were reviewed and updated as appropriate: allergies, current medications, past family history, past medical history, past social history, past surgical history and problem list.    Review of Systems   Constitutional: Negative for chills, fatigue, fever and unexpected weight change.   Respiratory: Negative for shortness of breath.    Cardiovascular: Negative for leg swelling.   Musculoskeletal: Positive for myalgias. Negative for arthralgias, gait problem and joint swelling.   Neurological: Negative for weakness and numbness.       Objective   Physical Exam   Constitutional: He is oriented to person, place, and time. He appears well-developed and well-nourished.   Pulmonary/Chest: Effort normal.   Musculoskeletal:        Right lower leg: He exhibits no tenderness, no bony tenderness, no swelling, no edema, no deformity and no laceration.   Neurological: He is oriented to person, place, and time.   Skin: Skin is warm and dry.   Psychiatric: He has a normal mood and affect. His behavior is normal. Judgment and thought content normal.   Nursing note and vitals reviewed.      Assessment/Plan   William was  seen today for leg cramps.    Diagnoses and all orders for this visit:    Cramp in muscle  -     cyclobenzaprine (FLEXERIL) 5 MG tablet; Take 1 tablet by mouth At Night As Needed for Muscle Spasms.        No further workup as symptoms seem to have resolved. Patient was given low dose flexeril to take if symptoms recurs.  He is to f/u if any calf swelling, redness, warmth or recurrence of pain.

## 2018-07-26 ENCOUNTER — INFUSION (OUTPATIENT)
Dept: ONCOLOGY | Facility: HOSPITAL | Age: 68
End: 2018-07-26

## 2018-07-26 DIAGNOSIS — Z45.2 FITTING AND ADJUSTMENT OF VASCULAR CATHETER: Primary | ICD-10-CM

## 2018-07-26 PROCEDURE — 96523 IRRIG DRUG DELIVERY DEVICE: CPT | Performed by: INTERNAL MEDICINE

## 2018-07-26 PROCEDURE — 25010000002 HEPARIN FLUSH (PORCINE) 100 UNIT/ML SOLUTION: Performed by: INTERNAL MEDICINE

## 2018-07-26 RX ORDER — SODIUM CHLORIDE 0.9 % (FLUSH) 0.9 %
10 SYRINGE (ML) INJECTION AS NEEDED
Status: CANCELLED | OUTPATIENT
Start: 2018-07-26

## 2018-07-26 RX ORDER — SODIUM CHLORIDE 0.9 % (FLUSH) 0.9 %
10 SYRINGE (ML) INJECTION AS NEEDED
Status: DISCONTINUED | OUTPATIENT
Start: 2018-07-26 | End: 2018-07-26 | Stop reason: HOSPADM

## 2018-07-26 RX ADMIN — SODIUM CHLORIDE, PRESERVATIVE FREE 500 UNITS: 5 INJECTION INTRAVENOUS at 10:34

## 2018-07-26 RX ADMIN — Medication 10 ML: at 10:34

## 2018-08-06 ENCOUNTER — TELEPHONE (OUTPATIENT)
Dept: ONCOLOGY | Facility: CLINIC | Age: 68
End: 2018-08-06

## 2018-08-06 NOTE — TELEPHONE ENCOUNTER
Patient asking if he needs scans prior to next visit in Sept.  According to Dr. Ballesteros's note, patient to see Dr. Ballesteros and have labs drawn on Sept visit and scan in December. Pt instructed on same.  Pt v/u

## 2018-08-07 ENCOUNTER — TELEPHONE (OUTPATIENT)
Dept: GENERAL RADIOLOGY | Facility: HOSPITAL | Age: 68
End: 2018-08-07

## 2018-08-07 ENCOUNTER — HOSPITAL ENCOUNTER (OUTPATIENT)
Dept: CARDIOLOGY | Facility: HOSPITAL | Age: 68
Discharge: HOME | End: 2018-08-07
Attending: INTERNAL MEDICINE | Admitting: INTERNAL MEDICINE

## 2018-08-07 ENCOUNTER — TELEPHONE (OUTPATIENT)
Dept: ONCOLOGY | Facility: HOSPITAL | Age: 68
End: 2018-08-07

## 2018-08-07 DIAGNOSIS — M79.604 RIGHT LEG PAIN: Primary | ICD-10-CM

## 2018-08-07 LAB
BH CV LOWER VASCULAR LEFT COMMON FEMORAL AUGMENT: NORMAL
BH CV LOWER VASCULAR LEFT COMMON FEMORAL COMPETENT: NORMAL
BH CV LOWER VASCULAR LEFT COMMON FEMORAL COMPRESS: NORMAL
BH CV LOWER VASCULAR LEFT COMMON FEMORAL PHASIC: NORMAL
BH CV LOWER VASCULAR LEFT COMMON FEMORAL SPONT: NORMAL
BH CV LOWER VASCULAR RIGHT COMMON FEMORAL AUGMENT: NORMAL
BH CV LOWER VASCULAR RIGHT COMMON FEMORAL COMPETENT: NORMAL
BH CV LOWER VASCULAR RIGHT COMMON FEMORAL COMPRESS: NORMAL
BH CV LOWER VASCULAR RIGHT COMMON FEMORAL PHASIC: NORMAL
BH CV LOWER VASCULAR RIGHT COMMON FEMORAL SPONT: NORMAL
BH CV LOWER VASCULAR RIGHT DISTAL FEMORAL COMPRESS: NORMAL
BH CV LOWER VASCULAR RIGHT GASTRONEMIUS COMPRESS: NORMAL
BH CV LOWER VASCULAR RIGHT GREATER SAPH AK COMPRESS: NORMAL
BH CV LOWER VASCULAR RIGHT GREATER SAPH BK COMPRESS: NORMAL
BH CV LOWER VASCULAR RIGHT LESSER SAPH COMPRESS: NORMAL
BH CV LOWER VASCULAR RIGHT MID FEMORAL AUGMENT: NORMAL
BH CV LOWER VASCULAR RIGHT MID FEMORAL COMPETENT: NORMAL
BH CV LOWER VASCULAR RIGHT MID FEMORAL COMPRESS: NORMAL
BH CV LOWER VASCULAR RIGHT MID FEMORAL PHASIC: NORMAL
BH CV LOWER VASCULAR RIGHT MID FEMORAL SPONT: NORMAL
BH CV LOWER VASCULAR RIGHT PERONEAL COMPRESS: NORMAL
BH CV LOWER VASCULAR RIGHT POPLITEAL AUGMENT: NORMAL
BH CV LOWER VASCULAR RIGHT POPLITEAL COMPETENT: NORMAL
BH CV LOWER VASCULAR RIGHT POPLITEAL COMPRESS: NORMAL
BH CV LOWER VASCULAR RIGHT POPLITEAL PHASIC: NORMAL
BH CV LOWER VASCULAR RIGHT POPLITEAL SPONT: NORMAL
BH CV LOWER VASCULAR RIGHT POSTERIOR TIBIAL COMPRESS: NORMAL
BH CV LOWER VASCULAR RIGHT PROXIMAL FEMORAL COMPRESS: NORMAL
BH CV LOWER VASCULAR RIGHT SAPHENOFEMORAL JUNCTION AUGMENT: NORMAL
BH CV LOWER VASCULAR RIGHT SAPHENOFEMORAL JUNCTION COMPETENT: NORMAL
BH CV LOWER VASCULAR RIGHT SAPHENOFEMORAL JUNCTION COMPRESS: NORMAL
BH CV LOWER VASCULAR RIGHT SAPHENOFEMORAL JUNCTION PHASIC: NORMAL
BH CV LOWER VASCULAR RIGHT SAPHENOFEMORAL JUNCTION SPONT: NORMAL

## 2018-08-07 PROCEDURE — 93971 EXTREMITY STUDY: CPT

## 2018-08-07 NOTE — TELEPHONE ENCOUNTER
----- Message from Genet Carreno RN sent at 8/7/2018  1:08 PM EDT -----  Please schedule doppler right leg today per Dr. Ballesteros and call pt with appt info. Dr. Ballesteros put order in. Thanks, Genet

## 2018-08-07 NOTE — TELEPHONE ENCOUNTER
----- Message from Shasta Pa sent at 8/7/2018 12:45 PM EDT -----  Regarding: calf pain  Contact: [phone]  Pt is having pain to his calf

## 2018-08-07 NOTE — TELEPHONE ENCOUNTER
Pt calls c/o sharp, shooting pain in right calf that began this morning. He states he had similar pain about two weeks ago and saw PCP. It was thought to be muscle spasms and pt was given flexeril. He stated the pain stopped until this morning. Denies redness, swelling, or warmth to leg. He took flexeril this morning and pain subsided for about an hour. Reviewed with Dr. Ballesteros and doppler ordered. Pt informed and v/u. Appt desk notified and will schedule and call pt with appt info.

## 2018-08-08 ENCOUNTER — TELEPHONE (OUTPATIENT)
Dept: ONCOLOGY | Facility: HOSPITAL | Age: 68
End: 2018-08-08

## 2018-08-08 NOTE — TELEPHONE ENCOUNTER
----- Message from Shasta Pa sent at 8/8/2018 12:23 PM EDT -----  Regarding: doppler results  Contact: [phone]  Pt is calling to get  results for venous doppler  Notified patient results normal.

## 2018-08-13 ENCOUNTER — OFFICE VISIT (OUTPATIENT)
Dept: FAMILY MEDICINE CLINIC | Facility: CLINIC | Age: 68
End: 2018-08-13

## 2018-08-13 VITALS
DIASTOLIC BLOOD PRESSURE: 62 MMHG | RESPIRATION RATE: 16 BRPM | TEMPERATURE: 98.7 F | BODY MASS INDEX: 25.87 KG/M2 | HEART RATE: 70 BPM | SYSTOLIC BLOOD PRESSURE: 126 MMHG | HEIGHT: 72 IN | WEIGHT: 191 LBS

## 2018-08-13 DIAGNOSIS — R25.2 CRAMP IN MUSCLE: ICD-10-CM

## 2018-08-13 DIAGNOSIS — I10 BENIGN ESSENTIAL HYPERTENSION: ICD-10-CM

## 2018-08-13 PROCEDURE — 99213 OFFICE O/P EST LOW 20 MIN: CPT | Performed by: FAMILY MEDICINE

## 2018-08-13 RX ORDER — METOPROLOL TARTRATE 50 MG/1
50 TABLET, FILM COATED ORAL 2 TIMES DAILY
Qty: 180 TABLET | Refills: 3 | Status: SHIPPED | OUTPATIENT
Start: 2018-08-13 | End: 2019-12-09 | Stop reason: SDUPTHER

## 2018-08-13 RX ORDER — CYCLOBENZAPRINE HCL 5 MG
5 TABLET ORAL 2 TIMES DAILY PRN
Qty: 60 TABLET | Refills: 5 | Status: SHIPPED | OUTPATIENT
Start: 2018-08-13 | End: 2020-02-04 | Stop reason: SDUPTHER

## 2018-08-13 NOTE — PROGRESS NOTES
"Subjective   William Ritchie is a 68 y.o. male.     History of Present Illness     Chief Complaint:   Chief Complaint   Patient presents with   • Hypertension     med refill - [facility]       William Ritchie 68 y.o. male who presents today for Medical Management of the below listed issues and medication refills.  he has a problem list of   Patient Active Problem List   Diagnosis   • Hyperlipidemia   • Benign essential hypertension   • IFG (impaired fasting glucose)   • Oropharyngeal cancer (CMS/HCC)   • Cancer of base of tongue (CMS/HCC)   • Fitting and adjustment of vascular catheter   • Steroid-induced hyperglycemia   • Dehydration   • Acute renal injury (CMS/HCC)   • Anemia associated with chemotherapy   • Chemotherapy-induced nausea   • GERD (gastroesophageal reflux disease)   • Chemotherapy induced neutropenia (CMS/HCC)   • Adverse effect of antineoplastic and immunosuppressive drugs   • Adverse effect of radiation therapy   • Pharyngitis, acute   • Hypothyroidism due to acquired atrophy of thyroid   • TSH (thyroid-stimulating hormone deficiency)   • Calculus of gallbladder without cholecystitis without obstruction   • Right leg pain   .  Since the last visit, he has overall felt well.  he has been compliant with   Current Outpatient Prescriptions:   •  metoprolol tartrate (LOPRESSOR) 50 MG tablet, Take 1 tablet by mouth 2 (Two) Times a Day., Disp: 180 tablet, Rfl: 3  •  cyclobenzaprine (FLEXERIL) 5 MG tablet, Take 1 tablet by mouth 2 (Two) Times a Day As Needed for Muscle Spasms., Disp: 60 tablet, Rfl: 5.  he denies medication side effects.    All of the chronic condition(s) listed above are stable w/o issues.    /62   Pulse 70   Temp 98.7 °F (37.1 °C) (Oral)   Resp 16   Ht 182.9 cm (72\")   Wt 86.6 kg (191 lb)   BMI 25.90 kg/m²     Results for orders placed or performed in visit on 08/07/18   Duplex Venous Lower Extremity - Right CAR   Result Value Ref Range    Right Common Femoral Spont " Y     Right Common Femoral Phasic Y     Right Common Femoral Augment Y     Right Common Femoral Competent Y     Right Common Femoral Compress C     Right Saphenofemoral Junction Spont Y     Right Saphenofemoral Junction Phasic Y     Right Saphenofemoral Junction Augment Y     Right Saphenofemoral Junction Competent Y     Right Saphenofemoral Junction Compress C     Right Proximal Femoral Compress C     Right Mid Femoral Spont Y     Right Mid Femoral Phasic Y     Right Mid Femoral Augment Y     Right Mid Femoral Competent Y     Right Mid Femoral Compress C     Right Distal Femoral Compress C     Right Popliteal Spont Y     Right Popliteal Phasic Y     Right Popliteal Augment Y     Right Popliteal Competent Y     Right Popliteal Compress C     Right Posterior Tibial Compress C     Right Peroneal Compress C     Right GastronemiusSoleal Compress C     Right Greater Saph AK Compress C     Right Greater Saph BK Compress C     Right Lesser Saph Compress C     Left Common Femoral Spont Y     Left Common Femoral Phasic Y     Left Common Femoral Augment Y     Left Common Femoral Competent Y     Left Common Femoral Compress C            The following portions of the patient's history were reviewed and updated as appropriate: allergies, current medications, past family history, past medical history, past social history, past surgical history and problem list.    Review of Systems   Constitutional: Negative for activity change, chills, fatigue and fever.   Respiratory: Negative for cough and shortness of breath.    Cardiovascular: Negative for chest pain and palpitations.   Gastrointestinal: Negative for abdominal pain.   Endocrine: Negative for cold intolerance.   Psychiatric/Behavioral: Negative for behavioral problems and dysphoric mood. The patient is not nervous/anxious.        Objective   Physical Exam   Constitutional: He appears well-developed and well-nourished.   Neck: Neck supple. No thyromegaly present.    Cardiovascular: Normal rate and regular rhythm.    No murmur heard.  Pulmonary/Chest: Effort normal and breath sounds normal.   Abdominal: Bowel sounds are normal. There is no tenderness.   Psychiatric: He has a normal mood and affect. His behavior is normal.   Nursing note and vitals reviewed.  Labs reviewed with pt today during visit. All questions answered.      Assessment/Plan   William was seen today for hypertension.    Diagnoses and all orders for this visit:    Benign essential hypertension  -     metoprolol tartrate (LOPRESSOR) 50 MG tablet; Take 1 tablet by mouth 2 (Two) Times a Day.    Cramp in muscle  -     cyclobenzaprine (FLEXERIL) 5 MG tablet; Take 1 tablet by mouth 2 (Two) Times a Day As Needed for Muscle Spasms.

## 2018-08-16 DIAGNOSIS — R25.2 CRAMP IN MUSCLE: ICD-10-CM

## 2018-08-16 RX ORDER — CYCLOBENZAPRINE HCL 5 MG
5 TABLET ORAL NIGHTLY PRN
Qty: 30 TABLET | Refills: 0 | Status: SHIPPED | OUTPATIENT
Start: 2018-08-16 | End: 2018-09-12

## 2018-09-07 ENCOUNTER — LAB (OUTPATIENT)
Dept: ONCOLOGY | Facility: HOSPITAL | Age: 68
End: 2018-09-07

## 2018-09-07 ENCOUNTER — OFFICE VISIT (OUTPATIENT)
Dept: ONCOLOGY | Facility: CLINIC | Age: 68
End: 2018-09-07

## 2018-09-07 VITALS
HEART RATE: 60 BPM | HEIGHT: 73 IN | TEMPERATURE: 97.7 F | OXYGEN SATURATION: 98 % | RESPIRATION RATE: 14 BRPM | SYSTOLIC BLOOD PRESSURE: 132 MMHG | WEIGHT: 187.8 LBS | DIASTOLIC BLOOD PRESSURE: 60 MMHG | BODY MASS INDEX: 24.89 KG/M2

## 2018-09-07 DIAGNOSIS — D64.81 ANEMIA ASSOCIATED WITH CHEMOTHERAPY: ICD-10-CM

## 2018-09-07 DIAGNOSIS — D64.81 ANEMIA ASSOCIATED WITH CHEMOTHERAPY: Primary | ICD-10-CM

## 2018-09-07 DIAGNOSIS — E03.4 HYPOTHYROIDISM DUE TO ACQUIRED ATROPHY OF THYROID: ICD-10-CM

## 2018-09-07 DIAGNOSIS — Z45.2 FITTING AND ADJUSTMENT OF VASCULAR CATHETER: ICD-10-CM

## 2018-09-07 DIAGNOSIS — T45.1X5A ANEMIA ASSOCIATED WITH CHEMOTHERAPY: Primary | ICD-10-CM

## 2018-09-07 DIAGNOSIS — C10.9 OROPHARYNGEAL CANCER (HCC): ICD-10-CM

## 2018-09-07 DIAGNOSIS — C10.9 OROPHARYNGEAL CANCER (HCC): Primary | ICD-10-CM

## 2018-09-07 DIAGNOSIS — T45.1X5A ANEMIA ASSOCIATED WITH CHEMOTHERAPY: ICD-10-CM

## 2018-09-07 LAB
ALBUMIN SERPL-MCNC: 4 G/DL (ref 3.5–5.2)
ALBUMIN/GLOB SERPL: 1.5 G/DL (ref 1.1–2.4)
ALP SERPL-CCNC: 97 U/L (ref 38–116)
ALT SERPL W P-5'-P-CCNC: 22 U/L (ref 0–41)
ANION GAP SERPL CALCULATED.3IONS-SCNC: 10.4 MMOL/L
AST SERPL-CCNC: 17 U/L (ref 0–40)
BASOPHILS # BLD AUTO: 0.03 10*3/MM3 (ref 0–0.1)
BASOPHILS NFR BLD AUTO: 0.6 % (ref 0–1.1)
BILIRUB SERPL-MCNC: 0.4 MG/DL (ref 0.1–1.2)
BUN BLD-MCNC: 13 MG/DL (ref 6–20)
BUN/CREAT SERPL: 16.9 (ref 7.3–30)
CALCIUM SPEC-SCNC: 9.4 MG/DL (ref 8.5–10.2)
CHLORIDE SERPL-SCNC: 101 MMOL/L (ref 98–107)
CO2 SERPL-SCNC: 28.6 MMOL/L (ref 22–29)
CREAT BLD-MCNC: 0.77 MG/DL (ref 0.7–1.3)
DEPRECATED RDW RBC AUTO: 39.4 FL (ref 37–49)
EOSINOPHIL # BLD AUTO: 0.48 10*3/MM3 (ref 0–0.36)
EOSINOPHIL NFR BLD AUTO: 9 % (ref 1–5)
ERYTHROCYTE [DISTWIDTH] IN BLOOD BY AUTOMATED COUNT: 11.6 % (ref 11.7–14.5)
FERRITIN SERPL-MCNC: 274.1 NG/ML (ref 30–400)
GFR SERPL CREATININE-BSD FRML MDRD: 100 ML/MIN/1.73
GLOBULIN UR ELPH-MCNC: 2.7 GM/DL (ref 1.8–3.5)
GLUCOSE BLD-MCNC: 166 MG/DL (ref 74–124)
HCT VFR BLD AUTO: 39.1 % (ref 40–49)
HGB BLD-MCNC: 13.5 G/DL (ref 13.5–16.5)
IMM GRANULOCYTES # BLD: 0.02 10*3/MM3 (ref 0–0.03)
IMM GRANULOCYTES NFR BLD: 0.4 % (ref 0–0.5)
IRON 24H UR-MRATE: 100 MCG/DL (ref 59–158)
IRON SATN MFR SERPL: 36 % (ref 14–48)
LYMPHOCYTES # BLD AUTO: 0.96 10*3/MM3 (ref 1–3.5)
LYMPHOCYTES NFR BLD AUTO: 17.9 % (ref 20–49)
MCH RBC QN AUTO: 32.5 PG (ref 27–33)
MCHC RBC AUTO-ENTMCNC: 34.5 G/DL (ref 32–35)
MCV RBC AUTO: 94 FL (ref 83–97)
MONOCYTES # BLD AUTO: 0.48 10*3/MM3 (ref 0.25–0.8)
MONOCYTES NFR BLD AUTO: 9 % (ref 4–12)
NEUTROPHILS # BLD AUTO: 3.38 10*3/MM3 (ref 1.5–7)
NEUTROPHILS NFR BLD AUTO: 63.1 % (ref 39–75)
NRBC BLD MANUAL-RTO: 0 /100 WBC (ref 0–0)
PLATELET # BLD AUTO: 206 10*3/MM3 (ref 150–375)
PMV BLD AUTO: 9.5 FL (ref 8.9–12.1)
POTASSIUM BLD-SCNC: 4.3 MMOL/L (ref 3.5–4.7)
PROT SERPL-MCNC: 6.7 G/DL (ref 6.3–8)
RBC # BLD AUTO: 4.16 10*6/MM3 (ref 4.3–5.5)
SODIUM BLD-SCNC: 140 MMOL/L (ref 134–145)
TIBC SERPL-MCNC: 274 MCG/DL (ref 249–505)
TRANSFERRIN SERPL-MCNC: 196 MG/DL (ref 200–360)
TSH SERPL DL<=0.05 MIU/L-ACNC: 2.87 MIU/ML (ref 0.27–4.2)
VIT B12 BLD-MCNC: 631 PG/ML (ref 211–946)
WBC NRBC COR # BLD: 5.35 10*3/MM3 (ref 4–10)

## 2018-09-07 PROCEDURE — 85025 COMPLETE CBC W/AUTO DIFF WBC: CPT | Performed by: INTERNAL MEDICINE

## 2018-09-07 PROCEDURE — 83540 ASSAY OF IRON: CPT | Performed by: INTERNAL MEDICINE

## 2018-09-07 PROCEDURE — 84466 ASSAY OF TRANSFERRIN: CPT | Performed by: INTERNAL MEDICINE

## 2018-09-07 PROCEDURE — 84443 ASSAY THYROID STIM HORMONE: CPT | Performed by: INTERNAL MEDICINE

## 2018-09-07 PROCEDURE — 25010000002 HEPARIN FLUSH (PORCINE) 100 UNIT/ML SOLUTION: Performed by: INTERNAL MEDICINE

## 2018-09-07 PROCEDURE — 82607 VITAMIN B-12: CPT | Performed by: INTERNAL MEDICINE

## 2018-09-07 PROCEDURE — 82728 ASSAY OF FERRITIN: CPT | Performed by: INTERNAL MEDICINE

## 2018-09-07 PROCEDURE — 96523 IRRIG DRUG DELIVERY DEVICE: CPT | Performed by: INTERNAL MEDICINE

## 2018-09-07 PROCEDURE — 99214 OFFICE O/P EST MOD 30 MIN: CPT | Performed by: INTERNAL MEDICINE

## 2018-09-07 PROCEDURE — 80053 COMPREHEN METABOLIC PANEL: CPT | Performed by: INTERNAL MEDICINE

## 2018-09-07 RX ORDER — SODIUM CHLORIDE 0.9 % (FLUSH) 0.9 %
10 SYRINGE (ML) INJECTION AS NEEDED
Status: DISCONTINUED | OUTPATIENT
Start: 2018-09-07 | End: 2018-09-07 | Stop reason: HOSPADM

## 2018-09-07 RX ORDER — SODIUM CHLORIDE 0.9 % (FLUSH) 0.9 %
10 SYRINGE (ML) INJECTION AS NEEDED
Status: CANCELLED | OUTPATIENT
Start: 2018-09-07

## 2018-09-07 RX ORDER — HEPARIN SODIUM (PORCINE) LOCK FLUSH IV SOLN 100 UNIT/ML 100 UNIT/ML
500 SOLUTION INTRAVENOUS AS NEEDED
Status: CANCELLED | OUTPATIENT
Start: 2018-09-07

## 2018-09-07 RX ADMIN — Medication 10 ML: at 10:48

## 2018-09-07 RX ADMIN — SODIUM CHLORIDE, PRESERVATIVE FREE 500 UNITS: 5 INJECTION INTRAVENOUS at 10:48

## 2018-09-07 NOTE — PROGRESS NOTES
Subjective .     REASONS FOR FOLLOWUP:    1.  Squamous cell carcinoma of the base of tongue, stage YEN (T1N2b), HPV status is not clear.  Started concurrent chemoradiation therapy on 12/4/2017 using cisplatin repeating every 3 weeks.    2.  Moderate neutropenia secondary to concurrent chemoradiotherapy.  Cycle #3 cisplatin was delayed and then canceled.    3.  Radiation therapy finished on 1/23/2018.  PEG tube was removed in May 2018.    4. PET scan on 3/15/2018 reported essentially complete resolution of hypermetabolic lesion at the left tongue base.  The left neck lymphadenopathy also showed blood pool activity.    6.  CT scan examination on 6/11/2018 showed no evidence of disease progression.    HISTORY OF PRESENT ILLNESS:  The patient is a 68 y.o. year old male who is here for 3-month follow-up with the above-mentioned history.  Patient is accompanied by his wife who helped with history.     I noticed the patient had a cholecystectomy in July 2018 and hospitalized at [facility].  Patient reports he recovered from the surgery.  No recurrent abdomen pain.    Patient reports he still does not have much taste in his mouth, but he has been able to eat properly, no dysphagia or odynophagia.  He has stable weight.   His performance status is ECOG 0.  Denies fever sweating or chills.        Past Medical History:   Diagnosis Date   • Benign essential hypertension    • H/O complete eye exam due   • H/O Neck mass     left   • Hyperlipidemia    • IFG (impaired fasting glucose)    • Seasonal allergies    • Tongue cancer (CMS/HCC) 11/01/2017    Left base of tongue squamous cell carcinoma, stage YEN, recieved chemo and radiation therapy     Past Surgical History:   Procedure Laterality Date   • CHOLECYSTECTOMY  07/05/2018   • CHOLECYSTECTOMY WITH INTRAOPERATIVE CHOLANGIOGRAM N/A 7/5/2018    Procedure: Laparoscopic cholecystectomy with intraoperative cholangiogram ;  Surgeon: Ashley Fischer MD;  Location:  [facility] MAIN OR;  Service: General   • COLONOSCOPY N/A 2015    normal colonoscopy-Dr. Galen Morrissey   • COLONOSCOPY N/A 05/04/2011    Normal colonoscopy-Dr. Tobias Emerson   • ENDOSCOPY W/ PEG TUBE PLACEMENT N/A 11/28/2017    Procedure: ESOPHAGOGASTRODUODENOSCOPY WITH PERCUTANEOUS ENDOSCOPIC GASTROSTOMY TUBE INSERTION;  Surgeon: Isma Hercules MD;  Location: [facility] OR;  Service:    • FINE NEEDLE ASPIRATION Left 10/13/2017    Ultrasound guidance for fine needle biopsy and fine needle aspiration with ultrasonic guidance of left neck mass-Dr. Frank Marques   • INGUINAL HERNIA REPAIR Left 1994    Dr. Brian Peres   • INGUINAL HERNIA REPAIR Right 1993    Dr. Brian Peres   • LARYNGOPLASTY      Laryngoplasty with biopsy-Dr. Del Toro   • NH INSJ TUNNELED CVC W/O SUBQ PORT/ AGE 3 YR/> Left 11/28/2017    Procedure: INSERTION VENOUS ACCESS DEVICE;  Surgeon: Isma Hercules MD;  Location: [facility] OR;  Service: General   • TONGUE BIOPSY / EXCISION N/A 11/01/2017    Biopsy of the left tongue base-Dr. Zane Del Toro   • TONSILLECTOMY AND ADENOIDECTOMY Bilateral 11/01/2017    Dr. Zane Del Toro   Removal PEG tube on 5/2/2018     HEMATOLOGIC/ONCOLOGIC HISTORY:  Mr. Ritchie is a 67 y.o. male who is here on 11/16/2017 for evaluation accompanied by his wife, referred by radiation oncologist, Dr. Aguirre, because of newly diagnosed squamous cell carcinoma of the left tongue base, stage YEN, [id number].      Patient previously only had history of mild hypertension which was controlled. Recently in early 09/2017 when he was on vacation, he felt a left neck nodule when he was shaving. He denies any pain associated with that. Patient was seen by his primary care physician, Dr. Sheffield, for evaluation and was referred to ENT, Dr. Zane Del Toro. Patient subsequently had CT of the neck examination at [facility] on 09/22/2017. This study reported a left neck mass measuring 3.5 x 3.1 x 2.4 cm with  cystic/necrotic changes located at the region of the left level II jugular chain. There were additional small homogeneous cervical lymph nodes but possibly reactive.      Patient subsequently had ultrasound of the neck on 10/13/2017 associated with fine-needle aspiration biopsy at Dr. Del Toro' office. The ultrasound reported 2 nodules in left neck. The large one measured about 3.28 cm x 2.67 cm x 3.28 cm. The 2nd smaller one measures 1.56 cm x 0.99 cm x 1.48 cm, just below the large mass. Patient had fine-needle aspiration biopsy at the same time. Pathology evaluation from the [facility] reported poorly differentiated squamous cell carcinoma with degeneration and necrosis. The viable tumor cells exhibit strong nuclear reactivity for both p40 and p63.     Patient subsequently had tonsillectomy, biopsy of the left tongue base and adenoidectomy on 11/01/2017 by Dr. Del Toro. Pathology evaluation from [facility] reported moderately differentiated squamous cell carcinoma. The left tonsil has no evidence of malignancy. Right tonsil also was benign. The adenoid tissue was also benign.      Patient reports the left neck mass is growing. He also reports poor appetite after tonsillectomy. For the past couple of weeks since the biopsy, he lost about 6 pounds. He denies nausea or vomiting. He has actually started feeling better with improved appetite. Denies pain. Denies fever or sweating.      This patient reports he never smoked cigarette. He is only a very rare social drinker. He told me the test for HPV is ongoing.      Patient also had PET scan examination obtained on 11/14/2017. This study reported focal hypermetabolism with SUV 15.9 corresponding to the left-sided base of the tongue. There was moderate enlarged left jugular chain lymph node which is hypermetabolic but without measuring SUV. There was also mild hypermetabolism identified within several additional smaller left jugular chain lymph nodes. There was no  hypermetabolic lymphadenopathy elsewhere in the neck nor foci in the chest, abdomen or pelvis.    Patient was started on concurrent chemoradiation therapy with cisplatin every 3 weeks.  He received 2 cycles chemotherapy and due to poor tolerance with acute renal injury and neutropenia, cycle #3 cisplatin was canceled.     Radiation therapy finished on 1/23/2018.    PET scan on 3/15/2018 reported essentially complete resolution of hypermetabolic lesion at the left tongue base.  The left and neck lymphadenopathy also showing, with blood pool activity.    His CT scan examination of the neck on 6/11/2018 reported no evidence of local disease recurrence, a stable left neck adenopathy, maybe 2 mm smaller compared to the PET scan obtained in March 2018.  Laboratory study on the same day reported normal renal function with a creatinine 0.73 normal electrolytes and liver function panel, stable mild anemia with hemoglobin 12.4 and normal WBC and platelets.        MEDICATIONS    Current Outpatient Prescriptions:   •  cyclobenzaprine (FLEXERIL) 5 MG tablet, Take 1 tablet by mouth 2 (Two) Times a Day As Needed for Muscle Spasms., Disp: 60 tablet, Rfl: 5  •  cyclobenzaprine (FLEXERIL) 5 MG tablet, TAKE 1 TABLET BY MOUTH AT NIGHT AS NEEDED FOR MUSCLE SPASMS., Disp: 30 tablet, Rfl: 0  •  metoprolol tartrate (LOPRESSOR) 50 MG tablet, Take 1 tablet by mouth 2 (Two) Times a Day., Disp: 180 tablet, Rfl: 3  No current facility-administered medications for this visit.     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 he does report frequent cold sensitivity.     SKIN: No nonhealing lesions. No rashes.  HEME/LYMPH: No easy bruising, bleeding.   EYES: No vision changes or diplopia.   ENT: No hearing loss, gum bleeding, epistaxis, hoarseness or dysphagia.   Has not recovered taste in his mouth.    RESPIRATORY: No cough, shortness of breath, hemoptysis or wheezing.   CVS: No chest pain, palpitations, orthopnea, dyspnea on exertion.   GI: No nausea, vomiting.  No abdominal pain.   No constipation, diarrhea.  No melena or hematochezia.  : No lower tract obstructive symptoms, dysuria or hematuria.   MUSCULOSKELETAL: No bone pain.  No joint stiffness.   NEUROLOGICAL: No global weakness, loss of consciousness or seizures.   PSYCHIATRIC: No increased nervousness, mood changes or depression.     Objective    Vitals:    09/07/18 1102   BP: 132/60   Pulse: 60   Resp: 14   Temp: 97.7 °F (36.5 °C)   TempSrc: Oral   SpO2: 98%   Weight: 85.2 kg (187 lb 12.8 oz)   Height: 185 cm (72.84\")   PainSc: 0-No pain     Current Status 9/7/2018   ECOG score 0      PHYSICAL EXAM:    GENERAL:  Well-developed, well-nourished male in no acute distress.   SKIN:  Warm, dry without rashes, purpura or petechiae.    EYES:  Pupils equal, round and reactive to light.  EOMs intact.  Conjunctivae normal.  EARS:  Hearing intact.  NOSE: No nasal discharge.  MOUTH:  Tongue is well-papillated; no stomatitis or ulcers.  No evidence of fungal infection.  Lips normal.  THROAT:  Oropharynx without lesions or exudates.    NECK:  Supple with good range of motion; no thyromegaly.   LYMPHATICS:  No palpable lymphadenopathy in the neck, supraclavicular or " axillary adenopathy.    CHEST:  Lungs clear to auscultation. Good airflow.  CARDIAC:  Regular rate and rhythm without murmurs, rubs or gallops. Normal S1,S2.  ABDOMEN:  Soft, nontender with no hepatosplenomegaly or masses. Bowel sounds normal.     EXTREMITIES:  No clubbing, cyanosis or edema.  NEUROLOGICAL:  Cranial Nerves II-XII grossly intact.  No focal neurological deficits.  PSYCHIATRIC:  Normal affect and mood.     RECENT LABS:    Lab Results   Component Value Date    WBC 5.35 09/07/2018    HGB 13.5 09/07/2018    HCT 39.1 (L) 09/07/2018    MCV 94.0 09/07/2018     09/07/2018     Lab Results   Component Value Date    NEUTROABS 3.38 09/07/2018     CMP, iron study, TSH are pending.      Assessment/Plan      1.  Left tongue base squamous cell carcinoma.   Stage IV A.  Finished chemotherapy Cisplatin day 1 and day 22, with concurrent radiation.  Did not receive day 43 cisplatin as planned due to neutropenia.     Patient finished his radiation therapy on 1/23/2018.      His PET scan on 3/15/2018 showed essentially complete metabolic response.  The residual left neck lymph node shows only low-level blood pool activity.     Patient had a CT scan examination of the neck on 6/11/2018 which showed stable left neck lymphadenopathy, probably smaller by 2 mm compared to the PET scan on 3/15/2018. This lesion is still probably dying down.      Patient switched his ENT care to Dr. Browning, who exam the patient in June 2018.     Physical examination today is benign.  No palpable lymphadenopathy in the neck area.  Patient also has no dysphagia/odynophagia.  I recommended repeating CT scan for neck in 3 months for reevaluation.         2.  Anemia, due to chemotherapy. His hemoglobin  improved and only marginally low.  We are checking his iron studies and B12 level.  Results are pending.          3.  Tinnitus.  He states he had ringing in his ears prior to cisplatin.  Ringing did not worsen with dose 1 or dose 2 of  cisplatin.     4.  Power-port-a-catheter.  Patient reports this has not been used during his hospitalization, nor was used for injecting IV contrast.  We'll request it to be removed.      5.  Recurrent cold sensitivity. Risk of developing hypothyroidism due to radiation therapy.   Checking his TSH level.        Plan:   1.  We'll refer patient back to Dr. Hercules to remove the Port-A-Cath.    2.  Arrange CT scan for neck with IV contrast to be done in 3 months.    3.  We will obtain laboratory study for CBC CMP ferritin, iron profile, vitamin B12 level and TSH.  4.  Patient will return in 3 months for M.D. Reevaluation..       More than 25 min for patient care, over half of that time were for counseling.      DOMENICA LEAL M.D., Ph.D.    9/7/2018      Addendum:   Lab Results   Component Value Date    TSH 2.870 09/07/2018     Lab Results   Component Value Date    IRON 100 09/07/2018    TIBC 274 09/07/2018    FERRITIN 274.10 09/07/2018   Iron saturation 36%.     Lab Results   Component Value Date    [id number] 631 09/07/2018       Laboratory study showed normal TSH, proper iron saturation and ferritin level, normal vitamin B12 level.      DOMENICA LEAL M.D., Ph.D.    9/7/2018       CC:  Zane Del Toro M.D. / Frank Marques M.D.   Ameya Sheffield M.D.    Ameya Aguirre M.D.    Isma Hercules M.D.      Bhaskar Browning M.D.      Dictated using Dragon Dictation.

## 2018-09-08 PROBLEM — R68.89 SENSITIVITY TO THE COLD: Status: ACTIVE | Noted: 2018-09-08

## 2018-09-12 ENCOUNTER — OFFICE VISIT (OUTPATIENT)
Dept: SURGERY | Facility: CLINIC | Age: 68
End: 2018-09-12

## 2018-09-12 VITALS — WEIGHT: 188 LBS | HEIGHT: 73 IN | OXYGEN SATURATION: 98 % | HEART RATE: 73 BPM | BODY MASS INDEX: 24.92 KG/M2

## 2018-09-12 DIAGNOSIS — C09.9 CANCER OF TONSIL (HCC): Primary | ICD-10-CM

## 2018-09-12 PROCEDURE — 36590 REMOVAL TUNNELED CV CATH: CPT | Performed by: SURGERY

## 2018-09-12 NOTE — PROGRESS NOTES
Procedure note:    Preoperative diagnosis: Squamous cell cancer of the tonsil, in remission    Postoperative diagnosis: Same    Procedure performed: Removal of left subclavian vein approach [id number]    Surgeon: Isma Hercules M.D.    Estimated blood loss: Less than 1 cc    Complications: None    Indication for procedure:  This is a nice 68-year-old gentleman who had a port placed last fall and has since undergone chemoradiation with good results to this point.  There are no plans for any further treatment at this moment and as such removal of his port was requested both by the patient and his oncologist.    Description of procedure:  After obtaining informed consent, the patient was brought to the procedure room and his chest was sterilely prepped and draped.  An incision was made through the prior skin incision after anesthetizing this with a mixture of lidocaine and Marcaine.  I dissected through the subcutaneous tissues and got down onto the junction of the port and the catheter.  This was cleared off and the capsule surrounding the port was then incised.  The Prolene sutures were then clipped and the port was withdrawn from the pocket.  The catheter was withdrawn with the tip intact.  Direct pressure was held over the stick site for a couple of minutes.  I then inspected to ensure there was no bleeding.  The deep tissues were closed with interrupted 3-0 Vicryl's.  Skin was closed with glue.  Sterile dressings were applied.  The patient tolerated the procedure well.    Isma Hercules MD  General and Endoscopic Surgery  [facility]    [address], [address]  [place], [place], [address]  P: [phone]  F: [phone]

## 2018-11-20 ENCOUNTER — TELEPHONE (OUTPATIENT)
Dept: ONCOLOGY | Facility: CLINIC | Age: 68
End: 2018-11-20

## 2018-11-20 ENCOUNTER — HOSPITAL ENCOUNTER (OUTPATIENT)
Dept: PET IMAGING | Facility: HOSPITAL | Age: 68
End: 2018-11-20
Attending: INTERNAL MEDICINE

## 2018-11-20 RX ORDER — PREDNISONE 50 MG/1
TABLET ORAL
Qty: 3 TABLET | Refills: 0 | Status: SHIPPED | OUTPATIENT
Start: 2018-11-20 | End: 2018-11-30

## 2018-11-20 NOTE — TELEPHONE ENCOUNTER
----- Message from Nuvia Ballesteros MD PhD sent at 11/20/2018 11:35 AM EST -----  Regarding: Prednisone and Benadryl  Patient is allergic to IV contrast.  Please prescribe premedication for prednisone 50 mg to be given 13 hours, 7 hours and 1 hour before the scans.  Also give Benadryl 25 mg 1 hour before the CT scan.  He is re-scheduled for CAT scan tomorrow on 11/21.     Thank you very much! Let me know you prescribed.     Dr. Ballesteros

## 2018-11-20 NOTE — TELEPHONE ENCOUNTER
Pt. Due for scans tomorrow.  Needs pre-meds prior to having them done.  [facility] prednisone 50mg to take 13 hrs, 7 hrs, and 1 hr prior to scans.  Pt. Also needs to take 50mg of benadryl 1 hr prior to scans.  L/m on pt. Voice mail.

## 2018-11-21 ENCOUNTER — LAB (OUTPATIENT)
Dept: LAB | Facility: HOSPITAL | Age: 68
End: 2018-11-21

## 2018-11-21 ENCOUNTER — HOSPITAL ENCOUNTER (OUTPATIENT)
Dept: PET IMAGING | Facility: HOSPITAL | Age: 68
Discharge: HOME | End: 2018-11-21
Attending: INTERNAL MEDICINE | Admitting: INTERNAL MEDICINE

## 2018-11-21 DIAGNOSIS — C10.9 OROPHARYNGEAL CANCER (HCC): ICD-10-CM

## 2018-11-21 LAB
ALBUMIN SERPL-MCNC: 4.6 G/DL (ref 3.5–5.2)
ALBUMIN/GLOB SERPL: 1.6 G/DL (ref 1.1–2.4)
ALP SERPL-CCNC: 101 U/L (ref 38–116)
ALT SERPL W P-5'-P-CCNC: 24 U/L (ref 0–41)
ANION GAP SERPL CALCULATED.3IONS-SCNC: 13.5 MMOL/L
AST SERPL-CCNC: 19 U/L (ref 0–40)
BASOPHILS # BLD AUTO: 0.01 10*3/MM3 (ref 0–0.1)
BASOPHILS NFR BLD AUTO: 0.1 % (ref 0–1.1)
BILIRUB SERPL-MCNC: 0.4 MG/DL (ref 0.1–1.2)
BUN BLD-MCNC: 12 MG/DL (ref 6–20)
BUN/CREAT SERPL: 15.4 (ref 7.3–30)
CALCIUM SPEC-SCNC: 10 MG/DL (ref 8.5–10.2)
CHLORIDE SERPL-SCNC: 98 MMOL/L (ref 98–107)
CO2 SERPL-SCNC: 25.5 MMOL/L (ref 22–29)
CREAT BLD-MCNC: 0.78 MG/DL (ref 0.7–1.3)
CREAT BLDA-MCNC: 0.8 MG/DL (ref 0.6–1.3)
DEPRECATED RDW RBC AUTO: 40.7 FL (ref 37–49)
EOSINOPHIL # BLD AUTO: 0 10*3/MM3 (ref 0–0.36)
EOSINOPHIL NFR BLD AUTO: 0 % (ref 1–5)
ERYTHROCYTE [DISTWIDTH] IN BLOOD BY AUTOMATED COUNT: 11.6 % (ref 11.7–14.5)
GFR SERPL CREATININE-BSD FRML MDRD: 98 ML/MIN/1.73
GLOBULIN UR ELPH-MCNC: 2.9 GM/DL (ref 1.8–3.5)
GLUCOSE BLD-MCNC: 167 MG/DL (ref 74–124)
HCT VFR BLD AUTO: 42.1 % (ref 40–49)
HGB BLD-MCNC: 14 G/DL (ref 13.5–16.5)
IMM GRANULOCYTES # BLD: 0.04 10*3/MM3 (ref 0–0.03)
IMM GRANULOCYTES NFR BLD: 0.4 % (ref 0–0.5)
LYMPHOCYTES # BLD AUTO: 0.81 10*3/MM3 (ref 1–3.5)
LYMPHOCYTES NFR BLD AUTO: 7.7 % (ref 20–49)
MCH RBC QN AUTO: 31.7 PG (ref 27–33)
MCHC RBC AUTO-ENTMCNC: 33.3 G/DL (ref 32–35)
MCV RBC AUTO: 95.2 FL (ref 83–97)
MONOCYTES # BLD AUTO: 0.07 10*3/MM3 (ref 0.25–0.8)
MONOCYTES NFR BLD AUTO: 0.7 % (ref 4–12)
NEUTROPHILS # BLD AUTO: 9.53 10*3/MM3 (ref 1.5–7)
NEUTROPHILS NFR BLD AUTO: 91.1 % (ref 39–75)
NRBC BLD MANUAL-RTO: 0 /100 WBC (ref 0–0)
PLATELET # BLD AUTO: 263 10*3/MM3 (ref 150–375)
PMV BLD AUTO: 10.1 FL (ref 8.9–12.1)
POTASSIUM BLD-SCNC: 4.1 MMOL/L (ref 3.5–4.7)
PROT SERPL-MCNC: 7.5 G/DL (ref 6.3–8)
RBC # BLD AUTO: 4.42 10*6/MM3 (ref 4.3–5.5)
SODIUM BLD-SCNC: 137 MMOL/L (ref 134–145)
WBC NRBC COR # BLD: 10.46 10*3/MM3 (ref 4–10)

## 2018-11-21 PROCEDURE — 70491 CT SOFT TISSUE NECK W/DYE: CPT

## 2018-11-21 PROCEDURE — 80053 COMPREHEN METABOLIC PANEL: CPT | Performed by: INTERNAL MEDICINE

## 2018-11-21 PROCEDURE — 85025 COMPLETE CBC W/AUTO DIFF WBC: CPT | Performed by: INTERNAL MEDICINE

## 2018-11-21 PROCEDURE — 25010000002 IOPAMIDOL 61 % SOLUTION: Performed by: INTERNAL MEDICINE

## 2018-11-21 PROCEDURE — 82565 ASSAY OF CREATININE: CPT

## 2018-11-21 PROCEDURE — 36415 COLL VENOUS BLD VENIPUNCTURE: CPT | Performed by: INTERNAL MEDICINE

## 2018-11-21 RX ADMIN — IOPAMIDOL 75 ML: 612 INJECTION, SOLUTION INTRAVENOUS at 11:46

## 2018-11-30 ENCOUNTER — OFFICE VISIT (OUTPATIENT)
Dept: ONCOLOGY | Facility: CLINIC | Age: 68
End: 2018-11-30

## 2018-11-30 ENCOUNTER — APPOINTMENT (OUTPATIENT)
Dept: LAB | Facility: HOSPITAL | Age: 68
End: 2018-11-30

## 2018-11-30 VITALS
HEART RATE: 56 BPM | WEIGHT: 187.3 LBS | OXYGEN SATURATION: 98 % | RESPIRATION RATE: 14 BRPM | DIASTOLIC BLOOD PRESSURE: 72 MMHG | SYSTOLIC BLOOD PRESSURE: 120 MMHG | BODY MASS INDEX: 24.82 KG/M2 | HEIGHT: 73 IN | TEMPERATURE: 98.7 F

## 2018-11-30 DIAGNOSIS — C01 CANCER OF BASE OF TONGUE (HCC): Primary | ICD-10-CM

## 2018-11-30 DIAGNOSIS — T38.0X5A STEROID-INDUCED HYPERGLYCEMIA: ICD-10-CM

## 2018-11-30 DIAGNOSIS — R73.9 STEROID-INDUCED HYPERGLYCEMIA: ICD-10-CM

## 2018-11-30 DIAGNOSIS — C10.9 OROPHARYNGEAL CANCER (HCC): ICD-10-CM

## 2018-11-30 PROCEDURE — G0463 HOSPITAL OUTPT CLINIC VISIT: HCPCS | Performed by: INTERNAL MEDICINE

## 2018-11-30 PROCEDURE — 99214 OFFICE O/P EST MOD 30 MIN: CPT | Performed by: INTERNAL MEDICINE

## 2018-12-01 PROBLEM — D72.823 LEUKEMOID REACTION: Status: ACTIVE | Noted: 2018-12-01

## 2018-12-13 ENCOUNTER — OFFICE VISIT (OUTPATIENT)
Dept: FAMILY MEDICINE CLINIC | Facility: CLINIC | Age: 68
End: 2018-12-13

## 2018-12-13 VITALS
DIASTOLIC BLOOD PRESSURE: 63 MMHG | SYSTOLIC BLOOD PRESSURE: 107 MMHG | TEMPERATURE: 98 F | HEART RATE: 60 BPM | BODY MASS INDEX: 24.92 KG/M2 | WEIGHT: 184 LBS | HEIGHT: 72 IN | RESPIRATION RATE: 14 BRPM

## 2018-12-13 DIAGNOSIS — J01.00 ACUTE NON-RECURRENT MAXILLARY SINUSITIS: ICD-10-CM

## 2018-12-13 DIAGNOSIS — Z00.00 MEDICARE ANNUAL WELLNESS VISIT, SUBSEQUENT: Primary | ICD-10-CM

## 2018-12-13 PROCEDURE — G0439 PPPS, SUBSEQ VISIT: HCPCS | Performed by: FAMILY MEDICINE

## 2018-12-13 PROCEDURE — 99213 OFFICE O/P EST LOW 20 MIN: CPT | Performed by: FAMILY MEDICINE

## 2018-12-13 RX ORDER — AMOXICILLIN AND CLAVULANATE POTASSIUM 875; 125 MG/1; MG/1
1 TABLET, FILM COATED ORAL EVERY 12 HOURS SCHEDULED
Qty: 20 TABLET | Refills: 0 | Status: SHIPPED | OUTPATIENT
Start: 2018-12-13 | End: 2019-02-20

## 2018-12-13 NOTE — PROGRESS NOTES
"Subjective   William Ritchie is a 68 y.o. male.     CC: URI    History of Present Illness     William Ritchie 68 y.o. male who presents for evaluation of sinus pressure and congestion, sore throat, cough. Symptoms include congestion and dry cough.  Onset of symptoms was 3 days ago, unchanged since that time. Patient denies wheezing, hemoptysis.   Evaluation to date: none Treatment to date:  none.       The following portions of the patient's history were reviewed and updated as appropriate: allergies, current medications, past family history, past medical history, past social history, past surgical history and problem list.    Review of Systems   Constitutional: Negative for activity change, chills, fatigue and fever.   HENT: Positive for congestion, postnasal drip, sinus pressure and sore throat.    Respiratory: Positive for cough. Negative for chest tightness.    Cardiovascular: Negative for chest pain and palpitations.   Gastrointestinal: Negative for abdominal pain and nausea.   Endocrine: Negative for cold intolerance and polydipsia.   Psychiatric/Behavioral: Negative for behavioral problems and dysphoric mood.   All other systems reviewed and are negative.      /63   Pulse 60   Temp 98 °F (36.7 °C) (Oral)   Resp 14   Ht 182.9 cm (72\")   Wt 83.5 kg (184 lb)   BMI 24.95 kg/m²     Objective   Physical Exam   Constitutional: He appears well-developed and well-nourished.   HENT:   Right Ear: Tympanic membrane normal.   Left Ear: Tympanic membrane normal.   Nose: Right sinus exhibits maxillary sinus tenderness. Left sinus exhibits maxillary sinus tenderness.   Mouth/Throat: Oropharynx is clear and moist. No oropharyngeal exudate, posterior oropharyngeal erythema or tonsillar abscesses.   Neck: Neck supple. No thyromegaly present.   Cardiovascular: Normal rate and regular rhythm.   No murmur heard.  Pulmonary/Chest: Effort normal and breath sounds normal.   Abdominal: Bowel sounds are normal. "   Psychiatric: He has a normal mood and affect. His behavior is normal.   Nursing note and vitals reviewed.      Assessment/Plan   William was seen today for medicare wellness, nasal congestion, sinusitis, cough and sore throat.    Diagnoses and all orders for this visit:    Medicare annual wellness visit, subsequent    Acute non-recurrent maxillary sinusitis  -     amoxicillin-clavulanate (AUGMENTIN) 875-125 MG per tablet; Take 1 tablet by mouth Every 12 (Twelve) Hours.

## 2018-12-13 NOTE — PATIENT INSTRUCTIONS
Medicare Wellness  Personal Prevention Plan of Service     Date of Office Visit:  2018  Encounter Provider:  Ameya Sheffield MD  Place of Service:  [facility]  Patient Name: William Ritchie  :  1950    As part of the Medicare Wellness portion of your visit today, we are providing you with this personalized preventive plan of services (PPPS). This plan is based upon recommendations of the [place] Preventive Services Task Force (USPSTF) and the Advisory Committee on Immunization Practices (ACIP).    This lists the preventive care services that should be considered, and provides dates of when you are due. Items listed as completed are up-to-date and do not require any further intervention.    Health Maintenance   Topic Date Due   • LIPID PANEL  2018 (Originally 2018)   • ZOSTER VACCINE (2 of 2) 2018 (Originally 2013)   • HEPATITIS A VACCINE ADULT (2 of 2) 2019 (Originally 10/19/2018)   • MEDICARE ANNUAL WELLNESS  2019   • COLONOSCOPY  10/08/2025   • INFLUENZA VACCINE  Completed   • PNEUMOCOCCAL VACCINES (65+ LOW/MEDIUM RISK)  Completed   • HEPATITIS C SCREENING  Discontinued   • PROSTATE CANCER SCREENING  Discontinued   • TDAP/TD VACCINES  Discontinued       No orders of the defined types were placed in this encounter.      Return if symptoms worsen or fail to improve.

## 2019-01-11 ENCOUNTER — OFFICE VISIT (OUTPATIENT)
Dept: RETAIL CLINIC | Facility: CLINIC | Age: 69
End: 2019-01-11

## 2019-01-11 VITALS
HEART RATE: 60 BPM | OXYGEN SATURATION: 99 % | SYSTOLIC BLOOD PRESSURE: 120 MMHG | TEMPERATURE: 98 F | DIASTOLIC BLOOD PRESSURE: 62 MMHG

## 2019-01-11 DIAGNOSIS — J06.9 ACUTE URI: Primary | ICD-10-CM

## 2019-01-11 PROCEDURE — 99213 OFFICE O/P EST LOW 20 MIN: CPT | Performed by: NURSE PRACTITIONER

## 2019-01-11 RX ORDER — GUAIFENESIN 600 MG/1
600 TABLET, EXTENDED RELEASE ORAL 2 TIMES DAILY
Qty: 28 TABLET | Refills: 0 | Status: SHIPPED | OUTPATIENT
Start: 2019-01-11 | End: 2019-01-25

## 2019-01-11 RX ORDER — PREDNISONE 20 MG/1
TABLET ORAL
Qty: 20 TABLET | Refills: 0 | Status: SHIPPED | OUTPATIENT
Start: 2019-01-11 | End: 2019-02-20

## 2019-01-11 RX ORDER — BENZONATATE 100 MG/1
CAPSULE ORAL
Qty: 30 CAPSULE | Refills: 0 | Status: SHIPPED | OUTPATIENT
Start: 2019-01-11 | End: 2019-02-20

## 2019-01-11 NOTE — PROGRESS NOTES
Subjective:     William Ritchie is a 68 y.o.     Cough   The current episode started 1 to 4 weeks ago. The cough is productive of purulent sputum. Associated symptoms include postnasal drip and rhinorrhea. Pertinent negatives include no ear congestion, ear pain, fever, nasal congestion, sore throat, shortness of breath or wheezing. Treatments tried: otc sinus medication. The treatment provided mild relief.   Sore Throat    Associated symptoms include coughing. Pertinent negatives include no ear pain or shortness of breath.         The following portions of the patient's history were reviewed and updated as appropriate: allergies, current medications, past family history, past medical history, past social history, past surgical history and problem list.      Review of Systems   Constitutional: Negative for fever.   HENT: Positive for postnasal drip and rhinorrhea. Negative for ear pain, sinus pressure, sinus pain and sore throat.    Respiratory: Positive for cough. Negative for shortness of breath and wheezing.    Cardiovascular: Negative.          Objective:      Physical Exam   HENT:   Head: Normocephalic and atraumatic.   Right Ear: Tympanic membrane and ear canal normal.   Left Ear: Tympanic membrane and ear canal normal.   Nose: Nose normal.   Mouth/Throat: No oropharyngeal exudate or posterior oropharyngeal erythema.   Post nasal drainage noted   Cardiovascular: Normal rate, regular rhythm, S1 normal, S2 normal and normal heart sounds.   Pulmonary/Chest: Effort normal and breath sounds normal.   Lymphadenopathy:     He has no cervical adenopathy.   Vitals reviewed.          William was seen today for cough and sore throat.    Diagnoses and all orders for this visit:    Acute URI    Other orders  -     predniSONE (DELTASONE) 20 MG tablet; Prednisone 20mg tabs, 3 for 3 days, 2 for 3 days, 1 for 3 days, 1/2 for 3 days take with food or milk  -     guaiFENesin (MUCINEX) 600 MG 12 hr tablet; Take 1 tablet by  mouth 2 (Two) Times a Day for 14 days.  -     benzonatate (TESSALON PERLES) 100 MG capsule; 1 to 2 capsules 3 times a day

## 2019-01-11 NOTE — PATIENT INSTRUCTIONS

## 2019-02-20 ENCOUNTER — OFFICE VISIT (OUTPATIENT)
Dept: FAMILY MEDICINE CLINIC | Facility: CLINIC | Age: 69
End: 2019-02-20

## 2019-02-20 VITALS
DIASTOLIC BLOOD PRESSURE: 72 MMHG | OXYGEN SATURATION: 94 % | HEART RATE: 74 BPM | SYSTOLIC BLOOD PRESSURE: 129 MMHG | TEMPERATURE: 98 F | BODY MASS INDEX: 24.92 KG/M2 | HEIGHT: 73 IN | WEIGHT: 188 LBS | RESPIRATION RATE: 16 BRPM

## 2019-02-20 DIAGNOSIS — D70.1 CHEMOTHERAPY INDUCED NEUTROPENIA (HCC): ICD-10-CM

## 2019-02-20 DIAGNOSIS — T45.1X5A CHEMOTHERAPY INDUCED NEUTROPENIA (HCC): ICD-10-CM

## 2019-02-20 DIAGNOSIS — J01.00 ACUTE NON-RECURRENT MAXILLARY SINUSITIS: Primary | ICD-10-CM

## 2019-02-20 DIAGNOSIS — C77.0 SECONDARY AND UNSPECIFIED MALIGNANT NEOPLASM OF LYMPH NODES OF HEAD, FACE AND NECK (HCC): ICD-10-CM

## 2019-02-20 PROCEDURE — 99213 OFFICE O/P EST LOW 20 MIN: CPT | Performed by: FAMILY MEDICINE

## 2019-02-20 RX ORDER — CEFDINIR 300 MG/1
300 CAPSULE ORAL 2 TIMES DAILY
Qty: 20 CAPSULE | Refills: 0 | Status: SHIPPED | OUTPATIENT
Start: 2019-02-20 | End: 2019-02-21

## 2019-02-20 NOTE — PROGRESS NOTES
"Subjective   William Ritchie is a 68 y.o. male.     CC: URI    History of Present Illness     William Ritchie 68 y.o. male who presents for evaluation of sinus pressure and congestion, cough. Symptoms include congestion and post nasal drip.  Onset of symptoms was 1 week ago, unchanged since that time. Patient denies hemoptysis.   Evaluation to date: none Treatment to date:  OTC oral decongestants.       The following portions of the patient's history were reviewed and updated as appropriate: allergies, current medications, past family history, past medical history, past social history, past surgical history and problem list.    Review of Systems   Constitutional: Negative for activity change, chills, fatigue and fever.   HENT: Positive for congestion, postnasal drip and sinus pressure.    Respiratory: Positive for cough. Negative for chest tightness.    Cardiovascular: Negative for chest pain and palpitations.   Gastrointestinal: Negative for abdominal pain and nausea.   Endocrine: Negative for cold intolerance and polydipsia.   Psychiatric/Behavioral: Negative for behavioral problems and dysphoric mood.   All other systems reviewed and are negative.      /72   Pulse 74   Temp 98 °F (36.7 °C) (Oral)   Resp 16   Ht 185.4 cm (73\")   Wt 85.3 kg (188 lb)   SpO2 94%   BMI 24.80 kg/m²     Objective   Physical Exam   Constitutional: He appears well-developed and well-nourished.   HENT:   Right Ear: Tympanic membrane normal.   Left Ear: Tympanic membrane normal.   Nose: Right sinus exhibits maxillary sinus tenderness. Left sinus exhibits maxillary sinus tenderness.   Mouth/Throat: Oropharynx is clear and moist. No oropharyngeal exudate, posterior oropharyngeal erythema or tonsillar abscesses.   Neck: Neck supple. No thyromegaly present.   Cardiovascular: Normal rate and regular rhythm.   No murmur heard.  Pulmonary/Chest: Effort normal and breath sounds normal.   Abdominal: Bowel sounds are normal. "   Psychiatric: He has a normal mood and affect. His behavior is normal.   Nursing note and vitals reviewed.      Assessment/Plan   William was seen today for nasal congestion, sinusitis and cough.    Diagnoses and all orders for this visit:    Acute non-recurrent maxillary sinusitis  -     cefdinir (OMNICEF) 300 MG capsule; Take 1 capsule by mouth 2 (Two) Times a Day for 10 days.    Chemotherapy induced neutropenia (CMS/HCC)    Secondary and unspecified malignant neoplasm of lymph nodes of head, face and neck (CMS/HCC)

## 2019-02-21 ENCOUNTER — TELEPHONE (OUTPATIENT)
Dept: FAMILY MEDICINE CLINIC | Facility: CLINIC | Age: 69
End: 2019-02-21

## 2019-02-21 RX ORDER — AMOXICILLIN 500 MG/1
500 CAPSULE ORAL 3 TIMES DAILY
Qty: 30 CAPSULE | Refills: 0 | Status: SHIPPED | OUTPATIENT
Start: 2019-02-21 | End: 2019-06-21

## 2019-03-08 ENCOUNTER — LAB (OUTPATIENT)
Dept: LAB | Facility: HOSPITAL | Age: 69
End: 2019-03-08

## 2019-03-08 ENCOUNTER — OFFICE VISIT (OUTPATIENT)
Dept: ONCOLOGY | Facility: CLINIC | Age: 69
End: 2019-03-08

## 2019-03-08 VITALS
HEIGHT: 73 IN | OXYGEN SATURATION: 98 % | HEART RATE: 52 BPM | SYSTOLIC BLOOD PRESSURE: 126 MMHG | TEMPERATURE: 98.2 F | RESPIRATION RATE: 16 BRPM | WEIGHT: 190.5 LBS | BODY MASS INDEX: 25.25 KG/M2 | DIASTOLIC BLOOD PRESSURE: 68 MMHG

## 2019-03-08 DIAGNOSIS — E03.8 TSH (THYROID-STIMULATING HORMONE DEFICIENCY): Primary | ICD-10-CM
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
## 2019-03-08 DIAGNOSIS — C10.9 OROPHARYNGEAL CANCER (HCC): Primary | ICD-10-CM

## 2019-03-08 DIAGNOSIS — E03.4 HYPOTHYROIDISM DUE TO ACQUIRED ATROPHY OF THYROID: ICD-10-CM

## 2019-03-08 DIAGNOSIS — C01 CANCER OF BASE OF TONGUE (HCC): ICD-10-CM

## 2019-03-08 DIAGNOSIS — T66.XXXS ADVERSE EFFECT OF RADIATION THERAPY, SEQUELA: ICD-10-CM

## 2019-03-08 DIAGNOSIS — C10.9 OROPHARYNGEAL CANCER (HCC): ICD-10-CM

## 2019-03-08 DIAGNOSIS — K11.7 XEROSTOMIA DUE TO RADIOTHERAPY: ICD-10-CM

## 2019-03-08 LAB
ALBUMIN SERPL-MCNC: 4 G/DL (ref 3.5–5.2)
ALBUMIN/GLOB SERPL: 1.5 G/DL (ref 1.1–2.4)
ALP SERPL-CCNC: 85 U/L (ref 38–116)
ALT SERPL W P-5'-P-CCNC: 16 U/L (ref 0–41)
ANION GAP SERPL CALCULATED.3IONS-SCNC: 10.4 MMOL/L
AST SERPL-CCNC: 14 U/L (ref 0–40)
BASOPHILS # BLD AUTO: 0.06 10*3/MM3 (ref 0–0.2)
BASOPHILS NFR BLD AUTO: 1.1 % (ref 0–1.5)
BILIRUB SERPL-MCNC: 0.4 MG/DL (ref 0.1–1.2)
BUN BLD-MCNC: 14 MG/DL (ref 6–20)
BUN/CREAT SERPL: 15.6 (ref 7.3–30)
CALCIUM SPEC-SCNC: 9.5 MG/DL (ref 8.5–10.2)
CHLORIDE SERPL-SCNC: 100 MMOL/L (ref 98–107)
CO2 SERPL-SCNC: 28.6 MMOL/L (ref 22–29)
CREAT BLD-MCNC: 0.9 MG/DL (ref 0.7–1.3)
DEPRECATED RDW RBC AUTO: 40.9 FL (ref 37–54)
EOSINOPHIL # BLD AUTO: 0.59 10*3/MM3 (ref 0–0.4)
EOSINOPHIL NFR BLD AUTO: 11.2 % (ref 0.3–6.2)
ERYTHROCYTE [DISTWIDTH] IN BLOOD BY AUTOMATED COUNT: 11.8 % (ref 12.3–15.4)
GFR SERPL CREATININE-BSD FRML MDRD: 83 ML/MIN/1.73
GLOBULIN UR ELPH-MCNC: 2.6 GM/DL (ref 1.8–3.5)
GLUCOSE BLD-MCNC: 111 MG/DL (ref 74–124)
HCT VFR BLD AUTO: 41.3 % (ref 37.5–51)
HGB BLD-MCNC: 13.9 G/DL (ref 13–17.7)
IMM GRANULOCYTES # BLD AUTO: 0.02 10*3/MM3 (ref 0–0.05)
IMM GRANULOCYTES NFR BLD AUTO: 0.4 % (ref 0–0.5)
LYMPHOCYTES # BLD AUTO: 0.94 10*3/MM3 (ref 0.7–3.1)
LYMPHOCYTES NFR BLD AUTO: 17.9 % (ref 19.6–45.3)
MCH RBC QN AUTO: 32 PG (ref 26.6–33)
MCHC RBC AUTO-ENTMCNC: 33.7 G/DL (ref 31.5–35.7)
MCV RBC AUTO: 94.9 FL (ref 79–97)
MONOCYTES # BLD AUTO: 0.55 10*3/MM3 (ref 0.1–0.9)
MONOCYTES NFR BLD AUTO: 10.5 % (ref 5–12)
NEUTROPHILS # BLD AUTO: 3.1 10*3/MM3 (ref 1.4–7)
NEUTROPHILS NFR BLD AUTO: 58.9 % (ref 42.7–76)
NRBC BLD AUTO-RTO: 0 /100 WBC (ref 0–0)
PLATELET # BLD AUTO: 249 10*3/MM3 (ref 140–450)
PMV BLD AUTO: 8.6 FL (ref 6–12)
POTASSIUM BLD-SCNC: 4.4 MMOL/L (ref 3.5–4.7)
PROT SERPL-MCNC: 6.6 G/DL (ref 6.3–8)
RBC # BLD AUTO: 4.35 10*6/MM3 (ref 4.14–5.8)
SODIUM BLD-SCNC: 139 MMOL/L (ref 134–145)
TSH SERPL DL<=0.05 MIU/L-ACNC: 4.47 MIU/ML (ref 0.27–4.2)
WBC NRBC COR # BLD: 5.26 10*3/MM3 (ref 3.4–10.8)

## 2019-03-08 PROCEDURE — 80053 COMPREHEN METABOLIC PANEL: CPT | Performed by: INTERNAL MEDICINE

## 2019-03-08 PROCEDURE — 36415 COLL VENOUS BLD VENIPUNCTURE: CPT | Performed by: INTERNAL MEDICINE

## 2019-03-08 PROCEDURE — 84443 ASSAY THYROID STIM HORMONE: CPT | Performed by: INTERNAL MEDICINE

## 2019-03-08 PROCEDURE — 85025 COMPLETE CBC W/AUTO DIFF WBC: CPT | Performed by: INTERNAL MEDICINE

## 2019-03-08 PROCEDURE — 99214 OFFICE O/P EST MOD 30 MIN: CPT | Performed by: INTERNAL MEDICINE

## 2019-03-08 RX ORDER — PREDNISONE 50 MG/1
50 TABLET ORAL 2 TIMES DAILY
Qty: 3 TABLET | Refills: 0 | Status: SHIPPED | OUTPATIENT
Start: 2019-03-08 | End: 2019-06-21

## 2019-03-08 NOTE — PROGRESS NOTES
Subjective .     REASONS FOR FOLLOWUP:    1.  Squamous cell carcinoma of the base of tongue, stage YEN (T1N2b), HPV status is not clear.  Started concurrent chemoradiation therapy on 12/4/2017 using cisplatin repeating every 3 weeks.    2.  Moderate neutropenia secondary to concurrent chemoradiotherapy.  Cycle #3 cisplatin was delayed and then canceled.    3.  Radiation therapy finished on 1/23/2018.  PEG tube was removed in May 2018.    4. PET scan on 3/15/2018 reported essentially complete resolution of hypermetabolic lesion at the left tongue base.  The left neck lymphadenopathy also showed blood pool activity.    5. Patient switched his ENT care to Dr. Browning.   6.  CT scan on 6/11/2018 and 11/21/2018 showed no evidence of disease recurrence.    HISTORY OF PRESENT ILLNESS:  The patient is a 68 y.o. year old male who is here for 3-month follow-up.  Patient is accompanied by his wife who helped with history and discussion.     Reports patient has persistently dry mouth, altered taste but no dysphagia.  Patient has stable weight.  Patient has excellent performance status ECOG 0.      His wife reports patient will need to dental procedures for root canal next week.  He currently is taking amoxicillin.    Laboratory study today showed normal WBC and platelets, marginal hemoglobin.  He has elevated TSH 4.47.  It was normal 2.87 on 9/7/2018.        Past Medical History:   Diagnosis Date   • Benign essential hypertension    • H/O complete eye exam due   • H/O Neck mass     left   • Hyperlipidemia    • IFG (impaired fasting glucose)    • Seasonal allergies    • Tongue cancer (CMS/HCC) 11/01/2017    Left base of tongue squamous cell carcinoma, stage YEN, recieved chemo and radiation therapy     Past Surgical History:   Procedure Laterality Date   • CHOLECYSTECTOMY WITH INTRAOPERATIVE CHOLANGIOGRAM N/A 7/5/2018    Procedure: Laparoscopic cholecystectomy with intraoperative cholangiogram ;  Surgeon: Ashley Fischer MD;   Location: [facility] MAIN OR;  Service: General   • COLONOSCOPY N/A 2015    normal colonoscopy-Dr. Galen Morrissey   • COLONOSCOPY N/A 05/04/2011    Normal colonoscopy-Dr. Tobias Emerson   • ENDOSCOPY W/ PEG TUBE PLACEMENT N/A 11/28/2017    Procedure: ESOPHAGOGASTRODUODENOSCOPY WITH PERCUTANEOUS ENDOSCOPIC GASTROSTOMY TUBE INSERTION;  Surgeon: Isma Hercules MD;  Location: [facility] MAIN OR;  Service:    • FINE NEEDLE ASPIRATION Left 10/13/2017    Ultrasound guidance for fine needle biopsy and fine needle aspiration with ultrasonic guidance of left neck mass-Dr. Frank Marques   • INGUINAL HERNIA REPAIR Left 1994    Dr. Brian Peres   • INGUINAL HERNIA REPAIR Right 1993    Dr. Brian Peres   • LARYNGOPLASTY      Laryngoplasty with biopsy-Dr. Del Toro   • MI INSJ TUNNELED CVC W/O SUBQ PORT/ AGE 5 YR/> Left 11/28/2017    Procedure: INSERTION VENOUS ACCESS DEVICE;  Surgeon: Isma Hercules MD;  Location: [facility] OR;  Service: General   • TONGUE BIOPSY / EXCISION N/A 11/01/2017    Biopsy of the left tongue base-Dr. Zane Del Toro   • TONSILLECTOMY AND ADENOIDECTOMY Bilateral 11/01/2017    Dr. Zane Del Toro   Removal PEG tube on 5/2/2018    cholecystectomy in July 2018    HEMATOLOGIC/ONCOLOGIC HISTORY:  Mr. Ritchie is a 67 y.o. male who is here on 11/16/2017 for evaluation accompanied by his wife, referred by radiation oncologist, Dr. Aguirre, because of newly diagnosed squamous cell carcinoma of the left tongue base, stage YEN, [id number].      Patient previously only had history of mild hypertension which was controlled. Recently in early 09/2017 when he was on vacation, he felt a left neck nodule when he was shaving. He denies any pain associated with that. Patient was seen by his primary care physician, Dr. Sheffield, for evaluation and was referred to ENT, Dr. Zane Del Toro. Patient subsequently had CT of the neck examination at [facility] on 09/22/2017. This study reported a left neck mass  measuring 3.5 x 3.1 x 2.4 cm with cystic/necrotic changes located at the region of the left level II jugular chain. There were additional small homogeneous cervical lymph nodes but possibly reactive.      Patient subsequently had ultrasound of the neck on 10/13/2017 associated with fine-needle aspiration biopsy at Dr. Del Toro' office. The ultrasound reported 2 nodules in left neck. The large one measured about 3.28 cm x 2.67 cm x 3.28 cm. The 2nd smaller one measures 1.56 cm x 0.99 cm x 1.48 cm, just below the large mass. Patient had fine-needle aspiration biopsy at the same time. Pathology evaluation from the [facility] reported poorly differentiated squamous cell carcinoma with degeneration and necrosis. The viable tumor cells exhibit strong nuclear reactivity for both p40 and p63.     Patient subsequently had tonsillectomy, biopsy of the left tongue base and adenoidectomy on 11/01/2017 by Dr. Del Toro. Pathology evaluation from [facility] reported moderately differentiated squamous cell carcinoma. The left tonsil has no evidence of malignancy. Right tonsil also was benign. The adenoid tissue was also benign.      Patient reports the left neck mass is growing. He also reports poor appetite after tonsillectomy. For the past couple of weeks since the biopsy, he lost about 6 pounds. He denies nausea or vomiting. He has actually started feeling better with improved appetite. Denies pain. Denies fever or sweating.      This patient reports he never smoked cigarette. He is only a very rare social drinker. He told me the test for HPV is ongoing.      Patient also had PET scan examination obtained on 11/14/2017. This study reported focal hypermetabolism with SUV 15.9 corresponding to the left-sided base of the tongue. There was moderate enlarged left jugular chain lymph node which is hypermetabolic but without measuring SUV. There was also mild hypermetabolism identified within several additional smaller left jugular  chain lymph nodes. There was no hypermetabolic lymphadenopathy elsewhere in the neck nor foci in the chest, abdomen or pelvis.    Patient was started on concurrent chemoradiation therapy with cisplatin every 3 weeks.  He received 2 cycles chemotherapy and due to poor tolerance with acute renal injury and neutropenia, cycle #3 cisplatin was canceled.     Radiation therapy finished on 1/23/2018.    PET scan on 3/15/2018 reported essentially complete resolution of hypermetabolic lesion at the left tongue base.  The left and neck lymphadenopathy also showing, with blood pool activity.    His CT scan examination of the neck on 6/11/2018 reported no evidence of local disease recurrence, a stable left neck adenopathy, maybe 2 mm smaller compared to the PET scan obtained in March 2018.  Laboratory study on the same day reported normal renal function with a creatinine 0.73 normal electrolytes and liver function panel, stable mild anemia with hemoglobin 12.4 and normal WBC and platelets.      Laboratory study on 9/7/2018 reported normal iron study with a ferritin 274, iron 100, TIBC 274 iron saturation 36% in the normal B12 level 631 pg/mL.  Hemoglobin was 13.5 improved and normal WBC 5350, and platelets 206,000.  He also had a normal TSH 2.87, and completely normal CMP.       CT scan examination for the neck with IV contrast on 11/21/2018 showed evidence of disease recurrence.  Laboratory study reported elevated glucose otherwise normal CMP.  Patient also had mildly elevated neutrophils.  These were likely secondary to steroids use prior to the CT scan because he is allergic to IV dye.      MEDICATIONS    Current Outpatient Medications:   •  amoxicillin (AMOXIL) 500 MG capsule, Take 1 capsule by mouth 3 (Three) Times a Day. (Patient taking differently: Take 875 mg by mouth 2 (Two) Times a Day.), Disp: 30 capsule, Rfl: 0  •  cyclobenzaprine (FLEXERIL) 5 MG tablet, Take 1 tablet by mouth 2 (Two) Times a Day As Needed for  Muscle Spasms., Disp: 60 tablet, Rfl: 5  •  metoprolol tartrate (LOPRESSOR) 50 MG tablet, Take 1 tablet by mouth 2 (Two) Times a Day., Disp: 180 tablet, Rfl: 3  •  predniSONE (DELTASONE) 50 MG tablet, Take 1 tablet by mouth 2 (Two) Times a Day. Take 1 tab 13 hrs, 7 hrs, and 1 hr before CT scan., Disp: 3 tablet, Rfl: 0    ALLERGIES:     Allergies   Allergen Reactions   • Iodinated Diagnostic Agents Itching and Rash     Had a 24 hour delayed reaction to Ct contrast       SOCIAL HISTORY:       Social History     Social History   • Marital status:      Spouse name: Janiya   • Number of children: 2   • Years of education: High School     Occupational History   •  Retired     [employer]     Social History Main Topics   • Smoking status: Never Smoker   • Smokeless tobacco: Never Used   • Alcohol use Yes      Comment: occassional   • Drug use: No   • Sexual activity: No       FAMILY HISTORY:  Family History   Problem Relation Age of Onset   • Alcohol abuse Father    • Diabetes Father    • Cancer Neg Hx    • Malig Hyperthermia Neg Hx        REVIEW OF SYSTEMS:  GENERAL: No change weight; No fevers, chills, sweats. he does report frequent cold sensitivity.     SKIN: No nonhealing lesions. No rashes.  HEME/LYMPH: No easy bruising, bleeding.   EYES: No vision changes or diplopia.   ENT:  Continues to have xerostomia and altered taste in mouth.  No hearing loss, gum bleeding, epistaxis, hoarseness or dysphagia.     RESPIRATORY: No cough, shortness of breath, hemoptysis or wheezing.   CVS: No chest pain, palpitations, orthopnea, dyspnea on exertion.   GI: No dysphagia or odynophagia.  No nausea, vomiting.  No abdominal pain. No constipation, diarrhea.  No melena or hematochezia.  : No lower tract obstructive symptoms, dysuria or hematuria.   MUSCULOSKELETAL: No bone pain.  No joint stiffness.   NEUROLOGICAL: No global weakness, loss of consciousness or seizures.   PSYCHIATRIC: No increased nervousness, mood changes or depression.  "    Objective    Vitals:    03/08/19 1020   BP: 126/68   Pulse: 52   Resp: 16   Temp: 98.2 °F (36.8 °C)   SpO2: 98%  Comment: at rest   Weight: 86.4 kg (190 lb 8 oz)   Height: 185 cm (72.84\")   PainSc: 0-No pain     Current Status 11/30/2018   ECOG score 0      PHYSICAL EXAM:    GENERAL:  Well-developed, well-nourished male in no acute distress.   SKIN:  Warm, dry without rashes, purpura or petechiae.    EYES:  Pupils equal, round and reactive to light.  Conjunctivae normal.  EARS:  Hearing intact.  NOSE: No nasal discharge.  MOUTH:  Tongue is well-papillated;  oral mucosa moist, no stomatitis or ulcers.  Lips normal.  THROAT:  Oropharynx without lesions or exudates.    NECK:  Supple with good range of motion; no thyromegaly.   LYMPHATICS:  No palpable lymphadenopathy in the neck, supraclavicular or axillary adenopathy.    CHEST:  Lungs clear to auscultation.  Normal respiratory effort.  Good airflow.  CARDIAC:  Regular rate and rhythm without murmurs. Normal S1,S2.  ABDOMEN:  Soft, no tender, no hepatosplenomegaly or masses. Bowel sounds normal.     EXTREMITIES:  No clubbing, cyanosis or edema.  NEUROLOGICAL:  Cranial Nerves II-XII grossly intact.  No focal neurological deficits.  PSYCHIATRIC:  Normal affect and mood.     RECENT LABS:    Lab Results   Component Value Date    WBC 5.26 03/08/2019    HGB 13.9 03/08/2019    HCT 41.3 03/08/2019    MCV 94.9 03/08/2019     03/08/2019     Lab Results   Component Value Date    NEUTROABS 3.10 03/08/2019     Lab Results   Component Value Date    GLUCOSE 111 03/08/2019    BUN 14 03/08/2019    CREATININE 0.90 03/08/2019    EGFRIFNONA 83 03/08/2019    EGFRIFAFRI 94 01/17/2017    BCR 15.6 03/08/2019    K 4.4 03/08/2019    CO2 28.6 03/08/2019    CALCIUM 9.5 03/08/2019    PROTENTOTREF 7.3 01/17/2017    ALBUMIN 4.00 03/08/2019    LABIL2 1.6 01/17/2017    AST 14 03/08/2019    ALT 16 03/08/2019     Lab Results   Component Value Date    TSH 4.470 (H) 03/08/2019     Lab Results "   Component Value Date    IRON 100 09/07/2018    TIBC 274 09/07/2018    FERRITIN 274.10 09/07/2018     Iron saturation 36%.       IMAGE STUDY: none since 11/21/2018         Assessment/Plan      1.  Left tongue base squamous cell carcinoma.   Stage YEN.  Finished 2 cycles chemotherapy with Cisplatin day 1 and day 22, with concurrent radiation on 1/23/2018.  Did not receive day 43 cisplatin as planned due to neutropenia.     His PET scan on 3/15/2018 showed essentially complete metabolic response.  The residual left neck lymph node shows only low-level blood pool activity.     Patient had CT scan examination of the neck on 6/11/2018 which showed stable left neck lymphadenopathy, probably smaller by 2 mm compared to the PET scan on 3/15/2018. This lesion is still probably dying down.      Patient switched his ENT care to Dr. Browning, who exam the patient in June 2018 and was negative.     Patient had CT scan examination of the neck with IV contrast on 11/21/2018, showed no evidence of disease recurrence.      Physical examination today is benign, no palpable lymphadenopathy in the neck.  Patient also reports he had the direct examination by Dr. Browning in the past 3 months and was negative.      2.  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  Patient has marginally elevated TSH today 4.47.  This may be the beginning of his hypothyroidism secondary to radiation therapy. This needs to be reassessed in 3 months together with free T3 and T4.       3.  Anemia, due to chemotherapy. His hemoglobin  improved and borderline normal.  Laboratory studies in September 2018 showed normal iron and vitamin B12.      His hemoglobin is 13.9 today.     4.  Tinnitus.  He states he had ringing in his ears prior to cisplatin.  His tinnitus did not worsen with cisplatin chemotherapy.      5.  Leukocytosis/neutrophilia and hyperglycemia on 11/21/2018.  This most likely was secondary to steroids high doses prior to CT scan  examination because he was allergic to IV contrast.       Both has resolved.         Plan:   1.   Arrange CT scan for the neck with IV contrast in 3 months.  Will obtain laboratory studies cbc, cmp, TSH, free T4 at same time.    2.  Due to allergic reaction to IV contrast, we'll give patient prednisone 50 mg, 13 hours, 7 hours and 1 hour before CT scan examination.  I e- scribed to his pharmacy.  He should also take Benadryl 50 mg 1 hour before CT scan.  3.  Patient will continue follow-up with ENT Dr. Browning.   4.  Patient is safe to proceed ahead with dental procedure.   5.   We will bring patient back for reevaluation in 3 months 1 week after his CT scan and liver study.        More than 25 min for patient care, over half of that time were for counseling.    DOMENICA LEAL M.D., Ph.D.    3/8/2019        CC:     Ameya Sheffield M.D.    Ameya Aguirre M.D.    Isma Hercules M.D.      Bhaskar Browning M.D.      Dictated using Dragon Dictation.

## 2019-03-09 PROBLEM — K11.7 XEROSTOMIA DUE TO RADIOTHERAPY: Status: ACTIVE | Noted: 2019-03-09
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
## 2019-03-12 ENCOUNTER — DOCUMENTATION (OUTPATIENT)
Dept: PHARMACY | Facility: HOSPITAL | Age: 69
End: 2019-03-12

## 2019-03-12 NOTE — PROGRESS NOTES
Clarification yesterday faxed to [facility] for Prednisone 50. Today ICD-10 code of T50.995A faxed over so Medicare B would pay for medication

## 2019-06-18 ENCOUNTER — HOSPITAL ENCOUNTER (OUTPATIENT)
Dept: PET IMAGING | Facility: HOSPITAL | Age: 69
Discharge: HOME | End: 2019-06-18
Admitting: INTERNAL MEDICINE

## 2019-06-18 ENCOUNTER — LAB (OUTPATIENT)
Dept: LAB | Facility: HOSPITAL | Age: 69
End: 2019-06-18

## 2019-06-18 DIAGNOSIS — C10.9 OROPHARYNGEAL CANCER (HCC): ICD-10-CM

## 2019-06-18 DIAGNOSIS — E03.4 HYPOTHYROIDISM DUE TO ACQUIRED ATROPHY OF THYROID: ICD-10-CM

## 2019-06-18 LAB
ALBUMIN SERPL-MCNC: 4.6 G/DL (ref 3.5–5.2)
ALBUMIN/GLOB SERPL: 1.6 G/DL (ref 1.1–2.4)
ALP SERPL-CCNC: 93 U/L (ref 38–116)
ALT SERPL W P-5'-P-CCNC: 24 U/L (ref 0–41)
ANION GAP SERPL CALCULATED.3IONS-SCNC: 12.4 MMOL/L
AST SERPL-CCNC: 19 U/L (ref 0–40)
BASOPHILS # BLD AUTO: 0.02 10*3/MM3 (ref 0–0.2)
BASOPHILS NFR BLD AUTO: 0.2 % (ref 0–1.5)
BILIRUB SERPL-MCNC: 0.4 MG/DL (ref 0.2–1.2)
BUN BLD-MCNC: 15 MG/DL (ref 6–20)
BUN/CREAT SERPL: 17.6 (ref 7.3–30)
CALCIUM SPEC-SCNC: 10.2 MG/DL (ref 8.5–10.2)
CHLORIDE SERPL-SCNC: 98 MMOL/L (ref 98–107)
CO2 SERPL-SCNC: 26.6 MMOL/L (ref 22–29)
CREAT BLD-MCNC: 0.85 MG/DL (ref 0.7–1.3)
CREAT BLDA-MCNC: 0.8 MG/DL (ref 0.6–1.3)
DEPRECATED RDW RBC AUTO: 40.4 FL (ref 37–54)
EOSINOPHIL # BLD AUTO: 0 10*3/MM3 (ref 0–0.4)
EOSINOPHIL NFR BLD AUTO: 0 % (ref 0.3–6.2)
ERYTHROCYTE [DISTWIDTH] IN BLOOD BY AUTOMATED COUNT: 11.6 % (ref 12.3–15.4)
GFR SERPL CREATININE-BSD FRML MDRD: 89 ML/MIN/1.73
GLOBULIN UR ELPH-MCNC: 2.8 GM/DL (ref 1.8–3.5)
GLUCOSE BLD-MCNC: 219 MG/DL (ref 74–124)
HCT VFR BLD AUTO: 45.2 % (ref 37.5–51)
HGB BLD-MCNC: 15.1 G/DL (ref 13–17.7)
IMM GRANULOCYTES # BLD AUTO: 0.07 10*3/MM3 (ref 0–0.05)
IMM GRANULOCYTES NFR BLD AUTO: 0.6 % (ref 0–0.5)
LYMPHOCYTES # BLD AUTO: 0.98 10*3/MM3 (ref 0.7–3.1)
LYMPHOCYTES NFR BLD AUTO: 8.4 % (ref 19.6–45.3)
MCH RBC QN AUTO: 31.9 PG (ref 26.6–33)
MCHC RBC AUTO-ENTMCNC: 33.4 G/DL (ref 31.5–35.7)
MCV RBC AUTO: 95.4 FL (ref 79–97)
MONOCYTES # BLD AUTO: 0.06 10*3/MM3 (ref 0.1–0.9)
MONOCYTES NFR BLD AUTO: 0.5 % (ref 5–12)
NEUTROPHILS # BLD AUTO: 10.47 10*3/MM3 (ref 1.7–7)
NEUTROPHILS NFR BLD AUTO: 90.3 % (ref 42.7–76)
NRBC BLD AUTO-RTO: 0 /100 WBC (ref 0–0.2)
PLATELET # BLD AUTO: 247 10*3/MM3 (ref 140–450)
PMV BLD AUTO: 10.1 FL (ref 6–12)
POTASSIUM BLD-SCNC: 4.7 MMOL/L (ref 3.5–4.7)
PROT SERPL-MCNC: 7.4 G/DL (ref 6.3–8)
RBC # BLD AUTO: 4.74 10*6/MM3 (ref 4.14–5.8)
SODIUM BLD-SCNC: 137 MMOL/L (ref 134–145)
T3FREE SERPL-MCNC: 2.33 PG/ML (ref 2–4.4)
T4 FREE SERPL-MCNC: 1.08 NG/DL (ref 0.93–1.7)
TSH SERPL DL<=0.05 MIU/L-ACNC: 1.28 MIU/ML (ref 0.27–4.2)
WBC NRBC COR # BLD: 11.6 10*3/MM3 (ref 3.4–10.8)

## 2019-06-18 PROCEDURE — 70491 CT SOFT TISSUE NECK W/DYE: CPT

## 2019-06-18 PROCEDURE — 36415 COLL VENOUS BLD VENIPUNCTURE: CPT | Performed by: INTERNAL MEDICINE

## 2019-06-18 PROCEDURE — 84443 ASSAY THYROID STIM HORMONE: CPT | Performed by: INTERNAL MEDICINE

## 2019-06-18 PROCEDURE — 25010000002 IOPAMIDOL 61 % SOLUTION: Performed by: INTERNAL MEDICINE

## 2019-06-18 PROCEDURE — 82565 ASSAY OF CREATININE: CPT

## 2019-06-18 PROCEDURE — 84481 FREE ASSAY (FT-3): CPT | Performed by: INTERNAL MEDICINE

## 2019-06-18 PROCEDURE — 85025 COMPLETE CBC W/AUTO DIFF WBC: CPT | Performed by: INTERNAL MEDICINE

## 2019-06-18 PROCEDURE — 84439 ASSAY OF FREE THYROXINE: CPT | Performed by: INTERNAL MEDICINE

## 2019-06-18 PROCEDURE — 80053 COMPREHEN METABOLIC PANEL: CPT | Performed by: INTERNAL MEDICINE

## 2019-06-18 RX ADMIN — IOPAMIDOL 75 ML: 612 INJECTION, SOLUTION INTRAVENOUS at 11:37

## 2019-06-20 NOTE — PROGRESS NOTES
Subjective .     REASONS FOR FOLLOWUP:    1.  Squamous cell carcinoma of the base of tongue, stage YEN (T1N2b), HPV status is not clear.  Started concurrent chemoradiation therapy on 12/4/2017 using cisplatin repeating every 3 weeks.    2.  Moderate neutropenia secondary to concurrent chemoradiotherapy.  Cycle #3 cisplatin was delayed and then canceled.    3.  Radiation therapy finished on 1/23/2018.  PEG tube was removed in May 2018.    4. PET scan on 3/15/2018 reported essentially complete resolution of hypermetabolic lesion at the left tongue base.  The left neck lymphadenopathy also showed blood pool activity.    5. Patient switched his ENT care to Dr. Browning.   6.  CT scan on 06/18/2019 showed no evidence of disease recurrence.    HISTORY OF PRESENT ILLNESS:  The patient is a 68 y.o. year old male who is here for 3-month follow-up, to discuss the results of CT scan examination.  Patient is accompanied by his wife who helped with history and discussion.     Patient is doing well overall. He still has dry mouth and altered taste secondary to chemo and radiation.  Patient reports good energy level. Patient has excellent performance status ECOG 0.  He denies fever sweating chills.     Patient is followed up by Otolaryngology and has a follow-up appointment with on July 10th with Dr. Browning.    Patient has oral abscess for which he will be following up with dentist in 4 days.     CT Neck done on 06/18/2019 showed beam hardening artifact limits evaluation somewhat but there is no evidence of residual or recurrent tongue base mass. There is no evidence of pathologic adenopathy. 1.4 x 0.9 cm area of lucency involving the spinous process of T1, nonspecific but stable. This likely represents an atypical hemangioma. Area of lucency related to the left maxillary 1st molar suggesting a periapical abscess measuring 1.5 cm in maximum dimension. Clinical correlation and standard dental radiographs recommended.    Laboratory  study today 06/18/2019 showed creatine at 0.80, TSH Baseline at 1.280, Free T4 at 1.08, and free T3 at 2.33. CBC was normal besides WBC elevated at 11.6, RDW down at 11.6, ANC [place] elevated at 04[address], absolute monocytes at 60, absolute lymphocytes at 980, and absolute immature grans 0.07. CMP normal besides elevated glucose of 219.       Past Medical History:   Diagnosis Date   • Benign essential hypertension    • H/O complete eye exam due   • H/O Neck mass     left   • Hyperlipidemia    • IFG (impaired fasting glucose)    • Seasonal allergies    • Tongue cancer (CMS/HCC) 11/01/2017    Left base of tongue squamous cell carcinoma, stage YEN, recieved chemo and radiation therapy     Past Surgical History:   Procedure Laterality Date   • CHOLECYSTECTOMY WITH INTRAOPERATIVE CHOLANGIOGRAM N/A 7/5/2018    Procedure: Laparoscopic cholecystectomy with intraoperative cholangiogram ;  Surgeon: Ashley Fischer MD;  Location: [facility];  Service: General   • COLONOSCOPY N/A 2015    normal colonoscopy-Dr. Galen Morrissey   • COLONOSCOPY N/A 05/04/2011    Normal colonoscopy-Dr. Tobias Emerson   • ENDOSCOPY W/ PEG TUBE PLACEMENT N/A 11/28/2017    Procedure: ESOPHAGOGASTRODUODENOSCOPY WITH PERCUTANEOUS ENDOSCOPIC GASTROSTOMY TUBE INSERTION;  Surgeon: Isma Hercules MD;  Location: [facility];  Service:    • FINE NEEDLE ASPIRATION Left 10/13/2017    Ultrasound guidance for fine needle biopsy and fine needle aspiration with ultrasonic guidance of left neck mass-Dr. Frank Marques   • INGUINAL HERNIA REPAIR Left 1994    Dr. Brian Peres   • INGUINAL HERNIA REPAIR Right 1993    Dr. Brian Peres   • LARYNGOPLASTY      Laryngoplasty with biopsy-Dr. Del Toro   • ND INSJ TUNNELED CVC W/O SUBQ PORT/ AGE 3 YR/> Left 11/28/2017    Procedure: INSERTION VENOUS ACCESS DEVICE;  Surgeon: Isma Hercules MD;  Location: [facility];  Service: General   • TONGUE BIOPSY / EXCISION N/A 11/01/2017    Biopsy of the left tongue  base-Dr. Zane Del Toro   • TONSILLECTOMY AND ADENOIDECTOMY Bilateral 11/01/2017    Dr. Zane Del Toro   Removal PEG tube on 5/2/2018    cholecystectomy in July 2018    HEMATOLOGIC/ONCOLOGIC HISTORY:  Mr. Ritchie is a 67 y.o. male who is here on 11/16/2017 for evaluation accompanied by his wife, referred by radiation oncologist, Dr. Aguirre, because of newly diagnosed squamous cell carcinoma of the left tongue base, stage YEN, [id number].      Patient previously only had history of mild hypertension which was controlled. Recently in early 09/2017 when he was on vacation, he felt a left neck nodule when he was shaving. He denies any pain associated with that. Patient was seen by his primary care physician, Dr. Sheffield, for evaluation and was referred to ENT, Dr. Zane Del Toro. Patient subsequently had CT of the neck examination at [facility] on 09/22/2017. This study reported a left neck mass measuring 3.5 x 3.1 x 2.4 cm with cystic/necrotic changes located at the region of the left level II jugular chain. There were additional small homogeneous cervical lymph nodes but possibly reactive.      Patient subsequently had ultrasound of the neck on 10/13/2017 associated with fine-needle aspiration biopsy at Dr. Del Toro' office. The ultrasound reported 2 nodules in left neck. The large one measured about 3.28 cm x 2.67 cm x 3.28 cm. The 2nd smaller one measures 1.56 cm x 0.99 cm x 1.48 cm, just below the large mass. Patient had fine-needle aspiration biopsy at the same time. Pathology evaluation from the [facility] reported poorly differentiated squamous cell carcinoma with degeneration and necrosis. The viable tumor cells exhibit strong nuclear reactivity for both p40 and p63.     Patient subsequently had tonsillectomy, biopsy of the left tongue base and adenoidectomy on 11/01/2017 by Dr. Del Toro. Pathology evaluation from [facility] reported moderately differentiated squamous cell carcinoma. The left  tonsil has no evidence of malignancy. Right tonsil also was benign. The adenoid tissue was also benign.      Patient reports the left neck mass is growing. He also reports poor appetite after tonsillectomy. For the past couple of weeks since the biopsy, he lost about 6 pounds. He denies nausea or vomiting. He has actually started feeling better with improved appetite. Denies pain. Denies fever or sweating.      This patient reports he never smoked cigarette. He is only a very rare social drinker. He told me the test for HPV is ongoing.      Patient also had PET scan examination obtained on 11/14/2017. This study reported focal hypermetabolism with SUV 15.9 corresponding to the left-sided base of the tongue. There was moderate enlarged left jugular chain lymph node which is hypermetabolic but without measuring SUV. There was also mild hypermetabolism identified within several additional smaller left jugular chain lymph nodes. There was no hypermetabolic lymphadenopathy elsewhere in the neck nor foci in the chest, abdomen or pelvis.    Patient was started on concurrent chemoradiation therapy with cisplatin every 3 weeks.  He received 2 cycles chemotherapy and due to poor tolerance with acute renal injury and neutropenia, cycle #3 cisplatin was canceled.     Radiation therapy finished on 1/23/2018.    PET scan on 3/15/2018 reported essentially complete resolution of hypermetabolic lesion at the left tongue base.  The left and neck lymphadenopathy also showing, with blood pool activity.    His CT scan examination of the neck on 6/11/2018 reported no evidence of local disease recurrence, a stable left neck adenopathy, maybe 2 mm smaller compared to the PET scan obtained in March 2018.  Laboratory study on the same day reported normal renal function with a creatinine 0.73 normal electrolytes and liver function panel, stable mild anemia with hemoglobin 12.4 and normal WBC and platelets.      Laboratory study on 9/7/2018  reported normal iron study with a ferritin 274, iron 100, TIBC 274 iron saturation 36% in the normal B12 level 631 pg/mL.  Hemoglobin was 13.5 improved and normal WBC 5350, and platelets 206,000.  He also had a normal TSH 2.87, and completely normal CMP.       CT scan examination for the neck with IV contrast on 11/21/2018 showed evidence of disease recurrence.  Laboratory study reported elevated glucose otherwise normal CMP.  Patient also had mildly elevated neutrophils.  These were likely secondary to steroids use prior to the CT scan because he is allergic to IV dye.      CT Neck done on 06/18/2019 showed beam hardening artifact limits evaluation somewhat but there is no evidence of residual or recurrent tongue base mass. There is no evidence of pathologic adenopathy. 1.4 x 0.9 cm area of lucency involving the spinous process of T1, nonspecific but stable. This likely represents an atypical hemangioma. Area of lucency related to the left maxillary 1st molar suggesting a periapical abscess measuring 1.5 cm in maximum dimension. Clinical correlation and standard dental radiographs recommended.    Laboratory study today 06/18/2019 showed creatine at 0.80, TSH Baseline at 1.280, Free T4 at 1.08, and free T3 at 2.33. CBC was normal besides WBC elevated at 11.6, RDW down at 11.6, [place] [address], absolute monocytes at 60, absolute lymphocytes at 980, and absolute immature grans 0.07. CMP normal besides elevated glucose of 219.         MEDICATIONS    Current Outpatient Medications:   •  cyclobenzaprine (FLEXERIL) 5 MG tablet, Take 1 tablet by mouth 2 (Two) Times a Day As Needed for Muscle Spasms., Disp: 60 tablet, Rfl: 5  •  metoprolol tartrate (LOPRESSOR) 50 MG tablet, Take 1 tablet by mouth 2 (Two) Times a Day., Disp: 180 tablet, Rfl: 3    ALLERGIES:     Allergies   Allergen Reactions   • Iodinated Diagnostic Agents Itching and Rash     Had a 24 hour delayed reaction to Ct contrast       SOCIAL HISTORY:      "  Social History     Social History   • Marital status:      Spouse name: Janiya   • Number of children: 2   • Years of education: High School     Occupational History   •  Retired     [employer]     Social History Main Topics   • Smoking status: Never Smoker   • Smokeless tobacco: Never Used   • Alcohol use Yes      Comment: occassional   • Drug use: No   • Sexual activity: No       FAMILY HISTORY:  Family History   Problem Relation Age of Onset   • Alcohol abuse Father    • Diabetes Father    • Cancer Neg Hx    • Malig Hyperthermia Neg Hx        Review of Systems   Constitutional: Negative for appetite change, chills, diaphoresis, fatigue, fever and unexpected weight change.   HENT: Positive for dental problem (Had a recent procedure for root canal). Negative for facial swelling, hearing loss, sore throat and trouble swallowing.         He has dry throat and mouth poor taste poor taste   Eyes: Negative for visual disturbance.   Respiratory: Negative for cough and shortness of breath.    Cardiovascular: Negative for chest pain, palpitations and leg swelling.   Gastrointestinal: Negative for abdominal pain, constipation, diarrhea and nausea.   Genitourinary: Negative for dysuria and hematuria.   Musculoskeletal: Negative for arthralgias and joint swelling.   Skin: Negative for rash.   Neurological: Negative for dizziness and headaches.   Hematological: Negative for adenopathy. Does not bruise/bleed easily.   Psychiatric/Behavioral: Negative for behavioral problems and sleep disturbance.         Objective    Vitals:    06/21/19 1135   BP: 150/79   Pulse: 56   Resp: 16   Temp: 98.4 °F (36.9 °C)   SpO2: 99%   Weight: 88.1 kg (194 lb 3.2 oz)   Height: 185 cm (72.84\")   PainSc: 0-No pain     Current Status 6/21/2019   ECOG score 0      PHYSICAL EXAM:      GENERAL:  Well-developed, well-nourished in no acute distress.   SKIN:  Warm, dry without rashes, purpura or petechiae.  EYES:  Pupils equal, round and reactive to " light.  EOMs intact.  Conjunctivae normal.  EARS:  Hearing intact.  NOSE: No excoriations or nasal discharge.  MOUTH:  Tongue is well-papillated; no stomatitis or ulcers.  Lips normal.  THROAT:  Oropharynx without lesions or exudates.  NECK:  Supple with good range of motion; no thyromegaly or masses, no JVD.  LYMPHATICS:  No cervical, supraclavicular, axillary adenopathy.  CHEST:  Lungs clear to auscultation. Good airflow.  CARDIAC:  Regular rate and rhythm without murmurs, rubs or gallops. Normal S1,S2.  ABDOMEN:  Soft, nontender with no hepatosplenomegaly or masses. Bowel sounds normal.   EXTREMITIES:  No clubbing, cyanosis or edema.  NEUROLOGICAL:  Cranial Nerves II-XII grossly intact.  No focal neurological deficits.  PSYCHIATRIC:  Normal affect and mood.      RECENT LABS:    Lab Results   Component Value Date    WBC 11.60 (H) 06/18/2019    HGB 15.1 06/18/2019    HCT 45.2 06/18/2019    MCV 95.4 06/18/2019     06/18/2019     Lab Results   Component Value Date    NEUTROABS 10.47 (H) 06/18/2019     Lab Results   Component Value Date    GLUCOSE 219 (H) 06/18/2019    BUN 15 06/18/2019    CREATININE 0.80 06/18/2019    EGFRIFNONA 89 06/18/2019    EGFRIFAFRI 94 01/17/2017    BCR 17.6 06/18/2019    K 4.7 06/18/2019    CO2 26.6 06/18/2019    CALCIUM 10.2 06/18/2019    PROTENTOTREF 7.3 01/17/2017    ALBUMIN 4.60 06/18/2019    LABIL2 1.6 01/17/2017    AST 19 06/18/2019    ALT 24 06/18/2019     Lab Results   Component Value Date    TSH 1.280 06/18/2019     Component      Latest Ref Rng & Units 6/18/2019   Creatinine      0.60 - 1.30 mg/dL 0.80   TSH Baseline      0.270 - 4.200 mIU/mL 1.280   Free T4      0.93 - 1.70 ng/dL 1.08   T3, Free      2.00 - 4.40 pg/mL 2.33       IMAGE STUDY:  06/18/2019 - CT Soft Tissue Neck   FINDINGS: The visualized portions of the skull base appear unremarkable.  The parotid, submandibular and thyroid glands appear unremarkable. The  visualized lung apices are clear.     There is no  evidence of pathologic adenopathy. Small subcentimeter nodes  are identified involving posterior triangle of the neck bilaterally.  Beam hardening artifact from dental amalgam limits evaluation somewhat  but there is no evidence of residual or recurrent tumor involving the  tongue base on the left.     The frontal sinus is completely opacified as is the frontal recess on  the right, unchanged. There is complete opacification of the right  maxillary sinus which is atelectatic.     There is lucency related to the left maxillary 1st molar suggesting a  periapical abscess similar in appearance as compared to prior  examination. This measures approximately 1.5 cm maximum dimension.  Clinical correlation and standard dental radiographs are recommended.     There is an area of lucency related to the spinous process of T1  measuring 14 mm in AP dimension and 9 mm in transverse dimension. This  was present on all prior CT examinations of the neck and appears stable.  Although nonspecific the stability is reassuring suggesting that this  represents an atypical hemangioma rather than a neoplastic process.  Additionally, there was no evidence of increased activity on the pet  scan at this location on the pet examination performed on 11/14/2017.     IMPRESSION:  1.  Beam hardening artifact limits evaluation somewhat but there is no  evidence of residual or recurrent tongue base mass. There is no evidence  of pathologic adenopathy.  2.  1.4 x 0.9 cm area of lucency involving the spinous process of T1,  nonspecific but stable. This likely represents an atypical hemangioma.  3.  Area of lucency related to the left maxillary 1st molar suggesting a  periapical abscess measuring 1.5 cm in maximum dimension. Clinical  correlation and standard dental radiographs recommended.      Assessment/Plan      1.  Left tongue base squamous cell carcinoma.   Stage YEN.  Finished 2 cycles chemotherapy with Cisplatin day 1 and day 22, with  concurrent radiation on 1/23/2018.  Did not receive day 43 cisplatin as planned due to neutropenia.     His PET scan on 3/15/2018 showed essentially complete metabolic response.  The residual left neck lymph node shows only low-level blood pool activity.     Patient had CT scan examination of the neck on 6/11/2018 which showed stable left neck lymphadenopathy, probably smaller by 2 mm compared to the PET scan on 3/15/2018. This lesion is still probably dying down.      Patient switched his ENT care to Dr. Browning.  His appointment to see Dr. Browning in July 2019.    Patient had CT scan examination of the neck with IV contrast on 06/18/2018, showed no evidence of disease recurrence.       2.  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    · Patient has marginally elevated TSH on 03/08/2019 which was 4.47.  This may be the beginning of his hypothyroidism secondary to radiation therapy.    · TSH Baseline at 1.280, Free T4 at 1.08, and free T3 at 2.33 on 06/18/2019.  We will continue to monitor.       3.  Anemia, due to chemotherapy. His hemoglobin  improved and borderline normal.  Laboratory studies in September 2018 showed normal iron and vitamin B12.   · Patient has normalized hemoglobin at 15.1 on 06/18/2019.        4.  Tinnitus.  He states he had ringing in his ears prior to cisplatin.  His tinnitus did not worsen with cisplatin chemotherapy.      5.  Leukocytosis/neutrophilia and hyperglycemia on 11/21/2018.    · This most likely was secondary to steroids high doses prior to CT scan examination because he was allergic to IV contrast.  CMP normal besides elevated glucose of 219.   · Patient had a normalized WBC 5260 on 3/8/2019.    · Labs on 6/18/2019 WBC elevated at 11.6, ANC [place] elevated at [address], which is possibly secondary to oral steroids again prior to the CT examination.      6. High blood glucose: Labs 06/18/2019 showed glucose of 219 and has been high in past as well.  This may be related to  his prednisone use before CT scan examination.  However his previous glucose levels has been elevated multiple times.  He is not diagnosed with DM II.     Advised patient to follow-up with his PCP for further evaluation.     7.  First molar tooth abscess on CT scan examination from 6/18/2019.  Patient reports that he had a root canal procedure recently.  He has dental appointment next Tuesday.  Advised patient to discuss this with his dentist for further evaluation.  He voiced understanding.    Plan:   1. Patient will continue follow-up with ENT Dr. Browning in July 2019.   2. Patient to follow up with his Dentist regarding dental abscess next week.   3. Patient to follow up with PCP regarding his elevated blood glucose.   4. Follow up with me in 6 months with labs - CBC and CMP      I, Chiqui Blackmon, acted as scribe for Nuvia Ballesteros MD PhD  in documenting the service or procedure. To the best of my knowledge, I recorded what was dictated by Nuvia Ballesteros MD PhD    I reviewed the note scribed by Ms. Chiqui Blackmon and made appropriate corrections.       Nuvia Ballesteros MD PhD  06/21/2019      CC:     Ameya Sheffield M.D.    Ameya Aguirre M.D.    Isma Hercules M.D.      Efrain Browning M.D.

## 2019-06-21 ENCOUNTER — OFFICE VISIT (OUTPATIENT)
Dept: ONCOLOGY | Facility: CLINIC | Age: 69
End: 2019-06-21

## 2019-06-21 ENCOUNTER — APPOINTMENT (OUTPATIENT)
Dept: LAB | Facility: HOSPITAL | Age: 69
End: 2019-06-21

## 2019-06-21 VITALS
BODY MASS INDEX: 25.74 KG/M2 | RESPIRATION RATE: 16 BRPM | WEIGHT: 194.2 LBS | HEIGHT: 73 IN | DIASTOLIC BLOOD PRESSURE: 79 MMHG | HEART RATE: 56 BPM | OXYGEN SATURATION: 99 % | TEMPERATURE: 98.4 F | SYSTOLIC BLOOD PRESSURE: 150 MMHG

## 2019-06-21 DIAGNOSIS — C10.9 OROPHARYNGEAL CANCER (HCC): Primary | ICD-10-CM

## 2019-06-21 DIAGNOSIS — R73.9 STEROID-INDUCED HYPERGLYCEMIA: ICD-10-CM

## 2019-06-21 DIAGNOSIS — D64.81 ANEMIA ASSOCIATED WITH CHEMOTHERAPY: ICD-10-CM

## 2019-06-21 DIAGNOSIS — T45.1X5A ANEMIA ASSOCIATED WITH CHEMOTHERAPY: ICD-10-CM

## 2019-06-21 DIAGNOSIS — T38.0X5A STEROID-INDUCED HYPERGLYCEMIA: ICD-10-CM

## 2019-06-21 DIAGNOSIS — D72.823 LEUKEMOID REACTION: ICD-10-CM

## 2019-06-21 PROCEDURE — 99215 OFFICE O/P EST HI 40 MIN: CPT | Performed by: INTERNAL MEDICINE

## 2019-06-21 PROCEDURE — G0463 HOSPITAL OUTPT CLINIC VISIT: HCPCS | Performed by: INTERNAL MEDICINE

## 2019-06-22 PROBLEM — K04.7 DENTAL ABSCESS: Status: ACTIVE | Noted: 2019-06-22

## 2019-12-06 ENCOUNTER — TELEPHONE (OUTPATIENT)
Dept: ONCOLOGY | Facility: CLINIC | Age: 69
End: 2019-12-06

## 2019-12-06 ENCOUNTER — OFFICE VISIT (OUTPATIENT)
Dept: ONCOLOGY | Facility: CLINIC | Age: 69
End: 2019-12-06

## 2019-12-06 ENCOUNTER — LAB (OUTPATIENT)
Dept: LAB | Facility: HOSPITAL | Age: 69
End: 2019-12-06

## 2019-12-06 VITALS
WEIGHT: 200.3 LBS | HEIGHT: 72 IN | HEART RATE: 52 BPM | BODY MASS INDEX: 27.13 KG/M2 | OXYGEN SATURATION: 97 % | SYSTOLIC BLOOD PRESSURE: 143 MMHG | DIASTOLIC BLOOD PRESSURE: 79 MMHG | RESPIRATION RATE: 16 BRPM | TEMPERATURE: 98.1 F

## 2019-12-06 DIAGNOSIS — E87.5 HYPERKALEMIA: Primary | ICD-10-CM

## 2019-12-06 DIAGNOSIS — C10.9 OROPHARYNGEAL CANCER (HCC): Primary | ICD-10-CM

## 2019-12-06 DIAGNOSIS — E03.4 HYPOTHYROIDISM DUE TO ACQUIRED ATROPHY OF THYROID: ICD-10-CM

## 2019-12-06 DIAGNOSIS — C10.9 OROPHARYNGEAL CANCER (HCC): ICD-10-CM

## 2019-12-06 LAB
ALBUMIN SERPL-MCNC: 4.2 G/DL (ref 3.5–5.2)
ALBUMIN/GLOB SERPL: 1.6 G/DL (ref 1.1–2.4)
ALP SERPL-CCNC: 84 U/L (ref 38–116)
ALT SERPL W P-5'-P-CCNC: 20 U/L (ref 0–41)
ANION GAP SERPL CALCULATED.3IONS-SCNC: 9.2 MMOL/L (ref 5–15)
AST SERPL-CCNC: 17 U/L (ref 0–40)
BASOPHILS # BLD AUTO: 0.05 10*3/MM3 (ref 0–0.2)
BASOPHILS NFR BLD AUTO: 0.8 % (ref 0–1.5)
BILIRUB SERPL-MCNC: 0.5 MG/DL (ref 0.2–1.2)
BUN BLD-MCNC: 16 MG/DL (ref 6–20)
BUN/CREAT SERPL: 15.2 (ref 7.3–30)
CALCIUM SPEC-SCNC: 9.5 MG/DL (ref 8.5–10.2)
CHLORIDE SERPL-SCNC: 101 MMOL/L (ref 98–107)
CO2 SERPL-SCNC: 29.8 MMOL/L (ref 22–29)
CREAT BLD-MCNC: 1.05 MG/DL (ref 0.7–1.3)
DEPRECATED RDW RBC AUTO: 40.6 FL (ref 37–54)
EOSINOPHIL # BLD AUTO: 0.67 10*3/MM3 (ref 0–0.4)
EOSINOPHIL NFR BLD AUTO: 10.2 % (ref 0.3–6.2)
ERYTHROCYTE [DISTWIDTH] IN BLOOD BY AUTOMATED COUNT: 11.6 % (ref 12.3–15.4)
GFR SERPL CREATININE-BSD FRML MDRD: 70 ML/MIN/1.73
GLOBULIN UR ELPH-MCNC: 2.7 GM/DL (ref 1.8–3.5)
GLUCOSE BLD-MCNC: 111 MG/DL (ref 74–124)
HCT VFR BLD AUTO: 43.6 % (ref 37.5–51)
HGB BLD-MCNC: 14.8 G/DL (ref 13–17.7)
IMM GRANULOCYTES # BLD AUTO: 0.03 10*3/MM3 (ref 0–0.05)
IMM GRANULOCYTES NFR BLD AUTO: 0.5 % (ref 0–0.5)
LYMPHOCYTES # BLD AUTO: 1.59 10*3/MM3 (ref 0.7–3.1)
LYMPHOCYTES NFR BLD AUTO: 24.1 % (ref 19.6–45.3)
MCH RBC QN AUTO: 32.5 PG (ref 26.6–33)
MCHC RBC AUTO-ENTMCNC: 33.9 G/DL (ref 31.5–35.7)
MCV RBC AUTO: 95.8 FL (ref 79–97)
MONOCYTES # BLD AUTO: 0.55 10*3/MM3 (ref 0.1–0.9)
MONOCYTES NFR BLD AUTO: 8.3 % (ref 5–12)
NEUTROPHILS # BLD AUTO: 3.71 10*3/MM3 (ref 1.7–7)
NEUTROPHILS NFR BLD AUTO: 56.1 % (ref 42.7–76)
NRBC BLD AUTO-RTO: 0 /100 WBC (ref 0–0.2)
PLATELET # BLD AUTO: 212 10*3/MM3 (ref 140–450)
PMV BLD AUTO: 9.2 FL (ref 6–12)
POTASSIUM BLD-SCNC: 5.7 MMOL/L (ref 3.5–4.7)
PROT SERPL-MCNC: 6.9 G/DL (ref 6.3–8)
RBC # BLD AUTO: 4.55 10*6/MM3 (ref 4.14–5.8)
SODIUM BLD-SCNC: 140 MMOL/L (ref 134–145)
WBC NRBC COR # BLD: 6.6 10*3/MM3 (ref 3.4–10.8)

## 2019-12-06 PROCEDURE — 99214 OFFICE O/P EST MOD 30 MIN: CPT | Performed by: INTERNAL MEDICINE

## 2019-12-06 PROCEDURE — 36415 COLL VENOUS BLD VENIPUNCTURE: CPT

## 2019-12-06 PROCEDURE — 85025 COMPLETE CBC W/AUTO DIFF WBC: CPT

## 2019-12-06 PROCEDURE — 80053 COMPREHEN METABOLIC PANEL: CPT

## 2019-12-06 RX ORDER — FUROSEMIDE 20 MG/1
20 TABLET ORAL DAILY
Qty: 7 TABLET | Refills: 0 | Status: SHIPPED | OUTPATIENT
Start: 2019-12-06 | End: 2019-12-09

## 2019-12-06 RX ORDER — INFLUENZA A VIRUS A/MICHIGAN/45/2015 X-275 (H1N1) ANTIGEN (FORMALDEHYDE INACTIVATED), INFLUENZA A VIRUS A/SINGAPORE/INFIMH-16-0019/2016 IVR-186 (H3N2) ANTIGEN (FORMALDEHYDE INACTIVATED), AND INFLUENZA B VIRUS B/MARYLAND/15/2016 BX-69A (A B/COLORADO/6/2017-LIKE VIRUS) ANTIGEN (FORMALDEHYDE INACTIVATED) 60; 60; 60 UG/.5ML; UG/.5ML; UG/.5ML
INJECTION, SUSPENSION INTRAMUSCULAR
Refills: 0 | COMMUNITY
Start: 2019-10-26 | End: 2019-12-09

## 2019-12-06 RX ORDER — FUROSEMIDE 20 MG/1
20 TABLET ORAL DAILY
Qty: 7 TABLET | Refills: 0 | Status: SHIPPED | OUTPATIENT
Start: 2019-12-06 | End: 2019-12-06

## 2019-12-06 NOTE — PROGRESS NOTES
Subjective .     REASONS FOR FOLLOWUP:    1.  Squamous cell carcinoma of the base of tongue, stage YEN (T1N2b), HPV status is not clear.  Started concurrent chemoradiation therapy on 12/4/2017 using cisplatin repeating every 3 weeks.    2.  Moderate neutropenia secondary to concurrent chemoradiotherapy.  Cycle #3 cisplatin was delayed and then canceled.    3.  Radiation therapy finished on 1/23/2018.  PEG tube was removed in May 2018.    4. PET scan on 3/15/2018 reported essentially complete resolution of hypermetabolic lesion at the left tongue base.  The left neck lymphadenopathy also showed blood pool activity.    5. Patient switched his ENT care to Dr. Browning.   6.  CT scan on 06/18/2019 showed no evidence of disease recurrence.    HISTORY OF PRESENT ILLNESS:  The patient is a 69 y.o. year old male who is here for 6-month follow-up, accompanied by his wife today..     Patient reports no lymphadenopathy in the neck area.  No hoarseness of voice or dysphagia.  He continues to have dry mouth and altered taste secondary to previous chemoradiation therapy.  Otherwise he has no specific complaints.  Has excellent performance status ECOG 0.    Patient is followed by Otolaryngology Dr. Browning week.            Past Medical History:   Diagnosis Date   • Benign essential hypertension    • H/O complete eye exam due   • H/O Neck mass     left   • Hyperlipidemia    • IFG (impaired fasting glucose)    • Seasonal allergies    • Tongue cancer (CMS/HCC) 11/01/2017    Left base of tongue squamous cell carcinoma, stage YEN, recieved chemo and radiation therapy     Past Surgical History:   Procedure Laterality Date   • CHOLECYSTECTOMY WITH INTRAOPERATIVE CHOLANGIOGRAM N/A 7/5/2018    Procedure: Laparoscopic cholecystectomy with intraoperative cholangiogram ;  Surgeon: Ashley Fischer MD;  Location: [facility];  Service: General   • COLONOSCOPY N/A 2015    normal colonoscopy-Dr. Galen Morrissey   • COLONOSCOPY N/A 05/04/2011     Normal colonoscopy-Dr. Tobias Emerson   • ENDOSCOPY W/ PEG TUBE PLACEMENT N/A 11/28/2017    Procedure: ESOPHAGOGASTRODUODENOSCOPY WITH PERCUTANEOUS ENDOSCOPIC GASTROSTOMY TUBE INSERTION;  Surgeon: Isma Hercules MD;  Location: [facility] OR;  Service:    • FINE NEEDLE ASPIRATION Left 10/13/2017    Ultrasound guidance for fine needle biopsy and fine needle aspiration with ultrasonic guidance of left neck mass-Dr. Frank Marques   • INGUINAL HERNIA REPAIR Left 1994    Dr. Brian Peres   • INGUINAL HERNIA REPAIR Right 1993    Dr. Brian Peres   • LARYNGOPLASTY      Laryngoplasty with biopsy-Dr. Del Toro   • OR INSJ TUNNELED CVC W/O SUBQ PORT/ AGE 5 YR/> Left 11/28/2017    Procedure: INSERTION VENOUS ACCESS DEVICE;  Surgeon: Isma Hercules MD;  Location: [facility];  Service: General   • TONGUE BIOPSY / EXCISION N/A 11/01/2017    Biopsy of the left tongue base-Dr. Zane Del Toro   • TONSILLECTOMY AND ADENOIDECTOMY Bilateral 11/01/2017    Dr. Zane Del Toro   Removal PEG tube on 5/2/2018    cholecystectomy in July 2018    HEMATOLOGIC/ONCOLOGIC HISTORY:  Mr. Ritchie is a 67 y.o. male who is here on 11/16/2017 for evaluation accompanied by his wife, referred by radiation oncologist, Dr. Aguirre, because of newly diagnosed squamous cell carcinoma of the left tongue base, stage YEN, [id number].      Patient previously only had history of mild hypertension which was controlled. Recently in early 09/2017 when he was on vacation, he felt a left neck nodule when he was shaving. He denies any pain associated with that. Patient was seen by his primary care physician, Dr. Sheffield, for evaluation and was referred to ENT, Dr. Zane Del Toro. Patient subsequently had CT of the neck examination at [facility] on 09/22/2017. This study reported a left neck mass measuring 3.5 x 3.1 x 2.4 cm with cystic/necrotic changes located at the region of the left level II jugular chain. There were additional  small homogeneous cervical lymph nodes but possibly reactive.      Patient subsequently had ultrasound of the neck on 10/13/2017 associated with fine-needle aspiration biopsy at Dr. Del Toro' office. The ultrasound reported 2 nodules in left neck. The large one measured about 3.28 cm x 2.67 cm x 3.28 cm. The 2nd smaller one measures 1.56 cm x 0.99 cm x 1.48 cm, just below the large mass. Patient had fine-needle aspiration biopsy at the same time. Pathology evaluation from the [facility] reported poorly differentiated squamous cell carcinoma with degeneration and necrosis. The viable tumor cells exhibit strong nuclear reactivity for both p40 and p63.     Patient subsequently had tonsillectomy, biopsy of the left tongue base and adenoidectomy on 11/01/2017 by Dr. Del Toro. Pathology evaluation from [facility] reported moderately differentiated squamous cell carcinoma. The left tonsil has no evidence of malignancy. Right tonsil also was benign. The adenoid tissue was also benign.      Patient reports the left neck mass is growing. He also reports poor appetite after tonsillectomy. For the past couple of weeks since the biopsy, he lost about 6 pounds. He denies nausea or vomiting. He has actually started feeling better with improved appetite. Denies pain. Denies fever or sweating.      This patient reports he never smoked cigarette. He is only a very rare social drinker. He told me the test for HPV is ongoing.      Patient also had PET scan examination obtained on 11/14/2017. This study reported focal hypermetabolism with SUV 15.9 corresponding to the left-sided base of the tongue. There was moderate enlarged left jugular chain lymph node which is hypermetabolic but without measuring SUV. There was also mild hypermetabolism identified within several additional smaller left jugular chain lymph nodes. There was no hypermetabolic lymphadenopathy elsewhere in the neck nor foci in the chest, abdomen or pelvis.    Patient  was started on concurrent chemoradiation therapy with cisplatin every 3 weeks.  He received 2 cycles chemotherapy and due to poor tolerance with acute renal injury and neutropenia, cycle #3 cisplatin was canceled.     Radiation therapy finished on 1/23/2018.    PET scan on 3/15/2018 reported essentially complete resolution of hypermetabolic lesion at the left tongue base.  The left and neck lymphadenopathy also showing, with blood pool activity.    His CT scan examination of the neck on 6/11/2018 reported no evidence of local disease recurrence, a stable left neck adenopathy, maybe 2 mm smaller compared to the PET scan obtained in March 2018.  Laboratory study on the same day reported normal renal function with a creatinine 0.73 normal electrolytes and liver function panel, stable mild anemia with hemoglobin 12.4 and normal WBC and platelets.      Laboratory study on 9/7/2018 reported normal iron study with a ferritin 274, iron 100, TIBC 274 iron saturation 36% in the normal B12 level 631 pg/mL.  Hemoglobin was 13.5 improved and normal WBC 5350, and platelets 206,000.  He also had a normal TSH 2.87, and completely normal CMP.     CT scan examination for the neck with IV contrast on 11/21/2018 showed evidence of disease recurrence.  Laboratory study reported elevated glucose otherwise normal CMP.  Patient also had mildly elevated neutrophils.  These were likely secondary to steroids use prior to the CT scan because he is allergic to IV dye.      CT Neck done on 06/18/2019 showed beam hardening artifact limits evaluation somewhat but there is no evidence of residual or recurrent tongue base mass. There is no evidence of pathologic adenopathy. 1.4 x 0.9 cm area of lucency involving the spinous process of T1, nonspecific but stable. This likely represents an atypical hemangioma. Area of lucency related to the left maxillary 1st molar suggesting a periapical abscess measuring 1.5 cm in maximum dimension. Clinical  correlation and standard dental radiographs recommended.    Laboratory study today 06/18/2019 showed creatine at 0.80, TSH Baseline at 1.280, Free T4 at 1.08, and free T3 at 2.33. CBC was normal besides WBC elevated at 11.6, RDW down at 11.6, [place] [address], absolute monocytes at 60, absolute lymphocytes at 980, and absolute immature grans 0.07. CMP normal besides elevated glucose of 219.         MEDICATIONS    Current Outpatient Medications:   •  cyclobenzaprine (FLEXERIL) 5 MG tablet, Take 1 tablet by mouth 2 (Two) Times a Day As Needed for Muscle Spasms., Disp: 60 tablet, Rfl: 5  •  FLUZONE HIGH-DOSE 0.5 ML suspension prefilled syringe injection, TO BE ADMINISTERED BY PHARMACIST FOR IMMUNIZATION, Disp: , Rfl: 0  •  metoprolol tartrate (LOPRESSOR) 50 MG tablet, Take 1 tablet by mouth 2 (Two) Times a Day., Disp: 180 tablet, Rfl: 3    ALLERGIES:     Allergies   Allergen Reactions   • Iodinated Diagnostic Agents Itching and Rash     Had a 24 hour delayed reaction to Ct contrast       SOCIAL HISTORY:       Social History     Social History   • Marital status:      Spouse name: Janiya   • Number of children: 2   • Years of education: High School     Occupational History   •  Retired     [employer]     Social History Main Topics   • Smoking status: Never Smoker   • Smokeless tobacco: Never Used   • Alcohol use Yes      Comment: occassional   • Drug use: No   • Sexual activity: No       FAMILY HISTORY:  Family History   Problem Relation Age of Onset   • Alcohol abuse Father    • Diabetes Father    • Cancer Neg Hx    • Malig Hyperthermia Neg Hx        Review of Systems   Constitutional: Negative for activity change, appetite change, chills, diaphoresis, fatigue, fever and unexpected weight change.   HENT: Positive for dental problem (Had a recent procedure for root canal). Negative for facial swelling, hearing loss, mouth sores, nosebleeds, sore throat and trouble swallowing.         He has dry throat and poor  "taste   Eyes: Negative for visual disturbance.   Respiratory: Negative for cough and shortness of breath.    Cardiovascular: Negative for chest pain, palpitations and leg swelling.   Gastrointestinal: Negative for abdominal pain, blood in stool and nausea.   Endocrine: Negative for cold intolerance and polyuria.   Genitourinary: Negative for dysuria and hematuria.   Musculoskeletal: Negative for arthralgias and joint swelling.   Skin: Negative for color change and rash.   Allergic/Immunologic: Negative for immunocompromised state.   Neurological: Negative for dizziness, light-headedness and headaches.   Hematological: Negative for adenopathy. Does not bruise/bleed easily.   Psychiatric/Behavioral: Negative for behavioral problems and sleep disturbance.         Objective    Vitals:    12/06/19 1030   BP: 143/79   Pulse: 52   Resp: 16   Temp: 98.1 °F (36.7 °C)   SpO2: 97%   Weight: 90.9 kg (200 lb 4.8 oz)   Height: 183 cm (72.05\")  Comment: new ht.   PainSc: 0-No pain     Current Status 12/6/2019   ECOG score 0      PHYSICAL EXAM:    GENERAL:  Well-developed, well-nourished in no acute distress.   SKIN:  Warm, dry without rashes, purpura or petechiae.  EYES:  Pupils equal, round.  EOMs intact.  Conjunctivae normal.  EARS:  Hearing intact.  NECK:  Supple with good range of motion; no thyromegaly or masses, no JVD.  CHEST:  Lungs clear to auscultation. Good airflow.  CARDIAC:  Regular rate and rhythm without murmurs, rubs or gallops. Normal S1,S2.  ABDOMEN:  Soft, nontender with no hepatosplenomegaly or masses.  EXTREMITIES:  No clubbing, cyanosis or edema.  NEUROLOGICAL:  Cranial Nerves II-XII grossly intact.  No focal neurological deficits.  PSYCHIATRIC:  Normal affect and mood.        RECENT LABS:    Lab Results   Component Value Date    WBC 6.60 12/06/2019    HGB 14.8 12/06/2019    HCT 43.6 12/06/2019    MCV 95.8 12/06/2019     12/06/2019     Lab Results   Component Value Date    NEUTROABS 3.71 12/06/2019 "     Lab Results   Component Value Date    GLUCOSE 219 (H) 06/18/2019    BUN 15 06/18/2019    CREATININE 0.80 06/18/2019    EGFRIFNONA 89 06/18/2019    EGFRIFAFRI 94 01/17/2017    BCR 17.6 06/18/2019    K 4.7 06/18/2019    CO2 26.6 06/18/2019    CALCIUM 10.2 06/18/2019    PROTENTOTREF 7.3 01/17/2017    ALBUMIN 4.60 06/18/2019    LABIL2 1.6 01/17/2017    AST 19 06/18/2019    ALT 24 06/18/2019     Lab Results   Component Value Date    TSH 1.280 06/18/2019     Component      Latest Ref Rng & Units 6/18/2019   Creatinine      0.60 - 1.30 mg/dL 0.80   TSH Baseline      0.270 - 4.200 mIU/mL 1.280   Free T4      0.93 - 1.70 ng/dL 1.08   T3, Free      2.00 - 4.40 pg/mL 2.33         Assessment/Plan      1.  Left tongue base squamous cell carcinoma.   Stage YEN.  Finished 2 cycles chemotherapy with Cisplatin day 1 and day 22, with concurrent radiation on 1/23/2018.  Did not receive day 43 cisplatin as planned due to neutropenia.     His PET scan on 3/15/2018 showed essentially complete metabolic response.  The residual left neck lymph node shows only low-level blood pool activity.     Patient had CT scan examination of the neck on 6/11/2018 which showed stable left neck lymphadenopathy, probably smaller by 2 mm compared to the PET scan on 3/15/2018. This lesion is still probably dying down.      Patient switched his ENT care to Dr. Browning.     Patient had CT scan examination of the neck with IV contrast on 06/18/2019, showed no evidence of disease recurrence.       2.  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    · Patient has marginally elevated TSH on 03/08/2019 which was 4.47.  This may be the beginning of his hypothyroidism secondary to radiation therapy.    · TSH Baseline at 1.280, Free T4 at 1.08, and free T3 at 2.33 on 06/18/2019.  We will continue to monitor.     3.  Anemia, due to chemotherapy. His hemoglobin  improved and borderline normal.  Laboratory studies in September 2018 showed normal iron and  vitamin B12.   · Patient has normalized hemoglobin at 15.1 on 06/18/2019.      4.  Tinnitus.  He states he had ringing in his ears prior to cisplatin.  His tinnitus did not worsen with cisplatin chemotherapy.      5.  Leukocytosis/neutrophilia and hyperglycemia on 11/21/2018.    · This most likely was secondary to steroids high doses prior to CT scan examination because he was allergic to IV contrast.  CMP normal besides elevated glucose of 219.   · Patient had a normalized WBC 5260 on 3/8/2019.    · Labs on 6/18/2019 WBC elevated at 11.6, ANC [place] [address], which is possibly secondary to oral steroids again prior to the CT examination.      6. High blood glucose: Labs 06/18/2019 showed glucose of 219 and has been high in past as well.  This may be related to his prednisone use before CT scan examination.  However his previous glucose levels has been elevated multiple times.  He is not diagnosed with DM II.         Plan:   1. Patient will continue follow-up with ENT Dr. Browning.    2. We will arrange CT scan for the neck with IV contrast, together with laboratory studies CBC CMP, TSH and free T4 in 6 months, 1 week prior to his next MD visit.     Addendum:   Glucose   Date Value Ref Range Status   12/06/2019 111 74 - 124 mg/dL Final     BUN   Date Value Ref Range Status   12/06/2019 16 6 - 20 mg/dL Final     Creatinine   Date Value Ref Range Status   12/06/2019 1.05 0.70 - 1.30 mg/dL Final   06/18/2019 0.80 0.60 - 1.30 mg/dL Final     Comment:     Serial Number: [id number]:  [id number]     Sodium   Date Value Ref Range Status   12/06/2019 140 134 - 145 mmol/L Final     Potassium   Date Value Ref Range Status   12/06/2019 5.7 (C) 3.5 - 4.7 mmol/L Final     Chloride   Date Value Ref Range Status   12/06/2019 101 98 - 107 mmol/L Final     CO2   Date Value Ref Range Status   12/06/2019 29.8 (H) 22.0 - 29.0 mmol/L Final     Calcium   Date Value Ref Range Status   12/06/2019 9.5 8.5 - 10.2 mg/dL Final     Total  Protein   Date Value Ref Range Status   12/06/2019 6.9 6.3 - 8.0 g/dL Final     Albumin   Date Value Ref Range Status   12/06/2019 4.20 3.50 - 5.20 g/dL Final     ALT (SGPT)   Date Value Ref Range Status   12/06/2019 20 0 - 41 U/L Final     AST (SGOT)   Date Value Ref Range Status   12/06/2019 17 0 - 40 U/L Final     Alkaline Phosphatase   Date Value Ref Range Status   12/06/2019 84 38 - 116 U/L Final     Total Bilirubin   Date Value Ref Range Status   12/06/2019 0.5 0.2 - 1.2 mg/dL Final     eGFR Non  Amer   Date Value Ref Range Status   12/06/2019 70 >60 mL/min/1.73 Final     A/G Ratio   Date Value Ref Range Status   01/17/2017 1.6 g/dL Final     BUN/Creatinine Ratio   Date Value Ref Range Status   12/06/2019 15.2 7.3 - 30.0 Final     Anion Gap   Date Value Ref Range Status   12/06/2019 9.2 5.0 - 15.0 mmol/L Final       CMP results came back after patient left the clinic.  This patient has hyperkalemia potassium 5.7.  Repeated and confirmed.  I called and left a message for patient, will start him on Lasix 20 mg daily for 3 days repeat potassium level next Monday.      Nuvia Ballesteros MD PhD  12/06/2019      CC:     Ameya Sheffield M.D.    Ameya Aguirre M.D.    Isma Hercules M.D.      Efrain Browning M.D.

## 2019-12-06 NOTE — TELEPHONE ENCOUNTER
PT CALLING STATING THAT HE REC'D A VM THAT HIS K+ WAS HIGH AND THAT HE NEEDED TO TAKE A RX. SAW THAT PT'S K+ WAS 5.7. ALSO SAW THAT RX FOR LASIX HAD BEEN SENT TO PT'S PHARMACY. PT IS GOING TO HIS PCP Monday. PLACED ORDER FOR STAT BMP AND PT WILL REQUEST THAT THEY DRAW IT Monday TO RECHECK K+.

## 2019-12-07 PROBLEM — C77.0 SECONDARY AND UNSPECIFIED MALIGNANT NEOPLASM OF LYMPH NODES OF HEAD, FACE AND NECK: Status: RESOLVED | Noted: 2019-02-20 | Resolved: 2019-12-07

## 2019-12-07 PROBLEM — C01 CANCER OF BASE OF TONGUE (HCC): Status: RESOLVED | Noted: 2017-11-16 | Resolved: 2019-12-07

## 2019-12-09 ENCOUNTER — OFFICE VISIT (OUTPATIENT)
Dept: FAMILY MEDICINE CLINIC | Facility: CLINIC | Age: 69
End: 2019-12-09

## 2019-12-09 VITALS
HEIGHT: 72 IN | RESPIRATION RATE: 14 BRPM | BODY MASS INDEX: 27.09 KG/M2 | HEART RATE: 58 BPM | SYSTOLIC BLOOD PRESSURE: 142 MMHG | TEMPERATURE: 97.9 F | WEIGHT: 200 LBS | DIASTOLIC BLOOD PRESSURE: 74 MMHG | OXYGEN SATURATION: 100 %

## 2019-12-09 DIAGNOSIS — I10 BENIGN ESSENTIAL HYPERTENSION: ICD-10-CM

## 2019-12-09 PROCEDURE — 99213 OFFICE O/P EST LOW 20 MIN: CPT | Performed by: FAMILY MEDICINE

## 2019-12-09 RX ORDER — METOPROLOL TARTRATE 50 MG/1
50 TABLET, FILM COATED ORAL 2 TIMES DAILY
Qty: 180 TABLET | Refills: 3 | Status: SHIPPED | OUTPATIENT
Start: 2019-12-09 | End: 2020-02-04 | Stop reason: SDUPTHER

## 2019-12-09 RX ORDER — HYDROCHLOROTHIAZIDE 12.5 MG/1
12.5 CAPSULE, GELATIN COATED ORAL DAILY
Qty: 90 CAPSULE | Refills: 3 | Status: SHIPPED | OUTPATIENT
Start: 2019-12-09 | End: 2020-02-04 | Stop reason: SDUPTHER

## 2019-12-09 NOTE — PROGRESS NOTES
Subjective   William Ritchie is a 69 y.o. male.     History of Present Illness     Chief Complaint:   Chief Complaint   Patient presents with   • Hypertension     MED REFILL  - LAB RESULTS  - ON LASIX  -enoch bp done        William Ritchie 69 y.o. male who presents today for Medical Management of the below listed issues and medication refills.  he has a problem list of   Patient Active Problem List   Diagnosis   • Hyperlipidemia   • Benign essential hypertension   • IFG (impaired fasting glucose)   • Oropharyngeal cancer (CMS/HCC)   • Fitting and adjustment of vascular catheter   • Steroid-induced hyperglycemia   • Dehydration   • Acute renal injury (CMS/HCC)   • Anemia associated with chemotherapy   • Chemotherapy-induced nausea   • GERD (gastroesophageal reflux disease)   • Chemotherapy induced neutropenia (CMS/HCC)   • Adverse effect of antineoplastic and immunosuppressive drugs   • Adverse effect of radiation therapy   • Pharyngitis, acute   • Hypothyroidism due to acquired atrophy of thyroid   • TSH (thyroid-stimulating hormone deficiency)   • Calculus of gallbladder without cholecystitis without obstruction   • Right leg pain   • Sensitivity to the cold   • Leukemoid reaction   • 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  Since the last visit, he has overall felt well.  he has been compliant with   Current Outpatient Medications:   •  metoprolol tartrate (LOPRESSOR) 50 MG tablet, Take 1 tablet by mouth 2 (Two) Times a Day., Disp: 180 tablet, Rfl: 3  •  cyclobenzaprine (FLEXERIL) 5 MG tablet, Take 1 tablet by mouth 2 (Two) Times a Day As Needed for Muscle Spasms., Disp: 60 tablet, Rfl: 5  •  hydroCHLOROthiazide (MICROZIDE) 12.5 MG capsule, Take 1 capsule by mouth Daily., Disp: 90 capsule, Rfl: 3.  he denies medication side effects.    All of the other chronic condition(s) listed above are stable w/o issues.    /74   Pulse 58   Temp 97.9 °F (36.6 °C) (Oral)   Resp 14   Ht 183 cm  "(72.05\")   Wt 90.7 kg (200 lb)   SpO2 100%   BMI 27.09 kg/m²     Results for orders placed or performed in visit on 12/06/19   Comprehensive Metabolic Panel   Result Value Ref Range    Glucose 111 74 - 124 mg/dL    BUN 16 6 - 20 mg/dL    Creatinine 1.05 0.70 - 1.30 mg/dL    Sodium 140 134 - 145 mmol/L    Potassium 5.7 (C) 3.5 - 4.7 mmol/L    Chloride 101 98 - 107 mmol/L    CO2 29.8 (H) 22.0 - 29.0 mmol/L    Calcium 9.5 8.5 - 10.2 mg/dL    Total Protein 6.9 6.3 - 8.0 g/dL    Albumin 4.20 3.50 - 5.20 g/dL    ALT (SGPT) 20 0 - 41 U/L    AST (SGOT) 17 0 - 40 U/L    Alkaline Phosphatase 84 38 - 116 U/L    Total Bilirubin 0.5 0.2 - 1.2 mg/dL    eGFR Non African Amer 70 >60 mL/min/1.73    Globulin 2.7 1.8 - 3.5 gm/dL    A/G Ratio 1.6 1.1 - 2.4 g/dL    BUN/Creatinine Ratio 15.2 7.3 - 30.0    Anion Gap 9.2 5.0 - 15.0 mmol/L   CBC Auto Differential   Result Value Ref Range    WBC 6.60 3.40 - 10.80 10*3/mm3    RBC 4.55 4.14 - 5.80 10*6/mm3    Hemoglobin 14.8 13.0 - 17.7 g/dL    Hematocrit 43.6 37.5 - 51.0 %    MCV 95.8 79.0 - 97.0 fL    MCH 32.5 26.6 - 33.0 pg    MCHC 33.9 31.5 - 35.7 g/dL    RDW 11.6 (L) 12.3 - 15.4 %    RDW-SD 40.6 37.0 - 54.0 fl    MPV 9.2 6.0 - 12.0 fL    Platelets 212 140 - 450 10*3/mm3    Neutrophil % 56.1 42.7 - 76.0 %    Lymphocyte % 24.1 19.6 - 45.3 %    Monocyte % 8.3 5.0 - 12.0 %    Eosinophil % 10.2 (H) 0.3 - 6.2 %    Basophil % 0.8 0.0 - 1.5 %    Immature Grans % 0.5 0.0 - 0.5 %    Neutrophils, Absolute 3.71 1.70 - 7.00 10*3/mm3    Lymphocytes, Absolute 1.59 0.70 - 3.10 10*3/mm3    Monocytes, Absolute 0.55 0.10 - 0.90 10*3/mm3    Eosinophils, Absolute 0.67 (H) 0.00 - 0.40 10*3/mm3    Basophils, Absolute 0.05 0.00 - 0.20 10*3/mm3    Immature Grans, Absolute 0.03 0.00 - 0.05 10*3/mm3    nRBC 0.0 0.0 - 0.2 /100 WBC           The following portions of the patient's history were reviewed and updated as appropriate: allergies, current medications, past family history, past medical history, past " social history, past surgical history and problem list.    Review of Systems   Constitutional: Negative for activity change, chills, fatigue and fever.   Respiratory: Negative for cough and shortness of breath.    Cardiovascular: Negative for chest pain and palpitations.   Gastrointestinal: Negative for abdominal pain.   Endocrine: Negative for cold intolerance.   Psychiatric/Behavioral: Negative for behavioral problems and dysphoric mood. The patient is not nervous/anxious.        Objective   Physical Exam   Constitutional: He appears well-developed and well-nourished.   Neck: Neck supple. No thyromegaly present.   Cardiovascular: Normal rate and regular rhythm.   No murmur heard.  Pulmonary/Chest: Effort normal and breath sounds normal.   Abdominal: Bowel sounds are normal. There is no tenderness.   Psychiatric: He has a normal mood and affect. His behavior is normal.   Nursing note and vitals reviewed.      Assessment/Plan   William was seen today for hypertension.    Diagnoses and all orders for this visit:    Benign essential hypertension  Comments:  UNCONTROLLED  Orders:  -     metoprolol tartrate (LOPRESSOR) 50 MG tablet; Take 1 tablet by mouth 2 (Two) Times a Day.  -     hydroCHLOROthiazide (MICROZIDE) 12.5 MG capsule; Take 1 capsule by mouth Daily.

## 2019-12-11 ENCOUNTER — TELEPHONE (OUTPATIENT)
Dept: ONCOLOGY | Facility: CLINIC | Age: 69
End: 2019-12-11

## 2019-12-11 NOTE — TELEPHONE ENCOUNTER
Pt called requesting his labs results from Dr. Sheffield's office that were drawn on Monday 12/9. There are no results in [facility]. I called and left VM on Dr. Sheffield's triage line. Await response

## 2019-12-12 ENCOUNTER — TELEPHONE (OUTPATIENT)
Dept: ONCOLOGY | Facility: CLINIC | Age: 69
End: 2019-12-12

## 2019-12-12 ENCOUNTER — TELEPHONE (OUTPATIENT)
Dept: FAMILY MEDICINE CLINIC | Facility: CLINIC | Age: 69
End: 2019-12-12

## 2019-12-12 NOTE — TELEPHONE ENCOUNTER
I called pt. Dr Ballesteros office called him. He is wanting to know if you want him to come back in a month and make sure the HCTZ pill is helping. And if so what lab you want?

## 2019-12-12 NOTE — TELEPHONE ENCOUNTER
Per Dr. Ballesteros, pt's PCP can follow potassium level. Attempted to call pt. No answer, left VM

## 2019-12-12 NOTE — TELEPHONE ENCOUNTER
His hematologist ordered it and hasn't signed off on it yet, thus I can't see it. He will need to contact them

## 2020-01-13 ENCOUNTER — PATIENT OUTREACH (OUTPATIENT)
Dept: CASE MANAGEMENT | Facility: OTHER | Age: 70
End: 2020-01-13

## 2020-01-13 NOTE — OUTREACH NOTE
Spoke with pt to schedule AWV. Pt was interested in scheduling but would like to repeat his lab work prior to scheduling. Pt stated that he had some abnormal lab work a few weeks ago and was supposed to follow up and would prefer to wait to schedule. Pt was advised to call the office to schedule at his convenience or to call my work line.     Marv Henderson, [facility] and    Phone: [phone]

## 2020-01-30 ENCOUNTER — TELEPHONE (OUTPATIENT)
Dept: FAMILY MEDICINE CLINIC | Facility: CLINIC | Age: 70
End: 2020-01-30

## 2020-01-30 DIAGNOSIS — R73.01 IFG (IMPAIRED FASTING GLUCOSE): ICD-10-CM

## 2020-01-30 DIAGNOSIS — E03.4 HYPOTHYROIDISM DUE TO ACQUIRED ATROPHY OF THYROID: ICD-10-CM

## 2020-01-30 DIAGNOSIS — K21.9 GASTROESOPHAGEAL REFLUX DISEASE, ESOPHAGITIS PRESENCE NOT SPECIFIED: ICD-10-CM

## 2020-01-30 DIAGNOSIS — I10 BENIGN ESSENTIAL HYPERTENSION: Primary | ICD-10-CM

## 2020-02-04 ENCOUNTER — OFFICE VISIT (OUTPATIENT)
Dept: FAMILY MEDICINE CLINIC | Facility: CLINIC | Age: 70
End: 2020-02-04

## 2020-02-04 VITALS
HEIGHT: 72 IN | BODY MASS INDEX: 27.63 KG/M2 | HEART RATE: 58 BPM | RESPIRATION RATE: 16 BRPM | WEIGHT: 204 LBS | SYSTOLIC BLOOD PRESSURE: 135 MMHG | TEMPERATURE: 97.8 F | DIASTOLIC BLOOD PRESSURE: 77 MMHG

## 2020-02-04 DIAGNOSIS — I10 BENIGN ESSENTIAL HYPERTENSION: ICD-10-CM

## 2020-02-04 DIAGNOSIS — Z00.00 MEDICARE ANNUAL WELLNESS VISIT, SUBSEQUENT: Primary | ICD-10-CM

## 2020-02-04 DIAGNOSIS — R73.01 IFG (IMPAIRED FASTING GLUCOSE): ICD-10-CM

## 2020-02-04 DIAGNOSIS — R25.2 CRAMP IN MUSCLE: ICD-10-CM

## 2020-02-04 LAB
ALBUMIN SERPL-MCNC: 4.2 G/DL (ref 3.5–5.2)
ALBUMIN/GLOB SERPL: 2 G/DL
ALP SERPL-CCNC: 87 U/L (ref 39–117)
ALT SERPL-CCNC: 17 U/L (ref 1–41)
AST SERPL-CCNC: 16 U/L (ref 1–40)
BASOPHILS # BLD AUTO: 0.05 10*3/MM3 (ref 0–0.2)
BASOPHILS NFR BLD AUTO: 0.8 % (ref 0–1.5)
BILIRUB SERPL-MCNC: 0.5 MG/DL (ref 0.2–1.2)
BUN SERPL-MCNC: 17 MG/DL (ref 8–23)
BUN/CREAT SERPL: 17.2 (ref 7–25)
CALCIUM SERPL-MCNC: 9.5 MG/DL (ref 8.6–10.5)
CHLORIDE SERPL-SCNC: 97 MMOL/L (ref 98–107)
CHOLEST SERPL-MCNC: 156 MG/DL (ref 0–200)
CO2 SERPL-SCNC: 28.9 MMOL/L (ref 22–29)
CREAT SERPL-MCNC: 0.99 MG/DL (ref 0.76–1.27)
EOSINOPHIL # BLD AUTO: 0.73 10*3/MM3 (ref 0–0.4)
EOSINOPHIL NFR BLD AUTO: 11.3 % (ref 0.3–6.2)
ERYTHROCYTE [DISTWIDTH] IN BLOOD BY AUTOMATED COUNT: 12.3 % (ref 12.3–15.4)
GLOBULIN SER CALC-MCNC: 2.1 GM/DL
GLUCOSE SERPL-MCNC: 111 MG/DL (ref 65–99)
HCT VFR BLD AUTO: 42.5 % (ref 37.5–51)
HDLC SERPL-MCNC: 48 MG/DL (ref 40–60)
HGB BLD-MCNC: 14.8 G/DL (ref 13–17.7)
IMM GRANULOCYTES # BLD AUTO: 0.02 10*3/MM3 (ref 0–0.05)
IMM GRANULOCYTES NFR BLD AUTO: 0.3 % (ref 0–0.5)
LDLC SERPL CALC-MCNC: 84 MG/DL (ref 0–100)
LYMPHOCYTES # BLD AUTO: 1.77 10*3/MM3 (ref 0.7–3.1)
LYMPHOCYTES NFR BLD AUTO: 27.4 % (ref 19.6–45.3)
MCH RBC QN AUTO: 32.6 PG (ref 26.6–33)
MCHC RBC AUTO-ENTMCNC: 34.8 G/DL (ref 31.5–35.7)
MCV RBC AUTO: 93.6 FL (ref 79–97)
MONOCYTES # BLD AUTO: 0.55 10*3/MM3 (ref 0.1–0.9)
MONOCYTES NFR BLD AUTO: 8.5 % (ref 5–12)
NEUTROPHILS # BLD AUTO: 3.34 10*3/MM3 (ref 1.7–7)
NEUTROPHILS NFR BLD AUTO: 51.7 % (ref 42.7–76)
NRBC BLD AUTO-RTO: 0 /100 WBC (ref 0–0.2)
PLATELET # BLD AUTO: 227 10*3/MM3 (ref 140–450)
POTASSIUM SERPL-SCNC: 4.4 MMOL/L (ref 3.5–5.2)
PROT SERPL-MCNC: 6.3 G/DL (ref 6–8.5)
RBC # BLD AUTO: 4.54 10*6/MM3 (ref 4.14–5.8)
SODIUM SERPL-SCNC: 137 MMOL/L (ref 136–145)
TRIGL SERPL-MCNC: 121 MG/DL (ref 0–150)
TSH SERPL DL<=0.005 MIU/L-ACNC: 4.34 UIU/ML (ref 0.27–4.2)
VLDLC SERPL CALC-MCNC: 24.2 MG/DL
WBC # BLD AUTO: 6.46 10*3/MM3 (ref 3.4–10.8)

## 2020-02-04 PROCEDURE — 99214 OFFICE O/P EST MOD 30 MIN: CPT | Performed by: FAMILY MEDICINE

## 2020-02-04 PROCEDURE — G0439 PPPS, SUBSEQ VISIT: HCPCS | Performed by: FAMILY MEDICINE

## 2020-02-04 RX ORDER — METOPROLOL TARTRATE 50 MG/1
50 TABLET, FILM COATED ORAL 2 TIMES DAILY
Qty: 180 TABLET | Refills: 3 | Status: SHIPPED | OUTPATIENT
Start: 2020-02-04 | End: 2021-02-18

## 2020-02-04 RX ORDER — FUROSEMIDE 20 MG/1
TABLET ORAL
COMMUNITY
Start: 2019-12-11 | End: 2020-02-04

## 2020-02-04 RX ORDER — HYDROCHLOROTHIAZIDE 12.5 MG/1
12.5 CAPSULE, GELATIN COATED ORAL DAILY
Qty: 90 CAPSULE | Refills: 3 | Status: SHIPPED | OUTPATIENT
Start: 2020-02-04 | End: 2021-02-18

## 2020-02-04 RX ORDER — CYCLOBENZAPRINE HCL 5 MG
5 TABLET ORAL 2 TIMES DAILY PRN
Qty: 60 TABLET | Refills: 5 | Status: SHIPPED | OUTPATIENT
Start: 2020-02-04 | End: 2020-07-01 | Stop reason: SDUPTHER

## 2020-02-04 NOTE — PROGRESS NOTES
Subjective   William Ritchie is a 69 y.o. male.     History of Present Illness     Chief Complaint:   Chief Complaint   Patient presents with   • medicare wellness   • Hypertension     med refill - lab results - meds reviewed with pt today    • Hyperlipidemia     [facility]       William Ritchie 69 y.o. male who presents today for Medical Management of the below listed issues and medication refills.  he has a problem list of   Patient Active Problem List   Diagnosis   • Hyperlipidemia   • Benign essential hypertension   • IFG (impaired fasting glucose)   • Oropharyngeal cancer (CMS/HCC)   • Fitting and adjustment of vascular catheter   • Steroid-induced hyperglycemia   • Dehydration   • Acute renal injury (CMS/HCC)   • Anemia associated with chemotherapy   • Chemotherapy-induced nausea   • GERD (gastroesophageal reflux disease)   • Chemotherapy induced neutropenia (CMS/HCC)   • Adverse effect of antineoplastic and immunosuppressive drugs   • Adverse effect of radiation therapy   • Pharyngitis, acute   • Hypothyroidism due to acquired atrophy of thyroid   • TSH (thyroid-stimulating hormone deficiency)   • Calculus of gallbladder without cholecystitis without obstruction   • Right leg pain   • Sensitivity to the cold   • Leukemoid reaction   • 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  Since the last visit, he has overall felt well.  he has been compliant with   Current Outpatient Medications:   •  hydroCHLOROthiazide (MICROZIDE) 12.5 MG capsule, Take 1 capsule by mouth Daily., Disp: 90 capsule, Rfl: 3  •  metoprolol tartrate (LOPRESSOR) 50 MG tablet, Take 1 tablet by mouth 2 (Two) Times a Day., Disp: 180 tablet, Rfl: 3  •  cyclobenzaprine (FLEXERIL) 5 MG tablet, Take 1 tablet by mouth 2 (Two) Times a Day As Needed for Muscle Spasms., Disp: 60 tablet, Rfl: 5.  he denies medication side effects.    All of the other chronic condition(s) listed above are stable w/o issues.    /77   Pulse 58    "Temp 97.8 °F (36.6 °C) (Oral)   Resp 16   Ht 183 cm (72.05\")   Wt 92.5 kg (204 lb)   BMI 27.63 kg/m²     Results for orders placed or performed in visit on 01/30/20   Comprehensive metabolic panel   Result Value Ref Range    Glucose 111 (H) 65 - 99 mg/dL    BUN 17 8 - 23 mg/dL    Creatinine 0.99 0.76 - 1.27 mg/dL    eGFR Non African Am 75 >60 mL/min/1.73    eGFR African Am 90 >60 mL/min/1.73    BUN/Creatinine Ratio 17.2 7.0 - 25.0    Sodium 137 136 - 145 mmol/L    Potassium 4.4 3.5 - 5.2 mmol/L    Chloride 97 (L) 98 - 107 mmol/L    Total CO2 28.9 22.0 - 29.0 mmol/L    Calcium 9.5 8.6 - 10.5 mg/dL    Total Protein 6.3 6.0 - 8.5 g/dL    Albumin 4.20 3.50 - 5.20 g/dL    Globulin 2.1 gm/dL    A/G Ratio 2.0 g/dL    Total Bilirubin 0.5 0.2 - 1.2 mg/dL    Alkaline Phosphatase 87 39 - 117 U/L    AST (SGOT) 16 1 - 40 U/L    ALT (SGPT) 17 1 - 41 U/L   Lipid panel   Result Value Ref Range    Total Cholesterol 156 0 - 200 mg/dL    Triglycerides 121 0 - 150 mg/dL    HDL Cholesterol 48 40 - 60 mg/dL    VLDL Cholesterol 24.2 mg/dL    LDL Cholesterol  84 0 - 100 mg/dL   TSH   Result Value Ref Range    TSH 4.340 (H) 0.270 - 4.200 uIU/mL   CBC and Differential   Result Value Ref Range    WBC 6.46 3.40 - 10.80 10*3/mm3    RBC 4.54 4.14 - 5.80 10*6/mm3    Hemoglobin 14.8 13.0 - 17.7 g/dL    Hematocrit 42.5 37.5 - 51.0 %    MCV 93.6 79.0 - 97.0 fL    MCH 32.6 26.6 - 33.0 pg    MCHC 34.8 31.5 - 35.7 g/dL    RDW 12.3 12.3 - 15.4 %    Platelets 227 140 - 450 10*3/mm3    Neutrophil Rel % 51.7 42.7 - 76.0 %    Lymphocyte Rel % 27.4 19.6 - 45.3 %    Monocyte Rel % 8.5 5.0 - 12.0 %    Eosinophil Rel % 11.3 (H) 0.3 - 6.2 %    Basophil Rel % 0.8 0.0 - 1.5 %    Neutrophils Absolute 3.34 1.70 - 7.00 10*3/mm3    Lymphocytes Absolute 1.77 0.70 - 3.10 10*3/mm3    Monocytes Absolute 0.55 0.10 - 0.90 10*3/mm3    Eosinophils Absolute 0.73 (H) 0.00 - 0.40 10*3/mm3    Basophils Absolute 0.05 0.00 - 0.20 10*3/mm3    Immature Granulocyte Rel % 0.3 0.0 - " 0.5 %    Immature Grans Absolute 0.02 0.00 - 0.05 10*3/mm3    nRBC 0.0 0.0 - 0.2 /100 WBC           The following portions of the patient's history were reviewed and updated as appropriate: allergies, current medications, past family history, past medical history, past social history, past surgical history and problem list.    Review of Systems   Constitutional: Negative for activity change, chills, fatigue and fever.   Respiratory: Negative for cough and shortness of breath.    Cardiovascular: Negative for chest pain and palpitations.   Gastrointestinal: Negative for abdominal pain.   Endocrine: Negative for cold intolerance.   Psychiatric/Behavioral: Negative for behavioral problems and dysphoric mood. The patient is not nervous/anxious.        Objective   Physical Exam   Constitutional: He appears well-developed and well-nourished.   Neck: Neck supple. No thyromegaly present.   Cardiovascular: Normal rate and regular rhythm.   No murmur heard.  Pulmonary/Chest: Effort normal and breath sounds normal.   Abdominal: Bowel sounds are normal. There is no tenderness.   Psychiatric: He has a normal mood and affect. His behavior is normal.   Nursing note and vitals reviewed.  Labs reviewed with pt today during visit. All questions answered.      Assessment/Plan   William was seen today for medicare wellness, hypertension and hyperlipidemia.    Diagnoses and all orders for this visit:    Medicare annual wellness visit, subsequent    Benign essential hypertension  -     metoprolol tartrate (LOPRESSOR) 50 MG tablet; Take 1 tablet by mouth 2 (Two) Times a Day.  -     hydroCHLOROthiazide (MICROZIDE) 12.5 MG capsule; Take 1 capsule by mouth Daily.    Cramp in muscle  -     cyclobenzaprine (FLEXERIL) 5 MG tablet; Take 1 tablet by mouth 2 (Two) Times a Day As Needed for Muscle Spasms.    IFG (impaired fasting glucose)    Diet/exercise/weight management to treat the glucose discussed.

## 2020-02-04 NOTE — PATIENT INSTRUCTIONS
Medicare Wellness  Personal Prevention Plan of Service     Date of Office Visit:  2020  Encounter Provider:  Ameya Sheffield MD  Place of Service:  [facility]  Patient Name: William Ritchie  :  1950    As part of the Medicare Wellness portion of your visit today, we are providing you with this personalized preventive plan of services (PPPS). This plan is based upon recommendations of the [place] Preventive Services Task Force (USPSTF) and the Advisory Committee on Immunization Practices (ACIP).    This lists the preventive care services that should be considered, and provides dates of when you are due. Items listed as completed are up-to-date and do not require any further intervention.    Health Maintenance   Topic Date Due   • PNEUMOCOCCAL VACCINE (65+ HIGH RISK) (2 of 2 - PCV13) 10/25/2016   • LIPID PANEL  2018   • ZOSTER VACCINE (2 of 2) 2020 (Originally 2013)   • MEDICARE ANNUAL WELLNESS  2021   • COLONOSCOPY  10/08/2025   • INFLUENZA VACCINE  Completed   • HEPATITIS C SCREENING  Discontinued   • PROSTATE CANCER SCREENING  Discontinued   • TDAP/TD VACCINES  Discontinued       No orders of the defined types were placed in this encounter.      Return in about 1 year (around 2021) for Recheck.

## 2020-02-04 NOTE — PROGRESS NOTES
The ABCs of the Annual Wellness Visit  Subsequent Medicare Wellness Visit    Chief Complaint   Patient presents with   • medicare wellness   • Hypertension     med refill - lab results - meds reviewed with pt today    • Hyperlipidemia     [facility]       Subjective   History of Present Illness:  William Ritchie is a 69 y.o. male who presents for a Subsequent Medicare Wellness Visit.    HEALTH RISK ASSESSMENT    Recent Hospitalizations:  No hospitalization(s) within the last year.    Current Medical Providers:  Patient Care Team:  Ameya Sheffield MD as PCP - General (Family Medicine)  Nuvia Ballesteros MD PhD as PCP - Claims Attributed  Ameya Aguirre MD as Referring Physician (Radiation Oncology)  Nuvia Ballesteros MD PhD as Consulting Physician (Hematology and Oncology)  Ameya Ortega II, MD as Consulting Physician (Hematology and Oncology)    Smoking Status:  Social History     Tobacco Use   Smoking Status Never Smoker   Smokeless Tobacco Never Used       Alcohol Consumption:  Social History     Substance and Sexual Activity   Alcohol Use Yes    Comment: occassional       Depression Screen:   PHQ-2/PHQ-9 Depression Screening 2/4/2020   Little interest or pleasure in doing things 0   Feeling down, depressed, or hopeless 0   Trouble falling or staying asleep, or sleeping too much 0   Feeling tired or having little energy 0   Poor appetite or overeating 0   Feeling bad about yourself - or that you are a failure or have let yourself or your family down 0   Trouble concentrating on things, such as reading the newspaper or watching television 0   Moving or speaking so slowly that other people could have noticed. Or the opposite - being so fidgety or restless that you have been moving around a lot more than usual 0   Thoughts that you would be better off dead, or of hurting yourself in some way 0   Total Score 0   If you checked off any problems, how difficult have these problems made it for you to do your work,  take care of things at home, or get along with other people? Not difficult at all       Fall Risk Screen:  KENNETH Fall Risk Assessment was completed, and patient is at LOW risk for falls.Assessment completed on:2/4/2020    Health Habits and Functional and Cognitive Screening:  Functional & Cognitive Status 2/4/2020   Do you have difficulty preparing food and eating? No   Do you have difficulty bathing yourself, getting dressed or grooming yourself? No   Do you have difficulty using the toilet? No   Do you have difficulty moving around from place to place? No   Do you have trouble with steps or getting out of a bed or a chair? No   Current Diet Well Balanced Diet   Dental Exam Up to date   Eye Exam Up to date   Exercise (times per week) 3 times per week   Current Exercise Activities Include Walking   Do you need help using the phone?  No   Are you deaf or do you have serious difficulty hearing?  No   Do you need help with transportation? No   Do you need help shopping? No   Do you need help preparing meals?  No   Do you need help with housework?  No   Do you need help with laundry? No   Do you need help taking your medications? No   Do you need help managing money? No   Do you ever drive or ride in a car without wearing a seat belt? No   Have you felt unusual stress, anger or loneliness in the last month? No   Who do you live with? Spouse   If you need help, do you have trouble finding someone available to you? Yes   Have you been bothered in the last four weeks by sexual problems? No   Do you have difficulty concentrating, remembering or making decisions? No         Does the patient have evidence of cognitive impairment? No    Asprin use counseling:Does not need ASA (and currently is not on it)    Age-appropriate Screening Schedule:  Refer to the list below for future screening recommendations based on patient's age, sex and/or medical conditions. Orders for these recommended tests are listed in the plan section.  The patient has been provided with a written plan.    Health Maintenance   Topic Date Due   • PNEUMOCOCCAL VACCINE (65+ HIGH RISK) (2 of 2 - PCV13) 10/25/2016   • LIPID PANEL  01/17/2018   • ZOSTER VACCINE (2 of 2) 02/29/2020 (Originally 2/26/2013)   • COLONOSCOPY  10/08/2025   • INFLUENZA VACCINE  Completed   • PROSTATE CANCER SCREENING  Discontinued   • TDAP/TD VACCINES  Discontinued          The following portions of the patient's history were reviewed and updated as appropriate: allergies, current medications, past family history, past medical history, past social history, past surgical history and problem list.    Outpatient Medications Prior to Visit   Medication Sig Dispense Refill   • hydroCHLOROthiazide (MICROZIDE) 12.5 MG capsule Take 1 capsule by mouth Daily. 90 capsule 3   • metoprolol tartrate (LOPRESSOR) 50 MG tablet Take 1 tablet by mouth 2 (Two) Times a Day. 180 tablet 3   • cyclobenzaprine (FLEXERIL) 5 MG tablet Take 1 tablet by mouth 2 (Two) Times a Day As Needed for Muscle Spasms. 60 tablet 5   • furosemide (LASIX) 20 MG tablet        No facility-administered medications prior to visit.        Patient Active Problem List   Diagnosis   • Hyperlipidemia   • Benign essential hypertension   • IFG (impaired fasting glucose)   • Oropharyngeal cancer (CMS/HCC)   • Fitting and adjustment of vascular catheter   • Steroid-induced hyperglycemia   • Dehydration   • Acute renal injury (CMS/HCC)   • Anemia associated with chemotherapy   • Chemotherapy-induced nausea   • GERD (gastroesophageal reflux disease)   • Chemotherapy induced neutropenia (CMS/HCC)   • Adverse effect of antineoplastic and immunosuppressive drugs   • Adverse effect of radiation therapy   • Pharyngitis, acute   • Hypothyroidism due to acquired atrophy of thyroid   • TSH (thyroid-stimulating hormone deficiency)   • Calculus of gallbladder without cholecystitis without obstruction   • Right leg pain   • Sensitivity to the cold   • Leukemoid  "reaction   • 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    Review of Systems    Compared to one year ago, the patient feels his physical health is the same.  Compared to one year ago, the patient feels his mental health is the same.    Reviewed chart for potential of high risk medication in the elderly: yes  Reviewed chart for potential of harmful drug interactions in the elderly:yes    Objective         Vitals:    02/04/20 1026   BP: 135/77   Pulse: 58   Resp: 16   Temp: 97.8 °F (36.6 °C)   TempSrc: Oral   Weight: 92.5 kg (204 lb)   Height: 183 cm (72.05\")   PainSc: 0-No pain       Body mass index is 27.63 kg/m².  Discussed the patient's BMI with him. The BMI is above average; BMI management plan is completed.    Physical Exam    Lab Results   Component Value Date     (H) 02/03/2020    CHLPL 156 02/03/2020    TRIG 121 02/03/2020    HDL 48 02/03/2020    LDL 84 02/03/2020    VLDL 24.2 02/03/2020        Assessment/Plan   Medicare Risks and Personalized Health Plan  CMS Preventative Services Quick Reference  Cardiovascular risk    The above risks/problems have been discussed with the patient.  Pertinent information has been shared with the patient in the After Visit Summary.  Follow up plans and orders are seen below in the Assessment/Plan Section.    Diagnoses and all orders for this visit:    1. Medicare annual wellness visit, subsequent (Primary)    2. Benign essential hypertension  -     metoprolol tartrate (LOPRESSOR) 50 MG tablet; Take 1 tablet by mouth 2 (Two) Times a Day.  Dispense: 180 tablet; Refill: 3  -     hydroCHLOROthiazide (MICROZIDE) 12.5 MG capsule; Take 1 capsule by mouth Daily.  Dispense: 90 capsule; Refill: 3    3. Cramp in muscle  -     cyclobenzaprine (FLEXERIL) 5 MG tablet; Take 1 tablet by mouth 2 (Two) Times a Day As Needed for Muscle Spasms.  Dispense: 60 tablet; Refill: 5    4. IFG " (impaired fasting glucose)      Follow Up:  No follow-ups on file.     An After Visit Summary and PPPS were given to the patient.

## 2020-06-08 ENCOUNTER — TELEPHONE (OUTPATIENT)
Dept: ONCOLOGY | Facility: HOSPITAL | Age: 70
End: 2020-06-08

## 2020-06-08 RX ORDER — DIPHENHYDRAMINE HCL 50 MG
CAPSULE ORAL
Qty: 1 CAPSULE | Refills: 0 | Status: SHIPPED | OUTPATIENT
Start: 2020-06-08 | End: 2020-06-12 | Stop reason: SDUPTHER

## 2020-06-08 RX ORDER — PREDNISONE 50 MG/1
TABLET ORAL
Qty: 3 TABLET | Refills: 0 | Status: SHIPPED | OUTPATIENT
Start: 2020-06-08 | End: 2020-06-12 | Stop reason: SDUPTHER

## 2020-06-08 NOTE — TELEPHONE ENCOUNTER
Pt called stating he is allergic to contrast dye. D/W Ruth Gasca and OK to do contrast allergy protocol. Prednisone and benadryl called in to pharmacy. Pt aware.

## 2020-06-08 NOTE — TELEPHONE ENCOUNTER
----- Message from Pasquale Mcmillan Rep sent at 6/8/2020  2:03 PM EDT -----  Regarding: pre-meds  Pt called and stated Dr Ballesteros had called in pre-meds prior to his scan last time. He has a scan on Monday. [facility].    Thank you!    Zulema

## 2020-06-11 ENCOUNTER — TELEPHONE (OUTPATIENT)
Dept: ONCOLOGY | Facility: CLINIC | Age: 70
End: 2020-06-11

## 2020-06-11 NOTE — TELEPHONE ENCOUNTER
ICD CODE OF C10.9 FOR BENADRYL AND PREDNISONE SIGNED PER FAINA HWANG NP AND FAXED BACK TO [facility] .[phone].  CONFIRMATION RECEIVED.

## 2020-06-12 ENCOUNTER — TELEPHONE (OUTPATIENT)
Dept: ONCOLOGY | Facility: CLINIC | Age: 70
End: 2020-06-12

## 2020-06-12 RX ORDER — PREDNISONE 50 MG/1
TABLET ORAL
Qty: 3 TABLET | Refills: 0 | Status: SHIPPED | OUTPATIENT
Start: 2020-06-12 | End: 2020-06-24

## 2020-06-12 RX ORDER — DIPHENHYDRAMINE HCL 50 MG
CAPSULE ORAL
Qty: 1 CAPSULE | Refills: 0 | Status: SHIPPED | OUTPATIENT
Start: 2020-06-12 | End: 2021-11-11

## 2020-06-12 NOTE — TELEPHONE ENCOUNTER
Pt Called wondering why we did not send the Benadryl and Prednisone to [facility]. We [id number] [place] [facility] [address] AT [facility] [phone] [facility] [phone] FX      Please send medications to this [facility] as soon as posible

## 2020-06-15 ENCOUNTER — HOSPITAL ENCOUNTER (OUTPATIENT)
Dept: PET IMAGING | Facility: HOSPITAL | Age: 70
Discharge: HOME | End: 2020-06-15
Admitting: INTERNAL MEDICINE

## 2020-06-15 ENCOUNTER — LAB (OUTPATIENT)
Dept: LAB | Facility: HOSPITAL | Age: 70
End: 2020-06-15

## 2020-06-15 DIAGNOSIS — E03.4 HYPOTHYROIDISM DUE TO ACQUIRED ATROPHY OF THYROID: ICD-10-CM

## 2020-06-15 DIAGNOSIS — C10.9 OROPHARYNGEAL CANCER (HCC): ICD-10-CM

## 2020-06-15 LAB
ALBUMIN SERPL-MCNC: 4.4 G/DL (ref 3.5–5.2)
ALBUMIN/GLOB SERPL: 1.4 G/DL (ref 1.1–2.4)
ALP SERPL-CCNC: 87 U/L (ref 38–116)
ALT SERPL W P-5'-P-CCNC: 20 U/L (ref 0–41)
ANION GAP SERPL CALCULATED.3IONS-SCNC: 14.6 MMOL/L (ref 5–15)
AST SERPL-CCNC: 19 U/L (ref 0–40)
BASOPHILS # BLD AUTO: 0.01 10*3/MM3 (ref 0–0.2)
BASOPHILS NFR BLD AUTO: 0.1 % (ref 0–1.5)
BILIRUB SERPL-MCNC: 0.4 MG/DL (ref 0.2–1.2)
BUN BLD-MCNC: 14 MG/DL (ref 6–20)
BUN/CREAT SERPL: 17.5 (ref 7.3–30)
CALCIUM SPEC-SCNC: 9.9 MG/DL (ref 8.5–10.2)
CHLORIDE SERPL-SCNC: 96 MMOL/L (ref 98–107)
CO2 SERPL-SCNC: 24.4 MMOL/L (ref 22–29)
CREAT BLD-MCNC: 0.8 MG/DL (ref 0.7–1.3)
CREAT BLDA-MCNC: 0.8 MG/DL (ref 0.6–1.3)
DEPRECATED RDW RBC AUTO: 40.3 FL (ref 37–54)
EOSINOPHIL # BLD AUTO: 0 10*3/MM3 (ref 0–0.4)
EOSINOPHIL NFR BLD AUTO: 0 % (ref 0.3–6.2)
ERYTHROCYTE [DISTWIDTH] IN BLOOD BY AUTOMATED COUNT: 11.8 % (ref 12.3–15.4)
GFR SERPL CREATININE-BSD FRML MDRD: 95 ML/MIN/1.73
GLOBULIN UR ELPH-MCNC: 3.1 GM/DL (ref 1.8–3.5)
GLUCOSE BLD-MCNC: 197 MG/DL (ref 74–124)
HCT VFR BLD AUTO: 43.6 % (ref 37.5–51)
HGB BLD-MCNC: 14.9 G/DL (ref 13–17.7)
IMM GRANULOCYTES # BLD AUTO: 0.04 10*3/MM3 (ref 0–0.05)
IMM GRANULOCYTES NFR BLD AUTO: 0.4 % (ref 0–0.5)
LYMPHOCYTES # BLD AUTO: 0.96 10*3/MM3 (ref 0.7–3.1)
LYMPHOCYTES NFR BLD AUTO: 9.5 % (ref 19.6–45.3)
MCH RBC QN AUTO: 32 PG (ref 26.6–33)
MCHC RBC AUTO-ENTMCNC: 34.2 G/DL (ref 31.5–35.7)
MCV RBC AUTO: 93.6 FL (ref 79–97)
MONOCYTES # BLD AUTO: 0.05 10*3/MM3 (ref 0.1–0.9)
MONOCYTES NFR BLD AUTO: 0.5 % (ref 5–12)
NEUTROPHILS # BLD AUTO: 9.09 10*3/MM3 (ref 1.7–7)
NEUTROPHILS NFR BLD AUTO: 89.5 % (ref 42.7–76)
NRBC BLD AUTO-RTO: 0 /100 WBC (ref 0–0.2)
PLATELET # BLD AUTO: 258 10*3/MM3 (ref 140–450)
PMV BLD AUTO: 9.6 FL (ref 6–12)
POTASSIUM BLD-SCNC: 3.9 MMOL/L (ref 3.5–4.7)
PROT SERPL-MCNC: 7.5 G/DL (ref 6.3–8)
RBC # BLD AUTO: 4.66 10*6/MM3 (ref 4.14–5.8)
SODIUM BLD-SCNC: 135 MMOL/L (ref 134–145)
T4 FREE SERPL-MCNC: 1.12 NG/DL (ref 0.93–1.7)
TSH SERPL DL<=0.05 MIU/L-ACNC: 1.47 UIU/ML (ref 0.27–4.2)
WBC NRBC COR # BLD: 10.15 10*3/MM3 (ref 3.4–10.8)

## 2020-06-15 PROCEDURE — 80053 COMPREHEN METABOLIC PANEL: CPT

## 2020-06-15 PROCEDURE — 85025 COMPLETE CBC W/AUTO DIFF WBC: CPT

## 2020-06-15 PROCEDURE — 70491 CT SOFT TISSUE NECK W/DYE: CPT

## 2020-06-15 PROCEDURE — 84443 ASSAY THYROID STIM HORMONE: CPT | Performed by: INTERNAL MEDICINE

## 2020-06-15 PROCEDURE — 84439 ASSAY OF FREE THYROXINE: CPT | Performed by: INTERNAL MEDICINE

## 2020-06-15 PROCEDURE — 82565 ASSAY OF CREATININE: CPT

## 2020-06-15 PROCEDURE — 25010000002 IOPAMIDOL 61 % SOLUTION: Performed by: INTERNAL MEDICINE

## 2020-06-15 PROCEDURE — 36415 COLL VENOUS BLD VENIPUNCTURE: CPT

## 2020-06-15 RX ADMIN — IOPAMIDOL 75 ML: 612 INJECTION, SOLUTION INTRAVENOUS at 11:49

## 2020-06-19 ENCOUNTER — APPOINTMENT (OUTPATIENT)
Dept: LAB | Facility: HOSPITAL | Age: 70
End: 2020-06-19

## 2020-06-19 ENCOUNTER — OFFICE VISIT (OUTPATIENT)
Dept: ONCOLOGY | Facility: CLINIC | Age: 70
End: 2020-06-19

## 2020-06-19 VITALS
WEIGHT: 199.8 LBS | TEMPERATURE: 98.2 F | OXYGEN SATURATION: 96 % | HEART RATE: 66 BPM | SYSTOLIC BLOOD PRESSURE: 151 MMHG | DIASTOLIC BLOOD PRESSURE: 77 MMHG | BODY MASS INDEX: 27.06 KG/M2 | RESPIRATION RATE: 16 BRPM | HEIGHT: 72 IN

## 2020-06-19 DIAGNOSIS — I65.22 CAROTID ARTERY STENOSIS, ASYMPTOMATIC, LEFT: ICD-10-CM

## 2020-06-19 DIAGNOSIS — R94.6 ABNORMAL RESULTS OF THYROID FUNCTION STUDIES: ICD-10-CM

## 2020-06-19 DIAGNOSIS — D72.823 LEUKEMOID REACTION: ICD-10-CM

## 2020-06-19 DIAGNOSIS — C10.9 OROPHARYNGEAL CANCER (HCC): Primary | ICD-10-CM

## 2020-06-19 DIAGNOSIS — E03.8 TSH (THYROID-STIMULATING HORMONE DEFICIENCY): ICD-10-CM

## 2020-06-19 DIAGNOSIS — R73.03 PREDIABETES: ICD-10-CM

## 2020-06-19 PROCEDURE — 99215 OFFICE O/P EST HI 40 MIN: CPT | Performed by: INTERNAL MEDICINE

## 2020-06-19 NOTE — PROGRESS NOTES
Subjective .     REASONS FOR FOLLOWUP:    1.  Squamous cell carcinoma of the base of tongue, stage YEN (T1N2b), HPV status is not clear.  Started concurrent chemoradiation therapy on 12/4/2017 using cisplatin repeating every 3 weeks.    2.  Moderate neutropenia secondary to concurrent chemoradiotherapy.  Cycle #3 cisplatin was delayed and then canceled.    3.  Radiation therapy finished on 1/23/2018.  PEG tube was removed in May 2018.    4. PET scan on 3/15/2018 reported essentially complete resolution of hypermetabolic lesion at the left tongue base.  The left neck lymphadenopathy also showed blood pool activity.    5. Patient switched his ENT care to Dr. Browning.   6.  CT scan on 06/18/2019 showed no evidence of disease recurrence.    HISTORY OF PRESENT ILLNESS:  The patient is a 69 y.o. year old male who is here for 6-month follow-up, to discuss the results of CT scan of the neck and also laboratory studies.    Patient reports he had tooth extraction a few days ago.  Otherwise he is doing well, excellent performance status ECOG 0.  Has good appetite.  Denies hoarseness of voice, no lymphadenopathy in the neck area.     He continues to have altered taste in his mouth due to previous chemoradiation therapy.      CT scan examination on 6/15/2020 showed no evidence of disease recurrence.  However it reported a left carotid artery at least moderate stenosis.    Laboratory studies on 6/15/2020 reported elevated neutrophils 9090, otherwise unremarkable.  He had a normalized TSH and a normal free T4.  Chemistry lab showed elevated glucose level otherwise unremarkable.     Past Medical History:   Diagnosis Date   • Benign essential hypertension    • H/O complete eye exam due   • H/O Neck mass     left   • Hyperlipidemia    • IFG (impaired fasting glucose)    • Seasonal allergies    • Tongue cancer (CMS/[facility]) 11/01/2017    Left base of tongue squamous cell carcinoma, stage YEN, recieved chemo and radiation therapy     Past  Surgical History:   Procedure Laterality Date   • CHOLECYSTECTOMY WITH INTRAOPERATIVE CHOLANGIOGRAM N/A 7/5/2018    Procedure: Laparoscopic cholecystectomy with intraoperative cholangiogram ;  Surgeon: Ashley Fischer MD;  Location: [facility] OR;  Service: General   • COLONOSCOPY N/A 2015    normal colonoscopy-Dr. Galen Morrissey   • COLONOSCOPY N/A 05/04/2011    Normal colonoscopy-Dr. Tobias Emerson   • ENDOSCOPY W/ PEG TUBE PLACEMENT N/A 11/28/2017    Procedure: ESOPHAGOGASTRODUODENOSCOPY WITH PERCUTANEOUS ENDOSCOPIC GASTROSTOMY TUBE INSERTION;  Surgeon: Isma Hercules MD;  Location: [facility] OR;  Service:    • FINE NEEDLE ASPIRATION Left 10/13/2017    Ultrasound guidance for fine needle biopsy and fine needle aspiration with ultrasonic guidance of left neck mass-Dr. Frank Marques   • INGUINAL HERNIA REPAIR Left 1994    Dr. Brian Peres   • INGUINAL HERNIA REPAIR Right 1993    Dr. Brian Peres   • LARYNGOPLASTY      Laryngoplasty with biopsy-Dr. Del Toro   • NV INSJ TUNNELED CVC W/O SUBQ PORT/ AGE 5 YR/> Left 11/28/2017    Procedure: INSERTION VENOUS ACCESS DEVICE;  Surgeon: Isma Hercules MD;  Location: [facility];  Service: General   • TONGUE BIOPSY / EXCISION N/A 11/01/2017    Biopsy of the left tongue base-Dr. Zane Del Toro   • TONSILLECTOMY AND ADENOIDECTOMY Bilateral 11/01/2017    Dr. Zane Del Toro   Removal PEG tube on 5/2/2018    cholecystectomy in July 2018    HEMATOLOGIC/ONCOLOGIC HISTORY:  Mr. Ritchie is a 67 y.o. male who is here on 11/16/2017 for evaluation accompanied by his wife, referred by radiation oncologist, Dr. Aguirre, because of newly diagnosed squamous cell carcinoma of the left tongue base, stage YEN, [id number].      Patient previously only had history of mild hypertension which was controlled. Recently in early 09/2017 when he was on vacation, he felt a left neck nodule when he was shaving. He denies any pain associated with that. Patient was seen by his primary care  physician, Dr. Sheffield, for evaluation and was referred to ENT, Dr. Zane Del Toro. Patient subsequently had CT of the neck examination at [facility] on 09/22/2017. This study reported a left neck mass measuring 3.5 x 3.1 x 2.4 cm with cystic/necrotic changes located at the region of the left level II jugular chain. There were additional small homogeneous cervical lymph nodes but possibly reactive.      Patient subsequently had ultrasound of the neck on 10/13/2017 associated with fine-needle aspiration biopsy at Dr. Del Toro' office. The ultrasound reported 2 nodules in left neck. The large one measured about 3.28 cm x 2.67 cm x 3.28 cm. The 2nd smaller one measures 1.56 cm x 0.99 cm x 1.48 cm, just below the large mass. Patient had fine-needle aspiration biopsy at the same time. Pathology evaluation from the [facility] reported poorly differentiated squamous cell carcinoma with degeneration and necrosis. The viable tumor cells exhibit strong nuclear reactivity for both p40 and p63.     Patient subsequently had tonsillectomy, biopsy of the left tongue base and adenoidectomy on 11/01/2017 by Dr. Del Toro. Pathology evaluation from [facility] reported moderately differentiated squamous cell carcinoma. The left tonsil has no evidence of malignancy. Right tonsil also was benign. The adenoid tissue was also benign.      Patient reports the left neck mass is growing. He also reports poor appetite after tonsillectomy. For the past couple of weeks since the biopsy, he lost about 6 pounds. He denies nausea or vomiting. He has actually started feeling better with improved appetite. Denies pain. Denies fever or sweating.      This patient reports he never smoked cigarette. He is only a very rare social drinker. He told me the test for HPV is ongoing.      Patient also had PET scan examination obtained on 11/14/2017. This study reported focal hypermetabolism with SUV 15.9 corresponding to the  left-sided base of the tongue. There was moderate enlarged left jugular chain lymph node which is hypermetabolic but without measuring SUV. There was also mild hypermetabolism identified within several additional smaller left jugular chain lymph nodes. There was no hypermetabolic lymphadenopathy elsewhere in the neck nor foci in the chest, abdomen or pelvis.    Patient was started on concurrent chemoradiation therapy with cisplatin every 3 weeks.  He received 2 cycles chemotherapy and due to poor tolerance with acute renal injury and neutropenia, cycle #3 cisplatin was canceled.     Radiation therapy finished on 1/23/2018.    PET scan on 3/15/2018 reported essentially complete resolution of hypermetabolic lesion at the left tongue base.  The left and neck lymphadenopathy also showing, with blood pool activity.    His CT scan examination of the neck on 6/11/2018 reported no evidence of local disease recurrence, a stable left neck adenopathy, maybe 2 mm smaller compared to the PET scan obtained in March 2018.  Laboratory study on the same day reported normal renal function with a creatinine 0.73 normal electrolytes and liver function panel, stable mild anemia with hemoglobin 12.4 and normal WBC and platelets.      Laboratory study on 9/7/2018 reported normal iron study with a ferritin 274, iron 100, TIBC 274 iron saturation 36% in the normal B12 level 631 pg/mL.  Hemoglobin was 13.5 improved and normal WBC 5350, and platelets 206,000.  He also had a normal TSH 2.87, and completely normal CMP.     CT scan examination for the neck with IV contrast on 11/21/2018 showed evidence of disease recurrence.  Laboratory study reported elevated glucose otherwise normal CMP.  Patient also had mildly elevated neutrophils.  These were likely secondary to steroids use prior to the CT scan because he is allergic to IV dye.      CT Neck done on 06/18/2019 showed beam hardening artifact limits evaluation somewhat but there is no  evidence of residual or recurrent tongue base mass. There is no evidence of pathologic adenopathy. 1.4 x 0.9 cm area of lucency involving the spinous process of T1, nonspecific but stable. This likely represents an atypical hemangioma. Area of lucency related to the left maxillary 1st molar suggesting a periapical abscess measuring 1.5 cm in maximum dimension. Clinical correlation and standard dental radiographs recommended.    Laboratory study today 06/18/2019 showed creatine at 0.80, TSH Baseline at 1.280, Free T4 at 1.08, and free T3 at 2.33. CBC was normal besides WBC elevated at 11.6, RDW down at 11.6, ANC at elevated at 9[address], absolute monocytes at 60, absolute lymphocytes at 980, and absolute immature grans 0.07. CMP normal besides elevated glucose of 219.         MEDICATIONS    Current Outpatient Medications:   •  cyclobenzaprine (FLEXERIL) 5 MG tablet, Take 1 tablet by mouth 2 (Two) Times a Day As Needed for Muscle Spasms., Disp: 60 tablet, Rfl: 5  •  diphenhydrAMINE (BENADRYL) 50 MG capsule, Take 1 capsule 1 hour prior to CT scan, Disp: 1 capsule, Rfl: 0  •  hydroCHLOROthiazide (MICROZIDE) 12.5 MG capsule, Take 1 capsule by mouth Daily., Disp: 90 capsule, Rfl: 3  •  metoprolol tartrate (LOPRESSOR) 50 MG tablet, Take 1 tablet by mouth 2 (Two) Times a Day., Disp: 180 tablet, Rfl: 3  •  predniSONE (DELTASONE) 50 MG tablet, Take 1 tablet 13 hours, 7 hours and 1 hour prior to CT scan, Disp: 3 tablet, Rfl: 0    ALLERGIES:     Allergies   Allergen Reactions   • Iodinated Diagnostic Agents Itching and Rash     Had a 24 hour delayed reaction to Ct contrast       SOCIAL HISTORY:       Social History     Social History   • Marital status:      Spouse name: Janiya   • Number of children: 2   • Years of education: High School     Occupational History   •  Retired     [employer]     Social History Main Topics   • Smoking status: Never Smoker   • Smokeless tobacco: Never Used   • Alcohol use Yes      Comment: occassional  "  • Drug use: No   • Sexual activity: No       FAMILY HISTORY:  Family History   Problem Relation Age of Onset   • Alcohol abuse Father    • Diabetes Father    • Cancer Neg Hx    • Malig Hyperthermia Neg Hx        Review of Systems   Constitutional: Negative for activity change, appetite change, chills, diaphoresis, fatigue, fever and unexpected weight change.   HENT: Positive for dental problem (Had a recent procedure for root canal). Negative for facial swelling, hearing loss, mouth sores, nosebleeds, sore throat and trouble swallowing.         He has dry throat and poor taste   Eyes: Negative for visual disturbance.   Respiratory: Negative for cough and shortness of breath.    Cardiovascular: Negative for chest pain, palpitations and leg swelling.   Gastrointestinal: Negative for abdominal pain, blood in stool and nausea.   Endocrine: Negative for cold intolerance and polyuria.   Genitourinary: Negative for dysuria and hematuria.   Musculoskeletal: Negative for arthralgias and joint swelling.   Skin: Negative for color change and rash.   Allergic/Immunologic: Negative for immunocompromised state.   Neurological: Negative for dizziness, light-headedness and headaches.   Hematological: Negative for adenopathy. Does not bruise/bleed easily.   Psychiatric/Behavioral: Negative for behavioral problems and sleep disturbance.         Objective    Vitals:    06/19/20 1135   BP: 151/77   Pulse: 66   Resp: 16   Temp: 98.2 °F (36.8 °C)   SpO2: 96%   Weight: 90.6 kg (199 lb 12.8 oz)   Height: 183 cm (72.05\")   PainSc: 0-No pain     Current Status 6/19/2020   ECOG score 0      PHYSICAL EXAM:    GENERAL:  Well-developed, well-nourished male in no acute distress.   SKIN:  Warm, dry without rashes, purpura or petechiae.  EYES:  Pupils equal, round and reactive to light.  EOMs intact.  Conjunctivae normal.  EARS: Decreased hearing.  NOSE: Patient wears mask is due to pandemic coronavirus infection.  No examination.  MOUTH: See " above.  THROAT: See above.  NECK:  Supple, no thyromegaly or masses.  LYMPHATICS:  No cervical, supraclavicular, axillary adenopathy.  CHEST:  Lungs clear to auscultation. Good airflow.  Normal respiratory effort.  CARDIAC:  Regular rate and rhythm without murmurs, rubs or gallops. Normal S1,S2.  ABDOMEN:  Soft, no tender, no hepatosplenomegaly or masses. Bowel sounds normal.   EXTREMITIES:  No clubbing, cyanosis or edema.  NEUROLOGICAL:  Cranial Nerves II-XII grossly intact.  No focal neurological deficits.  PSYCHIATRIC:  Normal affect and mood.         RECENT LABS:    Lab Results   Component Value Date    WBC 10.15 06/15/2020    HGB 14.9 06/15/2020    HCT 43.6 06/15/2020    MCV 93.6 06/15/2020     06/15/2020     Lab Results   Component Value Date    NEUTROABS 9.09 (H) 06/15/2020     Lab Results   Component Value Date    GLUCOSE 197 (H) 06/15/2020    BUN 14 06/15/2020    CREATININE 0.80 06/15/2020    EGFRIFNONA 95 06/15/2020    EGFRIFAFRI 90 02/03/2020    BCR 17.5 06/15/2020    K 3.9 06/15/2020    CO2 24.4 06/15/2020    CALCIUM 9.9 06/15/2020    PROTENTOTREF 6.3 02/03/2020    ALBUMIN 4.40 06/15/2020    LABIL2 2.0 02/03/2020    AST 19 06/15/2020    ALT 20 06/15/2020     Lab Results   Component Value Date    TSH 1.470 06/15/2020   Free T4 at 1.12 ng/dL on 6/15/2020.       IMAGING STUDY:  CT NECK WITH AND WITHOUT CONTRAST 6/15/2020     HISTORY: Oropharyngeal cancer, follow-up.     COMPARISON: Comparison is made to the CT examinations of the neck  06/18/2019 and 11/21/2018. The oldest examination available for  comparison is the CT examination 09/22/2017.     FINDINGS: The parotid glands appear unremarkable. The submandibular  glands appear atrophic, similar to the examination of 06/18/2019. There  is no evidence of pathologic adenopathy. There is no evidence of  abnormal soft tissue to suggest residual or recurrent tumor.     Vascular calcification involving the left internal carotid artery  proximally is  appreciated and there is likely at least a moderate  stenosis if not severe stenosis of the internal carotid artery. Further  evaluation could be performed with a carotid ultrasound or an MRA of the  neck. Decreased attenuation involving the spinous process of T1 is again  noted, unchanged.     There is partial visualization of the right frontal sinus which is  completely opacified, similar to the CT examination 09/17/2019.     IMPRESSION:  1.  No evidence of pathologic adenopathy. There is no evidence of  residual or recurrent tumor. Further evaluation could be performed with  a PET examination.  2.  Atherosclerotic disease involving the left carotid bifurcation where  there is likely at least moderate stenosis present. Further evaluation  could be performed with a carotid ultrasound or MRA.      Assessment/Plan      1.  Left tongue base squamous cell carcinoma.   Stage YEN.  Finished 2 cycles chemotherapy with Cisplatin day 1 and day 22, with concurrent radiation on 1/23/2018.  Did not receive day 43 cisplatin as planned due to neutropenia.     His PET scan on 3/15/2018 showed essentially complete metabolic response.  The residual left neck lymph node shows only low-level blood pool activity.     Patient had CT scan examination of the neck on 6/11/2018 which showed stable left neck lymphadenopathy, probably smaller by 2 mm compared to the PET scan on 3/15/2018. This lesion is still probably dying down.       Patient switched his ENT care to Dr. Browning.     Patient had CT scan examination of the neck with IV contrast on 06/18/2019, showed no evidence of disease recurrence.      CT scan examination on 6/15/2020 showed no evidence of disease recurrence.      2.  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    · Patient has marginally elevated TSH on 03/08/2019 which was 4.47.  This may be the beginning of his hypothyroidism secondary to radiation therapy.    · TSH Baseline at 1.280, Free T4 at 1.08, and  free T3 at 2.33 on 06/18/2019.    · Slightly elevated TSH 4.34 on 2/3/2020.     · Lab study on 6/15/2020 reported normal TSH 1.47 and free T4 at 1.12 ng/DL.  We will continue to monitor.     3.  History of anemia, due to chemotherapy. His hemoglobin  improved and borderline normal.  Laboratory studies in September 2018 showed normal iron and vitamin B12.   · Patient has normalized hemoglobin at 15.1 on 06/18/2019.   · Normal hemoglobin 14.9 on 6/15/2020.      4.  Tinnitus and decreased hearing.  He states he had ringing in his ears prior to cisplatin.  His tinnitus did not worsen with cisplatin chemotherapy.      5.  Left carotid artery stenosis, at least moderate per CT scan examination on 6/15/2020.   · Patient is asymptomatic.  Discussed with patient will refer him to vascular surgeon for further evaluation.      6.  Hyperglycemia.    · Labs 06/18/2019 showed glucose of 219 and has been high in past as well.  This may be related to his prednisone use before CT scan examination.  However his previous glucose levels has been elevated multiple times.  He is not diagnosed with DM II.    · Patient had a relatively normal glucose 111 in December 2019 and February 2020.   · Elevated glucose 197 on 6/15/2020 on day of CT scan.  This again is possibly related to his prednisone prior to CT scan examination.   · Patient will follow up with primary care physician Dr. Sheffield for evaluation.      7.  Elevated neutrophil 9090 6/15/2020.  This is likely due to his toothache and infection, for which he had tooth extraction several days ago.  No need for further evaluation.    Plan:   1. Please refer to vascular surgeon Dr. Cristobal Salmon for carotid artery stenosis.   2. Patient needs follow-up with primary care physician Dr. Sheffiedl for evaluation of possible diabetes.   3. Patient will continue follow-up with ENT Dr. Browning.    4. Arrange patient come back to see me in 6 months, CBC CMP, TSH and free T4.        Nuvia Ballesteros,  MD PhD  6/19/2020       CC:     Ameya Sheffield M.D.    Ameya Aguirre M.D.    Isma Hercules M.D.      Efrain Browning M.D.    Cristobal Salmon MD

## 2020-06-24 ENCOUNTER — OFFICE VISIT (OUTPATIENT)
Dept: FAMILY MEDICINE CLINIC | Facility: CLINIC | Age: 70
End: 2020-06-24

## 2020-06-24 DIAGNOSIS — C10.9 OROPHARYNGEAL CANCER (HCC): Primary | ICD-10-CM

## 2020-06-24 DIAGNOSIS — R73.01 IFG (IMPAIRED FASTING GLUCOSE): ICD-10-CM

## 2020-06-24 DIAGNOSIS — I10 BENIGN ESSENTIAL HYPERTENSION: ICD-10-CM

## 2020-06-24 DIAGNOSIS — Z12.5 SCREENING FOR PROSTATE CANCER: ICD-10-CM

## 2020-06-24 PROCEDURE — 99442 PR PHYS/QHP TELEPHONE EVALUATION 11-20 MIN: CPT | Performed by: FAMILY MEDICINE

## 2020-06-24 NOTE — PROGRESS NOTES
Subjective   William Ritchie is a 69 y.o. male.     CC: Telephone Visit for Management of CT Scan and Labs    History of Present Illness     Pt seen today on telehealth visit for questions about his recent CT scanning for his tongue cancer f/u with Dr Ballesteros and to disucss some recent, nonfasting labs.  Per Dr Vergara notes from 6/19/20:    Patient had CT scan examination of the neck on 6/11/2018 which showed stable left neck lymphadenopathy, probably smaller by 2 mm compared to the PET scan on 3/15/2018. This lesion is still probably dying down.        Patient switched his ENT care to Dr. Browning.      Patient had CT scan examination of the neck with IV contrast on 06/18/2019, showed no evidence of disease recurrence.       CT scan examination on 6/15/2020 showed no evidence of disease recurrence.     IMPRESSION:  1.  No evidence of pathologic adenopathy. There is no evidence of  residual or recurrent tumor. Further evaluation could be performed with  a PET examination.  2.  Atherosclerotic disease involving the left carotid bifurcation where  there is likely at least moderate stenosis present. Further evaluation  could be performed with a carotid ultrasound or MRA.    Pt already has an appt with Vascular to f/u on this.    The following portions of the patient's history were reviewed and updated as appropriate: allergies, current medications, past family history, past medical history, past social history, past surgical history and problem list.    Review of Systems   Constitutional: Negative for appetite change, chills and fever.   Respiratory: Negative for cough.    Cardiovascular: Negative for chest pain.   Psychiatric/Behavioral: Negative for dysphoric mood.       Objective   Physical Exam   Constitutional: No distress.   Psychiatric: He has a normal mood and affect. His behavior is normal. Thought content normal.       Assessment/Plan   William was seen today for lab results, elevated glucose and to discuss ct  scan results.    Diagnoses and all orders for this visit:    Oropharyngeal cancer (CMS/HCC)    IFG (impaired fasting glucose)  -     Comprehensive metabolic panel; Future  -     Hemoglobin A1c; Future    Benign essential hypertension  -     Comprehensive metabolic panel; Future  -     Lipid panel; Future  -     CBC and Differential; Future  -     TSH; Future    Screening for prostate cancer  -     PSA; Future    Diet/exercise/weight management to treat the glucose discussed.  Spent  215  minutes with chart and interview and consent for this encounter given by the patient.  You have chosen to receive care through a telehealth visit.  Do you consent to use a TELEPHONE connection for your medical care today? Yes

## 2020-07-01 ENCOUNTER — OFFICE VISIT (OUTPATIENT)
Dept: FAMILY MEDICINE CLINIC | Facility: CLINIC | Age: 70
End: 2020-07-01

## 2020-07-01 VITALS
RESPIRATION RATE: 16 BRPM | SYSTOLIC BLOOD PRESSURE: 156 MMHG | BODY MASS INDEX: 26.95 KG/M2 | DIASTOLIC BLOOD PRESSURE: 86 MMHG | HEIGHT: 72 IN | TEMPERATURE: 98 F | HEART RATE: 86 BPM | WEIGHT: 199 LBS

## 2020-07-01 DIAGNOSIS — R25.2 CRAMP IN MUSCLE: ICD-10-CM

## 2020-07-01 PROCEDURE — 99213 OFFICE O/P EST LOW 20 MIN: CPT | Performed by: FAMILY MEDICINE

## 2020-07-01 RX ORDER — CYCLOBENZAPRINE HCL 5 MG
5 TABLET ORAL 3 TIMES DAILY PRN
Qty: 60 TABLET | Refills: 5 | Status: SHIPPED | OUTPATIENT
Start: 2020-07-01 | End: 2021-07-05

## 2020-07-01 NOTE — PROGRESS NOTES
"Subjective   William Ritchie is a 69 y.o. male.     CC: Muscle Cramps    History of Present Illness     Pt comes in today reporting some RLE recurring cramps of the lateral right calf since mowing the yard in the heat of the day yesterday. No edema or injury. The cramps come and go quickly yet persist. Has a h/o this back in 2018, where u/s was negative at that time. No recent prolonged sitting. Recent labs normal regarding his electrolytes.    The following portions of the patient's history were reviewed and updated as appropriate: allergies, current medications, past family history, past medical history, past social history, past surgical history and problem list.    Review of Systems   Constitutional: Negative for chills and fever.   Musculoskeletal:        Cramps of right calf       /86   Pulse 86   Temp 98 °F (36.7 °C)   Resp 16   Ht 183 cm (72.05\")   Wt 90.3 kg (199 lb)   BMI 26.95 kg/m²     Objective   Physical Exam   Constitutional: He is oriented to person, place, and time. He appears well-developed and well-nourished. No distress.   Pulmonary/Chest: Effort normal.   Musculoskeletal: He exhibits tenderness (upper/lateral right calf).   Neurological: He is alert and oriented to person, place, and time.   Psychiatric: He has a normal mood and affect. His behavior is normal. Thought content normal.       Assessment/Plan   William was seen today for muscle pain.    Diagnoses and all orders for this visit:    Cramp in muscle  -     cyclobenzaprine (FLEXERIL) 5 MG tablet; Take 1 tablet by mouth 3 (Three) Times a Day As Needed for Muscle Spasms.    Instructed pt on proper stretching and heat to area. Hydration discussed.           "

## 2020-08-24 ENCOUNTER — RESULTS ENCOUNTER (OUTPATIENT)
Dept: FAMILY MEDICINE CLINIC | Facility: CLINIC | Age: 70
End: 2020-08-24

## 2020-08-24 DIAGNOSIS — R73.01 IFG (IMPAIRED FASTING GLUCOSE): ICD-10-CM

## 2020-08-24 DIAGNOSIS — Z12.5 SCREENING FOR PROSTATE CANCER: ICD-10-CM

## 2020-08-24 DIAGNOSIS — I10 BENIGN ESSENTIAL HYPERTENSION: ICD-10-CM

## 2020-10-07 LAB
ALBUMIN SERPL-MCNC: 4.6 G/DL (ref 3.5–5.2)
ALBUMIN/GLOB SERPL: 2.7 G/DL
ALP SERPL-CCNC: 109 U/L (ref 39–117)
ALT SERPL-CCNC: 37 U/L (ref 1–41)
AST SERPL-CCNC: 25 U/L (ref 1–40)
BASOPHILS # BLD AUTO: 0.04 10*3/MM3 (ref 0–0.2)
BASOPHILS NFR BLD AUTO: 0.6 % (ref 0–1.5)
BILIRUB SERPL-MCNC: 0.5 MG/DL (ref 0–1.2)
BUN SERPL-MCNC: 14 MG/DL (ref 8–23)
BUN/CREAT SERPL: 14.6 (ref 7–25)
CALCIUM SERPL-MCNC: 9.3 MG/DL (ref 8.6–10.5)
CHLORIDE SERPL-SCNC: 101 MMOL/L (ref 98–107)
CHOLEST SERPL-MCNC: 97 MG/DL (ref 0–200)
CO2 SERPL-SCNC: 28 MMOL/L (ref 22–29)
CREAT SERPL-MCNC: 0.96 MG/DL (ref 0.76–1.27)
EOSINOPHIL # BLD AUTO: 0.69 10*3/MM3 (ref 0–0.4)
EOSINOPHIL NFR BLD AUTO: 10.8 % (ref 0.3–6.2)
ERYTHROCYTE [DISTWIDTH] IN BLOOD BY AUTOMATED COUNT: 11.8 % (ref 12.3–15.4)
GLOBULIN SER CALC-MCNC: 1.7 GM/DL
GLUCOSE SERPL-MCNC: 115 MG/DL (ref 65–99)
HBA1C MFR BLD: 5.5 % (ref 4.8–5.6)
HCT VFR BLD AUTO: 41.6 % (ref 37.5–51)
HDLC SERPL-MCNC: 50 MG/DL (ref 40–60)
HGB BLD-MCNC: 14.5 G/DL (ref 13–17.7)
IMM GRANULOCYTES # BLD AUTO: 0.03 10*3/MM3 (ref 0–0.05)
IMM GRANULOCYTES NFR BLD AUTO: 0.5 % (ref 0–0.5)
LDLC SERPL CALC-MCNC: 38 MG/DL (ref 0–100)
LYMPHOCYTES # BLD AUTO: 1.64 10*3/MM3 (ref 0.7–3.1)
LYMPHOCYTES NFR BLD AUTO: 25.7 % (ref 19.6–45.3)
MCH RBC QN AUTO: 31.7 PG (ref 26.6–33)
MCHC RBC AUTO-ENTMCNC: 34.9 G/DL (ref 31.5–35.7)
MCV RBC AUTO: 91 FL (ref 79–97)
MONOCYTES # BLD AUTO: 0.49 10*3/MM3 (ref 0.1–0.9)
MONOCYTES NFR BLD AUTO: 7.7 % (ref 5–12)
NEUTROPHILS # BLD AUTO: 3.49 10*3/MM3 (ref 1.7–7)
NEUTROPHILS NFR BLD AUTO: 54.7 % (ref 42.7–76)
NRBC BLD AUTO-RTO: 0 /100 WBC (ref 0–0.2)
PLATELET # BLD AUTO: 220 10*3/MM3 (ref 140–450)
POTASSIUM SERPL-SCNC: 5 MMOL/L (ref 3.5–5.2)
PROT SERPL-MCNC: 6.3 G/DL (ref 6–8.5)
PSA SERPL-MCNC: 1.98 NG/ML (ref 0–4)
RBC # BLD AUTO: 4.57 10*6/MM3 (ref 4.14–5.8)
SODIUM SERPL-SCNC: 138 MMOL/L (ref 136–145)
TRIGL SERPL-MCNC: 43 MG/DL (ref 0–150)
TSH SERPL DL<=0.005 MIU/L-ACNC: 3.74 UIU/ML (ref 0.27–4.2)
VLDLC SERPL CALC-MCNC: 8.6 MG/DL
WBC # BLD AUTO: 6.38 10*3/MM3 (ref 3.4–10.8)

## 2020-10-13 ENCOUNTER — OFFICE VISIT (OUTPATIENT)
Dept: FAMILY MEDICINE CLINIC | Facility: CLINIC | Age: 70
End: 2020-10-13

## 2020-10-13 VITALS
WEIGHT: 202 LBS | SYSTOLIC BLOOD PRESSURE: 132 MMHG | RESPIRATION RATE: 14 BRPM | TEMPERATURE: 97.6 F | DIASTOLIC BLOOD PRESSURE: 67 MMHG | HEART RATE: 56 BPM | BODY MASS INDEX: 27.36 KG/M2 | HEIGHT: 72 IN

## 2020-10-13 DIAGNOSIS — R73.01 IFG (IMPAIRED FASTING GLUCOSE): ICD-10-CM

## 2020-10-13 DIAGNOSIS — E78.00 PURE HYPERCHOLESTEROLEMIA: ICD-10-CM

## 2020-10-13 DIAGNOSIS — N52.9 ERECTILE DYSFUNCTION, UNSPECIFIED ERECTILE DYSFUNCTION TYPE: ICD-10-CM

## 2020-10-13 DIAGNOSIS — I10 BENIGN ESSENTIAL HYPERTENSION: Primary | ICD-10-CM

## 2020-10-13 PROCEDURE — 99214 OFFICE O/P EST MOD 30 MIN: CPT | Performed by: FAMILY MEDICINE

## 2020-10-13 RX ORDER — ATORVASTATIN CALCIUM 20 MG/1
TABLET, FILM COATED ORAL
COMMUNITY
Start: 2020-09-25 | End: 2020-10-13 | Stop reason: SDUPTHER

## 2020-10-13 RX ORDER — ATORVASTATIN CALCIUM 20 MG/1
20 TABLET, FILM COATED ORAL DAILY
Qty: 90 TABLET | Refills: 3 | Status: SHIPPED | OUTPATIENT
Start: 2020-10-13 | End: 2021-09-23 | Stop reason: SDUPTHER

## 2020-10-13 RX ORDER — SILDENAFIL CITRATE 20 MG/1
TABLET ORAL
Qty: 30 TABLET | Refills: 11 | Status: SHIPPED | OUTPATIENT
Start: 2020-10-13 | End: 2021-09-23 | Stop reason: SDUPTHER

## 2020-10-13 NOTE — PROGRESS NOTES
Subjective   William Ritchie is a 70 y.o. male.     History of Present Illness     Chief Complaint:   Chief Complaint   Patient presents with   • Hypertension     to discuss lab results / no refills needed per pt    • Hyperlipidemia       William Ritchie 70 y.o. male who presents today for Medical Management of the below listed issues and medication refills.  he has a problem list of   Patient Active Problem List   Diagnosis   • Hyperlipidemia   • Benign essential hypertension   • IFG (impaired fasting glucose)   • Oropharyngeal cancer (CMS/HCC)   • Fitting and adjustment of vascular catheter   • Steroid-induced hyperglycemia   • Dehydration   • Acute renal injury (CMS/HCC)   • Anemia associated with chemotherapy   • Chemotherapy-induced nausea   • GERD (gastroesophageal reflux disease)   • Chemotherapy induced neutropenia (CMS/HCC)   • Adverse effect of antineoplastic and immunosuppressive drugs   • Adverse effect of radiation therapy   • Pharyngitis, acute   • Hypothyroidism due to acquired atrophy of thyroid   • TSH (thyroid-stimulating hormone deficiency)   • Calculus of gallbladder without cholecystitis without obstruction   • Right leg pain   • Sensitivity to the cold   • Leukemoid reaction   • 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  Since the last visit, he has overall felt well. Does have issues with chronic ED and desires help with this. he has been compliant with   Current Outpatient Medications:   •  atorvastatin (LIPITOR) 20 MG tablet, Take 1 tablet by mouth Daily., Disp: 90 tablet, Rfl: 3  •  cyclobenzaprine (FLEXERIL) 5 MG tablet, Take 1 tablet by mouth 3 (Three) Times a Day As Needed for Muscle Spasms., Disp: 60 tablet, Rfl: 5  •  diphenhydrAMINE (BENADRYL) 50 MG capsule, Take 1 capsule 1 hour prior to CT scan, Disp: 1 capsule, Rfl: 0  •  hydroCHLOROthiazide (MICROZIDE) 12.5 MG capsule, Take 1 capsule by  "mouth Daily., Disp: 90 capsule, Rfl: 3  •  metoprolol tartrate (LOPRESSOR) 50 MG tablet, Take 1 tablet by mouth 2 (Two) Times a Day., Disp: 180 tablet, Rfl: 3  •  sildenafil (REVATIO) 20 MG tablet, Take 2-3 tabs QD prn, Disp: 30 tablet, Rfl: 11.  he denies medication side effects.    All of the other chronic condition(s) listed above are stable w/o issues.    /67   Pulse 56   Temp 97.6 °F (36.4 °C) (Oral)   Resp 14   Ht 183 cm (72.05\")   Wt 91.6 kg (202 lb)   BMI 27.36 kg/m²     Results for orders placed or performed in visit on 08/24/20   Comprehensive metabolic panel    Specimen: Blood   Result Value Ref Range    Glucose 115 (H) 65 - 99 mg/dL    BUN 14 8 - 23 mg/dL    Creatinine 0.96 0.76 - 1.27 mg/dL    eGFR Non African Am 77 >60 mL/min/1.73    eGFR African Am 93 >60 mL/min/1.73    BUN/Creatinine Ratio 14.6 7.0 - 25.0    Sodium 138 136 - 145 mmol/L    Potassium 5.0 3.5 - 5.2 mmol/L    Chloride 101 98 - 107 mmol/L    Total CO2 28.0 22.0 - 29.0 mmol/L    Calcium 9.3 8.6 - 10.5 mg/dL    Total Protein 6.3 6.0 - 8.5 g/dL    Albumin 4.60 3.50 - 5.20 g/dL    Globulin 1.7 gm/dL    A/G Ratio 2.7 g/dL    Total Bilirubin 0.5 0.0 - 1.2 mg/dL    Alkaline Phosphatase 109 39 - 117 U/L    AST (SGOT) 25 1 - 40 U/L    ALT (SGPT) 37 1 - 41 U/L   Lipid panel    Specimen: Blood   Result Value Ref Range    Total Cholesterol 97 0 - 200 mg/dL    Triglycerides 43 0 - 150 mg/dL    HDL Cholesterol 50 40 - 60 mg/dL    VLDL Cholesterol Toi 8.6 mg/dL    LDL Chol Calc (NIH) 38 0 - 100 mg/dL   TSH    Specimen: Blood   Result Value Ref Range    TSH 3.740 0.270 - 4.200 uIU/mL   PSA    Specimen: Blood   Result Value Ref Range    PSA 1.980 0.000 - 4.000 ng/mL   Hemoglobin A1c    Specimen: Blood   Result Value Ref Range    Hemoglobin A1C 5.50 4.80 - 5.60 %   CBC and Differential    Specimen: Blood   Result Value Ref Range    WBC 6.38 3.40 - 10.80 10*3/mm3    RBC 4.57 4.14 - 5.80 10*6/mm3    Hemoglobin 14.5 13.0 - 17.7 g/dL    Hematocrit " 41.6 37.5 - 51.0 %    MCV 91.0 79.0 - 97.0 fL    MCH 31.7 26.6 - 33.0 pg    MCHC 34.9 31.5 - 35.7 g/dL    RDW 11.8 (L) 12.3 - 15.4 %    Platelets 220 140 - 450 10*3/mm3    Neutrophil Rel % 54.7 42.7 - 76.0 %    Lymphocyte Rel % 25.7 19.6 - 45.3 %    Monocyte Rel % 7.7 5.0 - 12.0 %    Eosinophil Rel % 10.8 (H) 0.3 - 6.2 %    Basophil Rel % 0.6 0.0 - 1.5 %    Neutrophils Absolute 3.49 1.70 - 7.00 10*3/mm3    Lymphocytes Absolute 1.64 0.70 - 3.10 10*3/mm3    Monocytes Absolute 0.49 0.10 - 0.90 10*3/mm3    Eosinophils Absolute 0.69 (H) 0.00 - 0.40 10*3/mm3    Basophils Absolute 0.04 0.00 - 0.20 10*3/mm3    Immature Granulocyte Rel % 0.5 0.0 - 0.5 %    Immature Grans Absolute 0.03 0.00 - 0.05 10*3/mm3    nRBC 0.0 0.0 - 0.2 /100 WBC           The following portions of the patient's history were reviewed and updated as appropriate: allergies, current medications, past family history, past medical history, past social history, past surgical history and problem list.    Review of Systems   Constitutional: Negative for activity change, chills and fever.   Respiratory: Negative for cough.    Cardiovascular: Negative for chest pain.   Psychiatric/Behavioral: Negative for dysphoric mood.       Objective   Physical Exam  Constitutional:       General: He is not in acute distress.     Appearance: He is well-developed.   Pulmonary:      Effort: Pulmonary effort is normal.   Neurological:      Mental Status: He is alert and oriented to person, place, and time.   Psychiatric:         Behavior: Behavior normal.         Thought Content: Thought content normal.     Labs reviewed with pt today during visit. All questions answered.      Assessment/Plan   Diagnoses and all orders for this visit:    1. Benign essential hypertension (Primary)    2. Pure hypercholesterolemia  -     atorvastatin (LIPITOR) 20 MG tablet; Take 1 tablet by mouth Daily.  Dispense: 90 tablet; Refill: 3    3. IFG (impaired fasting glucose)    4. Erectile dysfunction,  unspecified erectile dysfunction type  -     sildenafil (REVATIO) 20 MG tablet; Take 2-3 tabs QD prn  Dispense: 30 tablet; Refill: 11    Diet/exercise/weight management to treat the glucose discussed.

## 2020-12-04 ENCOUNTER — LAB (OUTPATIENT)
Dept: LAB | Facility: HOSPITAL | Age: 70
End: 2020-12-04

## 2020-12-04 ENCOUNTER — OFFICE VISIT (OUTPATIENT)
Dept: ONCOLOGY | Facility: CLINIC | Age: 70
End: 2020-12-04

## 2020-12-04 VITALS
OXYGEN SATURATION: 99 % | RESPIRATION RATE: 16 BRPM | HEART RATE: 50 BPM | BODY MASS INDEX: 27.41 KG/M2 | WEIGHT: 202.4 LBS | SYSTOLIC BLOOD PRESSURE: 128 MMHG | DIASTOLIC BLOOD PRESSURE: 63 MMHG | HEIGHT: 72 IN | TEMPERATURE: 97.2 F

## 2020-12-04 DIAGNOSIS — R94.6 ABNORMAL RESULTS OF THYROID FUNCTION STUDIES: ICD-10-CM

## 2020-12-04 DIAGNOSIS — R73.03 PREDIABETES: ICD-10-CM

## 2020-12-04 DIAGNOSIS — E03.4 HYPOTHYROIDISM DUE TO ACQUIRED ATROPHY OF THYROID: ICD-10-CM

## 2020-12-04 DIAGNOSIS — C10.9 OROPHARYNGEAL CANCER (HCC): Primary | ICD-10-CM

## 2020-12-04 DIAGNOSIS — C10.9 OROPHARYNGEAL CANCER (HCC): ICD-10-CM

## 2020-12-04 LAB
ALBUMIN SERPL-MCNC: 4.3 G/DL (ref 3.5–5.2)
ALBUMIN/GLOB SERPL: 1.7 G/DL (ref 1.1–2.4)
ALP SERPL-CCNC: 104 U/L (ref 38–116)
ALT SERPL W P-5'-P-CCNC: 39 U/L (ref 0–41)
ANION GAP SERPL CALCULATED.3IONS-SCNC: 8.9 MMOL/L (ref 5–15)
AST SERPL-CCNC: 25 U/L (ref 0–40)
BASOPHILS # BLD AUTO: 0.04 10*3/MM3 (ref 0–0.2)
BASOPHILS NFR BLD AUTO: 0.6 % (ref 0–1.5)
BILIRUB SERPL-MCNC: 0.7 MG/DL (ref 0.2–1.2)
BUN SERPL-MCNC: 15 MG/DL (ref 6–20)
BUN/CREAT SERPL: 18.5 (ref 7.3–30)
CALCIUM SPEC-SCNC: 9.5 MG/DL (ref 8.5–10.2)
CHLORIDE SERPL-SCNC: 99 MMOL/L (ref 98–107)
CO2 SERPL-SCNC: 30.1 MMOL/L (ref 22–29)
CREAT SERPL-MCNC: 0.81 MG/DL (ref 0.7–1.3)
DEPRECATED RDW RBC AUTO: 39.9 FL (ref 37–54)
EOSINOPHIL # BLD AUTO: 0.79 10*3/MM3 (ref 0–0.4)
EOSINOPHIL NFR BLD AUTO: 12.5 % (ref 0.3–6.2)
ERYTHROCYTE [DISTWIDTH] IN BLOOD BY AUTOMATED COUNT: 11.6 % (ref 12.3–15.4)
GFR SERPL CREATININE-BSD FRML MDRD: 94 ML/MIN/1.73
GLOBULIN UR ELPH-MCNC: 2.5 GM/DL (ref 1.8–3.5)
GLUCOSE SERPL-MCNC: 115 MG/DL (ref 74–124)
HCT VFR BLD AUTO: 43.2 % (ref 37.5–51)
HGB BLD-MCNC: 14.5 G/DL (ref 13–17.7)
IMM GRANULOCYTES # BLD AUTO: 0.02 10*3/MM3 (ref 0–0.05)
IMM GRANULOCYTES NFR BLD AUTO: 0.3 % (ref 0–0.5)
LYMPHOCYTES # BLD AUTO: 1.8 10*3/MM3 (ref 0.7–3.1)
LYMPHOCYTES NFR BLD AUTO: 28.6 % (ref 19.6–45.3)
MCH RBC QN AUTO: 31.5 PG (ref 26.6–33)
MCHC RBC AUTO-ENTMCNC: 33.6 G/DL (ref 31.5–35.7)
MCV RBC AUTO: 93.7 FL (ref 79–97)
MONOCYTES # BLD AUTO: 0.51 10*3/MM3 (ref 0.1–0.9)
MONOCYTES NFR BLD AUTO: 8.1 % (ref 5–12)
NEUTROPHILS NFR BLD AUTO: 3.14 10*3/MM3 (ref 1.7–7)
NEUTROPHILS NFR BLD AUTO: 49.9 % (ref 42.7–76)
NRBC BLD AUTO-RTO: 0 /100 WBC (ref 0–0.2)
PLATELET # BLD AUTO: 200 10*3/MM3 (ref 140–450)
PMV BLD AUTO: 9 FL (ref 6–12)
POTASSIUM SERPL-SCNC: 4 MMOL/L (ref 3.5–4.7)
PROT SERPL-MCNC: 6.8 G/DL (ref 6.3–8)
RBC # BLD AUTO: 4.61 10*6/MM3 (ref 4.14–5.8)
SODIUM SERPL-SCNC: 138 MMOL/L (ref 134–145)
T4 FREE SERPL-MCNC: 1.18 NG/DL (ref 0.93–1.7)
TSH SERPL DL<=0.05 MIU/L-ACNC: 5.26 UIU/ML (ref 0.27–4.2)
WBC # BLD AUTO: 6.3 10*3/MM3 (ref 3.4–10.8)

## 2020-12-04 PROCEDURE — 36415 COLL VENOUS BLD VENIPUNCTURE: CPT

## 2020-12-04 PROCEDURE — 99213 OFFICE O/P EST LOW 20 MIN: CPT | Performed by: INTERNAL MEDICINE

## 2020-12-04 PROCEDURE — 80053 COMPREHEN METABOLIC PANEL: CPT

## 2020-12-04 PROCEDURE — 85025 COMPLETE CBC W/AUTO DIFF WBC: CPT

## 2020-12-04 PROCEDURE — 84443 ASSAY THYROID STIM HORMONE: CPT | Performed by: INTERNAL MEDICINE

## 2020-12-04 PROCEDURE — 84439 ASSAY OF FREE THYROXINE: CPT | Performed by: INTERNAL MEDICINE

## 2020-12-04 RX ORDER — PREDNISONE 50 MG/1
50 TABLET ORAL DAILY PRN
Qty: 3 TABLET | Refills: 0 | Status: SHIPPED | OUTPATIENT
Start: 2020-12-04 | End: 2021-07-05

## 2020-12-06 NOTE — PROGRESS NOTES
Subjective .     REASONS FOR FOLLOWUP:    1. Squamous cell carcinoma of the base of tongue, stage YEN (T1N2b), HPV status is not clear.  Started concurrent chemoradiation therapy on 12/4/2017 using cisplatin repeating every 3 weeks.    2. Moderate neutropenia secondary to concurrent chemoradiotherapy.  Cycle #3 cisplatin was delayed and then canceled.    3. Radiation therapy finished on 1/23/2018.  PEG tube was removed in May 2018.    4. PET scan on 3/15/2018 reported essentially complete resolution of hypermetabolic lesion at the left tongue base.  The left neck lymphadenopathy also showed blood pool activity.    5. Patient switched his ENT care to Dr. Browning.   6. CT scan on 06/18/2019 showed no evidence of disease recurrence.    7. Neck CT scan on 6/15/2020 reported no evidence for disease recurrence.    HISTORY OF PRESENT ILLNESS:  The patient is a 70 y.o. year old male who is here for 6-month follow-up.    In June 2020 patient had neck CT scan which reported carotid artery stenosis.  Will refer the patient to vascular surgery.  Patient reports he was seen by vascular surgery who recommended observation for now.      Patient reports otherwise he is at baseline condition.  Excellent performance status ECOG 0.  He denies soreness in the mouth or throat, no dysphagia odynophagia.  No neck lymphadenopathy or mass.  No hoarseness of voice.    He continues to have altered taste in his mouth due to previous chemoradiation therapy.      Laboratory study today on 12/4/2020 reported normal CBC including Hb 14.5, platelets 200,000 WBC 6300 with ANC 3140 lymphocytes 1800 eosinophil 790 and monocytes 510.    Past Medical History:   Diagnosis Date   • Benign essential hypertension    • H/O complete eye exam due   • H/O Neck mass     left   • Hyperlipidemia    • IFG (impaired fasting glucose)    • Seasonal allergies    • Tongue cancer (CMS/[facility]) 11/01/2017    Left base of tongue squamous cell carcinoma, stage YEN, recieved  chemo and radiation therapy   · Carotid artery stenosis.    Past Surgical History:   Procedure Laterality Date   • CHOLECYSTECTOMY WITH INTRAOPERATIVE CHOLANGIOGRAM N/A 7/5/2018    Procedure: Laparoscopic cholecystectomy with intraoperative cholangiogram ;  Surgeon: Ashley Fischer MD;  Location: [facility];  Service: General   • COLONOSCOPY N/A 2015    normal colonoscopy-Dr. Galen Morrissey   • COLONOSCOPY N/A 05/04/2011    Normal colonoscopy-Dr. Tobias Emerson   • ENDOSCOPY W/ PEG TUBE PLACEMENT N/A 11/28/2017    Procedure: ESOPHAGOGASTRODUODENOSCOPY WITH PERCUTANEOUS ENDOSCOPIC GASTROSTOMY TUBE INSERTION;  Surgeon: Isma Hercules MD;  Location: [facility] OR;  Service:    • FINE NEEDLE ASPIRATION Left 10/13/2017    Ultrasound guidance for fine needle biopsy and fine needle aspiration with ultrasonic guidance of left neck mass-Dr. Frank Marques   • INGUINAL HERNIA REPAIR Left 1994    Dr. Brian Peres   • INGUINAL HERNIA REPAIR Right 1993    Dr. Brian Peres   • LARYNGOPLASTY      Laryngoplasty with biopsy-Dr. Del Toro   • AK INSJ TUNNELED CVC W/O SUBQ PORT/ AGE 5 YR/> Left 11/28/2017    Procedure: INSERTION VENOUS ACCESS DEVICE;  Surgeon: Isma Hercules MD;  Location: [facility];  Service: General   • TONGUE BIOPSY / EXCISION N/A 11/01/2017    Biopsy of the left tongue base-Dr. Zane Del Toro   • TONSILLECTOMY AND ADENOIDECTOMY Bilateral 11/01/2017    Dr. Zane Del Toro   · Removal PEG tube on 5/2/2018   ·  Cholecystectomy in July 2018    HEMATOLOGIC/ONCOLOGIC HISTORY:  Mr. Ritchie is a 67 y.o. male who is here on 11/16/2017 for evaluation accompanied by his wife, referred by radiation oncologist, Dr. Aguirre, because of newly diagnosed squamous cell carcinoma of the left tongue base, stage YEN, [id number].      Patient previously only had history of mild hypertension which was controlled. Recently in early 09/2017 when he was on vacation, he felt a left neck nodule when he was shaving. He  denies any pain associated with that. Patient was seen by his primary care physician, Dr. Sheffield, for evaluation and was referred to ENT, Dr. Zane Del Toro. Patient subsequently had CT of the neck examination at [facility] on 09/22/2017. This study reported a left neck mass measuring 3.5 x 3.1 x 2.4 cm with cystic/necrotic changes located at the region of the left level II jugular chain. There were additional small homogeneous cervical lymph nodes but possibly reactive.      Patient subsequently had ultrasound of the neck on 10/13/2017 associated with fine-needle aspiration biopsy at Dr. Del Toro' office. The ultrasound reported 2 nodules in left neck. The large one measured about 3.28 cm x 2.67 cm x 3.28 cm. The 2nd smaller one measures 1.56 cm x 0.99 cm x 1.48 cm, just below the large mass. Patient had fine-needle aspiration biopsy at the same time. Pathology evaluation from the [facility] reported poorly differentiated squamous cell carcinoma with degeneration and necrosis. The viable tumor cells exhibit strong nuclear reactivity for both p40 and p63.     Patient subsequently had tonsillectomy, biopsy of the left tongue base and adenoidectomy on 11/01/2017 by Dr. Del Toro. Pathology evaluation from [facility] reported moderately differentiated squamous cell carcinoma. The left tonsil has no evidence of malignancy. Right tonsil also was benign. The adenoid tissue was also benign.      Patient reports the left neck mass is growing. He also reports poor appetite after tonsillectomy. For the past couple of weeks since the biopsy, he lost about 6 pounds. He denies nausea or vomiting. He has actually started feeling better with improved appetite. Denies pain. Denies fever or sweating.      This patient reports he never smoked cigarette. He is only a very rare social drinker. He told me the test for HPV is ongoing.      Patient also had PET scan examination obtained on 11/14/2017. This study  reported focal hypermetabolism with SUV 15.9 corresponding to the left-sided base of the tongue. There was moderate enlarged left jugular chain lymph node which is hypermetabolic but without measuring SUV. There was also mild hypermetabolism identified within several additional smaller left jugular chain lymph nodes. There was no hypermetabolic lymphadenopathy elsewhere in the neck nor foci in the chest, abdomen or pelvis.    Patient was started on concurrent chemoradiation therapy with cisplatin every 3 weeks.  He received 2 cycles chemotherapy and due to poor tolerance with acute renal injury and neutropenia, cycle #3 cisplatin was canceled.     Radiation therapy finished on 1/23/2018.    PET scan on 3/15/2018 reported essentially complete resolution of hypermetabolic lesion at the left tongue base.  The left and neck lymphadenopathy also showing, with blood pool activity.    His CT scan examination of the neck on 6/11/2018 reported no evidence of local disease recurrence, a stable left neck adenopathy, maybe 2 mm smaller compared to the PET scan obtained in March 2018.  Laboratory study on the same day reported normal renal function with a creatinine 0.73 normal electrolytes and liver function panel, stable mild anemia with hemoglobin 12.4 and normal WBC and platelets.    Laboratory study on 9/7/2018 reported normal iron study with ferritin 274, iron 100, TIBC 274 iron saturation 36% in the normal B12 level 631 pg/mL.  Hemoglobin was 13.5 improved and normal WBC 5350, and platelets 206,000.  He also had a normal TSH 2.87, and completely normal CMP.     CT scan examination for the neck with IV contrast on 11/21/2018 showed evidence of disease recurrence.  Laboratory study reported elevated glucose otherwise normal CMP.  Patient also had mildly elevated neutrophils.  These were likely secondary to steroids use prior to the CT scan because he is allergic to IV dye.      CT Neck done on 06/18/2019 showed beam  hardening artifact limits evaluation somewhat but there is no evidence of residual or recurrent tongue base mass. There is no evidence of pathologic adenopathy. 1.4 x 0.9 cm area of lucency involving the spinous process of T1, nonspecific but stable. This likely represents an atypical hemangioma. Area of lucency related to the left maxillary 1st molar suggesting a periapical abscess measuring 1.5 cm in maximum dimension. Clinical correlation and standard dental radiographs recommended.    Laboratory study today 06/18/2019 showed creatine at 0.80, TSH Baseline at 1.280, Free T4 at 1.08, and free T3 at 2.33. CBC was normal besides WBC elevated at 11.6, RDW down at 11.6, ANC at elevated at 0[address], absolute monocytes at 60, absolute lymphocytes at 980, and absolute immature grans 0.07. CMP normal besides elevated glucose of 219.     CT scan examination on 6/15/2020 showed no evidence of disease recurrence.  However it reported a left carotid artery at least moderate stenosis.    Laboratory studies on 6/15/2020 reported elevated neutrophils 9090, otherwise unremarkable.  He had a normalized TSH and a normal free T4.  Chemistry lab showed elevated glucose level otherwise unremarkable.     MEDICATIONS    Current Outpatient Medications:   •  atorvastatin (LIPITOR) 20 MG tablet, Take 1 tablet by mouth Daily., Disp: 90 tablet, Rfl: 3  •  cyclobenzaprine (FLEXERIL) 5 MG tablet, Take 1 tablet by mouth 3 (Three) Times a Day As Needed for Muscle Spasms., Disp: 60 tablet, Rfl: 5  •  diphenhydrAMINE (BENADRYL) 50 MG capsule, Take 1 capsule 1 hour prior to CT scan, Disp: 1 capsule, Rfl: 0  •  hydroCHLOROthiazide (MICROZIDE) 12.5 MG capsule, Take 1 capsule by mouth Daily., Disp: 90 capsule, Rfl: 3  •  metoprolol tartrate (LOPRESSOR) 50 MG tablet, Take 1 tablet by mouth 2 (Two) Times a Day., Disp: 180 tablet, Rfl: 3  •  sildenafil (REVATIO) 20 MG tablet, Take 2-3 tabs QD prn, Disp: 30 tablet, Rfl: 11  •  predniSONE (DELTASONE) 50 MG  tablet, Take 1 tablet by mouth Daily As Needed (before CT scan). 1 tab 13hr, 7 hr and 1 hr before CT scan, Disp: 3 tablet, Rfl: 0    ALLERGIES:     Allergies   Allergen Reactions   • Iodinated Diagnostic Agents Itching and Rash     Had a 24 hour delayed reaction to Ct contrast       SOCIAL HISTORY:       Social History     Social History   • Marital status:      Spouse name: Janiya   • Number of children: 2   • Years of education: High School     Occupational History   •  Retired     [employer]     Social History Main Topics   • Smoking status: Never Smoker   • Smokeless tobacco: Never Used   • Alcohol use Yes      Comment: occassional   • Drug use: No   • Sexual activity: No       FAMILY HISTORY:  Family History   Problem Relation Age of Onset   • Alcohol abuse Father    • Diabetes Father    • Cancer Neg Hx    • Malig Hyperthermia Neg Hx        Review of Systems   Constitutional: Negative for activity change, appetite change, diaphoresis, fatigue and unexpected weight change.   HENT: Positive for tinnitus (Right ear). Negative for dental problem, hearing loss, mouth sores, nosebleeds, sore throat and trouble swallowing.         He has dry throat and poor taste   Eyes: Negative for photophobia and visual disturbance.   Respiratory: Negative for cough and shortness of breath.    Cardiovascular: Negative for chest pain and leg swelling.   Gastrointestinal: Negative for abdominal pain, blood in stool and nausea.   Endocrine: Negative for cold intolerance, heat intolerance and polyuria.   Genitourinary: Negative for dysuria and hematuria.   Musculoskeletal: Negative for arthralgias and joint swelling.   Skin: Negative for color change and pallor.   Allergic/Immunologic: Negative for immunocompromised state.   Neurological: Negative for dizziness, light-headedness and headaches.   Hematological: Negative for adenopathy. Does not bruise/bleed easily.   Psychiatric/Behavioral: Negative for agitation and sleep disturbance.  "        Objective    Vitals:    12/04/20 1128   BP: 128/63   Pulse: 50   Resp: 16   Temp: 97.2 °F (36.2 °C)   SpO2: 99%   Weight: 91.8 kg (202 lb 6.4 oz)   Height: 183 cm (72.05\")   PainSc: 0-No pain     Current Status 12/4/2020   ECOG score 0      PHYSICAL EXAM:    GENERAL:  Well-developed, well-nourished male, in no acute distress.   SKIN:  Warm, dry without rashes, purpura or petechiae.  HEAD:  Normocephalic.  EYES:  Pupils equal, round.  Conjunctivae normal.  EARS:  Hearing intact.  NOSE: Unremarkable..   MOUTH: Oral mucosa moist..  THROAT: No enlarged tonsils.  No visible lesion at the oropharynx.  NECK:  Supple; no thyromegaly or masses.  LYMPHATICS:  No cervical, supraclavicular adenopathy.  CHEST: Normal respiratory effort.  Lungs clear to auscultation. Good airflow.  CARDIAC:  Regular rate and rhythm without murmurs. Normal S1,S2.  ABDOMEN:  Soft, nontender with no organomegaly or masses.  Bowel sounds normal.  EXTREMITIES:  No clubbing, cyanosis or edema.  NEUROLOGICAL:  Cranial Nerves II-XII grossly intact.  No focal neurological deficits.  PSYCHIATRIC:  Normal affect and mood.          RECENT LABS:    Lab Results   Component Value Date    WBC 6.30 12/04/2020    HGB 14.5 12/04/2020    HCT 43.2 12/04/2020    MCV 93.7 12/04/2020     12/04/2020     Lab Results   Component Value Date    NEUTROABS 3.14 12/04/2020     Lab Results   Component Value Date    GLUCOSE 115 12/04/2020    BUN 15 12/04/2020    CREATININE 0.81 12/04/2020    EGFRIFNONA 94 12/04/2020    EGFRIFAFRI 93 10/07/2020    BCR 18.5 12/04/2020    K 4.0 12/04/2020    CO2 30.1 (H) 12/04/2020    CALCIUM 9.5 12/04/2020    PROTENTOTREF 6.3 10/07/2020    ALBUMIN 4.30 12/04/2020    LABIL2 2.7 10/07/2020    AST 25 12/04/2020    ALT 39 12/04/2020     Lab Results   Component Value Date    TSH 5.260 (H) 12/04/2020   Free T4 at 1.18 ng/dL on 12/4/2020.       IMAGING STUDY: No new images since 6/15/2020.         Assessment/Plan      1.  Left tongue base " squamous cell carcinoma.   Stage YEN.  Finished 2 cycles chemotherapy with Cisplatin day 1 and day 22, with concurrent radiation on 1/23/2018.  Did not receive day 43 cisplatin as planned due to neutropenia.   · His PET scan on 3/15/2018 showed essentially complete metabolic response.  The residual left neck lymph node shows only low-level blood pool activity.   · Patient had CT scan examination of the neck on 6/11/2018 which showed stable left neck lymphadenopathy, probably smaller by 2 mm compared to the PET scan on 3/15/2018. This lesion is still probably dying down.     · Patient switched his ENT care to Dr. Browning.   · Patient had CT scan examination of the neck with IV contrast on 06/18/2019, showed no evidence of disease recurrence.    · CT scan examination on 6/15/2020 showed no evidence of disease recurrence.   · Physical examination on 12/4/2020 was negative for cervical lymphadenopathy.  Recommended to have a CT scan for the neck in 6 months for reevaluation.     2.  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    · Patient has marginally elevated TSH on 03/08/2019 which was 4.47.  This may be the beginning of his hypothyroidism secondary to radiation therapy.    · TSH Baseline at 1.280, Free T4 at 1.08, and free T3 at 2.33 on 06/18/2019.    · Slightly elevated TSH 4.34 on 2/3/2020.     · Lab study on 6/15/2020 reported normal TSH 1.47 and free T4 at 1.12 ng/DL.    · Lab study on 12/4/2020 reported mildly elevated TSH 5.26, however normal free T4 at 1.18 ng/dL.  We will continue to monitor.     3.  History of anemia, due to chemotherapy. His hemoglobin  improved and borderline normal.  Laboratory studies in September 2018 showed normal iron and vitamin B12.   · Patient has normalized hemoglobin at 15.1 on 06/18/2019.   · Normal hemoglobin 14.9 on 6/15/2020.   · Normal hemoglobin 14.5 on 12/4/2020.     4.  Tinnitus and decreased hearing.  He states he had ringing in his ears prior to  cisplatin.  His tinnitus did not worsen with cisplatin chemotherapy.      5.  Left carotid artery stenosis, at least moderate per CT scan examination on 6/15/2020.   · Patient is asymptomatic.    · We will refer patient to vascular surgery for evaluation.  He was seen by vascular surgeon and recommended observation.      6.  Hyperglycemia.    · Labs 06/18/2019 showed glucose of 219 and has been high in past as well.  This may be related to his prednisone use before CT scan examination.  However his previous glucose levels has been elevated multiple times.  He is not diagnosed with DM II.    · Patient had a relatively normal glucose 111 in December 2019 and February 2020.   · Elevated glucose 197 on 6/15/2020 on day of CT scan.  This again is possibly related to his prednisone prior to CT scan examination.   · Patient will follow up with primary care physician Dr. Sheffield for evaluation.      7.  Elevated neutrophil 9090 6/15/2020.  This is likely due to his toothache and infection, for which he had tooth extraction several days ago.  No need for further evaluation.  · Patient is completely normal WBC 6300 on 12/4/2020 including ANC 3140 lymphocytes 1800, elevated eosinophils 790.      Plan:   1. Continue to follow-up with vascular surgeon for carotid artery stenosis.   2. Patient will continue follow-up with ENT Dr. Browning.   3. We will arrange patient to have a CT scan for an echo with IV contrast in 6 months.  Because of allergic to IV dye, I will also prescribe prednisone 50 mg to be taken 13 hours, 7 hours and 1 hour before CT scan examination.  He was also recommended to take Benadryl OTC, 25 mg 1 hour before CT scan.  He has done this before.  I E scribed prednisone to his pharmacy.  4. Arrange patient come back to see me in 6 months, CBC CMP, TSH and free T4.        Nuvia Ballesteros MD PhD  12/4/2020.      CC:     Ameya Sheffield M.D.    Ameya Aguirre M.D.    Isma Hercules M.D.      Efrain Browning M.D.     Luis Antonio Smith MD

## 2021-01-22 DIAGNOSIS — I10 BENIGN ESSENTIAL HYPERTENSION: ICD-10-CM

## 2021-01-22 DIAGNOSIS — E78.00 PURE HYPERCHOLESTEROLEMIA: ICD-10-CM

## 2021-01-22 RX ORDER — ATORVASTATIN CALCIUM 20 MG/1
20 TABLET, FILM COATED ORAL DAILY
Qty: 90 TABLET | Refills: 3 | OUTPATIENT
Start: 2021-01-22

## 2021-01-22 RX ORDER — HYDROCHLOROTHIAZIDE 12.5 MG/1
12.5 CAPSULE, GELATIN COATED ORAL DAILY
Qty: 90 CAPSULE | Refills: 3 | OUTPATIENT
Start: 2021-01-22

## 2021-01-22 RX ORDER — METOPROLOL TARTRATE 50 MG/1
50 TABLET, FILM COATED ORAL 2 TIMES DAILY
Qty: 180 TABLET | Refills: 3 | OUTPATIENT
Start: 2021-01-22

## 2021-02-18 DIAGNOSIS — I10 BENIGN ESSENTIAL HYPERTENSION: ICD-10-CM

## 2021-02-18 RX ORDER — METOPROLOL TARTRATE 50 MG/1
TABLET, FILM COATED ORAL
Qty: 180 TABLET | Refills: 3 | Status: SHIPPED | OUTPATIENT
Start: 2021-02-18 | End: 2021-09-23 | Stop reason: SDUPTHER

## 2021-02-18 RX ORDER — HYDROCHLOROTHIAZIDE 12.5 MG/1
CAPSULE, GELATIN COATED ORAL
Qty: 90 CAPSULE | Refills: 3 | Status: SHIPPED | OUTPATIENT
Start: 2021-02-18 | End: 2021-09-23 | Stop reason: SDUPTHER

## 2021-02-18 RX ORDER — METOPROLOL TARTRATE 50 MG/1
TABLET, FILM COATED ORAL
Qty: 180 TABLET | Refills: 3 | OUTPATIENT
Start: 2021-02-18

## 2021-02-18 RX ORDER — HYDROCHLOROTHIAZIDE 12.5 MG/1
CAPSULE, GELATIN COATED ORAL
Qty: 90 CAPSULE | Refills: 3 | OUTPATIENT
Start: 2021-02-18

## 2021-03-09 DIAGNOSIS — Z23 IMMUNIZATION DUE: ICD-10-CM

## 2021-05-04 ENCOUNTER — TELEPHONE (OUTPATIENT)
Dept: ONCOLOGY | Facility: CLINIC | Age: 71
End: 2021-05-04

## 2021-06-03 ENCOUNTER — LAB (OUTPATIENT)
Dept: LAB | Facility: HOSPITAL | Age: 71
End: 2021-06-03

## 2021-06-03 ENCOUNTER — HOSPITAL ENCOUNTER (OUTPATIENT)
Dept: PET IMAGING | Facility: HOSPITAL | Age: 71
Discharge: HOME | End: 2021-06-03
Admitting: INTERNAL MEDICINE

## 2021-06-03 DIAGNOSIS — C10.9 OROPHARYNGEAL CANCER (HCC): ICD-10-CM

## 2021-06-03 DIAGNOSIS — E03.4 HYPOTHYROIDISM DUE TO ACQUIRED ATROPHY OF THYROID: ICD-10-CM

## 2021-06-03 LAB
ALBUMIN SERPL-MCNC: 4.6 G/DL (ref 3.5–5.2)
ALBUMIN/GLOB SERPL: 1.4 G/DL (ref 1.1–2.4)
ALP SERPL-CCNC: 109 U/L (ref 38–116)
ALT SERPL W P-5'-P-CCNC: 24 U/L (ref 0–41)
ANION GAP SERPL CALCULATED.3IONS-SCNC: 10.3 MMOL/L (ref 5–15)
AST SERPL-CCNC: 18 U/L (ref 0–40)
BASOPHILS # BLD AUTO: 0.01 10*3/MM3 (ref 0–0.2)
BASOPHILS NFR BLD AUTO: 0.1 % (ref 0–1.5)
BILIRUB SERPL-MCNC: 0.5 MG/DL (ref 0.2–1.2)
BUN SERPL-MCNC: 15 MG/DL (ref 6–20)
BUN/CREAT SERPL: 19.5 (ref 7.3–30)
CALCIUM SPEC-SCNC: 10 MG/DL (ref 8.5–10.2)
CHLORIDE SERPL-SCNC: 96 MMOL/L (ref 98–107)
CO2 SERPL-SCNC: 26.7 MMOL/L (ref 22–29)
CREAT BLDA-MCNC: 0.8 MG/DL (ref 0.6–1.3)
CREAT SERPL-MCNC: 0.77 MG/DL (ref 0.7–1.3)
DEPRECATED RDW RBC AUTO: 38.7 FL (ref 37–54)
EOSINOPHIL # BLD AUTO: 0 10*3/MM3 (ref 0–0.4)
EOSINOPHIL NFR BLD AUTO: 0 % (ref 0.3–6.2)
ERYTHROCYTE [DISTWIDTH] IN BLOOD BY AUTOMATED COUNT: 11.5 % (ref 12.3–15.4)
GFR SERPL CREATININE-BSD FRML MDRD: 99 ML/MIN/1.73
GLOBULIN UR ELPH-MCNC: 3.2 GM/DL (ref 1.8–3.5)
GLUCOSE SERPL-MCNC: 143 MG/DL (ref 74–124)
HCT VFR BLD AUTO: 44.4 % (ref 37.5–51)
HGB BLD-MCNC: 15.9 G/DL (ref 13–17.7)
IMM GRANULOCYTES # BLD AUTO: 0.04 10*3/MM3 (ref 0–0.05)
IMM GRANULOCYTES NFR BLD AUTO: 0.4 % (ref 0–0.5)
LYMPHOCYTES # BLD AUTO: 1.27 10*3/MM3 (ref 0.7–3.1)
LYMPHOCYTES NFR BLD AUTO: 11.8 % (ref 19.6–45.3)
MCH RBC QN AUTO: 32.7 PG (ref 26.6–33)
MCHC RBC AUTO-ENTMCNC: 35.8 G/DL (ref 31.5–35.7)
MCV RBC AUTO: 91.4 FL (ref 79–97)
MONOCYTES # BLD AUTO: 0.07 10*3/MM3 (ref 0.1–0.9)
MONOCYTES NFR BLD AUTO: 0.7 % (ref 5–12)
NEUTROPHILS NFR BLD AUTO: 87 % (ref 42.7–76)
NEUTROPHILS NFR BLD AUTO: 9.34 10*3/MM3 (ref 1.7–7)
NRBC BLD AUTO-RTO: 0 /100 WBC (ref 0–0.2)
PLATELET # BLD AUTO: 257 10*3/MM3 (ref 140–450)
PMV BLD AUTO: 9.8 FL (ref 6–12)
POTASSIUM SERPL-SCNC: 3.7 MMOL/L (ref 3.5–4.7)
PROT SERPL-MCNC: 7.8 G/DL (ref 6.3–8)
RBC # BLD AUTO: 4.86 10*6/MM3 (ref 4.14–5.8)
SODIUM SERPL-SCNC: 133 MMOL/L (ref 134–145)
T4 FREE SERPL-MCNC: 1.12 NG/DL (ref 0.93–1.7)
TSH SERPL DL<=0.05 MIU/L-ACNC: 1.33 UIU/ML (ref 0.27–4.2)
WBC # BLD AUTO: 10.73 10*3/MM3 (ref 3.4–10.8)

## 2021-06-03 PROCEDURE — 80053 COMPREHEN METABOLIC PANEL: CPT

## 2021-06-03 PROCEDURE — 84439 ASSAY OF FREE THYROXINE: CPT | Performed by: INTERNAL MEDICINE

## 2021-06-03 PROCEDURE — 82565 ASSAY OF CREATININE: CPT

## 2021-06-03 PROCEDURE — 84443 ASSAY THYROID STIM HORMONE: CPT | Performed by: INTERNAL MEDICINE

## 2021-06-03 PROCEDURE — 36415 COLL VENOUS BLD VENIPUNCTURE: CPT

## 2021-06-03 PROCEDURE — 25010000002 IOPAMIDOL 61 % SOLUTION: Performed by: INTERNAL MEDICINE

## 2021-06-03 PROCEDURE — 70491 CT SOFT TISSUE NECK W/DYE: CPT

## 2021-06-03 PROCEDURE — 85025 COMPLETE CBC W/AUTO DIFF WBC: CPT

## 2021-06-03 RX ADMIN — IOPAMIDOL 75 ML: 612 INJECTION, SOLUTION INTRAVENOUS at 14:40

## 2021-06-09 ENCOUNTER — APPOINTMENT (OUTPATIENT)
Dept: LAB | Facility: HOSPITAL | Age: 71
End: 2021-06-09

## 2021-06-09 ENCOUNTER — OFFICE VISIT (OUTPATIENT)
Dept: ONCOLOGY | Facility: CLINIC | Age: 71
End: 2021-06-09

## 2021-06-09 VITALS
TEMPERATURE: 97.3 F | SYSTOLIC BLOOD PRESSURE: 158 MMHG | WEIGHT: 203.4 LBS | BODY MASS INDEX: 27.55 KG/M2 | OXYGEN SATURATION: 99 % | DIASTOLIC BLOOD PRESSURE: 78 MMHG | RESPIRATION RATE: 18 BRPM | HEIGHT: 72 IN | HEART RATE: 55 BPM

## 2021-06-09 DIAGNOSIS — T38.0X5A STEROID-INDUCED HYPERGLYCEMIA: ICD-10-CM

## 2021-06-09 DIAGNOSIS — D72.823 LEUKEMOID REACTION: ICD-10-CM

## 2021-06-09 DIAGNOSIS — R73.9 STEROID-INDUCED HYPERGLYCEMIA: ICD-10-CM

## 2021-06-09 DIAGNOSIS — E03.4 HYPOTHYROIDISM DUE TO ACQUIRED ATROPHY OF THYROID: Primary | ICD-10-CM

## 2021-06-09 DIAGNOSIS — Z85.819 HISTORY OF OROPHARYNGEAL CANCER: ICD-10-CM

## 2021-06-09 PROCEDURE — 99213 OFFICE O/P EST LOW 20 MIN: CPT | Performed by: INTERNAL MEDICINE

## 2021-09-23 ENCOUNTER — OFFICE VISIT (OUTPATIENT)
Dept: FAMILY MEDICINE CLINIC | Facility: CLINIC | Age: 71
End: 2021-09-23

## 2021-09-23 VITALS
DIASTOLIC BLOOD PRESSURE: 58 MMHG | SYSTOLIC BLOOD PRESSURE: 148 MMHG | HEIGHT: 72 IN | BODY MASS INDEX: 28.17 KG/M2 | HEART RATE: 54 BPM | TEMPERATURE: 96.7 F | WEIGHT: 208 LBS | RESPIRATION RATE: 14 BRPM

## 2021-09-23 DIAGNOSIS — I10 BENIGN ESSENTIAL HYPERTENSION: ICD-10-CM

## 2021-09-23 DIAGNOSIS — Z00.00 MEDICARE ANNUAL WELLNESS VISIT, SUBSEQUENT: Primary | ICD-10-CM

## 2021-09-23 DIAGNOSIS — E78.00 PURE HYPERCHOLESTEROLEMIA: ICD-10-CM

## 2021-09-23 DIAGNOSIS — N52.9 ERECTILE DYSFUNCTION, UNSPECIFIED ERECTILE DYSFUNCTION TYPE: ICD-10-CM

## 2021-09-23 PROCEDURE — 99214 OFFICE O/P EST MOD 30 MIN: CPT | Performed by: FAMILY MEDICINE

## 2021-09-23 PROCEDURE — G0439 PPPS, SUBSEQ VISIT: HCPCS | Performed by: FAMILY MEDICINE

## 2021-09-23 RX ORDER — ATORVASTATIN CALCIUM 20 MG/1
20 TABLET, FILM COATED ORAL DAILY
Qty: 90 TABLET | Refills: 3 | Status: SHIPPED | OUTPATIENT
Start: 2021-09-23 | End: 2022-10-04 | Stop reason: SDUPTHER

## 2021-09-23 RX ORDER — HYDROCHLOROTHIAZIDE 12.5 MG/1
12.5 CAPSULE, GELATIN COATED ORAL DAILY
Qty: 90 CAPSULE | Refills: 3 | Status: SHIPPED | OUTPATIENT
Start: 2021-09-23 | End: 2022-10-04 | Stop reason: SDUPTHER

## 2021-09-23 RX ORDER — SILDENAFIL CITRATE 20 MG/1
TABLET ORAL
Qty: 30 TABLET | Refills: 11 | Status: SHIPPED | OUTPATIENT
Start: 2021-09-23 | End: 2022-10-05 | Stop reason: SDUPTHER

## 2021-09-23 RX ORDER — METOPROLOL TARTRATE 50 MG/1
50 TABLET, FILM COATED ORAL 2 TIMES DAILY
Qty: 180 TABLET | Refills: 3 | Status: SHIPPED | OUTPATIENT
Start: 2021-09-23 | End: 2022-10-04 | Stop reason: SDUPTHER

## 2021-09-23 NOTE — PROGRESS NOTES
The ABCs of the Annual Wellness Visit  Subsequent Medicare Wellness Visit    Chief Complaint   Patient presents with   • MEDICARE WELLNESS      Subjective    History of Present Illness:  William Ritchie is a 71 y.o. male who presents for a Subsequent Medicare Wellness Visit.    The following portions of the patient's history were reviewed and   updated as appropriate: allergies, current medications, past family history, past medical history, past social history, past surgical history and problem list.    Compared to one year ago, the patient feels his physical   health is the same.    Compared to one year ago, the patient feels his mental   health is the same.    Recent Hospitalizations:  He was not admitted to the hospital during the last year.       Current Medical Providers:  Patient Care Team:  Ameya Sheffield MD as PCP - General (Family Medicine)  Ameya Aguirre MD as Referring Physician (Radiation Oncology)  Nuvia Ballesteros MD PhD as Consulting Physician (Hematology and Oncology)  Ameya Ortega II, MD as Consulting Physician (Hematology and Oncology)    Outpatient Medications Prior to Visit   Medication Sig Dispense Refill   • atorvastatin (LIPITOR) 20 MG tablet Take 1 tablet by mouth Daily. 90 tablet 3   • diphenhydrAMINE (BENADRYL) 50 MG capsule Take 1 capsule 1 hour prior to CT scan 1 capsule 0   • hydroCHLOROthiazide (MICROZIDE) 12.5 MG capsule TAKE 1 CAPSULE DAILY 90 capsule 3   • metoprolol tartrate (LOPRESSOR) 50 MG tablet TAKE 1 TABLET TWICE A  tablet 3   • sildenafil (REVATIO) 20 MG tablet Take 2-3 tabs QD prn 30 tablet 11     No facility-administered medications prior to visit.       No opioid medication identified on active medication list. I have reviewed chart for other potential  high risk medication/s and harmful drug interactions in the elderly.          Aspirin is not on active medication list.  Aspirin use is indicated based on review of current medical condition/s. Pros and  "cons of this therapy have been discussed with this patient. Benefits of this medication outweigh potential harm.  Patient has been instructed to start taking this medication..    Patient Active Problem List   Diagnosis   • Hyperlipidemia   • Benign essential hypertension   • IFG (impaired fasting glucose)   • Oropharyngeal cancer (CMS/HCC)   • Fitting and adjustment of vascular catheter   • Steroid-induced hyperglycemia   • Dehydration   • Acute renal injury (CMS/HCC)   • Anemia associated with chemotherapy   • Chemotherapy-induced nausea   • GERD (gastroesophageal reflux disease)   • Chemotherapy induced neutropenia (CMS/HCC)   • Adverse effect of antineoplastic and immunosuppressive drugs   • Adverse effect of radiation therapy   • Pharyngitis, acute   • Hypothyroidism due to acquired atrophy of thyroid   • TSH (thyroid-stimulating hormone deficiency)   • Calculus of gallbladder without cholecystitis without obstruction   • Right leg pain   • Sensitivity to the cold   • Leukemoid reaction   • 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  ACP discussion was held with the patient during this visit. Patient does not have an advance directive, information provided.          Objective    Vitals:    09/22/21 1036   Height: 183 cm (72.05\")     BMI Readings from Last 1 Encounters:   09/22/21 27.55 kg/m²   BMI is above normal parameters. Recommendations include: exercise counseling    Does the patient have evidence of cognitive impairment? No    Physical Exam            HEALTH RISK ASSESSMENT    Smoking Status:  Social History     Tobacco Use   Smoking Status Never Smoker   Smokeless Tobacco Never Used     Alcohol Consumption:  Social History     Substance and Sexual Activity   Alcohol Use Yes    Comment: occassional     Fall Risk Screen:    KENNETH " Fall Risk Assessment has not been completed.    Depression Screening:  PHQ-2/PHQ-9 Depression Screening 6/9/2021   Little interest or pleasure in doing things 0   Feeling down, depressed, or hopeless 0   Trouble falling or staying asleep, or sleeping too much 0   Feeling tired or having little energy 0   Poor appetite or overeating 0   Feeling bad about yourself - or that you are a failure or have let yourself or your family down 0   Trouble concentrating on things, such as reading the newspaper or watching television 0   Moving or speaking so slowly that other people could have noticed. Or the opposite - being so fidgety or restless that you have been moving around a lot more than usual 0   Thoughts that you would be better off dead, or of hurting yourself in some way 0   Total Score 0   If you checked off any problems, how difficult have these problems made it for you to do your work, take care of things at home, or get along with other people? Not difficult at all       Health Habits and Functional and Cognitive Screening:  Functional & Cognitive Status 2/4/2020   Do you have difficulty preparing food and eating? No   Do you have difficulty bathing yourself, getting dressed or grooming yourself? No   Do you have difficulty using the toilet? No   Do you have difficulty moving around from place to place? No   Do you have trouble with steps or getting out of a bed or a chair? No   Current Diet Well Balanced Diet   Dental Exam Up to date   Eye Exam Up to date   Exercise (times per week) 3 times per week   Current Exercise Activities Include Walking   Do you need help using the phone?  No   Are you deaf or do you have serious difficulty hearing?  No   Do you need help with transportation? No   Do you need help shopping? No   Do you need help preparing meals?  No   Do you need help with housework?  No   Do you need help with laundry? No   Do you need help taking your medications? No   Do you need help managing money?  No   Do you ever drive or ride in a car without wearing a seat belt? No   Have you felt unusual stress, anger or loneliness in the last month? No   Who do you live with? Spouse   If you need help, do you have trouble finding someone available to you? Yes   Have you been bothered in the last four weeks by sexual problems? No   Do you have difficulty concentrating, remembering or making decisions? No       Age-appropriate Screening Schedule:  Refer to the list below for future screening recommendations based on patient's age, sex and/or medical conditions. Orders for these recommended tests are listed in the plan section. The patient has been provided with a written plan.    Health Maintenance   Topic Date Due   • ZOSTER VACCINE (2 of 2) 02/26/2013   • INFLUENZA VACCINE  10/01/2021   • LIPID PANEL  10/07/2021   • PROSTATE CANCER SCREENING  Discontinued   • TDAP/TD VACCINES  Discontinued              Assessment/Plan   CMS Preventative Services Quick Reference  Risk Factors Identified During Encounter  Cardiovascular Disease  The above risks/problems have been discussed with the patient.  Follow up actions/plans if indicated are seen below in the Assessment/Plan Section.  Pertinent information has been shared with the patient in the After Visit Summary.    Diagnoses and all orders for this visit:    1. Medicare annual wellness visit, subsequent (Primary)        Follow Up:   No follow-ups on file.     An After Visit Summary and PPPS were made available to the patient.        I spent 10 minutes caring for William on this date of service. This time includes time spent by me in the following activities:preparing for the visit

## 2021-09-23 NOTE — PATIENT INSTRUCTIONS
Medicare Wellness  Personal Prevention Plan of Service     Date of Office Visit:  2021  Encounter Provider:  Ameya Sheffield MD  Place of Service:  [facility]  Patient Name: William Ritchie  :  1950    As part of the Medicare Wellness portion of your visit today, we are providing you with this personalized preventive plan of services (PPPS). This plan is based upon recommendations of the [place] Preventive Services Task Force (USPSTF) and the Advisory Committee on Immunization Practices (ACIP).    This lists the preventive care services that should be considered, and provides dates of when you are due. Items listed as completed are up-to-date and do not require any further intervention.    Health Maintenance   Topic Date Due   • ZOSTER VACCINE (2 of 2) 10/02/2022 (Originally 2013)   • INFLUENZA VACCINE  10/01/2021   • LIPID PANEL  10/07/2021   • ANNUAL WELLNESS VISIT  2022   • COLORECTAL CANCER SCREENING  10/08/2025   • COVID-19 Vaccine  Completed   • Pneumococcal Vaccine 65+  Completed   • HEPATITIS C SCREENING  Discontinued   • PROSTATE CANCER SCREENING  Discontinued   • TDAP/TD VACCINES  Discontinued       No orders of the defined types were placed in this encounter.      Return in about 6 months (around 3/23/2022) for Recheck.

## 2021-09-23 NOTE — PROGRESS NOTES
Subjective   William Ritchie is a 71 y.o. male.     History of Present Illness     Chief Complaint:   Chief Complaint   Patient presents with   • Erectile Dysfunction     med refill no labs / meds reviewed with pt today    • Hyperlipidemia   • medicare wellness     due         William Ritchie 71 y.o. male who presents today for Medical Management of the below listed issues and medication refills. He  has a problem list of   Patient Active Problem List   Diagnosis   • Hyperlipidemia   • Benign essential hypertension   • IFG (impaired fasting glucose)   • Oropharyngeal cancer (CMS/HCC)   • Fitting and adjustment of vascular catheter   • Steroid-induced hyperglycemia   • Dehydration   • Acute renal injury (CMS/HCC)   • Anemia associated with chemotherapy   • Chemotherapy-induced nausea   • GERD (gastroesophageal reflux disease)   • Chemotherapy induced neutropenia (CMS/HCC)   • Adverse effect of antineoplastic and immunosuppressive drugs   • Adverse effect of radiation therapy   • Pharyngitis, acute   • Hypothyroidism due to acquired atrophy of thyroid   • TSH (thyroid-stimulating hormone deficiency)   • Calculus of gallbladder without cholecystitis without obstruction   • Right leg pain   • Sensitivity to the cold   • Leukemoid reaction   • 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  Since the last visit, He has overall felt well.  he has been compliant with   Current Outpatient Medications:   •  atorvastatin (LIPITOR) 20 MG tablet, Take 1 tablet by mouth Daily., Disp: 90 tablet, Rfl: 3  •  sildenafil (REVATIO) 20 MG tablet, Take 2-3 tabs QD prn, Disp: 30 tablet, Rfl: 11  •  diphenhydrAMINE (BENADRYL) 50 MG capsule, Take 1 capsule 1 hour prior to CT scan, Disp: 1 capsule, Rfl: 0  •  hydroCHLOROthiazide (MICROZIDE) 12.5 MG capsule, Take 1 capsule by mouth Daily., Disp: 90 capsule, Rfl: 3  •   "metoprolol tartrate (LOPRESSOR) 50 MG tablet, Take 1 tablet by mouth 2 (Two) Times a Day., Disp: 180 tablet, Rfl: 3.  He denies medication side effects.    All of the other chronic condition(s) listed above are stable w/o issues.    /58   Pulse 54   Temp 96.7 °F (35.9 °C) (Oral)   Resp 14   Ht 183 cm (72.05\")   Wt 94.3 kg (208 lb)   BMI 28.17 kg/m²     Results for orders placed or performed during the hospital encounter of 06/03/21   POC Creatinine    Specimen: Blood   Result Value Ref Range    Creatinine 0.80 0.60 - 1.30 mg/dL             The following portions of the patient's history were reviewed and updated as appropriate: allergies, current medications, past family history, past medical history, past social history, past surgical history, and problem list.    Review of Systems   Constitutional: Negative for activity change, chills and fever.   Respiratory: Negative for cough.    Cardiovascular: Negative for chest pain.   Psychiatric/Behavioral: Negative for dysphoric mood.       Objective   Physical Exam  Constitutional:       General: He is not in acute distress.     Appearance: He is well-developed.   Cardiovascular:      Rate and Rhythm: Normal rate and regular rhythm.   Pulmonary:      Effort: Pulmonary effort is normal.      Breath sounds: Normal breath sounds.   Neurological:      Mental Status: He is alert and oriented to person, place, and time.   Psychiatric:         Behavior: Behavior normal.         Thought Content: Thought content normal.           Assessment/Plan   Diagnoses and all orders for this visit:    1. Medicare annual wellness visit, subsequent (Primary)    2. Erectile dysfunction, unspecified erectile dysfunction type  -     sildenafil (REVATIO) 20 MG tablet; Take 2-3 tabs QD prn  Dispense: 30 tablet; Refill: 11    3. Pure hypercholesterolemia  -     atorvastatin (LIPITOR) 20 MG tablet; Take 1 tablet by mouth Daily.  Dispense: 90 tablet; Refill: 3    4. Benign essential " hypertension  -     metoprolol tartrate (LOPRESSOR) 50 MG tablet; Take 1 tablet by mouth 2 (Two) Times a Day.  Dispense: 180 tablet; Refill: 3  -     hydroCHLOROthiazide (MICROZIDE) 12.5 MG capsule; Take 1 capsule by mouth Daily.  Dispense: 90 capsule; Refill: 3

## 2021-11-11 ENCOUNTER — OFFICE VISIT (OUTPATIENT)
Dept: FAMILY MEDICINE CLINIC | Facility: CLINIC | Age: 71
End: 2021-11-11

## 2021-11-11 VITALS
WEIGHT: 208 LBS | TEMPERATURE: 97.8 F | RESPIRATION RATE: 16 BRPM | DIASTOLIC BLOOD PRESSURE: 61 MMHG | BODY MASS INDEX: 28.17 KG/M2 | OXYGEN SATURATION: 98 % | HEIGHT: 72 IN | SYSTOLIC BLOOD PRESSURE: 130 MMHG | HEART RATE: 56 BPM

## 2021-11-11 DIAGNOSIS — J01.00 ACUTE NON-RECURRENT MAXILLARY SINUSITIS: ICD-10-CM

## 2021-11-11 DIAGNOSIS — M21.619 BUNION: Primary | ICD-10-CM

## 2021-11-11 PROCEDURE — 99213 OFFICE O/P EST LOW 20 MIN: CPT | Performed by: NURSE PRACTITIONER

## 2021-11-11 RX ORDER — AMOXICILLIN AND CLAVULANATE POTASSIUM 875; 125 MG/1; MG/1
1 TABLET, FILM COATED ORAL EVERY 12 HOURS SCHEDULED
Qty: 20 TABLET | Refills: 0 | Status: SHIPPED | OUTPATIENT
Start: 2021-11-11 | End: 2021-12-17

## 2021-11-11 NOTE — PATIENT INSTRUCTIONS
Sinusitis, Adult  Sinusitis is soreness and swelling (inflammation) of your sinuses. Sinuses are hollow spaces in the bones around your face. They are located:  · Around your eyes.  · In the middle of your forehead.  · Behind your nose.  · In your cheekbones.  Your sinuses and nasal passages are lined with a fluid called mucus. Mucus drains out of your sinuses. Swelling can trap mucus in your sinuses. This lets germs (bacteria, virus, or fungus) grow, which leads to infection. Most of the time, this condition is caused by a virus.  What are the causes?  This condition is caused by:  · Allergies.  · Asthma.  · Germs.  · Things that block your nose or sinuses.  · Growths in the nose (nasal polyps).  · Chemicals or irritants in the air.  · Fungus (rare).  What increases the risk?  You are more likely to develop this condition if:  · You have a weak body defense system (immune system).  · You do a lot of swimming or diving.  · You use nasal sprays too much.  · You smoke.  What are the signs or symptoms?  The main symptoms of this condition are pain and a feeling of pressure around the sinuses. Other symptoms include:  · Stuffy nose (congestion).  · Runny nose (drainage).  · Swelling and warmth in the sinuses.  · Headache.  · Toothache.  · A cough that may get worse at night.  · Mucus that collects in the throat or the back of the nose (postnasal drip).  · Being unable to smell and taste.  · Being very tired (fatigue).  · A fever.  · Sore throat.  · Bad breath.  How is this diagnosed?  This condition is diagnosed based on:  · Your symptoms.  · Your medical history.  · A physical exam.  · Tests to find out if your condition is short-term (acute) or long-term (chronic). Your doctor may:  ? Check your nose for growths (polyps).  ? Check your sinuses using a tool that has a light (endoscope).  ? Check for allergies or germs.  ? Do imaging tests, such as an MRI or CT scan.  How is this treated?  Treatment for this condition  depends on the cause and whether it is short-term or long-term.  · If caused by a virus, your symptoms should go away on their own within 10 days. You may be given medicines to relieve symptoms. They include:  ? Medicines that shrink swollen tissue in the nose.  ? Medicines that treat allergies (antihistamines).  ? A spray that treats swelling of the nostrils.   ? Rinses that help get rid of thick mucus in your nose (nasal saline washes).  · If caused by bacteria, your doctor may wait to see if you will get better without treatment. You may be given antibiotic medicine if you have:  ? A very bad infection.  ? A weak body defense system.  · If caused by growths in the nose, you may need to have surgery.  Follow these instructions at home:  Medicines  · Take, use, or apply over-the-counter and prescription medicines only as told by your doctor. These may include nasal sprays.  · If you were prescribed an antibiotic medicine, take it as told by your doctor. Do not stop taking the antibiotic even if you start to feel better.  Hydrate and humidify    · Drink enough water to keep your pee (urine) pale yellow.  · Use a cool mist humidifier to keep the humidity level in your home above 50%.  · Breathe in steam for 10-15 minutes, 3-4 times a day, or as told by your doctor. You can do this in the bathroom while a hot shower is running.  · Try not to spend time in cool or dry air.    Rest  · Rest as much as you can.  · Sleep with your head raised (elevated).  · Make sure you get enough sleep each night.  General instructions    · Put a warm, moist washcloth on your face 3-4 times a day, or as often as told by your doctor. This will help with discomfort.  · Wash your hands often with soap and water. If there is no soap and water, use hand .  · Do not smoke. Avoid being around people who are smoking (secondhand smoke).  · Keep all follow-up visits as told by your doctor. This is important.    Contact a doctor if:  · You  have a fever.  · Your symptoms get worse.  · Your symptoms do not get better within 10 days.  Get help right away if:  · You have a very bad headache.  · You cannot stop throwing up (vomiting).  · You have very bad pain or swelling around your face or eyes.  · You have trouble seeing.  · You feel confused.  · Your neck is stiff.  · You have trouble breathing.  Summary  · Sinusitis is swelling of your sinuses. Sinuses are hollow spaces in the bones around your face.  · This condition is caused by tissues in your nose that become inflamed or swollen. This traps germs. These can lead to infection.  · If you were prescribed an antibiotic medicine, take it as told by your doctor. Do not stop taking it even if you start to feel better.  · Keep all follow-up visits as told by your doctor. This is important.  This information is not intended to replace advice given to you by your health care provider. Make sure you discuss any questions you have with your health care provider.  Document Revised: 05/20/2019 Document Reviewed: 05/20/2019  [employer] Patient Education © 2021 [employer]  Amoxicillin; Clavulanic Acid Chewable Tablets  What is this medicine?  AMOXICILLIN; CLAVULANIC ACID (a mox i COREY in; GALILEO fraser ic AS id) is a penicillin antibiotic. It treats some infections caused by bacteria. It will not work for colds, the flu, or other viruses.  This medicine may be used for other purposes; ask your health care provider or pharmacist if you have questions.  COMMON BRAND NAME(S): Augmentin  What should I tell my health care provider before I take this medicine?  They need to know if you have any of these conditions:  · bowel disease, like colitis  · kidney disease  · liver disease  · mononucleosis  · phenylketonuria  · an unusual or allergic reaction to amoxicillin, penicillin, cephalosporin, other antibiotics, clavulanic acid, other medicines, foods, dyes, or preservatives  · pregnant or trying to get  pregnant  · breast-feeding  How should I use this medicine?  Take this drug by mouth. Take it as directed on the prescription label at the same time every day. Chew or crush it completely before swallowing. Do not swallow tablets whole. You can take it with or without food. If it upsets your stomach, take it with food. Take all of this drug unless your health care provider tells you to stop it early. Keep taking it even if you think you are better.  Talk to your health care provider about the use of this drug in children. While it may be prescribed for selected conditions, precautions do apply.  Overdosage: If you think you have taken too much of this medicine contact a poison control center or emergency room at once.  NOTE: This medicine is only for you. Do not share this medicine with others.  What if I miss a dose?  If you miss a dose, take it as soon as you can. If it is almost time for your next dose, take only that dose. Do not take double or extra doses.  What may interact with this medicine?  · allopurinol  · anticoagulants  · birth control pills  · methotrexate  · probenecid  This list may not describe all possible interactions. Give your health care provider a list of all the medicines, herbs, non-prescription drugs, or dietary supplements you use. Also tell them if you smoke, drink alcohol, or use illegal drugs. Some items may interact with your medicine.  What should I watch for while using this medicine?  Tell your doctor or healthcare provider if your symptoms do not improve.  This medicine may cause serious skin reactions. They can happen weeks to months after starting the medicine. Contact your healthcare provider right away if you notice fevers or flu-like symptoms with a rash. The rash may be red or purple and then turn into blisters or peeling of the skin. Or, you might notice a red rash with swelling of the face, lips or lymph nodes in your neck or under your arms.  Do not treat diarrhea with  over the counter products. Contact your doctor if you have diarrhea that lasts more than 2 days or if it is severe and watery.  If you have diabetes, you may get a false-positive result for sugar in your urine. Check with your doctor or healthcare provider.  Birth control pills may not work properly while you are taking this medicine. Talk to your doctor about using an extra method of birth control.  What side effects may I notice from receiving this medicine?  Side effects that you should report to your doctor or health care professional as soon as possible:  · allergic reactions like skin rash, itching or hives, swelling of the face, lips, or tongue  · breathing problems  · dark urine  · fever or chills, sore throat  · redness, blistering, peeling, or loosening of the skin, including inside the mouth  · seizures  · trouble passing urine or change in the amount of urine  · unusual bleeding, bruising  · unusually weak or tired  · white patches or sores in the mouth or throat  Side effects that usually do not require medical attention (report to your doctor or health care professional if they continue or are bothersome):  · diarrhea  · dizziness  · headache  · nausea, vomiting  · stomach upset  · vaginal or anal irritation  This list may not describe all possible side effects. Call your doctor for medical advice about side effects. You may report side effects to FDA at 5-349-FDA-8610.  Where should I keep my medicine?  Keep out of the reach of children and pets.  Store at room temperature between 20 and 25 degrees C (68 and 77 degrees F). Throw away any unused drug after the expiration date.  NOTE: This sheet is a summary. It may not cover all possible information. If you have questions about this medicine, talk to your doctor, pharmacist, or health care provider.  © 2021 [employer] (2020-08-31 08:58:41)

## 2021-11-11 NOTE — PROGRESS NOTES
Chief Complaint  Sore Throat and Cough    Sandie Thomas presents to [facility]    Review of Systems   Constitutional: Negative.    HENT: Positive for sinus pressure and sore throat.    Eyes: Negative.    Respiratory: Positive for cough.    Cardiovascular: Negative.    Gastrointestinal: Negative.    Endocrine: Negative.    Genitourinary: Negative.    Musculoskeletal: Negative.    Skin: Negative.    Allergic/Immunologic: Negative.    Neurological: Negative.    Hematological: Negative.    Psychiatric/Behavioral: Negative.      71-year-old male presents to the office today complaining of sore throat,, sinus pain and pressure, nasal congestion for the past week.  Denies any fever, N/V/D.  Denies any  shortness of breath.  Does have a mild nonproductive cough when he feels lots of drainage in his throat.  He has not taken anything over-the-counter.      He is also complaining of a bunion on his right foot.  He would like a ambulatory referral made to podiatry for assessment.        He has an active problem list of the following    Patient Active Problem List   Diagnosis   • Hyperlipidemia   • Benign essential hypertension   • IFG (impaired fasting glucose)   • Oropharyngeal cancer (HCC)   • Fitting and adjustment of vascular catheter   • Steroid-induced hyperglycemia   • Dehydration   • Acute renal injury (HCC)   • Anemia associated with chemotherapy   • Chemotherapy-induced nausea   • GERD (gastroesophageal reflux disease)   • Chemotherapy induced neutropenia (HCC)   • Adverse effect of antineoplastic and immunosuppressive drugs   • Adverse effect of radiation therapy   • Pharyngitis, acute   • Hypothyroidism due to acquired atrophy of thyroid   • TSH (thyroid-stimulating hormone deficiency)   • Calculus of gallbladder without cholecystitis without obstruction   • Right leg pain   • Sensitivity to the cold   • Leukemoid reaction   • Xerostomia due to radiotherapy   • Dental  "abscess   • Carotid artery stenosis, asymptomatic, left   • Erectile dysfunction   • History of oropharyngeal cancer       He has been compliant on the following medications    Current Outpatient Medications on File Prior to Visit   Medication Sig Dispense Refill   • atorvastatin (LIPITOR) 20 MG tablet Take 1 tablet by mouth Daily. 90 tablet 3   • hydroCHLOROthiazide (MICROZIDE) 12.5 MG capsule Take 1 capsule by mouth Daily. 90 capsule 3   • metoprolol tartrate (LOPRESSOR) 50 MG tablet Take 1 tablet by mouth 2 (Two) Times a Day. 180 tablet 3   • sildenafil (REVATIO) 20 MG tablet Take 2-3 tabs QD prn 30 tablet 11   • diphenhydrAMINE (BENADRYL) 50 MG capsule Take 1 capsule 1 hour prior to CT scan 1 capsule 0     No current facility-administered medications on file prior to visit.       He denies any medication side effects    The following portions of the patient's history were reviewed and updated as appropriate: allergies, current medications, past family history, past medical history, past social history, past surgical history, and problem list       Objective   Vital Signs:   Vitals:    11/11/21 1126   BP: 130/61   Pulse: 56   Resp: 16   Temp: 97.8 °F (36.6 °C)   SpO2: 98%   Weight: 94.3 kg (208 lb)   Height: 183 cm (72.05\")        Physical Exam  Vitals reviewed.   Constitutional:       Appearance: Normal appearance. He is not ill-appearing.   HENT:      Head: Normocephalic.      Right Ear: Tympanic membrane normal.      Left Ear: Tympanic membrane normal.      Nose:      Right Sinus: Maxillary sinus tenderness present.      Left Sinus: Maxillary sinus tenderness present.      Mouth/Throat:      Mouth: Mucous membranes are moist.      Pharynx: Oropharynx is clear. Posterior oropharyngeal erythema present. No oropharyngeal exudate.   Eyes:      Extraocular Movements: Extraocular movements intact.      Conjunctiva/sclera: Conjunctivae normal.   Neck:      Vascular: No carotid bruit.   Cardiovascular:      Rate and " Rhythm: Normal rate and regular rhythm.      Pulses: Normal pulses.      Heart sounds: Normal heart sounds. No murmur heard.      Pulmonary:      Effort: Pulmonary effort is normal. No respiratory distress.      Breath sounds: Normal breath sounds. No stridor. No wheezing, rhonchi or rales.   Chest:      Chest wall: No tenderness.   Abdominal:      General: Abdomen is flat. Bowel sounds are normal.      Palpations: Abdomen is soft.   Musculoskeletal:      Cervical back: Normal range of motion and neck supple.   Skin:     General: Skin is warm.   Neurological:      General: No focal deficit present.      Mental Status: He is alert and oriented to person, place, and time.   Psychiatric:         Mood and Affect: Mood normal.         Behavior: Behavior normal.         Thought Content: Thought content normal.         Judgment: Judgment normal.          Result Review :     {The following data was reviewed by Renetta PATE    COVID-19,[facility] ROUTINE, NP/OP SWAB IN TRANSPORT MEDIA OR ESWAB 72 HR TAT - Swab, Anterior nasal (11/11/2021 11:48)           Assessment and Plan    Diagnoses and all orders for this visit:    1. Bunion (Primary)  -     Ambulatory Referral to Podiatry    2. Acute non-recurrent maxillary sinusitis  -     amoxicillin-clavulanate (Augmentin) 875-125 MG per tablet; Take 1 tablet by mouth Every 12 (Twelve) Hours. One PO BID for infection with food  Dispense: 20 tablet; Refill: 0    Plan  take medication as prescribed  monitor for any side effects  finish all antibiotics  increase fluid intake  ambulatory referral made to podiatry  quarantine until negative COVID-19 results  follow-up or to ER with any shortness of breath or fever not relieved by Tylenol      Follow Up   No follow-ups on file.  Patient was given instructions and counseling regarding his condition or for health maintenance advice. Please see specific information pulled into the AVS if appropriate.

## 2021-11-12 LAB
LABCORP SARS-COV-2, NAA 2 DAY TAT: NORMAL
SARS-COV-2 RNA RESP QL NAA+PROBE: NOT DETECTED

## 2021-12-07 ENCOUNTER — TELEPHONE (OUTPATIENT)
Dept: ONCOLOGY | Facility: CLINIC | Age: 71
End: 2021-12-07

## 2021-12-07 NOTE — TELEPHONE ENCOUNTER
I returned call to patient and read him the note from Dr. Ballesteros plan for his next appointment on 12/17/2021 and there is no CT scan mentioned in his note to be performed prior to him coming into his appointment.  He will have lab work that day and see MD and if he wants to do repeat CT scan at that time it will be scheduled.  Patient verbalized understanding.

## 2021-12-07 NOTE — TELEPHONE ENCOUNTER
Provider: DR LEAL    Caller: QUINTIN    Relationship to Patient: SELF    Reason for Call: QUINTIN IS WANTING TO MAKE SURE HE DID NOT NEED A CT SCAN BEFORE HIS 12-17 APPOINTMENT .  HE STATES THAT HE NORMALLY GETS A SCAN BEFORE HIS APPT.     PLEASE ADVISE.

## 2021-12-17 ENCOUNTER — OFFICE VISIT (OUTPATIENT)
Dept: ONCOLOGY | Facility: CLINIC | Age: 71
End: 2021-12-17

## 2021-12-17 ENCOUNTER — LAB (OUTPATIENT)
Dept: LAB | Facility: HOSPITAL | Age: 71
End: 2021-12-17

## 2021-12-17 VITALS
SYSTOLIC BLOOD PRESSURE: 158 MMHG | HEIGHT: 72 IN | TEMPERATURE: 98.3 F | BODY MASS INDEX: 28.3 KG/M2 | OXYGEN SATURATION: 99 % | WEIGHT: 208.9 LBS | HEART RATE: 59 BPM | DIASTOLIC BLOOD PRESSURE: 80 MMHG | RESPIRATION RATE: 16 BRPM

## 2021-12-17 DIAGNOSIS — E07.9 DISORDER OF THYROID, UNSPECIFIED: ICD-10-CM

## 2021-12-17 DIAGNOSIS — Z85.819 HISTORY OF OROPHARYNGEAL CANCER: Primary | ICD-10-CM

## 2021-12-17 DIAGNOSIS — Z85.819 HISTORY OF OROPHARYNGEAL CANCER: ICD-10-CM

## 2021-12-17 DIAGNOSIS — E03.8 OTHER SPECIFIED HYPOTHYROIDISM: ICD-10-CM

## 2021-12-17 DIAGNOSIS — E03.4 HYPOTHYROIDISM DUE TO ACQUIRED ATROPHY OF THYROID: ICD-10-CM

## 2021-12-17 PROBLEM — Z85.89 HISTORY OF HEAD AND NECK CANCER: Status: ACTIVE | Noted: 2021-12-17

## 2021-12-17 LAB
ALBUMIN SERPL-MCNC: 4.3 G/DL (ref 3.5–5.2)
ALBUMIN/GLOB SERPL: 1.7 G/DL (ref 1.1–2.4)
ALP SERPL-CCNC: 110 U/L (ref 38–116)
ALT SERPL W P-5'-P-CCNC: 28 U/L (ref 0–41)
ANION GAP SERPL CALCULATED.3IONS-SCNC: 11.2 MMOL/L (ref 5–15)
AST SERPL-CCNC: 22 U/L (ref 0–40)
BASOPHILS # BLD AUTO: 0.07 10*3/MM3 (ref 0–0.2)
BASOPHILS NFR BLD AUTO: 1.2 % (ref 0–1.5)
BILIRUB SERPL-MCNC: 0.7 MG/DL (ref 0.2–1.2)
BUN SERPL-MCNC: 14 MG/DL (ref 6–20)
BUN/CREAT SERPL: 15.6 (ref 7.3–30)
CALCIUM SPEC-SCNC: 9.5 MG/DL (ref 8.5–10.2)
CHLORIDE SERPL-SCNC: 99 MMOL/L (ref 98–107)
CO2 SERPL-SCNC: 26.8 MMOL/L (ref 22–29)
CREAT SERPL-MCNC: 0.9 MG/DL (ref 0.7–1.3)
DEPRECATED RDW RBC AUTO: 39 FL (ref 37–54)
EOSINOPHIL # BLD AUTO: 0.48 10*3/MM3 (ref 0–0.4)
EOSINOPHIL NFR BLD AUTO: 8.3 % (ref 0.3–6.2)
ERYTHROCYTE [DISTWIDTH] IN BLOOD BY AUTOMATED COUNT: 11.6 % (ref 12.3–15.4)
GFR SERPL CREATININE-BSD FRML MDRD: 83 ML/MIN/1.73
GLOBULIN UR ELPH-MCNC: 2.6 GM/DL (ref 1.8–3.5)
GLUCOSE SERPL-MCNC: 109 MG/DL (ref 74–124)
HCT VFR BLD AUTO: 41.8 % (ref 37.5–51)
HGB BLD-MCNC: 14.4 G/DL (ref 13–17.7)
IMM GRANULOCYTES # BLD AUTO: 0.01 10*3/MM3 (ref 0–0.05)
IMM GRANULOCYTES NFR BLD AUTO: 0.2 % (ref 0–0.5)
LYMPHOCYTES # BLD AUTO: 1.61 10*3/MM3 (ref 0.7–3.1)
LYMPHOCYTES NFR BLD AUTO: 27.9 % (ref 19.6–45.3)
MCH RBC QN AUTO: 31.8 PG (ref 26.6–33)
MCHC RBC AUTO-ENTMCNC: 34.4 G/DL (ref 31.5–35.7)
MCV RBC AUTO: 92.3 FL (ref 79–97)
MONOCYTES # BLD AUTO: 0.47 10*3/MM3 (ref 0.1–0.9)
MONOCYTES NFR BLD AUTO: 8.1 % (ref 5–12)
NEUTROPHILS NFR BLD AUTO: 3.14 10*3/MM3 (ref 1.7–7)
NEUTROPHILS NFR BLD AUTO: 54.3 % (ref 42.7–76)
NRBC BLD AUTO-RTO: 0 /100 WBC (ref 0–0.2)
PLATELET # BLD AUTO: 188 10*3/MM3 (ref 140–450)
PMV BLD AUTO: 9 FL (ref 6–12)
POTASSIUM SERPL-SCNC: 4.3 MMOL/L (ref 3.5–4.7)
PROT SERPL-MCNC: 6.9 G/DL (ref 6.3–8)
RBC # BLD AUTO: 4.53 10*6/MM3 (ref 4.14–5.8)
SODIUM SERPL-SCNC: 137 MMOL/L (ref 134–145)
T4 FREE SERPL-MCNC: 1.28 NG/DL (ref 0.93–1.7)
TSH SERPL DL<=0.05 MIU/L-ACNC: 4.15 UIU/ML (ref 0.27–4.2)
WBC NRBC COR # BLD: 5.78 10*3/MM3 (ref 3.4–10.8)

## 2021-12-17 PROCEDURE — 84439 ASSAY OF FREE THYROXINE: CPT | Performed by: INTERNAL MEDICINE

## 2021-12-17 PROCEDURE — 99214 OFFICE O/P EST MOD 30 MIN: CPT | Performed by: INTERNAL MEDICINE

## 2021-12-17 PROCEDURE — 84443 ASSAY THYROID STIM HORMONE: CPT | Performed by: INTERNAL MEDICINE

## 2021-12-17 PROCEDURE — 80053 COMPREHEN METABOLIC PANEL: CPT

## 2021-12-17 PROCEDURE — 36415 COLL VENOUS BLD VENIPUNCTURE: CPT

## 2021-12-17 PROCEDURE — 85025 COMPLETE CBC W/AUTO DIFF WBC: CPT

## 2021-12-17 RX ORDER — PREDNISONE 50 MG/1
50 TABLET ORAL 3 TIMES DAILY
Qty: 3 TABLET | Refills: 0 | Status: SHIPPED | OUTPATIENT
Start: 2021-12-17 | End: 2021-12-29

## 2021-12-17 NOTE — PROGRESS NOTES
Subjective .     REASONS FOR FOLLOWUP:    1. Squamous cell carcinoma of the base of tongue, stage YEN (T1N2b), HPV status is not clear.  Started concurrent chemoradiation therapy on 12/4/2017 using cisplatin repeating every 3 weeks.    2. Moderate neutropenia secondary to concurrent chemoradiotherapy.  Cycle #3 cisplatin was delayed and then canceled.    3. Radiation therapy finished on 1/23/2018.  PEG tube was removed in May 2018.    4. PET scan on 3/15/2018 reported essentially complete resolution of hypermetabolic lesion at the left tongue base.  The left neck lymphadenopathy also showed blood pool activity.    5. Patient switched his ENT care to Dr. Browning.   6. CT scan on 06/18/2019 showed no evidence of disease recurrence.    7. Neck CT scan on 6/15/2020 reported no evidence for disease recurrence.  8. Neck CT scan on 6/3/2021 reported no evidence for disease recurrence.    HISTORY OF PRESENT ILLNESS:  The patient is a 71 y.o. year old male who is here for 6-month follow-up.  Patient is accompanied by his wife today.    Patient reports he is at her baseline condition.  He continues to have low xerostomia and altered taste in his from his chemoradiation therapy.  Patient also reports discomfort in the left submandibular/lower neck area.  Examination is negative.  Otherwise no specific complaints.  Denies lymphadenopathy.  No hoarseness of voice or dysphagia.  Patient has excellent performance status ECOG 0.  Has good appetite and stable weight.    Laboratory studies today on 12/17/2021 reported normal CBC with Hb 14.4 platelets 144,000 WBC 5780.  Normal thyroid studies with TSH 4.15, and free T4 at 1.28.  Chemistry lab reported normal CMP.      Past Medical History:   Diagnosis Date   • Benign essential hypertension    • H/O complete eye exam due   • H/O Neck mass     left   • Hyperlipidemia    • IFG (impaired fasting glucose)    • Seasonal allergies    • Tongue cancer (HCC) 11/01/2017    Left base of tongue  squamous cell carcinoma, stage YEN, recieved chemo and radiation therapy   · Carotid artery stenosis.    Past Surgical History:   Procedure Laterality Date   • CHOLECYSTECTOMY WITH INTRAOPERATIVE CHOLANGIOGRAM N/A 7/5/2018    Procedure: Laparoscopic cholecystectomy with intraoperative cholangiogram ;  Surgeon: Ashley Fischer MD;  Location: [facility];  Service: General   • COLONOSCOPY N/A 2015    normal colonoscopy-Dr. Galen Morrissey   • COLONOSCOPY N/A 05/04/2011    Normal colonoscopy-Dr. Tobias Emerson   • ENDOSCOPY W/ PEG TUBE PLACEMENT N/A 11/28/2017    Procedure: ESOPHAGOGASTRODUODENOSCOPY WITH PERCUTANEOUS ENDOSCOPIC GASTROSTOMY TUBE INSERTION;  Surgeon: Isma Hercules MD;  Location: [facility] OR;  Service:    • FINE NEEDLE ASPIRATION Left 10/13/2017    Ultrasound guidance for fine needle biopsy and fine needle aspiration with ultrasonic guidance of left neck mass-Dr. Frank Marques   • INGUINAL HERNIA REPAIR Left 1994    Dr. Brian Peres   • INGUINAL HERNIA REPAIR Right 1993    Dr. Brian Peres   • LARYNGOPLASTY      Laryngoplasty with biopsy-Dr. Del Toro   • SD INSJ TUNNELED CVC W/O SUBQ PORT/ AGE 5 YR/> Left 11/28/2017    Procedure: INSERTION VENOUS ACCESS DEVICE;  Surgeon: Isma Hercules MD;  Location: [facility];  Service: General   • TONGUE BIOPSY / EXCISION N/A 11/01/2017    Biopsy of the left tongue base-Dr. Zane Del Toro   • TONSILLECTOMY AND ADENOIDECTOMY Bilateral 11/01/2017    Dr. Zane Del Toro   · Removal PEG tube on 5/2/2018   ·  Cholecystectomy in July 2018    HEMATOLOGIC/ONCOLOGIC HISTORY:  Mr. Ritchie is a 67 y.o. male who is here on 11/16/2017 for evaluation accompanied by his wife, referred by radiation oncologist, Dr. Aguirre, because of newly diagnosed squamous cell carcinoma of the left tongue base, stage YEN, [id number].      Patient previously only had history of mild hypertension which was controlled. Recently in early 09/2017 when he was on vacation, he felt a  left neck nodule when he was shaving. He denies any pain associated with that. Patient was seen by his primary care physician, Dr. Sheffield, for evaluation and was referred to ENT, Dr. Zane Del Toro. Patient subsequently had CT of the neck examination at the [facility] on 09/22/2017. This study reported a left neck mass measuring 3.5 x 3.1 x 2.4 cm with cystic/necrotic changes located at the region of the left level II jugular chain. There were additional small homogeneous cervical lymph nodes but possibly reactive.      Patient subsequently had ultrasound of the neck on 10/13/2017 associated with fine-needle aspiration biopsy at Dr. Del Toro' office. The ultrasound reported 2 nodules in left neck. The large one measured about 3.28 cm x 2.67 cm x 3.28 cm. The 2nd smaller one measures 1.56 cm x 0.99 cm x 1.48 cm, just below the large mass. Patient had fine-needle aspiration biopsy at the same time. Pathology evaluation from the [facility] reported poorly differentiated squamous cell carcinoma with degeneration and necrosis. The viable tumor cells exhibit strong nuclear reactivity for both p40 and p63.     Patient subsequently had tonsillectomy, biopsy of the left tongue base and adenoidectomy on 11/01/2017 by Dr. Del Toro. Pathology evaluation from [facility] reported moderately differentiated squamous cell carcinoma. The left tonsil has no evidence of malignancy. Right tonsil also was benign. The adenoid tissue was also benign.      Patient reports the left neck mass is growing. He also reports poor appetite after tonsillectomy. For the past couple of weeks since the biopsy, he lost about 6 pounds. He denies nausea or vomiting. He has actually started feeling better with improved appetite. Denies pain. Denies fever or sweating.      This patient reports he never smoked cigarette. He is only a very rare social drinker. He told me the test for HPV is ongoing.      Patient also had PET scan  examination obtained on 11/14/2017. This study reported focal hypermetabolism with SUV 15.9 corresponding to the left-sided base of the tongue. There was moderate enlarged left jugular chain lymph node which is hypermetabolic but without measuring SUV. There was also mild hypermetabolism identified within several additional smaller left jugular chain lymph nodes. There was no hypermetabolic lymphadenopathy elsewhere in the neck nor foci in the chest, abdomen or pelvis.    Patient was started on concurrent chemoradiation therapy with cisplatin every 3 weeks.  He received 2 cycles chemotherapy and due to poor tolerance with acute renal injury and neutropenia, cycle #3 cisplatin was canceled.     Radiation therapy finished on 1/23/2018.    PET scan on 3/15/2018 reported essentially complete resolution of hypermetabolic lesion at the left tongue base.  The left and neck lymphadenopathy also showing, with blood pool activity.    His CT scan examination of the neck on 6/11/2018 reported no evidence of local disease recurrence, a stable left neck adenopathy, maybe 2 mm smaller compared to the PET scan obtained in March 2018.  Laboratory study on the same day reported normal renal function with a creatinine 0.73 normal electrolytes and liver function panel, stable mild anemia with hemoglobin 12.4 and normal WBC and platelets.    Laboratory study on 9/7/2018 reported normal iron study with ferritin 274, iron 100, TIBC 274 iron saturation 36% in the normal B12 level 631 pg/mL.  Hemoglobin was 13.5 improved and normal WBC 5350, and platelets 206,000.  He also had a normal TSH 2.87, and completely normal CMP.     CT scan examination for the neck with IV contrast on 11/21/2018 showed evidence of disease recurrence.  Laboratory study reported elevated glucose otherwise normal CMP.  Patient also had mildly elevated neutrophils.  These were likely secondary to steroids use prior to the CT scan because he is allergic to IV dye.       CT Neck done on 06/18/2019 showed beam hardening artifact limits evaluation somewhat but there is no evidence of residual or recurrent tongue base mass. There is no evidence of pathologic adenopathy. 1.4 x 0.9 cm area of lucency involving the spinous process of T1, nonspecific but stable. This likely represents an atypical hemangioma. Area of lucency related to the left maxillary 1st molar suggesting a periapical abscess measuring 1.5 cm in maximum dimension. Clinical correlation and standard dental radiographs recommended.    Laboratory study today 06/18/2019 showed creatine at 0.80, TSH Baseline at 1.280, Free T4 at 1.08, and free T3 at 2.33. CBC was normal besides WBC elevated at 11.6, RDW down at 11.6, [place] at [address], absolute monocytes at 60, absolute lymphocytes at 980, and absolute immature grans 0.07. CMP normal besides elevated glucose of 219.     CT scan examination on 6/15/2020 showed no evidence of disease recurrence.  However it reported a left carotid artery at least moderate stenosis.    Laboratory studies on 6/15/2020 reported elevated neutrophils 9090, otherwise unremarkable.  He had a normalized TSH and a normal free T4.  Chemistry lab showed elevated glucose level otherwise unremarkable.     In June 2020 patient had neck CT scan which reported carotid artery stenosis.  Will refer the patient to vascular surgery.  Patient reports he was seen by vascular surgery who recommended observation for now.      Laboratory study on 12/4/2020 reported normal CBC including Hb 14.5, platelets 200,000 WBC 6300 with ANC 3140 lymphocytes 1800 eosinophil 790 and monocytes 510.  Elevated TSH 5.26 however normal free T4 at 1.18 ng/dL.    Neck CT scan examination on 6/3/2021 reported no evidence for disease local recurrence or cervical lymphadenopathy.    Laboratory study 6/3/2021 reported normal hemoglobin platelets, however elevated neutrophils 9340 out of WBC 10,700.  Chemistry lab reported normal TSH  "and free T4, and unremarkable CMP except elevated glucose of 143. Both of hyperglycemia and neutrophil elevation are likely secondary to oral prednisone given prior to IV contrast for CT scan.        MEDICATIONS    Current Outpatient Medications:   •  atorvastatin (LIPITOR) 20 MG tablet, Take 1 tablet by mouth Daily., Disp: 90 tablet, Rfl: 3  •  hydroCHLOROthiazide (MICROZIDE) 12.5 MG capsule, Take 1 capsule by mouth Daily., Disp: 90 capsule, Rfl: 3  •  metoprolol tartrate (LOPRESSOR) 50 MG tablet, Take 1 tablet by mouth 2 (Two) Times a Day., Disp: 180 tablet, Rfl: 3  •  sildenafil (REVATIO) 20 MG tablet, Take 2-3 tabs QD prn, Disp: 30 tablet, Rfl: 11  •  predniSONE (DELTASONE) 50 MG tablet, Take 1 tablet by mouth 3 (Three) Times a Day. Take 1 tablet, 13 hours, 7 hours and 1 hour before CT scan examination., Disp: 3 tablet, Rfl: 0    ALLERGIES:     Allergies   Allergen Reactions   • Iodinated Diagnostic Agents Itching and Rash     Had a 24 hour delayed reaction to Ct contrast       SOCIAL HISTORY:       Social History     Social History   • Marital status:      Spouse name: Janiya   • Number of children: 2   • Years of education: High School     Occupational History   •  Retired     [employer]     Social History Main Topics   • Smoking status: Never Smoker   • Smokeless tobacco: Never Used   • Alcohol use Yes      Comment: occassional   • Drug use: No   • Sexual activity: No       FAMILY HISTORY:  Family History   Problem Relation Age of Onset   • Alcohol abuse Father    • Diabetes Father    • Cancer Neg Hx    • Malig Hyperthermia Neg Hx            Objective    Vitals:    12/17/21 1135   BP: 158/80   Pulse: 59   Resp: 16   Temp: 98.3 °F (36.8 °C)   TempSrc: Oral   SpO2: 99%   Weight: 94.8 kg (208 lb 14.4 oz)   Height: 183 cm (72.05\")   PainSc: 0-No pain     Current Status 6/9/2021   ECOG score 0      PHYSICAL EXAM:    GENERAL:  Well-developed, well-nourished male in no acute distress.    SKIN:  Warm, dry without " rashes, purpura or petechiae.  HEENT:  Normocephalic.  Wearing mask.   LYMPHATICS:  No cervical, supraclavicular, axillary adenopathy.  CHEST: Normal respiratory effort.  Lungs clear to auscultation. Good airflow.  CARDIAC:  Regular rate and rhythm. Normal S1,S2.  ABDOMEN:  Soft, nontender with no organomegaly or masses.  Bowel sounds normal.  EXTREMITIES:  No clubbing, cyanosis or edema.  NEUROLOGICAL:  Grossly intact.    PSYCHIATRIC:  Normal affect and mood.        RECENT LABS:    Lab Results   Component Value Date    WBC 5.78 12/17/2021    HGB 14.4 12/17/2021    HCT 41.8 12/17/2021    MCV 92.3 12/17/2021     12/17/2021     Lab Results   Component Value Date    NEUTROABS 3.14 12/17/2021     Lab Results   Component Value Date    GLUCOSE 109 12/17/2021    BUN 14 12/17/2021    CREATININE 0.90 12/17/2021    EGFRIFNONA 83 12/17/2021    EGFRIFAFRI 93 10/07/2020    BCR 15.6 12/17/2021    K 4.3 12/17/2021    CO2 26.8 12/17/2021    CALCIUM 9.5 12/17/2021    PROTENTOTREF 6.3 10/07/2020    ALBUMIN 4.30 12/17/2021    LABIL2 2.7 10/07/2020    AST 22 12/17/2021    ALT 28 12/17/2021     Lab Results   Component Value Date    TSH 4.150 12/17/2021   Free T4 at 1.28 ng/dL on 12/17/2021     IMAGING STUDY:   No new images today since 6/3/2021.      Assessment/Plan      1.  Left tongue base squamous cell carcinoma.   Stage YEN.  Finished 2 cycles chemotherapy with Cisplatin day 1 and day 22, with concurrent radiation on 1/23/2018.  Did not receive day 43 cisplatin as planned due to neutropenia.   · His PET scan on 3/15/2018 showed essentially complete metabolic response.  The residual left neck lymph node shows only low-level blood pool activity.   · Patient had CT scan examination of the neck on 6/11/2018 which showed stable left neck lymphadenopathy, probably smaller by 2 mm compared to the PET scan on 3/15/2018. This lesion is still probably dying down.     · Patient switched his ENT care to Dr. Browning.   · Patient had CT  scan examination of the neck with IV contrast on 06/18/2019, showed no evidence of disease recurrence.    · CT scan examination on 6/15/2020 showed no evidence of disease recurrence.   · Physical examination on 12/4/2020 was negative for cervical lymphadenopathy.  Recommended to have a CT scan for the neck in 6 months for reevaluation.  · CT of the neck obtained 6/3/2021 reported no evidence for local disease recurrence or neck lymphadenopathy.  Physical examination today is also benign.  · On 12/17/2021, patient reports some discomfort in the left side of neck/submandibular area.  Otherwise negative review.  Physical examination negative for lymphadenopathy or mass.       2.  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    · Patient has marginally elevated TSH on 03/08/2019 which was 4.47.  This may be the beginning of his hypothyroidism secondary to radiation therapy.    · TSH at 1.280, Free T4 at 1.08, and free T3 at 2.33 on 06/18/2019.    · Slightly elevated TSH 4.34 on 2/3/2020.     · Lab study on 6/15/2020 reported normal TSH 1.47 and free T4 at 1.12 ng/DL.    · Lab study on 12/4/2020 reported mildly elevated TSH 5.26, however normal free T4 at 1.18 ng/dL.    · Lab study on 6/3/2021 reported normal TSH 1.33 and normal free T4 at 1.12 ng/dL.  We will continue to monitor.  · On 12/17/2021, patient has upper normal TSH 4.15, and free T4 at 1.28 ng/dL.     3.  Tinnitus and decreased hearing.  He states he had ringing in his ears prior to cisplatin.  His tinnitus did not worsen with cisplatin chemotherapy.   · No change of clinical condition.     4.  Left carotid artery stenosis, at least moderate per CT scan examination on 6/15/2020.   · Patient is asymptomatic.    · He was seen by vascular surgeon and recommended observation.      5.  Hyperglycemia.    · Labs 06/18/2019 showed glucose of 219 and has been high in past as well.  This may be related to his prednisone use before CT scan examination.   However his previous glucose levels has been elevated multiple times.  He was diagnosed with DM II.    · Patient had relatively normal glucose 111 in December 2019 and February 2020.   · Elevated glucose 197 on 6/15/2020 on day of CT scan.  This again is possibly related to his prednisone prior to CT scan examination.   · Glucose 143 on 6/3/2021.  Elevation of glucose may likely related to oral prednisone given prior to IV contrast for CT scan examination.    · Normal glucose 109 on 12/17/2021.      6.  Elevated neutrophil 9090 6/15/2020.  This is likely due to his toothache and infection, for which he had tooth extraction several days ago.  No need for further evaluation.  · Patient is completely normal WBC 6300 on 12/4/2020 including ANC 3140 lymphocytes 1800, elevated eosinophils 790.  · On 6/3/2021, elevated neutrophil 9340 out of WBC 10,730.  This is likely due to prednisone given prior to his CT scan with IV contrast.  Patient is asymptomatic.  · On 12/17/2021, normal WBC 5780 including neutrophils 3140.      Plan:   1. Patient will continue follow-up with ENT Dr. Browning.   2. We will arrange patient to have a CT scan for the neck with and without IV contrast in 6 months.  Will obtain laboratory studies at same time CBC CMP TSH free T4, free T3.  3. Patient returns for evaluation 1 week after CT scan and the laboratory studies.      Discussed with the patient about laboratory results and reevaluation in 6 months.    Nuvia Ballesteros MD PhD  12/17/2021.      CC:     Ameya Sheffield M.D.    Ameya Aguirre M.D.    Isma Hercules M.D.      Efrain Browning M.D.    Luis Antonio Smith MD

## 2021-12-29 ENCOUNTER — OFFICE VISIT (OUTPATIENT)
Dept: FAMILY MEDICINE CLINIC | Facility: CLINIC | Age: 71
End: 2021-12-29

## 2021-12-29 VITALS
RESPIRATION RATE: 16 BRPM | BODY MASS INDEX: 27.77 KG/M2 | SYSTOLIC BLOOD PRESSURE: 130 MMHG | TEMPERATURE: 98.5 F | HEART RATE: 66 BPM | HEIGHT: 72 IN | OXYGEN SATURATION: 98 % | WEIGHT: 205 LBS | DIASTOLIC BLOOD PRESSURE: 60 MMHG

## 2021-12-29 DIAGNOSIS — J06.9 ACUTE URI: Primary | ICD-10-CM

## 2021-12-29 DIAGNOSIS — R05.9 COUGH: ICD-10-CM

## 2021-12-29 LAB
EXPIRATION DATE: NORMAL
FLUAV AG NPH QL: NEGATIVE
FLUBV AG NPH QL: NEGATIVE
INTERNAL CONTROL: NORMAL
Lab: NORMAL

## 2021-12-29 PROCEDURE — 99213 OFFICE O/P EST LOW 20 MIN: CPT | Performed by: NURSE PRACTITIONER

## 2021-12-29 PROCEDURE — 87804 INFLUENZA ASSAY W/OPTIC: CPT | Performed by: NURSE PRACTITIONER

## 2021-12-29 RX ORDER — PREDNISONE 20 MG/1
20 TABLET ORAL DAILY
Qty: 7 TABLET | Refills: 0 | Status: SHIPPED | OUTPATIENT
Start: 2021-12-29 | End: 2022-06-13

## 2021-12-29 NOTE — PROGRESS NOTES
Subjective   William Ritchie is a 71 y.o. male.     History of Present Illness   William Ritchie 71 y.o. male who presents for evaluation of sinus pressure and congestion. Symptoms include congestion, nasal blockage, post nasal drip, dry cough, headache and fatigue.  Onset of symptoms was 3 days ago, unchanged since that time. Patient denies shortness of breath, wheezing, pleuritic chest pain, fever.   Evaluation to date: none Treatment to date:  OTC oral decongestants, OTC antihistamines and OTC cough suppressant. Had negative rapid covid test yesterday.       The following portions of the patient's history were reviewed and updated as appropriate: allergies, current medications, past family history, past medical history, past social history, past surgical history and problem list.    Review of Systems   Constitutional: Positive for fatigue. Negative for chills and fever.   HENT: Positive for congestion, postnasal drip and sinus pressure. Negative for ear pain.    Respiratory: Positive for cough. Negative for chest tightness, shortness of breath and wheezing.    Neurological: Positive for headaches.       Objective   Physical Exam  Vitals and nursing note reviewed.   Constitutional:       Appearance: Normal appearance. He is well-developed.   HENT:      Right Ear: Tympanic membrane, ear canal and external ear normal.      Left Ear: Tympanic membrane, ear canal and external ear normal.      Nose: Mucosal edema and rhinorrhea present.      Right Sinus: No maxillary sinus tenderness or frontal sinus tenderness.      Left Sinus: No maxillary sinus tenderness or frontal sinus tenderness.      Mouth/Throat:      Lips: Pink.      Mouth: Mucous membranes are moist.      Pharynx: Posterior oropharyngeal erythema present. No pharyngeal swelling, oropharyngeal exudate or uvula swelling.   Cardiovascular:      Rate and Rhythm: Normal rate and regular rhythm.   Pulmonary:      Effort: Pulmonary effort is normal.       Breath sounds: Normal breath sounds.   Neurological:      Mental Status: He is alert and oriented to person, place, and time.   Psychiatric:         Mood and Affect: Mood normal.         Behavior: Behavior normal.         Thought Content: Thought content normal.         Judgment: Judgment normal.         Assessment/Plan   Diagnoses and all orders for this visit:    1. Acute URI (Primary)  -     POCT Influenza A/B  -     COVID-19,[facility] ROUTINE, NP/OP SWAB IN TRANSPORT MEDIA OR ESWAB 72 HR TAT - Swab, Anterior nasal  -     predniSONE (DELTASONE) 20 MG tablet; Take 1 tablet by mouth Daily.  Dispense: 7 tablet; Refill: 0    2. Cough  -     POCT Influenza A/B  -     COVID-19,[facility] ROUTINE, NP/OP SWAB IN TRANSPORT MEDIA OR ESWAB 72 HR TAT - Swab, Anterior nasal  -     predniSONE (DELTASONE) 20 MG tablet; Take 1 tablet by mouth Daily.  Dispense: 7 tablet; Refill: 0

## 2021-12-30 ENCOUNTER — TELEPHONE (OUTPATIENT)
Dept: FAMILY MEDICINE CLINIC | Facility: CLINIC | Age: 71
End: 2021-12-30

## 2021-12-30 NOTE — TELEPHONE ENCOUNTER
Caller: Quintin Hwang    Relationship: Self    Best call back number: [phone] ([phone])    Caller requesting test results: QUINTIN HWANG    What test was performed: COVID TEST    When was the test performed: 12/29/2021    Where was the test performed: IN OFFICE    Additional notes: PATIENT WOULD LIKE A CALL BACK WITH RESULTS ASAP AND WOULD LIKE TO KNOW HOW HE IS SUPPOSED TO GET RESULTS WHEN OFFICE IS CLOSED, PLEASE ADVISE ASAP

## 2021-12-31 LAB
LABCORP SARS-COV-2, NAA 2 DAY TAT: NORMAL
SARS-COV-2 RNA RESP QL NAA+PROBE: NOT DETECTED

## 2022-01-06 NOTE — TELEPHONE ENCOUNTER
Caller: William Ritchie    Relationship to patient: Self    Best call back number:[phone]     Patient is needing: PT IS CALLING IN REGARDS TO THE VISIT ON 12/29. HIS COVID TEST CAME BACK NEGATIVE AND HE WAS PRESCRIBED MEDICATION FOR HIS COUGH AND RUNNY NOSE BUT HE STILL HAS THE SYMPTOMS AND WOULD LIKE TO SEE WHAT CAN BE DONE FOR HIM. PLEASE CONTACT WHEN POSSIBLE

## 2022-05-01 NOTE — PROGRESS NOTES
Subjective     PATIENT NAME:  William Ritchie  YOB: 1950  PATIENTS AGE:  67 y.o.  PATIENTS SEX:  male  DATE OF SERVICE:  11/22/2017  PROVIDER:  HERLINDA Sahu      ____________________PATIENT EDUCATION____________________    PATIENT EDUCATION:  Today I met with the patient to discuss the chemotherapy regimen recommended for treatment of his disease.  The patient was given explanation of treatment premed side effects including office policy that prohibits patients to drive if sedating medications are administered, MD explanation given regarding benefits, side effects, toxicities and goals of treatment.  The patient received a Chemotherapy/Biotherapy Plan Summary including diagnosis and specific treatment plan.    SIDE EFFECTS:  Common side effects were discussed with the patient and/or significant other.  Discussion included hair loss/discoloration, anemia/fatigue, infection/chills/fever, appetite, bleeding risk/precautions, constipation, diarrhea, mouth sores, taste alteration, loss of appetite,nausea/vomiting, peripheral neuropathy, skin/nail changes, rash, muscle aches/weakness, photosensitivity, weight gain/loss, hearing loss, dizziness, menopausal symptoms, menstrrual irregularity, sterility, high blood pressure, heart damage, liver damage, lung damage, kidney damage, DVT/PE risk, fluid retention, pleural/pericardial effusion, somnolence, electrolyte/LFT imbalance, vein exercises and/or the possible need for vascular access/port placement.  The patient was advice that although uncommon, leakage of an infused medication from the vein or venous access device (port) may lead to skin breakdown and/or other tissue damage.  The patient was advised that he/she may have pain, bleeding, and/or bruising from the insertion of a needle in their vein or venous access device (port).  The patient was further advised that, in spite of proper technique, infection with redness and irritation may  rarely occur at the site where the needle was inserted.  The patient was advised that if complications occur, additional medical treatment is available.    Discussion also included side effects specific to drugs in the treatment plan, specifically Cisplatin.    Reproductive risks were discussed, including appropriate use of birth control and protection during sexual relations.    A total of 40 minutes were spent with the patient, with 100% of time spent in education and counseling.         Bulb Junior-Pharynx Suction Only

## 2022-06-13 ENCOUNTER — TELEPHONE (OUTPATIENT)
Dept: ONCOLOGY | Facility: CLINIC | Age: 72
End: 2022-06-13

## 2022-06-13 RX ORDER — PREDNISONE 50 MG/1
50 TABLET ORAL TAKE AS DIRECTED
Qty: 3 TABLET | Refills: 0 | Status: SHIPPED | OUTPATIENT
Start: 2022-06-13 | End: 2022-06-24

## 2022-06-13 NOTE — TELEPHONE ENCOUNTER
Returned call to patient with the information he is to take Prednisone 50 mg, 13 hours 7 hours and 1 hour prior to the CT Scan, since it is with contrast.  Benadryl 50 mg one hour prior to the scan.  He v/u.  Script will be sent to the pharmacy.

## 2022-06-17 ENCOUNTER — HOSPITAL ENCOUNTER (OUTPATIENT)
Dept: PET IMAGING | Facility: HOSPITAL | Age: 72
Discharge: HOME | End: 2022-06-17
Admitting: INTERNAL MEDICINE

## 2022-06-17 ENCOUNTER — LAB (OUTPATIENT)
Dept: LAB | Facility: HOSPITAL | Age: 72
End: 2022-06-17

## 2022-06-17 DIAGNOSIS — Z85.819 HISTORY OF OROPHARYNGEAL CANCER: ICD-10-CM

## 2022-06-17 DIAGNOSIS — E03.8 OTHER SPECIFIED HYPOTHYROIDISM: ICD-10-CM

## 2022-06-17 DIAGNOSIS — E07.9 DISORDER OF THYROID, UNSPECIFIED: ICD-10-CM

## 2022-06-17 LAB
ALBUMIN SERPL-MCNC: 4.6 G/DL (ref 3.5–5.2)
ALBUMIN/GLOB SERPL: 1.6 G/DL (ref 1.1–2.4)
ALP SERPL-CCNC: 116 U/L (ref 38–116)
ALT SERPL W P-5'-P-CCNC: 32 U/L (ref 0–41)
ANION GAP SERPL CALCULATED.3IONS-SCNC: 14 MMOL/L (ref 5–15)
AST SERPL-CCNC: 25 U/L (ref 0–40)
BASOPHILS # BLD AUTO: 0.01 10*3/MM3 (ref 0–0.2)
BASOPHILS NFR BLD AUTO: 0.1 % (ref 0–1.5)
BILIRUB SERPL-MCNC: 0.6 MG/DL (ref 0.2–1.2)
BUN SERPL-MCNC: 18 MG/DL (ref 6–20)
BUN/CREAT SERPL: 22.5 (ref 7.3–30)
CALCIUM SPEC-SCNC: 9.9 MG/DL (ref 8.5–10.2)
CHLORIDE SERPL-SCNC: 95 MMOL/L (ref 98–107)
CO2 SERPL-SCNC: 24 MMOL/L (ref 22–29)
CREAT BLDA-MCNC: 0.7 MG/DL (ref 0.6–1.3)
CREAT SERPL-MCNC: 0.8 MG/DL (ref 0.7–1.3)
DEPRECATED RDW RBC AUTO: 39.7 FL (ref 37–54)
EGFRCR SERPLBLD CKD-EPI 2021: 93.4 ML/MIN/1.73
EOSINOPHIL # BLD AUTO: 0 10*3/MM3 (ref 0–0.4)
EOSINOPHIL NFR BLD AUTO: 0 % (ref 0.3–6.2)
ERYTHROCYTE [DISTWIDTH] IN BLOOD BY AUTOMATED COUNT: 11.7 % (ref 12.3–15.4)
GLOBULIN UR ELPH-MCNC: 2.9 GM/DL (ref 1.8–3.5)
GLUCOSE SERPL-MCNC: 165 MG/DL (ref 74–124)
HCT VFR BLD AUTO: 45.3 % (ref 37.5–51)
HGB BLD-MCNC: 15.4 G/DL (ref 13–17.7)
IMM GRANULOCYTES # BLD AUTO: 0.03 10*3/MM3 (ref 0–0.05)
IMM GRANULOCYTES NFR BLD AUTO: 0.3 % (ref 0–0.5)
LYMPHOCYTES # BLD AUTO: 1 10*3/MM3 (ref 0.7–3.1)
LYMPHOCYTES NFR BLD AUTO: 11.4 % (ref 19.6–45.3)
MCH RBC QN AUTO: 31.4 PG (ref 26.6–33)
MCHC RBC AUTO-ENTMCNC: 34 G/DL (ref 31.5–35.7)
MCV RBC AUTO: 92.4 FL (ref 79–97)
MONOCYTES # BLD AUTO: 0.06 10*3/MM3 (ref 0.1–0.9)
MONOCYTES NFR BLD AUTO: 0.7 % (ref 5–12)
NEUTROPHILS NFR BLD AUTO: 7.7 10*3/MM3 (ref 1.7–7)
NEUTROPHILS NFR BLD AUTO: 87.5 % (ref 42.7–76)
NRBC BLD AUTO-RTO: 0 /100 WBC (ref 0–0.2)
PLATELET # BLD AUTO: 249 10*3/MM3 (ref 140–450)
PMV BLD AUTO: 10 FL (ref 6–12)
POTASSIUM SERPL-SCNC: 4.2 MMOL/L (ref 3.5–4.7)
PROT SERPL-MCNC: 7.5 G/DL (ref 6.3–8)
RBC # BLD AUTO: 4.9 10*6/MM3 (ref 4.14–5.8)
SODIUM SERPL-SCNC: 133 MMOL/L (ref 134–145)
T3FREE SERPL-MCNC: 2.49 PG/ML (ref 2–4.4)
T4 FREE SERPL-MCNC: 1.21 NG/DL (ref 0.93–1.7)
TSH SERPL DL<=0.05 MIU/L-ACNC: 1.43 UIU/ML (ref 0.27–4.2)
WBC NRBC COR # BLD: 8.8 10*3/MM3 (ref 3.4–10.8)

## 2022-06-17 PROCEDURE — 36415 COLL VENOUS BLD VENIPUNCTURE: CPT

## 2022-06-17 PROCEDURE — 84481 FREE ASSAY (FT-3): CPT | Performed by: INTERNAL MEDICINE

## 2022-06-17 PROCEDURE — 85025 COMPLETE CBC W/AUTO DIFF WBC: CPT

## 2022-06-17 PROCEDURE — 25010000002 IOPAMIDOL 61 % SOLUTION: Performed by: INTERNAL MEDICINE

## 2022-06-17 PROCEDURE — 80053 COMPREHEN METABOLIC PANEL: CPT

## 2022-06-17 PROCEDURE — 70491 CT SOFT TISSUE NECK W/DYE: CPT

## 2022-06-17 PROCEDURE — 84439 ASSAY OF FREE THYROXINE: CPT | Performed by: INTERNAL MEDICINE

## 2022-06-17 PROCEDURE — 84443 ASSAY THYROID STIM HORMONE: CPT | Performed by: INTERNAL MEDICINE

## 2022-06-17 PROCEDURE — 82565 ASSAY OF CREATININE: CPT

## 2022-06-17 RX ADMIN — IOPAMIDOL 75 ML: 612 INJECTION, SOLUTION INTRAVENOUS at 11:03

## 2022-06-24 ENCOUNTER — APPOINTMENT (OUTPATIENT)
Dept: LAB | Facility: HOSPITAL | Age: 72
End: 2022-06-24

## 2022-06-24 ENCOUNTER — OFFICE VISIT (OUTPATIENT)
Dept: ONCOLOGY | Facility: CLINIC | Age: 72
End: 2022-06-24

## 2022-06-24 VITALS
RESPIRATION RATE: 16 BRPM | SYSTOLIC BLOOD PRESSURE: 176 MMHG | TEMPERATURE: 97.7 F | OXYGEN SATURATION: 97 % | HEIGHT: 72 IN | DIASTOLIC BLOOD PRESSURE: 76 MMHG | WEIGHT: 204 LBS | BODY MASS INDEX: 27.63 KG/M2 | HEART RATE: 67 BPM
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
## 2022-06-24 DIAGNOSIS — E03.4 HYPOTHYROIDISM DUE TO ACQUIRED ATROPHY OF THYROID: ICD-10-CM

## 2022-06-24 DIAGNOSIS — R73.9 STEROID-INDUCED HYPERGLYCEMIA: ICD-10-CM

## 2022-06-24 DIAGNOSIS — K11.7 XEROSTOMIA DUE TO RADIOTHERAPY: ICD-10-CM

## 2022-06-24 DIAGNOSIS — Z85.819 HISTORY OF OROPHARYNGEAL CANCER: Primary | ICD-10-CM

## 2022-06-24 DIAGNOSIS — T38.0X5A STEROID-INDUCED HYPERGLYCEMIA: ICD-10-CM

## 2022-06-24 PROCEDURE — 99214 OFFICE O/P EST MOD 30 MIN: CPT | Performed by: INTERNAL MEDICINE

## 2022-06-24 NOTE — PROGRESS NOTES
Subjective .     REASONS FOR FOLLOWUP:    1. Squamous cell carcinoma of the base of tongue, stage YEN (T1N2b), HPV status is not clear.  Started concurrent chemoradiation therapy on 12/4/2017 using cisplatin repeating every 3 weeks.    2. Moderate neutropenia secondary to concurrent chemoradiotherapy.  Cycle #3 cisplatin was delayed and then canceled.    3. Radiation therapy finished on 1/23/2018.  PEG tube was removed in May 2018.    4. PET scan on 3/15/2018 reported essentially complete resolution of hypermetabolic lesion at the left tongue base.  The left neck lymphadenopathy also showed blood pool activity.    5. Patient switched his ENT care to Dr. Browning.   6. CT scan on 06/18/2019 showed no evidence of disease recurrence.    7. Neck CT scan on 6/15/2020 reported no evidence for disease recurrence.  8. Neck CT scan on 6/3/2021 reported no evidence for disease recurrence.    HISTORY OF PRESENT ILLNESS:  The patient is a 71 y.o. year old male who is here for 6-month follow-up after recent CT scan for neck and laboratory studies.  Patient is accompanied by his wife today.    Patient reports he is at the baseline condition.  Excellent performance status ECOG 0.  He continues to have some poor taste and xerostomia in his mouth, otherwise no specific complaints.  Patient has been eating well.  No chronic fatigue and no coldness.  No hoarseness of voice.  No dysphagia odynophagia.    CT scan for neck obtained 6/17/2022 reported no evidence for pathologic lymphadenopathy.    Laboratory studies on 6/17/2022 reported normal CBC, normal hemoglobin 15.4 and platelets 249,000, however with slightly elevated neutrophils 7700 which is likely due to steroids prednisone given prior to the CT scan because of allergic reaction to IV dye.  He had a normal TSH, free T4 and free T3.  CMP was also unremarkable except elevated glucose 165 which is again likely due to prednisone.        Past Medical History:   Diagnosis Date   •  Benign essential hypertension    • H/O complete eye exam due   • H/O Neck mass     left   • Hyperlipidemia    • IFG (impaired fasting glucose)    • Seasonal allergies    • Tongue cancer (HCC) 11/01/2017    Left base of tongue squamous cell carcinoma, stage YEN, recieved chemo and radiation therapy   · Carotid artery stenosis.    Past Surgical History:   Procedure Laterality Date   • CHOLECYSTECTOMY WITH INTRAOPERATIVE CHOLANGIOGRAM N/A 7/5/2018    Procedure: Laparoscopic cholecystectomy with intraoperative cholangiogram ;  Surgeon: Ashley Fischer MD;  Location: [facility];  Service: General   • COLONOSCOPY N/A 2015    normal colonoscopy-Dr. Galen Morrissey   • COLONOSCOPY N/A 05/04/2011    Normal colonoscopy-Dr. Tobias Emerson   • ENDOSCOPY W/ PEG TUBE PLACEMENT N/A 11/28/2017    Procedure: ESOPHAGOGASTRODUODENOSCOPY WITH PERCUTANEOUS ENDOSCOPIC GASTROSTOMY TUBE INSERTION;  Surgeon: Isma Hercules MD;  Location: [facility];  Service:    • FINE NEEDLE ASPIRATION Left 10/13/2017    Ultrasound guidance for fine needle biopsy and fine needle aspiration with ultrasonic guidance of left neck mass-Dr. Frank Marques   • INGUINAL HERNIA REPAIR Left 1994    Dr. Brian Peres   • INGUINAL HERNIA REPAIR Right 1993    Dr. Brian Peres   • LARYNGOPLASTY      Laryngoplasty with biopsy-Dr. Del Toro   • WI INSJ TUNNELED CVC W/O SUBQ PORT/ AGE 5 YR/> Left 11/28/2017    Procedure: INSERTION VENOUS ACCESS DEVICE;  Surgeon: Isma Hercules MD;  Location: [facility];  Service: General   • TONGUE BIOPSY / EXCISION N/A 11/01/2017    Biopsy of the left tongue base-Dr. Zane Del Toro   • TONSILLECTOMY AND ADENOIDECTOMY Bilateral 11/01/2017    Dr. Zane Del Toro   · Removal PEG tube on 5/2/2018   ·  Cholecystectomy in July 2018    HEMATOLOGIC/ONCOLOGIC HISTORY:  Mr. Ritchie is a 67 y.o. male who is here on 11/16/2017 for evaluation accompanied by his wife, referred by radiation oncologist, Dr. Aguirre, because of newly  diagnosed squamous cell carcinoma of the left tongue base, stage YEN, [id number].      Patient previously only had history of mild hypertension which was controlled. Recently in early 09/2017 when he was on vacation, he felt a left neck nodule when he was shaving. He denies any pain associated with that. Patient was seen by his primary care physician, Dr. Sheffield, for evaluation and was referred to ENT, Dr. Zane Del Toro. Patient subsequently had CT of the neck examination at the [facility] on 09/22/2017. This study reported a left neck mass measuring 3.5 x 3.1 x 2.4 cm with cystic/necrotic changes located at the region of the left level II jugular chain. There were additional small homogeneous cervical lymph nodes but possibly reactive.      Patient subsequently had ultrasound of the neck on 10/13/2017 associated with fine-needle aspiration biopsy at Dr. Del Toro' office. The ultrasound reported 2 nodules in left neck. The large one measured about 3.28 cm x 2.67 cm x 3.28 cm. The 2nd smaller one measures 1.56 cm x 0.99 cm x 1.48 cm, just below the large mass. Patient had fine-needle aspiration biopsy at the same time. Pathology evaluation from the [facility] reported poorly differentiated squamous cell carcinoma with degeneration and necrosis. The viable tumor cells exhibit strong nuclear reactivity for both p40 and p63.     Patient subsequently had tonsillectomy, biopsy of the left tongue base and adenoidectomy on 11/01/2017 by Dr. Del Toro. Pathology evaluation from [facility] reported moderately differentiated squamous cell carcinoma. The left tonsil has no evidence of malignancy. Right tonsil also was benign. The adenoid tissue was also benign.      Patient reports the left neck mass is growing. He also reports poor appetite after tonsillectomy. For the past couple of weeks since the biopsy, he lost about 6 pounds. He denies nausea or vomiting. He has actually started feeling better with  improved appetite. Denies pain. Denies fever or sweating.      This patient reports he never smoked cigarette. He is only a very rare social drinker. He told me the test for HPV is ongoing.      Patient also had PET scan examination obtained on 11/14/2017. This study reported focal hypermetabolism with SUV 15.9 corresponding to the left-sided base of the tongue. There was moderate enlarged left jugular chain lymph node which is hypermetabolic but without measuring SUV. There was also mild hypermetabolism identified within several additional smaller left jugular chain lymph nodes. There was no hypermetabolic lymphadenopathy elsewhere in the neck nor foci in the chest, abdomen or pelvis.    Patient was started on concurrent chemoradiation therapy with cisplatin every 3 weeks.  He received 2 cycles chemotherapy and due to poor tolerance with acute renal injury and neutropenia, cycle #3 cisplatin was canceled.     Radiation therapy finished on 1/23/2018.    PET scan on 3/15/2018 reported essentially complete resolution of hypermetabolic lesion at the left tongue base.  The left and neck lymphadenopathy also showing, with blood pool activity.    His CT scan examination of the neck on 6/11/2018 reported no evidence of local disease recurrence, a stable left neck adenopathy, maybe 2 mm smaller compared to the PET scan obtained in March 2018.  Laboratory study on the same day reported normal renal function with a creatinine 0.73 normal electrolytes and liver function panel, stable mild anemia with hemoglobin 12.4 and normal WBC and platelets.    Laboratory study on 9/7/2018 reported normal iron study with ferritin 274, iron 100, TIBC 274 iron saturation 36% in the normal B12 level 631 pg/mL.  Hemoglobin was 13.5 improved and normal WBC 5350, and platelets 206,000.  He also had a normal TSH 2.87, and completely normal CMP.     CT scan examination for the neck with IV contrast on 11/21/2018 showed evidence of disease  recurrence.  Laboratory study reported elevated glucose otherwise normal CMP.  Patient also had mildly elevated neutrophils.  These were likely secondary to steroids use prior to the CT scan because he is allergic to IV dye.      CT Neck done on 06/18/2019 showed beam hardening artifact limits evaluation somewhat but there is no evidence of residual or recurrent tongue base mass. There is no evidence of pathologic adenopathy. 1.4 x 0.9 cm area of lucency involving the spinous process of T1, nonspecific but stable. This likely represents an atypical hemangioma. Area of lucency related to the left maxillary 1st molar suggesting a periapical abscess measuring 1.5 cm in maximum dimension. Clinical correlation and standard dental radiographs recommended.    Laboratory study 06/18/2019 showed creatine at 0.80, TSH Baseline at 1.280, Free T4 at 1.08, and free T3 at 2.33. CBC was normal besides WBC elevated at 11.6, RDW down at 11.6, [place] [address], absolute monocytes at 60, absolute lymphocytes at 980, and absolute immature grans 0.07. CMP normal besides elevated glucose of 219.     CT scan examination on 6/15/2020 showed no evidence of disease recurrence.  However it reported a left carotid artery at least moderate stenosis.    Laboratory studies on 6/15/2020 reported elevated neutrophils 9090, otherwise unremarkable.  He had a normalized TSH and a normal free T4.  Chemistry lab showed elevated glucose level otherwise unremarkable.     In June 2020 patient had neck CT scan which reported carotid artery stenosis.  Will refer the patient to vascular surgery.  Patient reports he was seen by vascular surgery who recommended observation for now.      Laboratory study on 12/4/2020 reported normal CBC including Hb 14.5, platelets 200,000 WBC 6300 with ANC 3140 lymphocytes 1800 eosinophil 790 and monocytes 510.  Elevated TSH 5.26 however normal free T4 at 1.18 ng/dL.    Neck CT scan examination on 6/3/2021 reported no  evidence for disease local recurrence or cervical lymphadenopathy.    Laboratory study 6/3/2021 reported normal hemoglobin platelets, however elevated neutrophils 9340 out of WBC 10,700.  Chemistry lab reported normal TSH and free T4, and unremarkable CMP except elevated glucose of 143. Both of hyperglycemia and neutrophil elevation are likely secondary to oral prednisone given prior to IV contrast for CT scan.      Laboratory studies on 12/17/2021 reported normal CBC with Hb 14.4 platelets 144,000 WBC 5780.  Normal thyroid studies with TSH 4.15, and free T4 at 1.28.  Chemistry lab reported normal CMP.      MEDICATIONS    Current Outpatient Medications:   •  ASPIRIN 81 PO, , Disp: , Rfl:   •  atorvastatin (LIPITOR) 20 MG tablet, Take 1 tablet by mouth Daily., Disp: 90 tablet, Rfl: 3  •  hydroCHLOROthiazide (MICROZIDE) 12.5 MG capsule, Take 1 capsule by mouth Daily., Disp: 90 capsule, Rfl: 3  •  metoprolol tartrate (LOPRESSOR) 50 MG tablet, Take 1 tablet by mouth 2 (Two) Times a Day., Disp: 180 tablet, Rfl: 3  •  sildenafil (REVATIO) 20 MG tablet, Take 2-3 tabs QD prn, Disp: 30 tablet, Rfl: 11  •  predniSONE (DELTASONE) 50 MG tablet, Take 1 tablet by mouth Take As Directed. Take one tablet 13 hours prior to CT Scan, one tablet 7 hours prior to the scan and one tablet 1 hour prior to the scan.  Also take Benadryl 50 mg one hour prior to the scan., Disp: 3 tablet, Rfl: 0    ALLERGIES:     Allergies   Allergen Reactions   • Iodinated Diagnostic Agents Itching and Rash     Had a 24 hour delayed reaction to Ct contrast       SOCIAL HISTORY:       Social History     Social History   • Marital status:      Spouse name: Janiya   • Number of children: 2   • Years of education: High School     Occupational History   •  Retired     [employer]     Social History Main Topics   • Smoking status: Never Smoker   • Smokeless tobacco: Never Used   • Alcohol use Yes      Comment: occassional   • Drug use: No   • Sexual activity: No      "  FAMILY HISTORY:  Family History   Problem Relation Age of Onset   • Alcohol abuse Father    • Diabetes Father    • Cancer Neg Hx    • Malig Hyperthermia Neg Hx            Objective    Vitals:    06/24/22 1329   BP: 176/76   Pulse: 67   Resp: 16   Temp: 97.7 °F (36.5 °C)   TempSrc: Temporal   SpO2: 97%   Weight: 92.5 kg (204 lb)   Height: 183 cm (72.05\")   PainSc: 0-No pain     Current Status 6/24/2022   ECOG score 0      PHYSICAL EXAM:    GENERAL:  Well-developed, well-nourished male in no acute distress.  Orientated to time place and people.  SKIN:  Warm, dry without rashes, purpura or petechiae.  HEENT:  Normocephalic.  Wearing mask.   LYMPHATICS:  No cervical, supraclavicular or axilla adenopathy.  CHEST: Normal respiratory effort.  Lungs clear to auscultation. Good airflow.  CARDIAC:  Regular rate and rhythm without murmurs. Normal S1,S2.  ABDOMEN:  Soft, nontender with no organomegaly or masses.  Bowel sounds normal.  EXTREMITIES:  No clubbing, cyanosis or edema.  NEUROLOGICAL:  Grossly intact.  No focal neurological deficits.  PSYCHIATRIC:  Normal affect and mood.          RECENT LABS:    Lab Results   Component Value Date    WBC 8.80 06/17/2022    HGB 15.4 06/17/2022    HCT 45.3 06/17/2022    MCV 92.4 06/17/2022     06/17/2022     Lab Results   Component Value Date    NEUTROABS 7.70 (H) 06/17/2022     Lab Results   Component Value Date    GLUCOSE 165 (H) 06/17/2022    BUN 18 06/17/2022    CREATININE 0.70 06/17/2022    EGFRIFNONA 83 12/17/2021    EGFRIFAFRI 93 10/07/2020    BCR 22.5 06/17/2022    K 4.2 06/17/2022    CO2 24.0 06/17/2022    CALCIUM 9.9 06/17/2022    PROTENTOTREF 6.3 10/07/2020    ALBUMIN 4.60 06/17/2022    LABIL2 2.7 10/07/2020    AST 25 06/17/2022    ALT 32 06/17/2022     Lab Results   Component Value Date    TSH 1.430 06/17/2022   Free T4 at 1.21 ng/dL on 6/17/2022      IMAGING STUDY:   CT Soft Tissue Neck With Contrast  Narrative: CT OF THE NECK WITH CONTRAST 06/17/2022     HISTORY: " Head and neck cancer. Follow-up.     TECHNIQUE: Axial images were obtained from the orbits to the lung apices  after intravenous contrast.     COMPARISON: Previous CT of the neck dated 06/03/2021 is compared.     FINDINGS: Again seen is complete opacification of the right maxillary  sinus which appears stable from the 06/03/2021 study. Left maxillary  sinus appears clear. There is partial opacification of the rightward  aspect of the frontal sinus, stable since the previous study.     No neck masses are seen. The tongue base appears normal. Airway is  patent. Epiglottis is not enlarged.     There appear to be tiny surgical clips in the left carotid region at the  level of the inferior mandible. Small amount of carotid calcification is  seen on the right.     No pathologically enlarged or necrotic lymph nodes are seen in the neck.  Thyroid gland does not appear enlarged. Lung apices appear clear.     Impression: 1. Stable appearance of the neck since the previous study of 06/03/2021.  2. No neck masses or pathologic lymphadenopathy is seen.     Radiation dose reduction techniques were utilized, including automated  exposure control and exposure modulation based on body size.     This report was finalized on 6/17/2022 2:45 PM by Dr. Dexter Strickland M.D.             Assessment & Plan      1.  Left tongue base squamous cell carcinoma.   Stage YEN.  Finished 2 cycles chemotherapy with Cisplatin day 1 and day 22, with concurrent radiation on 1/23/2018.  Did not receive day 43 cisplatin as planned due to neutropenia.   · His PET scan on 3/15/2018 showed essentially complete metabolic response.  The residual left neck lymph node shows only low-level blood pool activity.   · Patient had CT scan examination of the neck on 6/11/2018 which showed stable left neck lymphadenopathy, probably smaller by 2 mm compared to the PET scan on 3/15/2018. This lesion is still probably dying down.     · Patient switched his ENT care to  Dr. Browning.   · Patient had CT scan examination of the neck with IV contrast on 06/18/2019, showed no evidence of disease recurrence.    · CT scan examination on 6/15/2020 showed no evidence of disease recurrence.   · Physical examination on 12/4/2020 was negative for cervical lymphadenopathy.  Recommended to have a CT scan for the neck in 6 months for reevaluation.  · CT of the neck obtained 6/3/2021 reported no evidence for local disease recurrence or neck lymphadenopathy.  Physical examination today is also benign.  · On 12/17/2021, patient reports some discomfort in the left side of neck/submandibular area.  Otherwise negative review.  Physical examination negative for lymphadenopathy or mass.    · CT of the neck with IV contrast on 6/17/2022 reported stable condition, no evidence for disease recurrence.  Physical examination today on 6/24/2022 also had no palpable lymphadenopathy in the neck supraclavicular or axilla.     2.  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    · Patient has marginally elevated TSH on 03/08/2019 which was 4.47.  This may be the beginning of his hypothyroidism secondary to radiation therapy.    · TSH at 1.280, Free T4 at 1.08, and free T3 at 2.33 on 06/18/2019.    · Slightly elevated TSH 4.34 on 2/3/2020.     · Lab study on 6/15/2020 reported normal TSH 1.47 and free T4 at 1.12 ng/DL.    · Lab study on 12/4/2020 reported mildly elevated TSH 5.26, however normal free T4 at 1.18 ng/dL.    · Lab study on 6/3/2021 reported normal TSH 1.33 and normal free T4 at 1.12 ng/dL.  We will continue to monitor.  · On 12/17/2021, patient has upper normal TSH 4.15, and free T4 at 1.28 ng/dL.  · On 6/17/2022 patient had normal TSH 1.43, free T4 at 1.21 ng/dL, and free T3 at 2.49 pg/mL.     3.  Tinnitus and decreased hearing.  He states he had ringing in his ears prior to cisplatin.  His tinnitus did not worsen with cisplatin chemotherapy.   · No change of clinical condition.     4.   Left carotid artery stenosis, at least moderate per CT scan examination on 6/15/2020.   · Patient is asymptomatic.    · He was seen by vascular surgeon and recommended observation.      5.  Hyperglycemia.    · Labs 06/18/2019 showed glucose of 219 and has been high in past as well.  This may be related to his prednisone use before CT scan examination.  However his previous glucose levels has been elevated multiple times.  He was diagnosed with DM II.    · Patient had relatively normal glucose 111 in December 2019 and February 2020.   · Elevated glucose 197 on 6/15/2020 on day of CT scan.  This again is possibly related to his prednisone prior to CT scan examination.   · Glucose 143 on 6/3/2021.  Elevation of glucose may likely related to oral prednisone given prior to IV contrast for CT scan examination.    · Normal glucose 109 on 12/17/2021.  Patient did not have steroids use.  · On 6/17/2022 glucose 165, likely due to prednisone given prior to CT scan examination.     6.  Elevated neutrophil 9090 on 6/15/2020.  This is likely due to his toothache and infection, for which he had tooth extraction several days ago.  No need for further evaluation.  · Patient had completely normal WBC 6300 on 12/4/2020 including ANC 3140 lymphocytes 1800, elevated eosinophils 790.  · On 6/3/2021, elevated neutrophil 9340 out of WBC 10,730.  This is likely due to prednisone given prior to his CT scan with IV contrast.  Patient is asymptomatic.  · On 12/17/2021, normal WBC 5780 including neutrophils 3140.  · On 6/7/2022, ANC 7700 out of total WBC 8800.  Elevated ANC is likely due to prednisone given prior to CT with IV contrast due to previous reaction to IV dye.      Plan:   1. P will come back for reevaluation in 6 months, will check CBC CMP.       Reviewed CT scan report with the patient and his wife.  Also reviewed the lab results with them and discussed follow-up plan.  They voiced understanding.      Nuvia Ballesteros MD  PhD  6/24/2022.       CC:     Ameya Sheffield M.D.    Ameya Aguirre M.D.    Isma Hercules M.D.      Efrain Browning M.D.    Luis Antonio Smith MD

## 2022-10-04 NOTE — PROGRESS NOTES
The ABCs of the Annual Wellness Visit  Subsequent Medicare Wellness Visit    Chief Complaint   Patient presents with   • Hypertension     MED REFILL DUE /    • Hyperlipidemia     HD FLU VACCINE LEFT DELTOID    • MEDICARE WELLNESS     DUE       Subjective    History of Present Illness:  William Ritchie is a 72 y.o. male who presents for a Subsequent Medicare Wellness Visit.    The following portions of the patient's history were reviewed and   updated as appropriate: allergies, current medications, past family history, past medical history, past social history, past surgical history and problem list.    Compared to one year ago, the patient feels his physical   health is the same.    Compared to one year ago, the patient feels his mental   health is the same.    Recent Hospitalizations:  He was not admitted to the hospital during the last year.       Current Medical Providers:  Patient Care Team:  Ameya Sheffield MD as PCP - General (Family Medicine)  Ameya Aguirre MD as Referring Physician (Radiation Oncology)  Nuvia Ballesteros MD PhD as Consulting Physician (Hematology and Oncology)  Ameya Ortega II, MD as Consulting Physician (Hematology and Oncology)    Outpatient Medications Prior to Visit   Medication Sig Dispense Refill   • ASPIRIN 81 PO      • valACYclovir (VALTREX) 500 MG tablet TAKE 4 TABLETS BY MOUTH AT FIRST SIGN OF ATTACK AND THEN TAKE 4 TABLETS 12 HOURS LATER     • atorvastatin (LIPITOR) 20 MG tablet Take 1 tablet by mouth Daily. 90 tablet 3   • hydroCHLOROthiazide (MICROZIDE) 12.5 MG capsule Take 1 capsule by mouth Daily. 90 capsule 3   • metoprolol tartrate (LOPRESSOR) 50 MG tablet Take 1 tablet by mouth 2 (Two) Times a Day. 180 tablet 3   • sildenafil (REVATIO) 20 MG tablet Take 2-3 tabs QD prn 30 tablet 11     No facility-administered medications prior to visit.       No opioid medication identified on active medication list. I have reviewed chart for other potential  high risk medication/s  "and harmful drug interactions in the elderly.          Aspirin is on active medication list. Aspirin use is indicated based on review of current medical condition/s. Pros and cons of this therapy have been discussed today. Benefits of this medication outweigh potential harm.  Patient has been encouraged to continue taking this medication.  .      Patient Active Problem List   Diagnosis   • Hyperlipidemia   • Benign essential hypertension   • IFG (impaired fasting glucose)   • Oropharyngeal cancer (HCC)   • Fitting and adjustment of vascular catheter   • Steroid-induced hyperglycemia   • Dehydration   • Acute renal injury (HCC)   • Anemia associated with chemotherapy   • Chemotherapy-induced nausea   • GERD (gastroesophageal reflux disease)   • Chemotherapy induced neutropenia (HCC)   • Adverse effect of antineoplastic and immunosuppressive drugs   • Adverse effect of radiation therapy   • Pharyngitis, acute   • Hypothyroidism due to acquired atrophy of thyroid   • TSH (thyroid-stimulating hormone deficiency)   • Calculus of gallbladder without cholecystitis without obstruction   • Right leg pain   • Sensitivity to the cold   • Leukemoid reaction   • 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  ACP discussion was held with the patient during this visit. Patient does not have an advance directive, declines further assistance.          Objective    Vitals:    10/05/22 0832   BP: 136/58   Pulse: 54   Resp: 14   Temp: 97.6 °F (36.4 °C)   TempSrc: Oral   Weight: 92.5 kg (204 lb)   Height: 183 cm (72.05\")   PainSc: 0-No pain     Estimated body mass index is 27.63 kg/m² as calculated from the following:    Height as of this encounter: 183 cm (72.05\").    Weight as of this encounter: 92.5 kg (204 lb).    BMI is >= 25 " and <30. (Overweight) The following options were offered after discussion;: exercise counseling/recommendations and nutrition counseling/recommendations      Does the patient have evidence of cognitive impairment? No    Physical Exam            HEALTH RISK ASSESSMENT    Smoking Status:  Social History     Tobacco Use   Smoking Status Never Smoker   Smokeless Tobacco Never Used     Alcohol Consumption:  Social History     Substance and Sexual Activity   Alcohol Use Yes    Comment: occassional     Fall Risk Screen:    KENNETH Fall Risk Assessment has not been completed.    Depression Screening:  PHQ-2/PHQ-9 Depression Screening 10/5/2022   Retired PHQ-9 Total Score -   Retired Total Score -   Little Interest or Pleasure in Doing Things 0-->not at all   Feeling Down, Depressed or Hopeless 0-->not at all   PHQ-9: Brief Depression Severity Measure Score 0       Health Habits and Functional and Cognitive Screening:  Functional & Cognitive Status 10/5/2022   Do you have difficulty preparing food and eating? No   Do you have difficulty bathing yourself, getting dressed or grooming yourself? No   Do you have difficulty using the toilet? No   Do you have difficulty moving around from place to place? No   Do you have trouble with steps or getting out of a bed or a chair? No   Current Diet Well Balanced Diet   Dental Exam Up to date   Eye Exam Up to date   Exercise (times per week) 3 times per week   Current Exercises Include Walking   Current Exercise Activities Include -   Do you need help using the phone?  No   Are you deaf or do you have serious difficulty hearing?  Yes   Do you need help with transportation? No   Do you need help shopping? No   Do you need help preparing meals?  No   Do you need help with housework?  No   Do you need help with laundry? No   Do you need help taking your medications? No   Do you need help managing money? No   Do you ever drive or ride in a car without wearing a seat belt? No   Have you felt  unusual stress, anger or loneliness in the last month? No   Who do you live with? Spouse   If you need help, do you have trouble finding someone available to you? Yes   Have you been bothered in the last four weeks by sexual problems? No   Do you have difficulty concentrating, remembering or making decisions? No       Age-appropriate Screening Schedule:  Refer to the list below for future screening recommendations based on patient's age, sex and/or medical conditions. Orders for these recommended tests are listed in the plan section. The patient has been provided with a written plan.    Health Maintenance   Topic Date Due   • LIPID PANEL  10/07/2021   • INFLUENZA VACCINE  Completed   • ZOSTER VACCINE  Completed   • PROSTATE CANCER SCREENING  Discontinued   • TDAP/TD VACCINES  Discontinued              Assessment & Plan   CMS Preventative Services Quick Reference  Risk Factors Identified During Encounter  None Identified  The above risks/problems have been discussed with the patient.  Follow up actions/plans if indicated are seen below in the Assessment/Plan Section.  Pertinent information has been shared with the patient in the After Visit Summary.    Diagnoses and all orders for this visit:    1. Medicare annual wellness visit, subsequent (Primary)    2. Pure hypercholesterolemia  -     atorvastatin (LIPITOR) 20 MG tablet; Take 1 tablet by mouth Daily.  Dispense: 90 tablet; Refill: 3  -     Lipid panel    3. Benign essential hypertension  -     hydroCHLOROthiazide (MICROZIDE) 12.5 MG capsule; Take 1 capsule by mouth Daily.  Dispense: 90 capsule; Refill: 3  -     metoprolol tartrate (LOPRESSOR) 50 MG tablet; Take 1 tablet by mouth 2 (Two) Times a Day.  Dispense: 180 tablet; Refill: 3  -     Comprehensive metabolic panel  -     Lipid panel  -     CBC and Differential  -     TSH    4. IFG (impaired fasting glucose)  -     Hemoglobin A1c    5. Screening for prostate cancer  -     PSA    6. Immunization due  -      Fluzone High-Dose 65+yrs    7. Erectile dysfunction, unspecified erectile dysfunction type  -     sildenafil (REVATIO) 20 MG tablet; Take 2-3 tabs QD prn  Dispense: 30 tablet; Refill: 11        Follow Up:   No follow-ups on file.     An After Visit Summary and PPPS were made available to the patient.          I spent 15 minutes caring for William on this date of service. This time includes time spent by me in the following activities:preparing for the visit

## 2022-10-05 ENCOUNTER — PRIOR AUTHORIZATION (OUTPATIENT)
Dept: FAMILY MEDICINE CLINIC | Facility: CLINIC | Age: 72
End: 2022-10-05

## 2022-10-05 ENCOUNTER — OFFICE VISIT (OUTPATIENT)
Dept: FAMILY MEDICINE CLINIC | Facility: CLINIC | Age: 72
End: 2022-10-05

## 2022-10-05 VITALS
HEIGHT: 72 IN | SYSTOLIC BLOOD PRESSURE: 136 MMHG | RESPIRATION RATE: 14 BRPM | DIASTOLIC BLOOD PRESSURE: 58 MMHG | HEART RATE: 54 BPM | BODY MASS INDEX: 27.63 KG/M2 | WEIGHT: 204 LBS | TEMPERATURE: 97.6 F

## 2022-10-05 DIAGNOSIS — N52.9 ERECTILE DYSFUNCTION, UNSPECIFIED ERECTILE DYSFUNCTION TYPE: ICD-10-CM

## 2022-10-05 DIAGNOSIS — Z23 IMMUNIZATION DUE: ICD-10-CM

## 2022-10-05 DIAGNOSIS — R73.01 IFG (IMPAIRED FASTING GLUCOSE): ICD-10-CM

## 2022-10-05 DIAGNOSIS — I10 BENIGN ESSENTIAL HYPERTENSION: ICD-10-CM

## 2022-10-05 DIAGNOSIS — Z12.5 SCREENING FOR PROSTATE CANCER: ICD-10-CM

## 2022-10-05 DIAGNOSIS — E78.00 PURE HYPERCHOLESTEROLEMIA: ICD-10-CM

## 2022-10-05 DIAGNOSIS — Z00.00 MEDICARE ANNUAL WELLNESS VISIT, SUBSEQUENT: Primary | ICD-10-CM

## 2022-10-05 PROCEDURE — 90662 IIV NO PRSV INCREASED AG IM: CPT | Performed by: FAMILY MEDICINE

## 2022-10-05 PROCEDURE — G0439 PPPS, SUBSEQ VISIT: HCPCS | Performed by: FAMILY MEDICINE

## 2022-10-05 PROCEDURE — 1159F MED LIST DOCD IN RCRD: CPT | Performed by: FAMILY MEDICINE

## 2022-10-05 PROCEDURE — G0008 ADMIN INFLUENZA VIRUS VAC: HCPCS | Performed by: FAMILY MEDICINE

## 2022-10-05 PROCEDURE — 1170F FXNL STATUS ASSESSED: CPT | Performed by: FAMILY MEDICINE

## 2022-10-05 PROCEDURE — 1126F AMNT PAIN NOTED NONE PRSNT: CPT | Performed by: FAMILY MEDICINE

## 2022-10-05 RX ORDER — METOPROLOL TARTRATE 50 MG/1
50 TABLET, FILM COATED ORAL 2 TIMES DAILY
Qty: 180 TABLET | Refills: 3 | Status: SHIPPED | OUTPATIENT
Start: 2022-10-05

## 2022-10-05 RX ORDER — HYDROCHLOROTHIAZIDE 12.5 MG/1
12.5 CAPSULE, GELATIN COATED ORAL DAILY
Qty: 90 CAPSULE | Refills: 3 | Status: SHIPPED | OUTPATIENT
Start: 2022-10-05

## 2022-10-05 RX ORDER — SILDENAFIL CITRATE 20 MG/1
TABLET ORAL
Qty: 30 TABLET | Refills: 11 | Status: SHIPPED | OUTPATIENT
Start: 2022-10-05

## 2022-10-05 RX ORDER — VALACYCLOVIR HYDROCHLORIDE 500 MG/1
TABLET, FILM COATED ORAL
COMMUNITY
Start: 2022-08-15

## 2022-10-05 RX ORDER — ATORVASTATIN CALCIUM 20 MG/1
20 TABLET, FILM COATED ORAL DAILY
Qty: 90 TABLET | Refills: 3 | Status: SHIPPED | OUTPATIENT
Start: 2022-10-05

## 2022-10-05 NOTE — PROGRESS NOTES
Injection  H D FLU VACCINE Injection performed in LEFT DELTOID  by Berna Mcintyre MA. Patient tolerated the procedure well without complications.  10/05/22   Berna Mcintyre MA    Answers for HPI/ROS submitted by the patient on 10/2/2022  What is the primary reason for your visit?: Physical

## 2022-10-05 NOTE — PATIENT INSTRUCTIONS
Medicare Wellness  Personal Prevention Plan of Service     Date of Office Visit:    Encounter Provider:  Ameya Sheffield MD  Place of Service:  [facility]  Patient Name: William Ritchie  :  1950    As part of the Medicare Wellness portion of your visit today, we are providing you with this personalized preventive plan of services (PPPS). This plan is based upon recommendations of the [place] Preventive Services Task Force (USPSTF) and the Advisory Committee on Immunization Practices (ACIP).    This lists the preventive care services that should be considered, and provides dates of when you are due. Items listed as completed are up-to-date and do not require any further intervention.    Health Maintenance   Topic Date Due    Pneumococcal Vaccine 65+ (2 - PCV) 10/25/2016    LIPID PANEL  10/07/2021    COVID-19 Vaccine (4 - Booster for Pfizer series) 10/24/2022 (Originally 2021)    ANNUAL WELLNESS VISIT  10/05/2023    COLORECTAL CANCER SCREENING  10/08/2025    INFLUENZA VACCINE  Completed    ZOSTER VACCINE  Completed    HEPATITIS C SCREENING  Discontinued    PROSTATE CANCER SCREENING  Discontinued    TDAP/TD VACCINES  Discontinued       Orders Placed This Encounter   Procedures    Fluzone High-Dose 65+yrs    Comprehensive metabolic panel     Order Specific Question:   Release to patient     Answer:   Routine Release    Lipid panel    TSH     Order Specific Question:   Release to patient     Answer:   Routine Release    PSA     Order Specific Question:   Release to patient     Answer:   Routine Release    Hemoglobin A1c     Order Specific Question:   Release to patient     Answer:   Routine Release    CBC and Differential     Order Specific Question:   Manual Differential     Answer:   Yes       Return in about 1 year (around 10/5/2023) for Recheck.

## 2022-10-11 LAB
ALBUMIN SERPL-MCNC: 4.6 G/DL (ref 3.5–5.2)
ALBUMIN/GLOB SERPL: 2.4 G/DL
ALP SERPL-CCNC: 114 U/L (ref 39–117)
ALT SERPL-CCNC: 29 U/L (ref 1–41)
AST SERPL-CCNC: 19 U/L (ref 1–40)
BASOPHILS # BLD AUTO: 0.07 10*3/MM3 (ref 0–0.2)
BASOPHILS NFR BLD AUTO: 1 % (ref 0–1.5)
BILIRUB SERPL-MCNC: 0.8 MG/DL (ref 0–1.2)
BUN SERPL-MCNC: 12 MG/DL (ref 8–23)
BUN/CREAT SERPL: 13 (ref 7–25)
CALCIUM SERPL-MCNC: 10.1 MG/DL (ref 8.6–10.5)
CHLORIDE SERPL-SCNC: 98 MMOL/L (ref 98–107)
CHOLEST SERPL-MCNC: 113 MG/DL (ref 0–200)
CO2 SERPL-SCNC: 32.1 MMOL/L (ref 22–29)
CREAT SERPL-MCNC: 0.92 MG/DL (ref 0.76–1.27)
EGFRCR SERPLBLD CKD-EPI 2021: 87.3 ML/MIN/1.73
EOSINOPHIL # BLD AUTO: 0.88 10*3/MM3 (ref 0–0.4)
EOSINOPHIL NFR BLD AUTO: 12.4 % (ref 0.3–6.2)
ERYTHROCYTE [DISTWIDTH] IN BLOOD BY AUTOMATED COUNT: 11.8 % (ref 12.3–15.4)
GLOBULIN SER CALC-MCNC: 1.9 GM/DL
GLUCOSE SERPL-MCNC: 113 MG/DL (ref 65–99)
HBA1C MFR BLD: 5.7 % (ref 4.8–5.6)
HCT VFR BLD AUTO: 46.1 % (ref 37.5–51)
HDLC SERPL-MCNC: 55 MG/DL (ref 40–60)
HGB BLD-MCNC: 15.8 G/DL (ref 13–17.7)
IMM GRANULOCYTES # BLD AUTO: 0.02 10*3/MM3 (ref 0–0.05)
IMM GRANULOCYTES NFR BLD AUTO: 0.3 % (ref 0–0.5)
LDLC SERPL CALC-MCNC: 44 MG/DL (ref 0–100)
LYMPHOCYTES # BLD AUTO: 2.07 10*3/MM3 (ref 0.7–3.1)
LYMPHOCYTES NFR BLD AUTO: 29.3 % (ref 19.6–45.3)
MCH RBC QN AUTO: 32.1 PG (ref 26.6–33)
MCHC RBC AUTO-ENTMCNC: 34.3 G/DL (ref 31.5–35.7)
MCV RBC AUTO: 93.7 FL (ref 79–97)
MONOCYTES # BLD AUTO: 0.64 10*3/MM3 (ref 0.1–0.9)
MONOCYTES NFR BLD AUTO: 9.1 % (ref 5–12)
NEUTROPHILS # BLD AUTO: 3.39 10*3/MM3 (ref 1.7–7)
NEUTROPHILS NFR BLD AUTO: 47.9 % (ref 42.7–76)
NRBC BLD AUTO-RTO: 0 /100 WBC (ref 0–0.2)
PLATELET # BLD AUTO: 246 10*3/MM3 (ref 140–450)
POTASSIUM SERPL-SCNC: 5.3 MMOL/L (ref 3.5–5.2)
PROT SERPL-MCNC: 6.5 G/DL (ref 6–8.5)
PSA SERPL-MCNC: 2.12 NG/ML (ref 0–4)
RBC # BLD AUTO: 4.92 10*6/MM3 (ref 4.14–5.8)
SODIUM SERPL-SCNC: 139 MMOL/L (ref 136–145)
TRIGL SERPL-MCNC: 62 MG/DL (ref 0–150)
TSH SERPL DL<=0.005 MIU/L-ACNC: 4.85 UIU/ML (ref 0.27–4.2)
VLDLC SERPL CALC-MCNC: 14 MG/DL (ref 5–40)
WBC # BLD AUTO: 7.07 10*3/MM3 (ref 3.4–10.8)

## 2022-12-02 ENCOUNTER — OFFICE VISIT (OUTPATIENT)
Dept: ONCOLOGY | Facility: CLINIC | Age: 72
End: 2022-12-02

## 2022-12-02 ENCOUNTER — LAB (OUTPATIENT)
Dept: LAB | Facility: HOSPITAL | Age: 72
End: 2022-12-02

## 2022-12-02 VITALS
OXYGEN SATURATION: 98 % | TEMPERATURE: 97.1 F | DIASTOLIC BLOOD PRESSURE: 86 MMHG | WEIGHT: 207.6 LBS | HEART RATE: 55 BPM | RESPIRATION RATE: 18 BRPM | HEIGHT: 72 IN | SYSTOLIC BLOOD PRESSURE: 172 MMHG | BODY MASS INDEX: 28.12 KG/M2
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
## 2022-12-02 DIAGNOSIS — K11.7 XEROSTOMIA DUE TO RADIOTHERAPY: ICD-10-CM

## 2022-12-02 DIAGNOSIS — E03.4 HYPOTHYROIDISM DUE TO ACQUIRED ATROPHY OF THYROID: ICD-10-CM

## 2022-12-02 DIAGNOSIS — Z85.819 HISTORY OF OROPHARYNGEAL CANCER: Primary | ICD-10-CM

## 2022-12-02 DIAGNOSIS — T38.0X5A STEROID-INDUCED HYPERGLYCEMIA: ICD-10-CM

## 2022-12-02 DIAGNOSIS — R73.9 STEROID-INDUCED HYPERGLYCEMIA: ICD-10-CM

## 2022-12-02 DIAGNOSIS — Z85.819 HISTORY OF OROPHARYNGEAL CANCER: ICD-10-CM

## 2022-12-02 LAB
ALBUMIN SERPL-MCNC: 4.2 G/DL (ref 3.5–5.2)
ALBUMIN/GLOB SERPL: 1.6 G/DL (ref 1.1–2.4)
ALP SERPL-CCNC: 109 U/L (ref 38–116)
ALT SERPL W P-5'-P-CCNC: 31 U/L (ref 0–41)
ANION GAP SERPL CALCULATED.3IONS-SCNC: 8.4 MMOL/L (ref 5–15)
AST SERPL-CCNC: 24 U/L (ref 0–40)
BASOPHILS # BLD AUTO: 0.05 10*3/MM3 (ref 0–0.2)
BASOPHILS NFR BLD AUTO: 0.8 % (ref 0–1.5)
BILIRUB SERPL-MCNC: 0.6 MG/DL (ref 0.2–1.2)
BUN SERPL-MCNC: 15 MG/DL (ref 6–20)
BUN/CREAT SERPL: 18.3 (ref 7.3–30)
CALCIUM SPEC-SCNC: 9.4 MG/DL (ref 8.5–10.2)
CHLORIDE SERPL-SCNC: 98 MMOL/L (ref 98–107)
CO2 SERPL-SCNC: 28.6 MMOL/L (ref 22–29)
CREAT SERPL-MCNC: 0.82 MG/DL (ref 0.7–1.3)
DEPRECATED RDW RBC AUTO: 39.8 FL (ref 37–54)
EGFRCR SERPLBLD CKD-EPI 2021: 92.2 ML/MIN/1.73
EOSINOPHIL # BLD AUTO: 0.6 10*3/MM3 (ref 0–0.4)
EOSINOPHIL NFR BLD AUTO: 10.2 % (ref 0.3–6.2)
ERYTHROCYTE [DISTWIDTH] IN BLOOD BY AUTOMATED COUNT: 11.6 % (ref 12.3–15.4)
GLOBULIN UR ELPH-MCNC: 2.7 GM/DL (ref 1.8–3.5)
GLUCOSE SERPL-MCNC: 111 MG/DL (ref 74–124)
HCT VFR BLD AUTO: 44.5 % (ref 37.5–51)
HGB BLD-MCNC: 14.8 G/DL (ref 13–17.7)
IMM GRANULOCYTES # BLD AUTO: 0.02 10*3/MM3 (ref 0–0.05)
IMM GRANULOCYTES NFR BLD AUTO: 0.3 % (ref 0–0.5)
LYMPHOCYTES # BLD AUTO: 1.57 10*3/MM3 (ref 0.7–3.1)
LYMPHOCYTES NFR BLD AUTO: 26.6 % (ref 19.6–45.3)
MCH RBC QN AUTO: 31.2 PG (ref 26.6–33)
MCHC RBC AUTO-ENTMCNC: 33.3 G/DL (ref 31.5–35.7)
MCV RBC AUTO: 93.7 FL (ref 79–97)
MONOCYTES # BLD AUTO: 0.49 10*3/MM3 (ref 0.1–0.9)
MONOCYTES NFR BLD AUTO: 8.3 % (ref 5–12)
NEUTROPHILS NFR BLD AUTO: 3.17 10*3/MM3 (ref 1.7–7)
NEUTROPHILS NFR BLD AUTO: 53.8 % (ref 42.7–76)
NRBC BLD AUTO-RTO: 0 /100 WBC (ref 0–0.2)
PLATELET # BLD AUTO: 209 10*3/MM3 (ref 140–450)
PMV BLD AUTO: 8.8 FL (ref 6–12)
POTASSIUM SERPL-SCNC: 4.4 MMOL/L (ref 3.5–4.7)
PROT SERPL-MCNC: 6.9 G/DL (ref 6.3–8)
RBC # BLD AUTO: 4.75 10*6/MM3 (ref 4.14–5.8)
SODIUM SERPL-SCNC: 135 MMOL/L (ref 134–145)
T3FREE SERPL-MCNC: 3.04 PG/ML (ref 2–4.4)
T4 FREE SERPL-MCNC: 1.28 NG/DL (ref 0.93–1.7)
TSH SERPL DL<=0.05 MIU/L-ACNC: 4.18 UIU/ML (ref 0.27–4.2)
WBC NRBC COR # BLD: 5.9 10*3/MM3 (ref 3.4–10.8)

## 2022-12-02 PROCEDURE — 85025 COMPLETE CBC W/AUTO DIFF WBC: CPT

## 2022-12-02 PROCEDURE — 99214 OFFICE O/P EST MOD 30 MIN: CPT | Performed by: INTERNAL MEDICINE

## 2022-12-02 PROCEDURE — 80053 COMPREHEN METABOLIC PANEL: CPT

## 2022-12-02 PROCEDURE — 36415 COLL VENOUS BLD VENIPUNCTURE: CPT

## 2022-12-02 PROCEDURE — 84439 ASSAY OF FREE THYROXINE: CPT | Performed by: INTERNAL MEDICINE

## 2022-12-02 PROCEDURE — 84481 FREE ASSAY (FT-3): CPT | Performed by: INTERNAL MEDICINE

## 2022-12-02 PROCEDURE — 84443 ASSAY THYROID STIM HORMONE: CPT | Performed by: INTERNAL MEDICINE

## 2022-12-02 RX ORDER — PREDNISONE 50 MG/1
50 TABLET ORAL 3 TIMES DAILY
Qty: 3 TABLET | Refills: 0 | Status: SHIPPED | OUTPATIENT
Start: 2022-12-02 | End: 2023-02-14

## 2022-12-02 RX ORDER — DIPHENHYDRAMINE HCL 50 MG
50 CAPSULE ORAL ONCE
Qty: 1 CAPSULE | Refills: 0 | Status: SHIPPED | OUTPATIENT
Start: 2022-12-02 | End: 2022-12-02

## 2022-12-02 NOTE — PROGRESS NOTES
Subjective .     REASONS FOR FOLLOWUP:    1. Squamous cell carcinoma of the base of tongue, stage YEN (T1N2b), HPV status is not clear.  Started concurrent chemoradiation therapy on 12/4/2017 using cisplatin repeating every 3 weeks.    2. Moderate neutropenia secondary to concurrent chemoradiotherapy.  Cycle #3 cisplatin was delayed and then canceled.    3. Radiation therapy finished on 1/23/2018.  PEG tube was removed in May 2018.    4. PET scan on 3/15/2018 reported essentially complete resolution of hypermetabolic lesion at the left tongue base.  The left neck lymphadenopathy also showed blood pool activity.    5. Patient switched his ENT care to Dr. Browning.   6. CT scan on 06/18/2019 showed no evidence of disease recurrence.    7. Neck CT scan on 6/15/2020 reported no evidence for disease recurrence.  8. Neck CT scan on 6/3/2021 reported no evidence for disease recurrence.    HISTORY OF PRESENT ILLNESS:  The patient is a 72 y.o. year old male who presented today on 12/2/2022 for 6-month follow-up evaluation.    He reports no hoarseness in his voice, dysphagia or odynophagia. His weight is stable. He has been eating well. He has not seen an ENT physician for quite some time.     He recently had a laboratory study at his primary care physician's office on 10/10/2022 which reported normal liver function panel and renal function, glucose 113 mg/dL and potassium 5.3 mmol/L, otherwise unremarkable. He had a slightly elevated TSH of 4.85 uIU/mL.     Laboratory studies on 12/2/2022 reported hemoglobin 14.8 g/dL, MCV 93.7 fL, platelets 209,000, WBC 5900, ANC 3170 and lymphocytes 1570.         Past Medical History:   Diagnosis Date   • Benign essential hypertension    • H/O complete eye exam due   • H/O Neck mass     left   • Hyperlipidemia    • IFG (impaired fasting glucose)    • Seasonal allergies    • Tongue cancer (HCC) 11/01/2017    Left base of tongue squamous cell carcinoma, stage YEN, recieved chemo and  radiation therapy   · Carotid artery stenosis.    Past Surgical History:   Procedure Laterality Date   • CHOLECYSTECTOMY WITH INTRAOPERATIVE CHOLANGIOGRAM N/A 7/5/2018    Procedure: Laparoscopic cholecystectomy with intraoperative cholangiogram ;  Surgeon: Ashley Fischer MD;  Location: [facility] OR;  Service: General   • COLONOSCOPY N/A 2015    normal colonoscopy-Dr. Galen Morrissey   • COLONOSCOPY N/A 05/04/2011    Normal colonoscopy-Dr. Tobias Emerson   • ENDOSCOPY W/ PEG TUBE PLACEMENT N/A 11/28/2017    Procedure: ESOPHAGOGASTRODUODENOSCOPY WITH PERCUTANEOUS ENDOSCOPIC GASTROSTOMY TUBE INSERTION;  Surgeon: Isma Hercules MD;  Location: [facility] OR;  Service:    • FINE NEEDLE ASPIRATION Left 10/13/2017    Ultrasound guidance for fine needle biopsy and fine needle aspiration with ultrasonic guidance of left neck mass-Dr. Frank Marques   • INGUINAL HERNIA REPAIR Left 1994    Dr. Brian Peres   • INGUINAL HERNIA REPAIR Right 1993    Dr. Brian Peres   • LARYNGOPLASTY      Laryngoplasty with biopsy-Dr. Del Toro   • TX INSJ TUNNELED CVC W/O SUBQ PORT/ AGE 5 YR/> Left 11/28/2017    Procedure: INSERTION VENOUS ACCESS DEVICE;  Surgeon: Isma Hercules MD;  [facility];  Service: General   • TONGUE BIOPSY / EXCISION N/A 11/01/2017    Biopsy of the left tongue base-Dr. Zane Del Toro   • TONSILLECTOMY AND ADENOIDECTOMY Bilateral 11/01/2017    Dr. Zane Del Toro   · Removal PEG tube on 5/2/2018   ·  Cholecystectomy in July 2018    HEMATOLOGIC/ONCOLOGIC HISTORY:  Mr. Ritchie is a 67 y.o. male who is here on 11/16/2017 for evaluation accompanied by his wife, referred by radiation oncologist, Dr. Aguirre, because of newly diagnosed squamous cell carcinoma of the left tongue base, stage YEN, [id number].      Patient previously only had history of mild hypertension which was controlled. Recently in early 09/2017 when he was on vacation, he felt a left neck nodule when he was shaving. He denies any pain  associated with that. Patient was seen by his primary care physician, Dr. Sheffield, for evaluation and was referred to ENT, Dr. Zane Del Toro. Patient subsequently had CT of the neck examination at the [facility] on 09/22/2017. This study reported a left neck mass measuring 3.5 x 3.1 x 2.4 cm with cystic/necrotic changes located at the region of the left level II jugular chain. There were additional small homogeneous cervical lymph nodes but possibly reactive.      Patient subsequently had ultrasound of the neck on 10/13/2017 associated with fine-needle aspiration biopsy at Dr. Del Toro' office. The ultrasound reported 2 nodules in left neck. The large one measured about 3.28 cm x 2.67 cm x 3.28 cm. The 2nd smaller one measures 1.56 cm x 0.99 cm x 1.48 cm, just below the large mass. Patient had fine-needle aspiration biopsy at the same time. Pathology evaluation from the [facility] reported poorly differentiated squamous cell carcinoma with degeneration and necrosis. The viable tumor cells exhibit strong nuclear reactivity for both p40 and p63.     Patient subsequently had tonsillectomy, biopsy of the left tongue base and adenoidectomy on 11/01/2017 by Dr. Del Toro. Pathology evaluation from [facility] reported moderately differentiated squamous cell carcinoma. The left tonsil has no evidence of malignancy. Right tonsil also was benign. The adenoid tissue was also benign.      Patient reports the left neck mass is growing. He also reports poor appetite after tonsillectomy. For the past couple of weeks since the biopsy, he lost about 6 pounds. He denies nausea or vomiting. He has actually started feeling better with improved appetite. Denies pain. Denies fever or sweating.      This patient reports he never smoked cigarette. He is only a very rare social drinker. He told me the test for HPV is ongoing.      Patient also had PET scan examination obtained on 11/14/2017. This study reported focal  hypermetabolism with SUV 15.9 corresponding to the left-sided base of the tongue. There was moderate enlarged left jugular chain lymph node which is hypermetabolic but without measuring SUV. There was also mild hypermetabolism identified within several additional smaller left jugular chain lymph nodes. There was no hypermetabolic lymphadenopathy elsewhere in the neck nor foci in the chest, abdomen or pelvis.    Patient was started on concurrent chemoradiation therapy with cisplatin every 3 weeks.  He received 2 cycles chemotherapy and due to poor tolerance with acute renal injury and neutropenia, cycle #3 cisplatin was canceled.     Radiation therapy finished on 1/23/2018.    PET scan on 3/15/2018 reported essentially complete resolution of hypermetabolic lesion at the left tongue base.  The left and neck lymphadenopathy also showing, with blood pool activity.    His CT scan examination of the neck on 6/11/2018 reported no evidence of local disease recurrence, a stable left neck adenopathy, maybe 2 mm smaller compared to the PET scan obtained in March 2018.  Laboratory study on the same day reported normal renal function with a creatinine 0.73 normal electrolytes and liver function panel, stable mild anemia with hemoglobin 12.4 and normal WBC and platelets.    Laboratory study on 9/7/2018 reported normal iron study with ferritin 274, iron 100, TIBC 274 iron saturation 36% in the normal B12 level 631 pg/mL.  Hemoglobin was 13.5 improved and normal WBC 5350, and platelets 206,000.  He also had a normal TSH 2.87, and completely normal CMP.     CT scan examination for the neck with IV contrast on 11/21/2018 showed evidence of disease recurrence.  Laboratory study reported elevated glucose otherwise normal CMP.  Patient also had mildly elevated neutrophils.  These were likely secondary to steroids use prior to the CT scan because he is allergic to IV dye.      CT Neck done on 06/18/2019 showed beam hardening artifact  limits evaluation somewhat but there is no evidence of residual or recurrent tongue base mass. There is no evidence of pathologic adenopathy. 1.4 x 0.9 cm area of lucency involving the spinous process of T1, nonspecific but stable. This likely represents an atypical hemangioma. Area of lucency related to the left maxillary 1st molar suggesting a periapical abscess measuring 1.5 cm in maximum dimension. Clinical correlation and standard dental radiographs recommended.    Laboratory study 06/18/2019 showed creatine at 0.80, TSH Baseline at 1.280, Free T4 at 1.08, and free T3 at 2.33. CBC was normal besides WBC elevated at 11.6, RDW down at 11.6, ANC at elevated at 648[address], absolute monocytes at 60, absolute lymphocytes at 980, and absolute immature grans 0.07. CMP normal besides elevated glucose of 219.     CT scan examination on 6/15/2020 showed no evidence of disease recurrence.  However it reported a left carotid artery at least moderate stenosis.    Laboratory studies on 6/15/2020 reported elevated neutrophils 9090, otherwise unremarkable.  He had a normalized TSH and a normal free T4.  Chemistry lab showed elevated glucose level otherwise unremarkable.     In June 2020 patient had neck CT scan which reported carotid artery stenosis.  Will refer the patient to vascular surgery.  Patient reports he was seen by vascular surgery who recommended observation for now.      Laboratory study on 12/4/2020 reported normal CBC including Hb 14.5, platelets 200,000 WBC 6300 with ANC 3140 lymphocytes 1800 eosinophil 790 and monocytes 510.  Elevated TSH 5.26 however normal free T4 at 1.18 ng/dL.    Neck CT scan examination on 6/3/2021 reported no evidence for disease local recurrence or cervical lymphadenopathy.    Laboratory study 6/3/2021 reported normal hemoglobin platelets, however elevated neutrophils 9340 out of WBC 10,700.  Chemistry lab reported normal TSH and free T4, and unremarkable CMP except elevated glucose of 143.  Both of hyperglycemia and neutrophil elevation are likely secondary to oral prednisone given prior to IV contrast for CT scan.      Laboratory studies on 12/17/2021 reported normal CBC with Hb 14.4 platelets 144,000 WBC 5780.  Normal thyroid studies with TSH 4.15, and free T4 at 1.28.  Chemistry lab reported normal CMP.    CT scan for neck obtained 6/17/2022 reported no evidence for pathologic lymphadenopathy.    Laboratory studies on 6/17/2022 reported normal CBC, normal hemoglobin 15.4 and platelets 249,000, however with slightly elevated neutrophils 7700 which is likely due to steroids prednisone given prior to the CT scan because of allergic reaction to IV dye.  He had a normal TSH, free T4 and free T3.  CMP was also unremarkable except elevated glucose 165 which is again likely due to prednisone.      MEDICATIONS    Current Outpatient Medications:   •  atorvastatin (LIPITOR) 20 MG tablet, Take 1 tablet by mouth Daily., Disp: 90 tablet, Rfl: 3  •  hydroCHLOROthiazide (MICROZIDE) 12.5 MG capsule, Take 1 capsule by mouth Daily., Disp: 90 capsule, Rfl: 3  •  metoprolol tartrate (LOPRESSOR) 50 MG tablet, Take 1 tablet by mouth 2 (Two) Times a Day., Disp: 180 tablet, Rfl: 3  •  ASPIRIN 81 PO, , Disp: , Rfl:   •  sildenafil (REVATIO) 20 MG tablet, Take 2-3 tabs QD prn, Disp: 30 tablet, Rfl: 11  •  valACYclovir (VALTREX) 500 MG tablet, TAKE 4 TABLETS BY MOUTH AT FIRST SIGN OF ATTACK AND THEN TAKE 4 TABLETS 12 HOURS LATER, Disp: , Rfl:     ALLERGIES:     Allergies   Allergen Reactions   • Iodinated Diagnostic Agents Itching and Rash     Had a 24 hour delayed reaction to Ct contrast       SOCIAL HISTORY:       Social History     Social History   • Marital status:      Spouse name: Janiya   • Number of children: 2   • Years of education: High School     Occupational History   •  Retired     [employer]     Social History Main Topics   • Smoking status: Never Smoker   • Smokeless tobacco: Never Used   • Alcohol use Yes       "Comment: occassional   • Drug use: No   • Sexual activity: No       FAMILY HISTORY:  Family History   Problem Relation Age of Onset   • Alcohol abuse Father    • Diabetes Father    • Cancer Neg Hx    • Malig Hyperthermia Neg Hx            Objective    Vitals:    12/02/22 1129   BP: 172/86   Pulse: 55   Resp: 18   Temp: 97.1 °F (36.2 °C)   TempSrc: Temporal   SpO2: 98%   Weight: 94.2 kg (207 lb 9.6 oz)   Height: 183 cm (72.05\")   PainSc: 0-No pain     Current Status 12/2/2022   ECOG score 0      PHYSICAL EXAM:    GENERAL:  Well-developed, well-nourished male in no acute distress.    SKIN:  Warm, dry without rashes, purpura or petechiae.  HEENT:  Normocephalic.  Wearing mask.   LYMPHATICS:  No cervical, supraclavicular adenopathy.  CHEST: Normal respiratory effort.  Lungs clear to auscultation. Good airflow.  CARDIAC:  Regular rate and rhythm without murmurs. Normal S1,S2.  ABDOMEN:  Soft, nontender with no organomegaly or masses.  Bowel sounds normal.  EXTREMITIES:  No clubbing, cyanosis or edema.  NEUROLOGICAL:  Grossly intact.    PSYCHIATRIC:  Normal affect and mood.          RECENT LABS:    Lab Results   Component Value Date    WBC 5.90 12/02/2022    HGB 14.8 12/02/2022    HCT 44.5 12/02/2022    MCV 93.7 12/02/2022     12/02/2022     Lab Results   Component Value Date    NEUTROABS 3.17 12/02/2022     Lab Results   Component Value Date    GLUCOSE 111 12/02/2022    BUN 15 12/02/2022    CREATININE 0.82 12/02/2022    EGFRIFNONA 83 12/17/2021    EGFRIFAFRI 93 10/07/2020    BCR 18.3 12/02/2022    K 4.4 12/02/2022    CO2 28.6 12/02/2022    CALCIUM 9.4 12/02/2022    PROTENTOTREF 6.5 10/10/2022    ALBUMIN 4.20 12/02/2022    LABIL2 2.4 10/10/2022    AST 24 12/02/2022    ALT 31 12/02/2022       Lab Results   Component Value Date    TSH 4.850 (H) 10/10/2022       IMAGING STUDY: No new imaging studies since 6/17/2022.      Assessment & Plan      1.  Left tongue base squamous cell carcinoma.   Stage YEN.  Finished 2 " cycles chemotherapy with Cisplatin day 1 and day 22, with concurrent radiation on 1/23/2018.  Did not receive day #43 cisplatin as planned due to neutropenia.   · His PET scan on 3/15/2018 showed essentially complete metabolic response.  The residual left neck lymph node shows only low-level blood pool activity.   · Patient had CT scan examination of the neck on 6/11/2018 which showed stable left neck lymphadenopathy, probably smaller by 2 mm compared to the PET scan on 3/15/2018. This lesion is still probably dying down.     · Patient switched his ENT care to Dr. Browning.   · Patient had CT scan examination of the neck with IV contrast on 06/18/2019, showed no evidence of disease recurrence.    · CT scan examination on 6/15/2020 showed no evidence of disease recurrence.   · Physical examination on 12/4/2020 was negative for cervical lymphadenopathy.  Recommended to have a CT scan for the neck in 6 months for reevaluation.  · CT of the neck obtained 6/3/2021 reported no evidence for local disease recurrence or neck lymphadenopathy.  Physical examination today is also benign.  · On 12/17/2021, patient reports some discomfort in the left side of neck/submandibular area.  Otherwise negative review.  Physical examination negative for lymphadenopathy or mass.    · CT of the neck with IV contrast on 6/17/2022 reported stable condition, no evidence for disease recurrence.  Physical examination today on 6/24/2022 also had no palpable lymphadenopathy in the neck supraclavicular or axilla.  · On 12/2/2022, the patient has an unremarkable physical examination and review of system. CT scan of the neck with and without contrast is recommended in 6 months for reassessment.        2.  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    · Patient has marginally elevated TSH on 03/08/2019 which was 4.47.  This may be the beginning of his hypothyroidism secondary to radiation therapy.    · TSH at 1.280, Free T4 at 1.08, and  free T3 at 2.33 on 06/18/2019.    · Slightly elevated TSH 4.34 on 2/3/2020.     · Lab study on 6/15/2020 reported normal TSH 1.47 and free T4 at 1.12 ng/DL.    · Lab study on 12/4/2020 reported mildly elevated TSH 5.26, however normal free T4 at 1.18 ng/dL.    · Lab study on 6/3/2021 reported normal TSH 1.33 and normal free T4 at 1.12 ng/dL.  We will continue to monitor.  · On 12/17/2021, patient has upper normal TSH 4.15, and free T4 at 1.28 ng/dL.  · On 6/17/2022 patient had normal TSH 1.43, free T4 at 1.21 ng/dL, and free T3 at 2.49 pg/mL.  · Lab study on 10/10/2022 reported a mildly elevated TSH 4.85 uIU/mL. However, no free T4 or T3 at his primary care physician's office.    · On 12/2/2022, it was discussed with the patient, and we recommended to repeat TSH and add free T3 and T4 for assessment.        3.  Tinnitus and decreased hearing.  He states he had ringing in his ears prior to cisplatin.  His tinnitus did not worsen with cisplatin chemotherapy.   · No change of clinical condition.     4.  Left carotid artery stenosis, at least moderate per CT scan examination on 6/15/2020.   · Patient is asymptomatic.    · He was seen by vascular surgeon and recommended observation.      5.  Hyperglycemia.    · Labs 06/18/2019 showed glucose of 219 and has been high in past as well.  This may be related to his prednisone use before CT scan examination.  However his previous glucose levels has been elevated multiple times.  He was diagnosed with DM II.    · Patient had relatively normal glucose 111 in December 2019 and February 2020.   · Elevated glucose 197 on 6/15/2020 on day of CT scan.  This again is possibly related to his prednisone prior to CT scan examination.   · Glucose 143 on 6/3/2021.  Elevation of glucose may likely related to oral prednisone given prior to IV contrast for CT scan examination.    · Normal glucose 109 on 12/17/2021.  Patient did not have steroids use.  · On 6/17/2022 glucose 165, likely due to  "prednisone given prior to CT scan examination.  · On 10/10/2022, his blood glucose was 113 mg/dL and hemoglobin A1c was 5.7%. Her diabetes are well controlled.       PLAN:  1. Labs including TSH, free T4 and T3 will be obtained today.   2. Arrange CT scan for the neck with and without contrast for evaluation in 6 months.   3. In 6 months for repeat labs including CBC, CMP, TSH, free T4 and T3 for reassessment.   4. He will follow up with me in 6 months, 1 week after the above test.    I discussed with the patient about laboratory results and further management plan.  Patient voiced understanding and agreeable.      Domenica Ballesteros MD PhD     CC:     Ameya Sheffield M.D.    Ameya Aguirre M.D.    Isma Hercules M.D.      Efrain Browning M.D.    Luis Antonio Smith MD    Transcribed from ambient dictation for Domenica Ballesteros MD PhD by Abelino Velez.  12/02/22   13:04 EST    [facility] Provider Statement:[id number]::\"Patient or patient representative verbalized consent to the visit recording.\",\"I have personally performed the services described in this document as transcribed by the above individual, and it is both accurate and complete.\"         Addendum:    Component      Latest Ref Rng & Units 12/2/2022   TSH Baseline      0.270 - 4.200 uIU/mL 4.180   Free T4      0.93 - 1.70 ng/dL 1.28   T3, Free      2.00 - 4.40 pg/mL 3.04       Patient is completely normal thyroid profile.    DOMENICA BALLESTEROS M.D., Ph.D.    12/3/2022          "

## 2022-12-05 ENCOUNTER — TELEPHONE (OUTPATIENT)
Dept: ONCOLOGY | Facility: CLINIC | Age: 72
End: 2022-12-05

## 2022-12-05 NOTE — TELEPHONE ENCOUNTER
Per Dr Ballesteros patient called and informed his thyroid laboratory study results were normal.  Patient v/u

## 2022-12-05 NOTE — TELEPHONE ENCOUNTER
----- Message from Nuvia Ballesteros MD PhD sent at 12/3/2022  3:21 PM EST -----  Regarding: Thyroid profile  Linnea,    Please call patient Monday and tell him normal thyroid laboratory study results.    Thank you!    Favian

## 2022-12-21 ENCOUNTER — OFFICE VISIT (OUTPATIENT)
Dept: FAMILY MEDICINE CLINIC | Facility: CLINIC | Age: 72
End: 2022-12-21

## 2022-12-21 VITALS
WEIGHT: 207 LBS | RESPIRATION RATE: 16 BRPM | HEART RATE: 61 BPM | BODY MASS INDEX: 28.04 KG/M2 | SYSTOLIC BLOOD PRESSURE: 138 MMHG | DIASTOLIC BLOOD PRESSURE: 81 MMHG | HEIGHT: 72 IN | OXYGEN SATURATION: 98 % | TEMPERATURE: 97.9 F

## 2022-12-21 DIAGNOSIS — R51.9 NONINTRACTABLE HEADACHE, UNSPECIFIED CHRONICITY PATTERN, UNSPECIFIED HEADACHE TYPE: ICD-10-CM

## 2022-12-21 DIAGNOSIS — R09.81 NASAL CONGESTION: ICD-10-CM

## 2022-12-21 DIAGNOSIS — R05.8 PRODUCTIVE COUGH: ICD-10-CM

## 2022-12-21 DIAGNOSIS — R50.9 FEVER, UNSPECIFIED FEVER CAUSE: ICD-10-CM

## 2022-12-21 DIAGNOSIS — J01.00 ACUTE MAXILLARY SINUSITIS, RECURRENCE NOT SPECIFIED: Primary | ICD-10-CM

## 2022-12-21 LAB
EXPIRATION DATE: NORMAL
FLUAV AG UPPER RESP QL IA.RAPID: NOT DETECTED
FLUBV AG UPPER RESP QL IA.RAPID: NOT DETECTED
INTERNAL CONTROL: NORMAL
Lab: NORMAL
SARS-COV-2 AG UPPER RESP QL IA.RAPID: NOT DETECTED

## 2022-12-21 PROCEDURE — 99213 OFFICE O/P EST LOW 20 MIN: CPT

## 2022-12-21 PROCEDURE — 87428 SARSCOV & INF VIR A&B AG IA: CPT

## 2022-12-21 RX ORDER — BENZONATATE 200 MG/1
200 CAPSULE ORAL 3 TIMES DAILY PRN
Qty: 30 CAPSULE | Refills: 1 | Status: SHIPPED | OUTPATIENT
Start: 2022-12-21 | End: 2022-12-22 | Stop reason: SDUPTHER

## 2022-12-21 RX ORDER — AMOXICILLIN AND CLAVULANATE POTASSIUM 875; 125 MG/1; MG/1
1 TABLET, FILM COATED ORAL EVERY 12 HOURS SCHEDULED
Qty: 14 TABLET | Refills: 0 | Status: SHIPPED | OUTPATIENT
Start: 2022-12-21 | End: 2022-12-28

## 2022-12-22 ENCOUNTER — TELEPHONE (OUTPATIENT)
Dept: FAMILY MEDICINE CLINIC | Facility: CLINIC | Age: 72
End: 2022-12-22

## 2022-12-22 DIAGNOSIS — R05.8 PRODUCTIVE COUGH: ICD-10-CM

## 2022-12-22 DIAGNOSIS — J01.00 ACUTE MAXILLARY SINUSITIS, RECURRENCE NOT SPECIFIED: ICD-10-CM

## 2022-12-22 RX ORDER — BENZONATATE 200 MG/1
200 CAPSULE ORAL 3 TIMES DAILY PRN
Qty: 30 CAPSULE | Refills: 0 | Status: SHIPPED | OUTPATIENT
Start: 2022-12-22 | End: 2023-02-14

## 2022-12-22 NOTE — TELEPHONE ENCOUNTER
Caller: William Ritchie    Relationship: Self    Best call back number: [phone]    What medications are you currently taking: benzonatate (TESSALON) 200 MG capsule     What are your concerns: PATIENT WOULD LIKE TO HAVE THIS MEDICATION SWITCHED TO [facility] DUE TO PRICE DIFFERENCE.    PHARMACY:[facility] - [place] [id number] - [phone]  - [phone]   [phone]

## 2022-12-22 NOTE — TELEPHONE ENCOUNTER
PATIENT CALLING BACK IN REGARDS TO MEDICATION   benzonatate (TESSALON) 200 MG capsule    TO GO TO     [facility] - [place], [place] - [address] - [phone]  - [phone] FX  [phone]    HE CALLED THE PHARMACY AND THEY DID NOT HAVE ANYTHING    PLEASE CALL WHEN SENT TO PHARMACY [phone]

## 2023-02-14 ENCOUNTER — TELEMEDICINE (OUTPATIENT)
Dept: FAMILY MEDICINE CLINIC | Facility: CLINIC | Age: 73
End: 2023-02-14
Payer: MEDICARE

## 2023-02-14 DIAGNOSIS — R05.8 PRODUCTIVE COUGH: ICD-10-CM

## 2023-02-14 DIAGNOSIS — U07.1 COVID-19 VIRUS DETECTED: Primary | ICD-10-CM

## 2023-02-14 PROBLEM — M24.571 CONTRACTURE, RIGHT ANKLE: Status: ACTIVE | Noted: 2023-02-14

## 2023-02-14 PROCEDURE — 99213 OFFICE O/P EST LOW 20 MIN: CPT

## 2023-02-14 RX ORDER — BENZONATATE 200 MG/1
200 CAPSULE ORAL 3 TIMES DAILY PRN
Qty: 30 CAPSULE | Refills: 1 | Status: SHIPPED | OUTPATIENT
Start: 2023-02-14

## 2023-02-14 RX ORDER — DOXYCYCLINE HYCLATE 100 MG/1
100 CAPSULE ORAL 2 TIMES DAILY
Qty: 20 CAPSULE | Refills: 0 | Status: SHIPPED | OUTPATIENT
Start: 2023-02-14 | End: 2023-02-24

## 2023-02-14 NOTE — PROGRESS NOTES
You have chosen to receive care through a telephone visit. Do you consent to use a telephone visit for your medical care today? Yes   Mode of Visit: Telephone  The visit included telephone interaction. No technical issues occurred during this visit.     I spent 14 minutes caring for William on this date of service. This time includes time spent by me in the following activities:preparing for the visit, obtaining and/or reviewing a separately obtained history, performing a medically appropriate examination and/or evaluation , counseling and educating the patient/family/caregiver, ordering medications, tests, or procedures and documenting information in the medical record    This visit has been rescheduled as a phone visit to comply with patient safety concerns in accordance with CDC recommendations. Total time of discussion was 14 minutes.     Location of the patient: Home  Location of the provider: [facility]     Subjective       Chief Complaint   Patient presents with   • Covid-19 Home Monitoring Video Visit         History of Present Illness       Prince is a 72 y.o. male who presents to  [facility] [place] [facility] today.    The patient reports sinus pressure and congestion, and cough. Symptoms include congestion, post nasal drip, cough described as productive of white sputum and runny nose.  Onset of symptoms was 2 days ago, and stable since that time. Patient denies shortness of breath, wheezing, hemoptysis, pleuritic chest pain, fever, dyspnea on exertion, ear drainage, eye discharge, diarrhea, vomiting, vertigo, sneezing, chills, sweats, sinus pain, epistaxis, high fever, joint pain.   Evaluation to date: none Treatment to date:  OTC oral decongestants.      The patient tested positive for Covid-19 this morning.    He has had two Covid vaccines and one booster.  He is drinking plenty of fluids and staying hydrated.  He denies chest  pain, shortness of breath, and wheezing.    Review of Systems   Constitutional: Negative for appetite change, chills, fatigue and fever.   HENT: Positive for congestion (sinus), postnasal drip, rhinorrhea and sinus pressure. Negative for ear pain, sore throat and trouble swallowing.    Eyes: Negative for visual disturbance.   Respiratory: Positive for cough (productive, white sputum). Negative for apnea, chest tightness, shortness of breath and wheezing.    Cardiovascular: Negative for chest pain, palpitations and leg swelling.   Gastrointestinal: Negative for abdominal pain, constipation, diarrhea, nausea and vomiting.   Genitourinary: Negative for dysuria, frequency and urgency.   Musculoskeletal: Negative for gait problem, myalgias and neck pain.   Skin: Negative for rash.   Neurological: Negative for dizziness, syncope, weakness and light-headedness.   Psychiatric/Behavioral: Negative for dysphoric mood. The patient is not nervous/anxious.              Objective                         Assessment & Plan     Assessment and Plan      Diagnoses and all orders for this visit:    1. COVID-19 virus detected (Primary)  -     doxycycline (VIBRAMYCIN) 100 MG capsule; Take 1 capsule by mouth 2 (Two) Times a Day for 10 days.  Dispense: 20 capsule; Refill: 0  -     benzonatate (TESSALON) 200 MG capsule; Take 1 capsule by mouth 3 (Three) Times a Day As Needed for Cough.  Dispense: 30 capsule; Refill: 1    2. Productive cough  -     doxycycline (VIBRAMYCIN) 100 MG capsule; Take 1 capsule by mouth 2 (Two) Times a Day for 10 days.  Dispense: 20 capsule; Refill: 0  -     benzonatate (TESSALON) 200 MG capsule; Take 1 capsule by mouth 3 (Three) Times a Day As Needed for Cough.  Dispense: 30 capsule; Refill: 1          Follow Up  Wrapup  Review (Popup)  Communications :23}    Return if symptoms worsen or fail to improve.    -Take medication as prescribed.  -Covid test was positive today at home.  -Monitor for fever and take  Tylenol as needed.  Drink plenty of fluids and get plenty of rest.  -Use cool-mist humidifier as needed.  -I recommend an over-the-counter vitamin regimen to boost immune system of the following: Vitamin D3 5,000 IU daily, vitamin C 500-1,000 mg twice daily, Quercetin 250 mg twice daily, and Zinc 100 mg/day.  Purchase a pulse oximeter at any local pharmacy to monitor oxygen saturations.  Call 911 if you have shortness of breath, sharp pain with breathing, decreased oxygen saturations, sharp pain in your back, or a fever that will not come down by Tylenol.  -Remain in quarantine for 10 days from symptom onset.  -Return to the office is symptoms worsen or do not improve.

## 2023-11-01 NOTE — PROGRESS NOTES
The ABCs of the Annual Wellness Visit  Subsequent Medicare Wellness Visit    Subjective    William Ritchie is a 73 y.o. male who presents for a Subsequent Medicare Wellness Visit.    The following portions of the patient's history were reviewed and   updated as appropriate: allergies, current medications, past family history, past medical history, past social history, past surgical history, and problem list.    Compared to one year ago, the patient feels his physical   health is the same.    Compared to one year ago, the patient feels his mental   health is the same.    Recent Hospitalizations:  He was not admitted to the hospital during the last year.       Current Medical Providers:  Patient Care Team:  Ameya Sheffield MD as PCP - General (Family Medicine)  Ameya Aguirre MD as Referring Physician (Radiation Oncology)  Nuvia Ballesteros MD PhD as Consulting Physician (Hematology and Oncology)  Ameya Ortega II, MD as Consulting Physician (Hematology and Oncology)    Outpatient Medications Prior to Visit   Medication Sig Dispense Refill    hydroCHLOROthiazide (MICROZIDE) 12.5 MG capsule Take 1 capsule by mouth Daily. 90 capsule 3    ASPIRIN 81 PO       benzonatate (TESSALON) 200 MG capsule Take 1 capsule by mouth 3 (Three) Times a Day As Needed for Cough. 30 capsule 1    valACYclovir (VALTREX) 500 MG tablet TAKE 4 TABLETS BY MOUTH AT FIRST SIGN OF ATTACK AND THEN TAKE 4 TABLETS 12 HOURS LATER      atorvastatin (LIPITOR) 20 MG tablet Take 1 tablet by mouth Daily. 90 tablet 3    metoprolol tartrate (LOPRESSOR) 50 MG tablet Take 1 tablet by mouth 2 (Two) Times a Day. 180 tablet 3    sildenafil (REVATIO) 20 MG tablet Take 2-3 tabs QD prn 30 tablet 11     No facility-administered medications prior to visit.       No opioid medication identified on active medication list. I have reviewed chart for other potential  high risk medication/s and harmful drug interactions in the elderly.        Aspirin is on active  "medication list. Aspirin use is indicated based on review of current medical condition/s. Pros and cons of this therapy have been discussed today. Benefits of this medication outweigh potential harm.  Patient has been encouraged to continue taking this medication.  .      Patient Active Problem List   Diagnosis    Hyperlipidemia    Benign essential hypertension    IFG (impaired fasting glucose)    Oropharyngeal cancer    Fitting and adjustment of vascular catheter    Steroid-induced hyperglycemia    Dehydration    Acute renal injury    Anemia associated with chemotherapy    Chemotherapy-induced nausea    GERD (gastroesophageal reflux disease)    Chemotherapy induced neutropenia    Adverse effect of antineoplastic and immunosuppressive drugs    Adverse effect of radiation therapy    Pharyngitis, acute    Hypothyroidism due to acquired atrophy of thyroid    TSH (thyroid-stimulating hormone deficiency)    Calculus of gallbladder without cholecystitis without obstruction    Right leg pain    Sensitivity to the cold    Leukemoid reaction    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  ACP discussion was held with the patient during this visit. Patient does not have an advance directive, information provided.     Objective    Vitals:    11/01/23 1320   BP: 156/68  Comment: enoch bp   Pulse: 58   Resp: 14   Temp: 97.8 °F (36.6 °C)   TempSrc: Oral   SpO2: 100%   Weight: 94.8 kg (209 lb)   Height: 183 cm (72.05\")   PainSc: 0-No pain     Estimated body mass index is 28.31 kg/m² as calculated from the following:    Height as of this encounter: 183 cm (72.05\").    Weight as of this encounter: 94.8 kg (209 lb).    BMI is >= 25 and <30. (Overweight) The following options " were offered after discussion;: exercise counseling/recommendations and nutrition counseling/recommendations      Does the patient have evidence of cognitive impairment? No          HEALTH RISK ASSESSMENT    Smoking Status:  Social History     Tobacco Use   Smoking Status Never   Smokeless Tobacco Never     Alcohol Consumption:  Social History     Substance and Sexual Activity   Alcohol Use Not Currently    Comment: occassional     Fall Risk Screen:    KENNETH Fall Risk Assessment has not been completed.    Depression Screenin/2/2023    11:14 AM   PHQ-2/PHQ-9 Depression Screening   Little Interest or Pleasure in Doing Things 0-->not at all   Feeling Down, Depressed or Hopeless 0-->not at all   PHQ-9: Brief Depression Severity Measure Score 0       Health Habits and Functional and Cognitive Screenin/2/2023    11:00 AM   Functional & Cognitive Status   Do you have difficulty preparing food and eating? No   Do you have difficulty bathing yourself, getting dressed or grooming yourself? No   Do you have difficulty using the toilet? No   Do you have difficulty moving around from place to place? No   Do you have trouble with steps or getting out of a bed or a chair? No   Current Diet Well Balanced Diet   Dental Exam Up to date   Eye Exam Up to date   Exercise (times per week) 3 times per week   Current Exercises Include Walking   Do you need help using the phone?  No   Are you deaf or do you have serious difficulty hearing?  Yes   Do you need help to go to places out of walking distance? No   Do you need help shopping? No   Do you need help preparing meals?  No   Do you need help with housework?  No   Do you need help with laundry? No   Do you need help taking your medications? No   Do you need help managing money? No   Do you ever drive or ride in a car without wearing a seat belt? No   Have you felt unusual stress, anger or loneliness in the last month? No   Who do you live with? Spouse   If you need  help, do you have trouble finding someone available to you? Yes   Have you been bothered in the last four weeks by sexual problems? No   Do you have difficulty concentrating, remembering or making decisions? No       Age-appropriate Screening Schedule:  Refer to the list below for future screening recommendations based on patient's age, sex and/or medical conditions. Orders for these recommended tests are listed in the plan section. The patient has been provided with a written plan.    Health Maintenance   Topic Date Due    Pneumococcal Vaccine 65+ (2 - PCV) 10/25/2016    LIPID PANEL  10/10/2023    COVID-19 Vaccine (5 - 2023-24 season) 01/22/2024 (Originally 9/1/2023)    ANNUAL WELLNESS VISIT  11/02/2024    BMI FOLLOWUP  11/02/2024    COLORECTAL CANCER SCREENING  10/08/2025    INFLUENZA VACCINE  Completed    ZOSTER VACCINE  Completed    HEPATITIS C SCREENING  Discontinued    PROSTATE CANCER SCREENING  Discontinued    TDAP/TD VACCINES  Discontinued                  CMS Preventative Services Quick Reference  Risk Factors Identified During Encounter  None Identified  The above risks/problems have been discussed with the patient.  Pertinent information has been shared with the patient in the After Visit Summary.  An After Visit Summary and PPPS were made available to the patient.    Follow Up:   Next Medicare Wellness visit to be scheduled in 1 year.       Additional E&M Note during same encounter follows:  Patient has multiple medical problems which are significant and separately identifiable that require additional work above and beyond the Medicare Wellness Visit.      Chief Complaint  MEDICARE WELLNESS (DUE ), Hyperlipidemia, Hypertension, and Erectile Dysfunction    Subjective        HPI  William Ritchie is also being seen today for Medication Management.    Pt doing well on the medication(s) w/o SEs, and is due refill today.      Review of Systems   Constitutional:  Negative for chills and fever.   HENT:   "Negative for congestion, ear pain and sinus pressure.    Eyes:  Negative for pain and visual disturbance.   Respiratory:  Negative for cough and shortness of breath.    Cardiovascular:  Negative for chest pain.   Gastrointestinal:  Negative for abdominal pain.   Genitourinary:  Negative for difficulty urinating and dysuria.   Skin: Negative.    Neurological: Negative.    Psychiatric/Behavioral:  Negative for dysphoric mood.        Objective   Vital Signs:  /68 Comment: enoch bp  Pulse 58   Temp 97.8 °F (36.6 °C) (Oral)   Resp 14   Ht 183 cm (72.05\")   Wt 94.8 kg (209 lb)   SpO2 100%   BMI 28.31 kg/m²     Physical Exam  Constitutional:       General: He is not in acute distress.     Appearance: He is well-developed.   Cardiovascular:      Rate and Rhythm: Normal rate and regular rhythm.   Pulmonary:      Effort: Pulmonary effort is normal.      Breath sounds: Normal breath sounds.   Neurological:      Mental Status: He is alert and oriented to person, place, and time.   Psychiatric:         Behavior: Behavior normal.         Thought Content: Thought content normal.          The following data was reviewed by: Ameya Sheffield MD on 11/02/2023:  Common labs          12/2/2022    11:11 6/19/2023    11:09   Common Labs   Glucose 111  164    BUN 15  17    Creatinine 0.82  0.86     0.90    Sodium 135  130    Potassium 4.4  4.0    Chloride 98  94    Calcium 9.4  9.6    Albumin 4.20  4.8    Total Bilirubin 0.6  0.7    Alkaline Phosphatase 109  113    AST (SGOT) 24  22    ALT (SGPT) 31  27    WBC 5.90  10.85    Hemoglobin 14.8  16.0    Hematocrit 44.5  47.0    Platelets 209  256                 Assessment and Plan   Diagnoses and all orders for this visit:    1. Encounter for subsequent annual wellness visit (AWV) in Medicare patient (Primary)    2. Pure hypercholesterolemia  -     atorvastatin (LIPITOR) 20 MG tablet; Take 1 tablet by mouth Daily.  Dispense: 90 tablet; Refill: 3    3. Benign essential " hypertension  -     metoprolol tartrate (LOPRESSOR) 50 MG tablet; Take 1 tablet by mouth 2 (Two) Times a Day.  Dispense: 180 tablet; Refill: 3  -     lisinopril-hydrochlorothiazide (Zestoretic) 10-12.5 MG per tablet; Take 1 tablet by mouth Daily.  Dispense: 90 tablet; Refill: 3    4. Erectile dysfunction, unspecified erectile dysfunction type  -     sildenafil (REVATIO) 20 MG tablet; Take 2-3 tabs QD prn  Dispense: 30 tablet; Refill: 11           I spent 15 minutes caring for William on this date of service. This time includes time spent by me in the following activities:preparing for the visit, performing a medically appropriate examination and/or evaluation , ordering medications, tests, or procedures, and documenting information in the medical record  Follow Up   Return in about 1 year (around 11/2/2024) for Recheck.  Patient was given instructions and counseling regarding his condition or for health maintenance advice. Please see specific information pulled into the AVS if appropriate.

## 2023-11-02 ENCOUNTER — OFFICE VISIT (OUTPATIENT)
Dept: FAMILY MEDICINE CLINIC | Facility: CLINIC | Age: 73
End: 2023-11-02
Payer: MEDICARE

## 2023-11-02 VITALS
WEIGHT: 209 LBS | BODY MASS INDEX: 28.31 KG/M2 | SYSTOLIC BLOOD PRESSURE: 156 MMHG | RESPIRATION RATE: 14 BRPM | TEMPERATURE: 97.8 F | HEART RATE: 58 BPM | DIASTOLIC BLOOD PRESSURE: 68 MMHG | HEIGHT: 72 IN | OXYGEN SATURATION: 100 %

## 2023-11-02 DIAGNOSIS — I10 BENIGN ESSENTIAL HYPERTENSION: ICD-10-CM

## 2023-11-02 DIAGNOSIS — N52.9 ERECTILE DYSFUNCTION, UNSPECIFIED ERECTILE DYSFUNCTION TYPE: ICD-10-CM

## 2023-11-02 DIAGNOSIS — Z00.00 ENCOUNTER FOR SUBSEQUENT ANNUAL WELLNESS VISIT (AWV) IN MEDICARE PATIENT: Primary | ICD-10-CM

## 2023-11-02 DIAGNOSIS — E78.00 PURE HYPERCHOLESTEROLEMIA: ICD-10-CM

## 2023-11-02 RX ORDER — LISINOPRIL AND HYDROCHLOROTHIAZIDE 12.5; 1 MG/1; MG/1
1 TABLET ORAL DAILY
Qty: 90 TABLET | Refills: 3 | Status: SHIPPED | OUTPATIENT
Start: 2023-11-02

## 2023-11-02 RX ORDER — HYDROCHLOROTHIAZIDE 12.5 MG/1
12.5 CAPSULE, GELATIN COATED ORAL DAILY
Qty: 90 CAPSULE | Refills: 3 | Status: CANCELLED | OUTPATIENT
Start: 2023-11-02

## 2023-11-02 RX ORDER — ATORVASTATIN CALCIUM 20 MG/1
20 TABLET, FILM COATED ORAL DAILY
Qty: 90 TABLET | Refills: 3 | Status: SHIPPED | OUTPATIENT
Start: 2023-11-02

## 2023-11-02 RX ORDER — SILDENAFIL CITRATE 20 MG/1
TABLET ORAL
Qty: 30 TABLET | Refills: 11 | Status: SHIPPED | OUTPATIENT
Start: 2023-11-02

## 2023-11-02 RX ORDER — METOPROLOL TARTRATE 50 MG/1
50 TABLET, FILM COATED ORAL 2 TIMES DAILY
Qty: 180 TABLET | Refills: 3 | Status: SHIPPED | OUTPATIENT
Start: 2023-11-02

## 2023-11-02 NOTE — PATIENT INSTRUCTIONS
Medicare Wellness  Personal Prevention Plan of Service     Date of Office Visit:    Encounter Provider:  Ameya Sheffield MD  Place of Service:  [facility]  Patient Name: William Ritchie  :  1950    As part of the Medicare Wellness portion of your visit today, we are providing you with this personalized preventive plan of services (PPPS). This plan is based upon recommendations of the [place] Preventive Services Task Force (USPSTF) and the Advisory Committee on Immunization Practices (ACIP).    This lists the preventive care services that should be considered, and provides dates of when you are due. Items listed as completed are up-to-date and do not require any further intervention.    Health Maintenance   Topic Date Due    Pneumococcal Vaccine 65+ (2 - PCV) 10/25/2016    LIPID PANEL  10/10/2023    COVID-19 Vaccine (2023-24 season) 2024 (Originally 2023)    ANNUAL WELLNESS VISIT  2024    BMI FOLLOWUP  2024    COLORECTAL CANCER SCREENING  10/08/2025    INFLUENZA VACCINE  Completed    ZOSTER VACCINE  Completed    HEPATITIS C SCREENING  Discontinued    PROSTATE CANCER SCREENING  Discontinued    TDAP/TD VACCINES  Discontinued       No orders of the defined types were placed in this encounter.      Return in about 1 year (around 2024) for Recheck.

## 2023-11-20 ENCOUNTER — TELEPHONE (OUTPATIENT)
Dept: FAMILY MEDICINE CLINIC | Facility: CLINIC | Age: 73
End: 2023-11-20

## 2023-11-20 NOTE — TELEPHONE ENCOUNTER
"Caller: William Ritchie \"Prince\"    Relationship: Self    Best call back number: [phone]     What was the call regarding: PATIENT REPORTS HIS NEW PRESCRIPTION OF  lisinopril-hydrochlorothiazide (Zestoretic) 10-12.5 MG per tablet   ISNT WORKING WELL. HE SAYS HIS BLOOD SUGAR IS LOW, AND HE HAS BEEN DIZZY AND LIGHTHEADED.    PLEASE CALL TO ADVISE IF NEEDED. PATIENT SCHEDULED FOR 11/20/2023  "

## 2023-11-21 ENCOUNTER — OFFICE VISIT (OUTPATIENT)
Dept: FAMILY MEDICINE CLINIC | Facility: CLINIC | Age: 73
End: 2023-11-21
Payer: MEDICARE

## 2023-11-21 ENCOUNTER — TELEPHONE (OUTPATIENT)
Dept: FAMILY MEDICINE CLINIC | Facility: CLINIC | Age: 73
End: 2023-11-21

## 2023-11-21 VITALS
TEMPERATURE: 97.9 F | SYSTOLIC BLOOD PRESSURE: 170 MMHG | BODY MASS INDEX: 28.17 KG/M2 | OXYGEN SATURATION: 96 % | RESPIRATION RATE: 16 BRPM | DIASTOLIC BLOOD PRESSURE: 84 MMHG | HEIGHT: 72 IN | WEIGHT: 208 LBS | HEART RATE: 84 BPM

## 2023-11-21 DIAGNOSIS — Z12.5 SPECIAL SCREENING FOR MALIGNANT NEOPLASM OF PROSTATE: ICD-10-CM

## 2023-11-21 DIAGNOSIS — I10 BENIGN ESSENTIAL HYPERTENSION: Primary | Chronic | ICD-10-CM

## 2023-11-21 PROCEDURE — 3077F SYST BP >= 140 MM HG: CPT | Performed by: FAMILY MEDICINE

## 2023-11-21 PROCEDURE — 1159F MED LIST DOCD IN RCRD: CPT | Performed by: FAMILY MEDICINE

## 2023-11-21 PROCEDURE — 99213 OFFICE O/P EST LOW 20 MIN: CPT | Performed by: FAMILY MEDICINE

## 2023-11-21 PROCEDURE — 1160F RVW MEDS BY RX/DR IN RCRD: CPT | Performed by: FAMILY MEDICINE

## 2023-11-21 PROCEDURE — 3079F DIAST BP 80-89 MM HG: CPT | Performed by: FAMILY MEDICINE

## 2023-11-21 NOTE — TELEPHONE ENCOUNTER
"Caller: William Ritchie \"Prince\"    Relationship: Self    Best call back number: [phone]     Which medication are you concerned about: lisinopril-hydrochlorothiazide (Zestoretic) 10-12.5 MG per tablet     Who prescribed you this medication: DR MARTINEZ    When did you start taking this medication: 12 DAYS AGO    What are your concerns: PATIENT IS REQUESTING TO KNOW IF THIS MEDICATION COULD BE CAUSING HIS COUGH.    PLEASE CALL TO DISCUSS AND ADVISE.    "

## 2023-11-21 NOTE — PROGRESS NOTES
Chief Complaint:   Chief Complaint   Patient presents with    Hypertension     To discuss bp medication / elevated     Hyperlipidemia       William Ritchie 73 y.o. male who presents today for Medical Management of the below listed issues. He  has a problem list of   Patient Active Problem List   Diagnosis    Hyperlipidemia    Benign essential hypertension    IFG (impaired fasting glucose)    Oropharyngeal cancer    Fitting and adjustment of vascular catheter    Steroid-induced hyperglycemia    Dehydration    Acute renal injury    Anemia associated with chemotherapy    Chemotherapy-induced nausea    GERD (gastroesophageal reflux disease)    Chemotherapy induced neutropenia    Adverse effect of antineoplastic and immunosuppressive drugs    Adverse effect of radiation therapy    Pharyngitis, acute    Hypothyroidism due to acquired atrophy of thyroid    TSH (thyroid-stimulating hormone deficiency)    Calculus of gallbladder without cholecystitis without obstruction    Right leg pain    Sensitivity to the cold    Leukemoid reaction    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  Since the last visit, He has had his HCTZ 12.5mg changed to Zestoretic 10-12.5mg due to continued elevation of his BPs, but returns today feeling like this is not helping, and reports his blood pressures have been running up (and then down) since making the change.  In discussion with patient, it appears there was some confusion when I changed his hydrochlorothiazide to the Zestoretic, with continuation of the metoprolol twice daily.  Patient thought I told him to stop the metoprolol, which she did promptly, and has only been taking the Zestoretic for the last 13 days.      he has been compliant with   Current Outpatient Medications:     ASPIRIN 81 PO, , Disp: , Rfl:  "    atorvastatin (LIPITOR) 20 MG tablet, Take 1 tablet by mouth Daily., Disp: 90 tablet, Rfl: 3    benzonatate (TESSALON) 200 MG capsule, Take 1 capsule by mouth 3 (Three) Times a Day As Needed for Cough. (Patient not taking: Reported on 11/21/2023), Disp: 30 capsule, Rfl: 1    lisinopril-hydrochlorothiazide (Zestoretic) 10-12.5 MG per tablet, Take 1 tablet by mouth Daily., Disp: 90 tablet, Rfl: 3    metoprolol tartrate (LOPRESSOR) 50 MG tablet, Take 1 tablet by mouth 2 (Two) Times a Day., Disp: 180 tablet, Rfl: 3    sildenafil (REVATIO) 20 MG tablet, Take 2-3 tabs QD prn, Disp: 30 tablet, Rfl: 11    valACYclovir (VALTREX) 500 MG tablet, TAKE 4 TABLETS BY MOUTH AT FIRST SIGN OF ATTACK AND THEN TAKE 4 TABLETS 12 HOURS LATER, Disp: , Rfl: .  He denies medication side effects.    All of the other chronic condition(s) listed above are stable w/o issues.    /84   Pulse 84   Temp 97.9 °F (36.6 °C) (Oral)   Resp 16   Ht 183 cm (72.05\")   Wt 94.3 kg (208 lb)   SpO2 96%   BMI 28.17 kg/m²     Results for orders placed or performed during the hospital encounter of 06/19/23   Comprehensive Metabolic Panel    Specimen: Arm, Left; Blood   Result Value Ref Range    Glucose 164 (H) 65 - 99 mg/dL    BUN 17 8 - 23 mg/dL    Creatinine 0.86 0.76 - 1.27 mg/dL    Sodium 130 (L) 136 - 145 mmol/L    Potassium 4.0 3.5 - 5.2 mmol/L    Chloride 94 (L) 98 - 107 mmol/L    CO2 24.0 22.0 - 29.0 mmol/L    Calcium 9.6 8.6 - 10.5 mg/dL    Total Protein 7.6 6.0 - 8.5 g/dL    Albumin 4.8 3.5 - 5.2 g/dL    ALT (SGPT) 27 1 - 41 U/L    AST (SGOT) 22 1 - 40 U/L    Alkaline Phosphatase 113 39 - 117 U/L    Total Bilirubin 0.7 0.0 - 1.2 mg/dL    Globulin 2.8 gm/dL    A/G Ratio 1.7 g/dL    BUN/Creatinine Ratio 19.8 7.0 - 25.0    Anion Gap 12.0 5.0 - 15.0 mmol/L    eGFR 90.9 >60.0 mL/min/1.73   TSH    Specimen: Arm, Left; Blood   Result Value Ref Range    TSH 1.710 0.270 - 4.200 uIU/mL   T3, Free    Specimen: Arm, Left; Blood   Result Value Ref " Range    T3, Free 2.40 2.00 - 4.40 pg/mL   T4, Free    Specimen: Arm, Left; Blood   Result Value Ref Range    Free T4 1.26 0.93 - 1.70 ng/dL   CBC Auto Differential    Specimen: Arm, Left; Blood   Result Value Ref Range    WBC 10.85 (H) 3.40 - 10.80 10*3/mm3    RBC 5.11 4.14 - 5.80 10*6/mm3    Hemoglobin 16.0 13.0 - 17.7 g/dL    Hematocrit 47.0 37.5 - 51.0 %    MCV 92.0 79.0 - 97.0 fL    MCH 31.3 26.6 - 33.0 pg    MCHC 34.0 31.5 - 35.7 g/dL    RDW 11.8 (L) 12.3 - 15.4 %    RDW-SD 40.3 37.0 - 54.0 fl    MPV 9.9 6.0 - 12.0 fL    Platelets 256 140 - 450 10*3/mm3    Neutrophil % 89.6 (H) 42.7 - 76.0 %    Lymphocyte % 9.4 (L) 19.6 - 45.3 %    Monocyte % 0.3 (L) 5.0 - 12.0 %    Eosinophil % 0.0 (L) 0.3 - 6.2 %    Basophil % 0.1 0.0 - 1.5 %    Immature Grans % 0.6 (H) 0.0 - 0.5 %    Neutrophils, Absolute 9.72 (H) 1.70 - 7.00 10*3/mm3    Lymphocytes, Absolute 1.02 0.70 - 3.10 10*3/mm3    Monocytes, Absolute 0.03 (L) 0.10 - 0.90 10*3/mm3    Eosinophils, Absolute 0.00 0.00 - 0.40 10*3/mm3    Basophils, Absolute 0.01 0.00 - 0.20 10*3/mm3    Immature Grans, Absolute 0.07 (H) 0.00 - 0.05 10*3/mm3    nRBC 0.0 0.0 - 0.2 /100 WBC   POC Creatinine    Specimen: Blood   Result Value Ref Range    Creatinine 0.90 0.60 - 1.30 mg/dL             The following portions of the patient's history were reviewed and updated as appropriate: allergies, current medications, past family history, past medical history, past social history, past surgical history, and problem list.    Review of Systems   Constitutional:  Negative for activity change, chills and fever.   Respiratory:  Negative for cough.    Cardiovascular:  Negative for chest pain.   Psychiatric/Behavioral:  Negative for dysphoric mood.        Objective              Physical Exam  Constitutional:       General: He is not in acute distress.     Appearance: He is well-developed.   Cardiovascular:      Rate and Rhythm: Normal rate and regular rhythm.   Pulmonary:      Effort: Pulmonary  effort is normal.      Breath sounds: Normal breath sounds.   Neurological:      Mental Status: He is alert and oriented to person, place, and time.   Psychiatric:         Behavior: Behavior normal.         Thought Content: Thought content normal.             Diagnoses and all orders for this visit:    1. Benign essential hypertension (Primary)  -     Lipid Panel  -     TSH    2. Special screening for malignant neoplasm of prostate  -     PSA Screen    Discussion with patient and reviewed his medication list in depth several times today.  He understands he is to take the metoprolol twice a day as written and to continue taking the new blood pressure pill, Zestoretic, in the morning.  Also ordered labs for when he sees his hematologist in 3 weeks and he will complete those at the same time.

## 2023-11-22 DIAGNOSIS — I10 BENIGN ESSENTIAL HYPERTENSION: Primary | ICD-10-CM

## 2023-11-22 RX ORDER — LOSARTAN POTASSIUM AND HYDROCHLOROTHIAZIDE 12.5; 5 MG/1; MG/1
1 TABLET ORAL DAILY
Qty: 90 TABLET | Refills: 1 | Status: SHIPPED | OUTPATIENT
Start: 2023-11-22

## 2023-11-30 ENCOUNTER — TELEPHONE (OUTPATIENT)
Dept: FAMILY MEDICINE CLINIC | Facility: CLINIC | Age: 73
End: 2023-11-30
Payer: MEDICARE

## 2023-11-30 NOTE — TELEPHONE ENCOUNTER
Need carnification on  losartan-hydrochlorothiazide (Hyzaar) 50-12.5 MG per tablet (11/22/2023)     Ref# [id number]

## 2023-12-01 ENCOUNTER — TELEPHONE (OUTPATIENT)
Dept: FAMILY MEDICINE CLINIC | Facility: CLINIC | Age: 73
End: 2023-12-01
Payer: MEDICARE

## 2023-12-01 NOTE — TELEPHONE ENCOUNTER
"Caller: William Ritchie \"Prince\"    Relationship: Self    Best call back number: [phone]      What is the best time to reach you: ANY TIME    Who are you requesting to speak with (clinical staff, provider,  specific staff member): CLINICAL STAFF    What was the call regarding: PATIENT STATES THAT [facility] SAYS THAT THEY NEED FURTHER INFORMATION REGARDING PATIENT'S LOSARTAN MEDICATION BEFORE THEY CAN PROCESS THE PRESCRIPTION.  HE REQUESTS THAT CLINICAL STAFF CALL THE PHARMACY AT  [phone]    Is it okay if the provider responds through [facility]:     PLEASE ADVISE.        "

## 2023-12-06 ENCOUNTER — TELEPHONE (OUTPATIENT)
Dept: FAMILY MEDICINE CLINIC | Facility: CLINIC | Age: 73
End: 2023-12-06
Payer: MEDICARE

## 2023-12-06 DIAGNOSIS — I10 BENIGN ESSENTIAL HYPERTENSION: ICD-10-CM

## 2023-12-06 RX ORDER — LOSARTAN POTASSIUM AND HYDROCHLOROTHIAZIDE 12.5; 5 MG/1; MG/1
1 TABLET ORAL DAILY
Qty: 90 TABLET | Refills: 1 | Status: SHIPPED | OUTPATIENT
Start: 2023-12-06 | End: 2023-12-11 | Stop reason: SDUPTHER

## 2023-12-06 NOTE — TELEPHONE ENCOUNTER
"Name: William Ritchie \"Prince\"    Relationship: Self    HUB PROVIDED THE RELAY MESSAGE FROM THE OFFICE   PATIENT HAS FURTHER QUESTIONS AND WOULD LIKE A CALL BACK AT THE FOLLOWING PHONE NUMBER      ADDITIONAL INFORMATION:  PATIENT HAS CALLED [facility] 3 TIMES, AND THEY ARE UNABLE TO PROVIDE THAT TO HIM, AND PATIENT IS ASKING THAT THE OFFICE/DR. MARTINEZ CALL [facility] TO PROVIDE THE PHARMACY WITH MORE INFORMATION.         "

## 2023-12-06 NOTE — TELEPHONE ENCOUNTER
This pt called returning Janiya call. He stated can you just go ahead and fill prescription please

## 2023-12-11 ENCOUNTER — TELEPHONE (OUTPATIENT)
Dept: FAMILY MEDICINE CLINIC | Facility: CLINIC | Age: 73
End: 2023-12-11
Payer: MEDICARE

## 2023-12-11 DIAGNOSIS — I10 BENIGN ESSENTIAL HYPERTENSION: ICD-10-CM

## 2023-12-11 RX ORDER — LOSARTAN POTASSIUM AND HYDROCHLOROTHIAZIDE 12.5; 5 MG/1; MG/1
1 TABLET ORAL DAILY
Qty: 90 TABLET | Refills: 1 | Status: SHIPPED | OUTPATIENT
Start: 2023-12-11

## 2023-12-11 NOTE — TELEPHONE ENCOUNTER
"  Caller: William Ritchie \"Prince\"    Relationship: Self    Best call back number: [phone]    What was the call regarding: PATIENT STATED [facility] - TANIKA Betancourt - [address] [facility] [address] [facility] - [phone] [facility] [phone]  STATED THEY CANNOT FILL THE NEW PRESCRIPTION FOR THE LOSARTAN, UNTIL THE PRESCRIPTION FOR LISINOPRIL HAS BEEN CANCELED FOR PATIENT.    HE STATED HE CONTACTED THEM TODAY AND THEY STATED THIS STILL HAS NOT BEEN RESOLVED.    PLEASE CALL.  "

## 2023-12-12 NOTE — TELEPHONE ENCOUNTER
I talked to pharm . They were told to D/C Lisinopril -The lisinopril was DC'd the day I changed him to losartan.  It is no longer on his med list.  Please let his pharmacy know.

## 2023-12-15 ENCOUNTER — LAB (OUTPATIENT)
Dept: LAB | Facility: HOSPITAL | Age: 73
End: 2023-12-15
Payer: MEDICARE

## 2023-12-15 ENCOUNTER — OFFICE VISIT (OUTPATIENT)
Dept: ONCOLOGY | Facility: CLINIC | Age: 73
End: 2023-12-15
Payer: MEDICARE

## 2023-12-15 VITALS
HEART RATE: 61 BPM | TEMPERATURE: 98.4 F | DIASTOLIC BLOOD PRESSURE: 76 MMHG | HEIGHT: 72 IN | BODY MASS INDEX: 27.96 KG/M2 | WEIGHT: 206.4 LBS | SYSTOLIC BLOOD PRESSURE: 156 MMHG | OXYGEN SATURATION: 97 %

## 2023-12-15 DIAGNOSIS — Z85.819 HISTORY OF OROPHARYNGEAL CANCER: Primary | ICD-10-CM

## 2023-12-15 DIAGNOSIS — Z85.819 HISTORY OF OROPHARYNGEAL CANCER: ICD-10-CM

## 2023-12-15 LAB
ALBUMIN SERPL-MCNC: 4.2 G/DL (ref 3.5–5.2)
ALBUMIN/GLOB SERPL: 1.6 G/DL
ALP SERPL-CCNC: 101 U/L (ref 39–117)
ALT SERPL W P-5'-P-CCNC: 25 U/L (ref 1–41)
ANION GAP SERPL CALCULATED.3IONS-SCNC: 7.5 MMOL/L (ref 5–15)
AST SERPL-CCNC: 19 U/L (ref 1–40)
BASOPHILS # BLD AUTO: 0.05 10*3/MM3 (ref 0–0.2)
BASOPHILS NFR BLD AUTO: 0.7 % (ref 0–1.5)
BILIRUB SERPL-MCNC: 0.7 MG/DL (ref 0–1.2)
BUN SERPL-MCNC: 13 MG/DL (ref 8–23)
BUN/CREAT SERPL: 15.1 (ref 7–25)
CALCIUM SPEC-SCNC: 9.2 MG/DL (ref 8.6–10.5)
CHLORIDE SERPL-SCNC: 97 MMOL/L (ref 98–107)
CO2 SERPL-SCNC: 29.5 MMOL/L (ref 22–29)
CREAT SERPL-MCNC: 0.86 MG/DL (ref 0.76–1.27)
DEPRECATED RDW RBC AUTO: 39.2 FL (ref 37–54)
EGFRCR SERPLBLD CKD-EPI 2021: 90.3 ML/MIN/1.73
EOSINOPHIL # BLD AUTO: 0.68 10*3/MM3 (ref 0–0.4)
EOSINOPHIL NFR BLD AUTO: 9.8 % (ref 0.3–6.2)
ERYTHROCYTE [DISTWIDTH] IN BLOOD BY AUTOMATED COUNT: 11.4 % (ref 12.3–15.4)
GLOBULIN UR ELPH-MCNC: 2.6 GM/DL
GLUCOSE SERPL-MCNC: 109 MG/DL (ref 65–99)
HCT VFR BLD AUTO: 44 % (ref 37.5–51)
HGB BLD-MCNC: 15.2 G/DL (ref 13–17.7)
IMM GRANULOCYTES # BLD AUTO: 0.02 10*3/MM3 (ref 0–0.05)
IMM GRANULOCYTES NFR BLD AUTO: 0.3 % (ref 0–0.5)
LYMPHOCYTES # BLD AUTO: 1.51 10*3/MM3 (ref 0.7–3.1)
LYMPHOCYTES NFR BLD AUTO: 21.9 % (ref 19.6–45.3)
MCH RBC QN AUTO: 32.5 PG (ref 26.6–33)
MCHC RBC AUTO-ENTMCNC: 34.5 G/DL (ref 31.5–35.7)
MCV RBC AUTO: 94 FL (ref 79–97)
MONOCYTES # BLD AUTO: 0.48 10*3/MM3 (ref 0.1–0.9)
MONOCYTES NFR BLD AUTO: 6.9 % (ref 5–12)
NEUTROPHILS NFR BLD AUTO: 4.17 10*3/MM3 (ref 1.7–7)
NEUTROPHILS NFR BLD AUTO: 60.4 % (ref 42.7–76)
NRBC BLD AUTO-RTO: 0 /100 WBC (ref 0–0.2)
PLATELET # BLD AUTO: 222 10*3/MM3 (ref 140–450)
PMV BLD AUTO: 8.9 FL (ref 6–12)
POTASSIUM SERPL-SCNC: 4.4 MMOL/L (ref 3.5–5.2)
PROT SERPL-MCNC: 6.8 G/DL (ref 6–8.5)
RBC # BLD AUTO: 4.68 10*6/MM3 (ref 4.14–5.8)
SODIUM SERPL-SCNC: 134 MMOL/L (ref 136–145)
WBC NRBC COR # BLD AUTO: 6.91 10*3/MM3 (ref 3.4–10.8)

## 2023-12-15 PROCEDURE — 36415 COLL VENOUS BLD VENIPUNCTURE: CPT

## 2023-12-15 PROCEDURE — 85025 COMPLETE CBC W/AUTO DIFF WBC: CPT

## 2023-12-15 PROCEDURE — 80053 COMPREHEN METABOLIC PANEL: CPT

## 2023-12-15 NOTE — PROGRESS NOTES
Subjective .     REASONS FOR FOLLOWUP:    Squamous cell carcinoma of the base of tongue, stage YEN (T1N2b), HPV status is not clear.  Started concurrent chemoradiation therapy on 12/4/2017 using cisplatin repeating every 3 weeks.    Moderate neutropenia secondary to concurrent chemoradiotherapy.  Cycle #3 cisplatin was delayed and then canceled.    Radiation therapy finished on 1/23/2018.  PEG tube was removed in May 2018.    PET scan on 3/15/2018 reported essentially complete resolution of hypermetabolic lesion at the left tongue base.  The left neck lymphadenopathy also showed blood pool activity.    Patient switched his ENT care to Dr. Browning.   CT scan on 06/18/2019 showed no evidence of disease recurrence.    Neck CT scan on 6/15/2020 reported no evidence for disease recurrence.  Neck CT scan on 6/3/2021 reported no evidence for disease recurrence.    HISTORY OF PRESENT ILLNESS:  The patient is a 73 y.o. year old male who presents today on 12/15/2023 for 6 months reevaluation.    The patient reports he was started on new medication Hyzaar which actually just started today. The patient reports he is doing well. No dysphagia, odynophagia, no hoarseness of voice. No weight loss. Denies any palpable lymphadenopathy or nodules in the neck area. Patient has not seen his ENT physician, Dr. Browning, for a while.    The patient laboratory study today on 12/15/2023 showed normal CBC including hemoglobin 15.2 g/dL, WBC 1,610, neutrophils 4170, platelets 222,000. Pending labs for TSH and CMP.      Past Medical History:   Diagnosis Date    Allergic     Benign essential hypertension     Cholelithiasis     H/O complete eye exam due    H/O Neck mass     left    Hyperlipidemia     IFG (impaired fasting glucose)     Seasonal allergies     Tongue cancer 11/01/2017    Left base of tongue squamous cell carcinoma, stage YEN, recieved chemo and radiation therapy   Carotid artery stenosis.      Past Surgical History:   Procedure  Laterality Date    CHOLECYSTECTOMY  07/2018    CHOLECYSTECTOMY WITH INTRAOPERATIVE CHOLANGIOGRAM N/A 07/05/2018    Procedure: Laparoscopic cholecystectomy with intraoperative cholangiogram ;  Surgeon: Ashley Fischer MD;  Location: [facility] OR;  Service: General    COLONOSCOPY N/A 2015    normal colonoscopy-Dr. Galen Morrissey    COLONOSCOPY N/A 05/04/2011    Normal colonoscopy-Dr. Tobias Emerson    ENDOSCOPY W/ PEG TUBE PLACEMENT N/A 11/28/2017    Procedure: ESOPHAGOGASTRODUODENOSCOPY WITH PERCUTANEOUS ENDOSCOPIC GASTROSTOMY TUBE INSERTION;  Surgeon: Isma Hercules MD;  Location: [facility] OR;  Service:     FINE NEEDLE ASPIRATION Left 10/13/2017    Ultrasound guidance for fine needle biopsy and fine needle aspiration with ultrasonic guidance of left neck mass-Dr. Frank Marques    INGUINAL HERNIA REPAIR Left 1994    Dr. Brian Peres    INGUINAL HERNIA REPAIR Right 1993    Dr. Brian Peres    LARYNGOPLASTY      Laryngoplasty with biopsy-Dr. Del Toro    LA INSJ TUNNELED CVC W/O SUBQ PORT/ AGE 5 YR/> Left 11/28/2017    Procedure: INSERTION VENOUS ACCESS DEVICE;  Surgeon: Isma Hercules MD;  Location: [facility];  Service: General    TONGUE BIOPSY / EXCISION N/A 11/01/2017    Biopsy of the left tongue base-Dr. Zane Del Toro    TONSILLECTOMY AND ADENOIDECTOMY Bilateral 11/01/2017    Dr. Zane Del Toro   Removal PEG tube on 5/2/2018    Cholecystectomy in July 2018      HEMATOLOGIC/ONCOLOGIC HISTORY:  Mr. Ritchie is a 67 y.o. male who is here on 11/16/2017 for evaluation accompanied by his wife, referred by radiation oncologist, Dr. Aguirre, because of newly diagnosed squamous cell carcinoma of the left tongue base, stage YEN, [id number].      Patient previously only had history of mild hypertension which was controlled. Recently in early 09/2017 when he was on vacation, he felt a left neck nodule when he was shaving. He denies any pain associated with that. Patient was seen by his primary care  physician, Dr. Sheffield, for evaluation and was referred to ENT, Dr. Zane Del Toro. Patient subsequently had CT of the neck examination at [facility] on 09/22/2017. This study reported a left neck mass measuring 3.5 x 3.1 x 2.4 cm with cystic/necrotic changes located at the region of the left level II jugular chain. There were additional small homogeneous cervical lymph nodes but possibly reactive.      Patient subsequently had ultrasound of the neck on 10/13/2017 associated with fine-needle aspiration biopsy at Dr. Del Toro' office. The ultrasound reported 2 nodules in left neck. The large one measured about 3.28 cm x 2.67 cm x 3.28 cm. The 2nd smaller one measures 1.56 cm x 0.99 cm x 1.48 cm, just below the large mass. Patient had fine-needle aspiration biopsy at the same time. Pathology evaluation from the [facility] reported poorly differentiated squamous cell carcinoma with degeneration and necrosis. The viable tumor cells exhibit strong nuclear reactivity for both p40 and p63.     Patient subsequently had tonsillectomy, biopsy of the left tongue base and adenoidectomy on 11/01/2017 by Dr. Del Toro. Pathology evaluation from [facility] reported moderately differentiated squamous cell carcinoma. The left tonsil has no evidence of malignancy. Right tonsil also was benign. The adenoid tissue was also benign.      Patient reports the left neck mass is growing. He also reports poor appetite after tonsillectomy. For the past couple of weeks since the biopsy, he lost about 6 pounds. He denies nausea or vomiting. He has actually started feeling better with improved appetite. Denies pain. Denies fever or sweating.      This patient reports he never smoked cigarette. He is only a very rare social drinker. He told me the test for HPV is ongoing.      Patient also had PET scan examination obtained on 11/14/2017. This study reported focal hypermetabolism with SUV 15.9 corresponding to the  left-sided base of the tongue. There was moderate enlarged left jugular chain lymph node which is hypermetabolic but without measuring SUV. There was also mild hypermetabolism identified within several additional smaller left jugular chain lymph nodes. There was no hypermetabolic lymphadenopathy elsewhere in the neck nor foci in the chest, abdomen or pelvis.    Patient was started on concurrent chemoradiation therapy with cisplatin every 3 weeks.  He received 2 cycles chemotherapy and due to poor tolerance with acute renal injury and neutropenia, cycle #3 cisplatin was canceled.     Radiation therapy finished on 1/23/2018.    PET scan on 3/15/2018 reported essentially complete resolution of hypermetabolic lesion at the left tongue base.  The left and neck lymphadenopathy also showing, with blood pool activity.    His CT scan examination of the neck on 6/11/2018 reported no evidence of local disease recurrence, a stable left neck adenopathy, maybe 2 mm smaller compared to the PET scan obtained in March 2018.  Laboratory study on the same day reported normal renal function with a creatinine 0.73 normal electrolytes and liver function panel, stable mild anemia with hemoglobin 12.4 and normal WBC and platelets.    Laboratory study on 9/7/2018 reported normal iron study with ferritin 274, iron 100, TIBC 274 iron saturation 36% in the normal B12 level 631 pg/mL.  Hemoglobin was 13.5 improved and normal WBC 5350, and platelets 206,000.  He also had a normal TSH 2.87, and completely normal CMP.     CT scan examination for the neck with IV contrast on 11/21/2018 showed evidence of disease recurrence.  Laboratory study reported elevated glucose otherwise normal CMP.  Patient also had mildly elevated neutrophils.  These were likely secondary to steroids use prior to the CT scan because he is allergic to IV dye.      CT Neck done on 06/18/2019 showed beam hardening artifact limits evaluation somewhat but there is no evidence of  residual or recurrent tongue base mass. There is no evidence of pathologic adenopathy. 1.4 x 0.9 cm area of lucency involving the spinous process of T1, nonspecific but stable. This likely represents an atypical hemangioma. Area of lucency related to the left maxillary 1st molar suggesting a periapical abscess measuring 1.5 cm in maximum dimension. Clinical correlation and standard dental radiographs recommended.    Laboratory study 06/18/2019 showed creatine at 0.80, TSH Baseline at 1.280, Free T4 at 1.08, and free T3 at 2.33. CBC was normal besides WBC elevated at 11.6, RDW down at 11.6, [place] elevated at 6[address], absolute monocytes at 60, absolute lymphocytes at 980, and absolute immature grans 0.07. CMP normal besides elevated glucose of 219.     CT scan examination on 6/15/2020 showed no evidence of disease recurrence.  However it reported a left carotid artery at least moderate stenosis.    Laboratory studies on 6/15/2020 reported elevated neutrophils 9090, otherwise unremarkable.  He had a normalized TSH and a normal free T4.  Chemistry lab showed elevated glucose level otherwise unremarkable.     In June 2020 patient had neck CT scan which reported carotid artery stenosis.  Will refer the patient to vascular surgery.  Patient reports he was seen by vascular surgery who recommended observation for now.      Laboratory study on 12/4/2020 reported normal CBC including Hb 14.5, platelets 200,000 WBC 6300 with ANC 3140 lymphocytes 1800 eosinophil 790 and monocytes 510.  Elevated TSH 5.26 however normal free T4 at 1.18 ng/dL.    Neck CT scan examination on 6/3/2021 reported no evidence for disease local recurrence or cervical lymphadenopathy.    Laboratory study 6/3/2021 reported normal hemoglobin platelets, however elevated neutrophils 9340 out of WBC 10,700.  Chemistry lab reported normal TSH and free T4, and unremarkable CMP except elevated glucose of 143. Both of hyperglycemia and neutrophil elevation are  likely secondary to oral prednisone given prior to IV contrast for CT scan.      Laboratory studies on 12/17/2021 reported normal CBC with Hb 14.4 platelets 144,000 WBC 5780.  Normal thyroid studies with TSH 4.15, and free T4 at 1.28.  Chemistry lab reported normal CMP.    CT scan for neck obtained 6/17/2022 reported no evidence for pathologic lymphadenopathy.    Laboratory studies on 6/17/2022 reported normal CBC, normal hemoglobin 15.4 and platelets 249,000, however with slightly elevated neutrophils 7700 which is likely due to steroids prednisone given prior to the CT scan because of allergic reaction to IV dye.  He had a normal TSH, free T4 and free T3.  CMP was also unremarkable except elevated glucose 165 which is again likely due to prednisone.      Laboratory study at his primary care physician's office on 10/10/2022 which reported normal liver function panel and renal function, glucose 113 mg/dL and potassium 5.3 mmol/L, otherwise unremarkable. He had a slightly elevated TSH of 4.85 uIU/mL.     Laboratory studies on 12/2/2022 reported hemoglobin 14.8 g/dL, MCV 93.7 fL, platelets 209,000, WBC 5900, ANC 3170 and lymphocytes 1570.     Lab study on 06/19/2023 reported normal thyroid profile including TSH, free T4, and free T3. The chemistry lab reported glucose 167, sodium 130, chloride of 94, otherwise normal CMP. CBC study showed mild leukocytosis, WBC 10,850 including neutrophils 9,720, otherwise normal hemoglobin 16.0, hematocrit 47.0%, and platelets 256,000.    The patient had CT scan examination of the neck with IV contrast on 06/19/2023. This study reported no evidence for residual or recurrent malignancy in the neck. There was moderate to severe vascular calcification involving the left internal carotid artery. There is also completed opacification of the right maxillary sinus similar to previous studies.    The patient was taking prednisone prior to the CT scan because of previous reaction to the IV  "dyes and this is the reason for his elevated white cells at 10,800 including the neutrophils 9,720.      MEDICATIONS    Current Outpatient Medications:     ASPIRIN 81 PO, , Disp: , Rfl:     atorvastatin (LIPITOR) 20 MG tablet, Take 1 tablet by mouth Daily., Disp: 90 tablet, Rfl: 3    losartan-hydrochlorothiazide (Hyzaar) 50-12.5 MG per tablet, Take 1 tablet by mouth Daily., Disp: 90 tablet, Rfl: 1    metoprolol tartrate (LOPRESSOR) 50 MG tablet, Take 1 tablet by mouth 2 (Two) Times a Day., Disp: 180 tablet, Rfl: 3    sildenafil (REVATIO) 20 MG tablet, Take 2-3 tabs QD prn, Disp: 30 tablet, Rfl: 11    valACYclovir (VALTREX) 500 MG tablet, TAKE 4 TABLETS BY MOUTH AT FIRST SIGN OF ATTACK AND THEN TAKE 4 TABLETS 12 HOURS LATER, Disp: , Rfl:     ALLERGIES:     Allergies   Allergen Reactions    Iodinated Contrast Media Itching and Rash     Had a 24 hour delayed reaction to Ct contrast       SOCIAL HISTORY:       Social History     Social History    Marital status:      Spouse name: Janiya    Number of children: 2    Years of education: High School     Occupational History     Retired     [employer]     Social History Main Topics    Smoking status: Never Smoker    Smokeless tobacco: Never Used    Alcohol use Yes      Comment: occassional    Drug use: No    Sexual activity: No       FAMILY HISTORY:  Family History   Problem Relation Age of Onset    Alcohol abuse Father     Diabetes Father     Cancer Brother     Malig Hyperthermia Neg Hx            Objective    Vitals:    12/15/23 1127   BP: 156/76   Pulse: 61   Temp: 98.4 °F (36.9 °C)   SpO2: 97%   Weight: 93.6 kg (206 lb 6.4 oz)   Height: 183 cm (72.05\")   PainSc: 0-No pain           12/15/2023    11:27 AM   Current Status   ECOG score 0      PHYSICAL EXAM:    GENERAL:  Well-developed, well-nourished in no acute distress.  Orientated to time place and the people.  SKIN:  Warm, dry without rashes, purpura or petechiae.  HEENT:  Normocephalic.   LYMPHATICS:  No cervical, " supraclavicular adenopathy.  CHEST: Normal respiratory effort.  Lungs clear to auscultation. Good airflow.  CARDIAC:  Regular rate and rhythm without murmurs. Normal S1,S2.  ABDOMEN:  Soft, nontender with no organomegaly or masses.  Bowel sounds normal.  EXTREMITIES:  No lower extremity edema.      RECENT LABS:    Lab Results   Component Value Date    WBC 6.91 12/15/2023    HGB 15.2 12/15/2023    HCT 44.0 12/15/2023    MCV 94.0 12/15/2023     12/15/2023     Lab Results   Component Value Date    NEUTROABS 4.17 12/15/2023     Lab Results   Component Value Date    GLUCOSE 109 (H) 12/15/2023    BUN 13 12/15/2023    CREATININE 0.86 12/15/2023    EGFRIFNONA 83 12/17/2021    EGFRIFAFRI 93 10/07/2020    BCR 15.1 12/15/2023    K 4.4 12/15/2023    CO2 29.5 (H) 12/15/2023    CALCIUM 9.2 12/15/2023    PROTENTOTREF 6.5 10/10/2022    ALBUMIN 4.2 12/15/2023    LABIL2 2.4 10/10/2022    AST 19 12/15/2023    ALT 25 12/15/2023     Lab Results   Component Value Date    TSH 4.050 12/15/2023         IMAGING STUDY:   CT Soft Tissue Neck With Contrast  Narrative: CT NECK WITH CONTRAST     HISTORY: Oropharyngeal cancer, follow-up.     COMPARISON: CT examinations of the neck with the most recent examination  being the CT examination of 06/17/2022.     FINDINGS: The presenting PET examination of 11/14/2017 demonstrated a  mass involving the tongue base laterally to the left.     The parotid glands appear unremarkable. The submandibular glands are  atrophic, similar in appearance as compared to the prior study. The  thyroid is unremarkable in appearance. The cords are symmetrical. There  is no evidence of pathologic adenopathy. Moderate-to-severe vascular  calcification involving the left internal carotid artery proximally is  appreciated. Further evaluation with a carotid ultrasound or a CT  angiogram of the neck is suggested. There is complete opacification of  the right maxillary sinus, unchanged.     There is no evidence of a  residual or recurrent mass at the left tongue  base.     Impression: 1.  There is no evidence of residual recurrent mass at the left tongue  base or pathologic adenopathy.  2.  Moderate-to-severe vascular calcification involving the left  internal carotid artery proximally is appreciated. Further evaluation  with a carotid ultrasound or a CT angiogram of the neck is suggested.  3.  There is complete opacification of the right maxillary sinus,  similar in appearance compared to the prior study.           Radiation dose reduction techniques were utilized, including automated  exposure control and exposure modulation based on body size.     This report was finalized on 6/20/2023 11:28 PM by Dr. Yasir Osei M.D.           Assessment & Plan     1.  Left tongue base squamous cell carcinoma.   Stage YEN.  Finished 2 cycles chemotherapy with Cisplatin day 1 and day 22, with concurrent radiation on 1/23/2018.  Did not receive day #43 cisplatin as planned due to neutropenia.   His PET scan on 3/15/2018 showed essentially complete metabolic response.  The residual left neck lymph node shows only low-level blood pool activity.   Patient had CT scan examination of the neck on 6/11/2018 which showed stable left neck lymphadenopathy, probably smaller by 2 mm compared to the PET scan on 3/15/2018. This lesion is still probably dying down.     Patient switched his ENT care to Dr. Browning.   Patient had CT scan examination of the neck with IV contrast on 06/18/2019, showed no evidence of disease recurrence.    CT scan examination on 6/15/2020 showed no evidence of disease recurrence.   Physical examination on 12/4/2020 was negative for cervical lymphadenopathy.  Recommended to have a CT scan for the neck in 6 months for reevaluation.  CT of the neck obtained 6/3/2021 reported no evidence for local disease recurrence or neck lymphadenopathy.  Physical examination today is also benign.  On 12/17/2021, patient reports some discomfort  in the left side of neck/submandibular area.  Otherwise negative review.  Physical examination negative for lymphadenopathy or mass.    CT of the neck with IV contrast on 6/17/2022 reported stable condition, no evidence for disease recurrence.  Physical examination today on 6/24/2022 also had no palpable lymphadenopathy in the neck supraclavicular or axilla.  On 12/2/2022, the patient has an unremarkable physical examination and review of system. CT scan of the neck with and without contrast is recommended in 6 months for reassessment.    The patient had CT scan for the neck obtained on 06/19/2023, which showed no evidence for disease recurrence or residual tumor.   The patient presented today on 12/15/2023, reports doing well with no specific complaints. Physical examination, he has no lymphadenopathy in the neck area. I recommended the patient to follow up with his ENT physician, Dr. Browning, for a checkup.      2.  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    Patient has marginally elevated TSH on 03/08/2019 which was 4.47.  This may be the beginning of his hypothyroidism secondary to radiation therapy.    TSH at 1.280, Free T4 at 1.08, and free T3 at 2.33 on 06/18/2019.    Slightly elevated TSH 4.34 on 2/3/2020.     Lab study on 6/15/2020 reported normal TSH 1.47 and free T4 at 1.12 ng/DL.    Lab study on 12/4/2020 reported mildly elevated TSH 5.26, however normal free T4 at 1.18 ng/dL.    Lab study on 6/3/2021 reported normal TSH 1.33 and normal free T4 at 1.12 ng/dL.  We will continue to monitor.  On 12/17/2021, patient has upper normal TSH 4.15, and free T4 at 1.28 ng/dL.  On 6/17/2022 patient had normal TSH 1.43, free T4 at 1.21 ng/dL, and free T3 at 2.49 pg/mL.  Lab study on 10/10/2022 reported a mildly elevated TSH 4.85 uIU/mL. However, no free T4 or T3 at his primary care physician's office.    On 12/2/2022, it was discussed with the patient, and we recommended to repeat TSH and add free  T3 and T4 for assessment.   06/19/2023, the patient had normal TSH, free T3, and free T4.  12/15/2023, pending results for TSH.(Updated normal TSH 4.05)  3.  Tinnitus and decreased hearing.  He states he had ringing in his ears prior to cisplatin.  His tinnitus did not worsen with cisplatin chemotherapy.   No change of clinical condition.    4.  Left carotid artery stenosis, at least moderate per CT scan examination on 6/15/2020.   Patient is asymptomatic.    He was seen by vascular surgeon and recommended observation.   CT scan on 06/19/2023 reported moderate to severe calcification of the carotid arteries. The patient reports that he is due to see vascular surgeon in the coming months.    5.  Hyperglycemia.    Labs 06/18/2019 showed glucose of 219 and has been high in past as well.  This may be related to his prednisone use before CT scan examination.  However his previous glucose levels has been elevated multiple times.  He was diagnosed with DM II.    Patient had relatively normal glucose 111 in December 2019 and February 2020.   Elevated glucose 197 on 6/15/2020 on day of CT scan.  This again is possibly related to his prednisone prior to CT scan examination.   Glucose 143 on 6/3/2021.  Elevation of glucose may likely related to oral prednisone given prior to IV contrast for CT scan examination.    Normal glucose 109 on 12/17/2021.  Patient did not have steroids use.  On 6/17/2022 glucose 165, likely due to prednisone given prior to CT scan examination.  On 10/10/2022, his blood glucose was 113 mg/dL and hemoglobin A1c was 5.7%. Her diabetes are well controlled.    Laboratory study on 06/19/2023 showed elevated glucose 165. This is due to steroids Prednisone was given to him prior to the CT scan examination due to his reaction to iv dye.  Today on 12/15/2023, the patient has a glucose level of 109 mg/dL.    6.  Mild leukocytosis.  6/19/2023 patient has mild leukocytosis, and also neutrophilia with ANC 9720.   Patient reports no any recent infection.  This is likely due to oral prednisone started the night before in preparation for his CT scan due to previous reaction to the IV dye.  No need for further evaluation.  Today on 12/15/2023, the patient has completely normalized WBC 6910 including neutrophils 4170, lymphocytes 1,510, however, mildly elevated eosinophils 680.      PLAN:    We will refer the patient back to ENT, Dr. Browning, for routine follow-up evaluation with laryngoscope examination.  We will see the patient on an annual basis now, we will check CBC and CMP, see him in 12/2024.    The patient will continue to follow up with vascular surgeon for evaluation of carotid stenosis.      Discussed with patient and his wife today about the laboratory studies and follow-up appointment.        Nuvia Ballesteros MD PhD     CC:     Ameya Sheffield M.D.    Ameya Aguirre M.D.    Isma Hercules M.D.      Efrain Browning M.D.    Christiano Smith MD            Transcribed from ambient dictation for Nuvia Ballesteros MD PhD by Coco Lainez.  12/15/23   13:22 EST    Patient or patient representative verbalized consent to the visit recording.  I have personally performed the services described in this document as transcribed by the above individual, and it is both accurate and complete.    Nuvia Ballesteros MD PhD

## 2023-12-16 LAB
CHOLEST SERPL-MCNC: 115 MG/DL (ref 100–199)
HDLC SERPL-MCNC: 51 MG/DL
LDLC SERPL CALC-MCNC: 50 MG/DL (ref 0–99)
PSA SERPL-MCNC: 1.8 NG/ML (ref 0–4)
TRIGL SERPL-MCNC: 68 MG/DL (ref 0–149)
TSH SERPL DL<=0.005 MIU/L-ACNC: 4.05 UIU/ML (ref 0.45–4.5)
VLDLC SERPL CALC-MCNC: 14 MG/DL (ref 5–40)

## 2023-12-28 ENCOUNTER — TELEPHONE (OUTPATIENT)
Dept: FAMILY MEDICINE CLINIC | Facility: CLINIC | Age: 73
End: 2023-12-28
Payer: MEDICARE

## 2023-12-28 NOTE — TELEPHONE ENCOUNTER
LEFT MESSAGE FOR PATIENT TO RETURN CALL. NEED MORE INFORMATION ON MEDICATION PATIENT IS TALKING ABOUT.OK FOR THE HUB TO RELAY MESSAGE IF NEEDED

## 2023-12-28 NOTE — TELEPHONE ENCOUNTER
PATIENT WANTS TO KNOW IF IT IS OKAY TO TAKE SALGEN 5MG 4 PILLS A DAY FOR DRY MOUTH WITH HIS OTHER MEDICATION. PLEASE ADVISE.    ensure enlive 2x/day/Commercial beverage

## 2023-12-29 NOTE — TELEPHONE ENCOUNTER
"  Name: ChauWilliam J \"Prince\"    Relationship: Self    Best Callback Number: [phone]    HUB PROVIDED THE RELAY MESSAGE FROM THE OFFICE   PATIENT VOICED UNDERSTANDING AND HAS NO FURTHER QUESTIONS AT THIS TIME    ADDITIONAL INFORMATION:PATIENT STATED THAT THE NAME OF THE MEDICATION IS SALAGEN OR ANOTHER NAME IT MAY GO BY IS pilocarpine 5 MG AND HIS OTHER DOCTOR DAPHNEY MONTANO IS WANTING HIM TO TAKE IT 4 TIMES A DAY FOR HIS DRY MOUTH HE DEVELOPED FROM HAVING RADIATION AND IS WANTING TO MAKE SURE IT IS OKAY TO TAKE WITH HIS OTHER MEDICATIONS.    "

## 2024-01-12 NOTE — TELEPHONE ENCOUNTER
"  Caller: William Ritchie \"Prince\"    Relationship: Self    Best call back number: [phone]    Which medication are you concerned about: losartan-hydrochlorothiazide (Hyzaar) 50-12.5 MG per tablet     Who prescribed you this medication: DR MARTINEZ    When did you start taking this medication: DECEMBER 2023    What are your concerns: PATIENT STATED THAT HIS EXTREMELY DRY MOUTH WAKES HIM AT NIGHT AND HE THINKS IT MIGHT BE CAUSED BY THE ABOVE MEDICATION.    PATIENT REQUESTING TO HAVE A DIFFERENT MEDICATION CALLED IN THAT DOES NOT CAUSE DRY MOUTH AS BADLY AS THE LOSARTAN HAS BEEN DOING.    PATIENT STATED THAT THE LISINOPRIL DIDN'T SEEM TO BE THIS BAD AS FAR AS DRY MOUTH.    PATIENT STATED HE WOULD BE DRIER DURING THE DAY WITH THE LISINOPRIL AND IT WAS BETTER.  THE LOSARTAN IS WAKING HIM UP AT NIGHT THAT HIS MOUTH AND LIPS ARE SO DRY AND HE HAS TROUBLE GETTING BACK TO SLEEP.    [id number] [place] - [address] AT [facility] [phone] [facility] [phone] FX      How long have you had these concerns: FOR A COUPLE OF WEEKS        "

## 2024-01-13 ENCOUNTER — HOSPITAL ENCOUNTER (EMERGENCY)
Facility: HOSPITAL | Age: 74
Discharge: HOME | End: 2024-01-13
Attending: EMERGENCY MEDICINE
Payer: MEDICARE

## 2024-01-13 VITALS
WEIGHT: 207 LBS | BODY MASS INDEX: 28.04 KG/M2 | SYSTOLIC BLOOD PRESSURE: 148 MMHG | TEMPERATURE: 98.1 F | RESPIRATION RATE: 17 BRPM | DIASTOLIC BLOOD PRESSURE: 70 MMHG | HEART RATE: 68 BPM | OXYGEN SATURATION: 95 % | HEIGHT: 72 IN

## 2024-01-13 DIAGNOSIS — I15.9 SECONDARY HYPERTENSION: Primary | ICD-10-CM

## 2024-01-13 PROCEDURE — 93005 ELECTROCARDIOGRAM TRACING: CPT | Performed by: EMERGENCY MEDICINE

## 2024-01-13 PROCEDURE — 99282 EMERGENCY DEPT VISIT SF MDM: CPT

## 2024-01-13 RX ORDER — PILOCARPINE HYDROCHLORIDE 5 MG/1
5 TABLET, FILM COATED ORAL EVERY 12 HOURS SCHEDULED
COMMUNITY
Start: 2023-12-29

## 2024-01-14 LAB
QT INTERVAL: 396 MS
QTC INTERVAL: 428 MS

## 2024-01-14 NOTE — FSED PROVIDER NOTE
Subjective   History of Present Illness  Patient is here because his blood pressure was 200 at home he had taken it multiple times.  He recently got switched to losartan hydrochlorothiazide.  And it was changed from lisinopril because his blood pressure was starting to creep up and so change was made over the last 2 weeks.  The patient's blood pressure though has not improved.  Patient has no chest pain headaches no symptoms at all all his review of systems is negative.  Patient was just concerned because it was elevated.      Review of Systems   All other systems reviewed and are negative.      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 Galen Morrissey    COLONOSCOPY N/A 05/04/2011    Normal colonoscopy-Dr. Tobisa Emerson    ENDOSCOPY W/ PEG TUBE PLACEMENT N/A 11/28/2017    Procedure: ESOPHAGOGASTRODUODENOSCOPY WITH PERCUTANEOUS ENDOSCOPIC GASTROSTOMY TUBE INSERTION;  Surgeon: Isma Hercules MD;  Location: [facility];  Service:     FINE NEEDLE ASPIRATION Left 10/13/2017    Ultrasound guidance for fine needle biopsy and fine needle aspiration with ultrasonic guidance of left neck mass-Dr. Frank Marques    INGUINAL  HERNIA REPAIR Left 1994    Dr. Brian Peres    INGUINAL HERNIA REPAIR Right 1993    Dr. Brian Peres    LARYNGOPLASTY      Laryngoplasty with biopsy-Dr. Del Toro    KS INSJ TUNNELED CVC W/O SUBQ PORT/ AGE 3 YR/> Left 11/28/2017    Procedure: INSERTION VENOUS ACCESS DEVICE;  Surgeon: Isma Hercules MD;  Location: [facility];  Service: General    TONGUE BIOPSY / EXCISION N/A 11/01/2017    Biopsy of the left tongue base-Dr. Zane Del Toro    TONSILLECTOMY AND ADENOIDECTOMY Bilateral 11/01/2017    Dr. Zane Del Toro       Family History   Problem Relation Age of Onset    Alcohol abuse Father     Diabetes Father     Cancer Brother     Malig Hyperthermia Neg Hx        Social History     Socioeconomic History    Marital status:      Spouse name: Janiya    Number of children: 2    Years of education: High School   Tobacco Use    Smoking status: Never    Smokeless tobacco: Never   Substance and Sexual Activity    Alcohol use: Not Currently     Comment: occassional    Drug use: No    Sexual activity: Yes     Partners: Female     Birth control/protection: None           Objective   Physical Exam  Vitals and nursing note reviewed.   Constitutional:       General: He is not in acute distress.     Appearance: Normal appearance. He is not ill-appearing, toxic-appearing or diaphoretic.   HENT:      Head: Normocephalic and atraumatic.   Eyes:      Extraocular Movements: Extraocular movements intact.      Pupils: Pupils are equal, round, and reactive to light.   Cardiovascular:      Rate and Rhythm: Normal rate and regular rhythm.   Pulmonary:      Effort: Pulmonary effort is normal.      Breath sounds: Normal breath sounds.   Musculoskeletal:         General: Normal range of motion.      Cervical back: Normal range of motion and neck supple.   Skin:     General: Skin is warm and dry.      Capillary Refill: Capillary refill takes less than 2 seconds.   Neurological:      Mental Status: He is alert and oriented to  person, place, and time.   Psychiatric:      Comments: Patient is a little anxious about his blood pressure being elevated but has no other symptomatology.         Procedures           ED Course                                           Medical Decision Making  EKG is normal I spent 15 minutes with the patient and his wife and we discussed blood pressures and when to worry and when to get checked.  He does have an appointment on Thursday.  Patient had his blood pressure rechecked and it came down nicely 160 systolically and then rechecked again 10 minutes later and it was 140s systolically.  The patient feels much better after our discussion he understands that unless he has symptoms he does not need to come to the emergency department that he can follow-up with his doctor in a timely basis in 1 to 2 weeks and have his blood pressure medication adjusted accordingly.  His own doctor should make these decisions because there are many blood pressure medications affecting multiple organ systems and these choices should be made with someone who knows him well and is involved in his care.  Patient understands the same and feels comfortable going home he was reassured that his EKG is normal.    Amount and/or Complexity of Data Reviewed  ECG/medicine tests: ordered.     Details: Patient's EKG interpreted by myself shows a normal sinus rhythm with a ventricular rate of 70 a OK interval 209 ms and a QT corrected 428 ms there is no ST-T wave changes.        Final diagnoses:   Secondary hypertension       ED Disposition  ED Disposition       ED Disposition   Discharge    Condition   Stable    Comment   --               Ameya Sheffield MD  [address] [id number] [phone]    In 5 days  On Thursday as scheduled         Medication List      No changes were made to your prescriptions during this visit.

## 2024-01-14 NOTE — DISCHARGE INSTRUCTIONS
Return to the emergency department if you have symptoms chest pain headache and your blood pressure is elevated.  Do not take your blood pressure more than once a day and any arm and record it so that your doctor will see this on Thursday and can better make an informed decision.

## 2024-01-17 NOTE — TELEPHONE ENCOUNTER
LDTVM ADVISING OF PCP DICTATION. PT INSTRUCTED TO SEND DIRECT MESSAGE TO PROVIDER FOR FURTHER INFO.

## 2024-01-17 NOTE — PROGRESS NOTES
Subjective   William Ritchie is a 73 y.o. male.     CC: ED F/U for Elevated BPs    History of Present Illness     Pt returns today after recent ED visit. That visit was as follows:    History of Present Illness  Patient is here because his blood pressure was 200 at home he had taken it multiple times.  He recently got switched to losartan hydrochlorothiazide.  And it was changed from lisinopril because his blood pressure was starting to creep up and so change was made over the last 2 weeks.  The patient's blood pressure though has not improved.  Patient has no chest pain headaches no symptoms at all all his review of systems is negative.  Patient was just concerned because it was elevated.    EKG is normal I spent 15 minutes with the patient and his wife and we discussed blood pressures and when to worry and when to get checked.  He does have an appointment on Thursday.  Patient had his blood pressure rechecked and it came down nicely 160 systolically and then rechecked again 10 minutes later and it was 140s systolically.  The patient feels much better after our discussion he understands that unless he has symptoms he does not need to come to the emergency department that he can follow-up with his doctor in a timely basis in 1 to 2 weeks and have his blood pressure medication adjusted accordingly.  His own doctor should make these decisions because there are many blood pressure medications affecting multiple organ systems and these choices should be made with someone who knows him well and is involved in his care.  Patient understands the same and feels comfortable going home he was reassured that his EKG is normal.    Current outpatient and discharge medications have been reconciled for the patient.  Reviewed by: Ameya Sheffield MD          The following portions of the patient's history were reviewed and updated as appropriate: allergies, current medications, past family history, past medical history, past  "social history, past surgical history, and problem list.    Review of Systems   Constitutional:  Negative for activity change, chills and fever.   Respiratory:  Negative for cough.    Cardiovascular:  Negative for chest pain.   Psychiatric/Behavioral:  Negative for dysphoric mood.        /96 Comment: ANGELA BP DONE TODAY  Pulse 54   Temp 97.7 °F (36.5 °C) (Oral)   Resp 14   Ht 182.9 cm (72\")   Wt 94.3 kg (208 lb)   SpO2 98%   BMI 28.21 kg/m²     Objective   Physical Exam  Constitutional:       General: He is not in acute distress.     Appearance: He is well-developed.   Cardiovascular:      Rate and Rhythm: Normal rate and regular rhythm.   Pulmonary:      Effort: Pulmonary effort is normal.      Breath sounds: Normal breath sounds.   Neurological:      Mental Status: He is alert and oriented to person, place, and time.   Psychiatric:         Behavior: Behavior normal.         Thought Content: Thought content normal.     Hospital records reviewed with pt confirming HPI.      Assessment & Plan   Diagnoses and all orders for this visit:    1. Benign essential hypertension (Primary)  Comments:  Not to goal; medication adjusted/added at today's visit  Orders:  -     ramipril (Altace) 10 MG capsule; Take 1 capsule by mouth Daily.  Dispense: 90 capsule; Refill: 1  -     amLODIPine (Norvasc) 2.5 MG tablet; Take 1 tablet by mouth Daily.  Dispense: 90 tablet; Refill: 1                 "

## 2024-01-18 ENCOUNTER — OFFICE VISIT (OUTPATIENT)
Dept: FAMILY MEDICINE CLINIC | Facility: CLINIC | Age: 74
End: 2024-01-18
Payer: MEDICARE

## 2024-01-18 ENCOUNTER — TELEPHONE (OUTPATIENT)
Dept: CASE MANAGEMENT | Facility: OTHER | Age: 74
End: 2024-01-18
Payer: MEDICARE

## 2024-01-18 VITALS
RESPIRATION RATE: 14 BRPM | TEMPERATURE: 97.7 F | WEIGHT: 208 LBS | DIASTOLIC BLOOD PRESSURE: 96 MMHG | SYSTOLIC BLOOD PRESSURE: 166 MMHG | BODY MASS INDEX: 28.17 KG/M2 | HEART RATE: 54 BPM | HEIGHT: 72 IN | OXYGEN SATURATION: 98 %

## 2024-01-18 DIAGNOSIS — I10 BENIGN ESSENTIAL HYPERTENSION: Primary | Chronic | ICD-10-CM

## 2024-01-18 RX ORDER — AMLODIPINE BESYLATE 2.5 MG/1
2.5 TABLET ORAL DAILY
Qty: 90 TABLET | Refills: 1 | Status: SHIPPED | OUTPATIENT
Start: 2024-01-18

## 2024-01-18 RX ORDER — RAMIPRIL 10 MG/1
10 CAPSULE ORAL DAILY
Qty: 90 CAPSULE | Refills: 1 | Status: SHIPPED | OUTPATIENT
Start: 2024-01-18

## 2024-01-18 NOTE — TELEPHONE ENCOUNTER
Attempts 1-2: No answer, did not leave VM as patient does not have a recent verbal release on file.  FAZUAt message sent.

## 2024-01-22 ENCOUNTER — PATIENT OUTREACH (OUTPATIENT)
Dept: CASE MANAGEMENT | Facility: OTHER | Age: 74
End: 2024-01-22
Payer: MEDICARE

## 2024-01-22 DIAGNOSIS — I10 BENIGN ESSENTIAL HYPERTENSION: Primary | ICD-10-CM

## 2024-01-22 NOTE — OUTREACH NOTE
"AMBULATORY CASE MANAGEMENT NOTE    Name and Relationship of Patient/Support Person: William Ritchie \"Prince\" - Self    CCM Interim Update    Spoke with patient today, verified by name and date of birth.    Checking on patient rash and uncontrolled pressures.  States that he is currently monitoring his pressures and will be providing him with a log soon.    Denies any further assistance at this time.        Education Documentation  No documentation found.        Nena SERRANO  Ambulatory Case Management    1/22/2024, 13:05 EST  "

## 2024-01-24 DIAGNOSIS — E78.00 PURE HYPERCHOLESTEROLEMIA: ICD-10-CM

## 2024-01-24 DIAGNOSIS — I10 BENIGN ESSENTIAL HYPERTENSION: Chronic | ICD-10-CM

## 2024-01-24 DIAGNOSIS — I10 BENIGN ESSENTIAL HYPERTENSION: ICD-10-CM

## 2024-01-24 RX ORDER — AMLODIPINE BESYLATE 2.5 MG/1
2.5 TABLET ORAL DAILY
Qty: 90 TABLET | Refills: 1 | Status: SHIPPED | OUTPATIENT
Start: 2024-01-24

## 2024-01-24 RX ORDER — ATORVASTATIN CALCIUM 20 MG/1
20 TABLET, FILM COATED ORAL DAILY
Qty: 90 TABLET | Refills: 1 | Status: SHIPPED | OUTPATIENT
Start: 2024-01-24

## 2024-01-24 RX ORDER — RAMIPRIL 10 MG/1
10 CAPSULE ORAL DAILY
Qty: 90 CAPSULE | Refills: 1 | Status: SHIPPED | OUTPATIENT
Start: 2024-01-24

## 2024-01-24 RX ORDER — METOPROLOL TARTRATE 50 MG/1
50 TABLET, FILM COATED ORAL 2 TIMES DAILY
Qty: 180 TABLET | Refills: 1 | Status: SHIPPED | OUTPATIENT
Start: 2024-01-24

## 2024-01-24 NOTE — TELEPHONE ENCOUNTER
With Family Medicine (Ameya Sheffield MD)  04/18/2024 at 11:00 A  pt was given 90 day supply with one refill 01/18/2024 pt should have refills on file

## 2024-01-24 NOTE — TELEPHONE ENCOUNTER
"Caller: William Ritchie \"Prince\"    Relationship: Self    Best call back number:[phone] (Home)     Requested Prescriptions:   atorvastatin (LIPITOR) 20 MG tablet        Pharmacy where request should be sent:  [facility] - [address] [phone] [facility] [phone] FX     Last office visit with prescribing clinician: 1/18/2024   Last telemedicine visit with prescribing clinician: Visit date not found   Next office visit with prescribing clinician: 4/18/2024     Additional details provided by patient: PATIENT CALLED TO REQUEST A MEDICATION REFILL ON MEDICATION WITH A 90 DAY SUPPLY. PATIENT HAS A 14 DAY SUPPLY LEFT.      Does the patient have less than a 3 day supply:  [] Yes  [x] No    Would you like a call back once the refill request has been completed: [x] Yes [] No    If the office needs to give you a call back, can they leave a voicemail: [x] Yes [] No    Pasquale Adams Rep   01/24/24 11:07 EST         THANKS     "

## 2024-01-31 ENCOUNTER — NURSE TRIAGE (OUTPATIENT)
Dept: CALL CENTER | Facility: HOSPITAL | Age: 74
End: 2024-01-31
Payer: MEDICARE

## 2024-01-31 ENCOUNTER — TELEPHONE (OUTPATIENT)
Dept: FAMILY MEDICINE CLINIC | Facility: CLINIC | Age: 74
End: 2024-01-31
Payer: MEDICARE

## 2024-01-31 NOTE — TELEPHONE ENCOUNTER
Reason for Disposition   Caller has medicine question, adult has minor symptoms, caller declines triage, and triager answers question    Additional Information   Negative: Intentional drug overdose and suicidal thoughts or ideas   Negative: Drug overdose and triager unable to answer question   Negative: Caller requesting a renewal or refill of a medicine patient is currently taking   Negative: Caller requesting information unrelated to medicine   Negative: Caller requesting information about COVID-19 Vaccine   Negative: Caller requesting information about Emergency Contraception   Negative: Caller requesting information about Combined Birth Control Pills   Negative: Caller requesting information about Progestin Birth Control Pills   Negative: Caller requesting information about Post-Op pain or medicines   Negative: Caller requesting a prescription antibiotic (such as penicillin) for Strep throat and has a positive culture result   Negative: Caller requesting a prescription anti-viral med (such as Tamiflu) and has influenza (flu) symptoms   Negative: Immunization reaction suspected   Negative: Rash while taking a medicine or within 3 days of stopping it   Negative: Asthma and having symptoms of asthma (cough, wheezing, etc.)   Negative: Symptom of illness (e.g., headache, abdominal pain, earache, vomiting) that are more than mild   Negative: Breastfeeding questions about mother's medicines and diet   Negative: MORE THAN A DOUBLE DOSE of a prescription or over-the-counter (OTC) drug   Negative: DOUBLE DOSE (an extra dose or lesser amount) of prescription drug and any symptoms (e.g., dizziness, nausea, pain, sleepiness)   Negative: DOUBLE DOSE (an extra dose or lesser amount) of over-the-counter (OTC) drug and any symptoms (e.g., dizziness, nausea, pain, sleepiness)   Negative: Took another person's prescription drug   Negative: DOUBLE DOSE (an extra dose or lesser amount) of prescription drug and NO symptoms   "(Exception: A double dose of antibiotics.)   Negative: Diabetes drug error or overdose (e.g., took wrong type of insulin or took extra dose)   Negative: Caller has medication question about med NOT prescribed by PCP and triager unable to answer question (e.g., compatibility with other med, storage)   Negative: Prescription not at pharmacy and was prescribed by PCP recently  (Exception: triager has access to EMR and prescription is recorded there. Go to Home Care and confirm for pharmacy.)   Negative: Pharmacy calling with prescription question and triager unable to answer question   Negative: Caller has URGENT medicine question about med that PCP or specialist prescribed and triager unable to answer question   Negative: Caller has NON-URGENT medicine question about med that PCP or specialist prescribed and triager unable to answer question   Negative: Caller wants to use a complementary or alternative medicine   Negative: Medicine patch causing local rash or itching   Negative: Prescription request for new medicine (not a refill)   Negative: Prescription prescribed recently is not at pharmacy and triager has access to patient's EMR and prescription is recorded in the EMR   Negative: DOUBLE DOSE (an extra dose or lesser amount) of over-the-counter (OTC) drug and NO symptoms   Negative: DOUBLE DOSE (an extra dose or lesser amount) of antibiotic drug and NO symptoms   Negative: Caller has medicine question only, adult not sick, and triager answers question   Negative: Caller requesting information about medicine during pregnancy; adult is not ill AND triager answers question   Negative: Caller requesting information about medicine use with breastfeeding; neither adult nor infant is ill, triager answers question   Negative: Pharmacy calling with prescription question and triager answers question    Answer Assessment - Initial Assessment Questions  1. NAME of MEDICINE: \"What medicine(s) are you calling about?\"      " "Ramipril, amlodipine  2. QUESTION: \"What is your question?\" (e.g., double dose of medicine, side effect)      Side effects  3. PRESCRIBER: \"Who prescribed the medicine?\" Reason: if prescribed by specialist, call should be referred to that group.      Dr. Sheffield  4. SYMPTOMS: \"Do you have any symptoms?\" If Yes, ask: \"What symptoms are you having?\"  \"How bad are the symptoms (e.g., mild, moderate, severe)      HTN  5. PREGNANCY:  \"Is there any chance that you are pregnant?\" \"When was your last menstrual period?\"      na    Protocols used: Medication Question Call-ADULT-[place]    "

## 2024-01-31 NOTE — TELEPHONE ENCOUNTER
PT TOLD -  Have him stop the ramipril and increase his amlodipine from 2.5 to 5 mg daily, which means he can take 2 of those once a day, and contact me a week from now to see if the side effects have resolved.   PT UNDERSTANDS DIRECTIONS

## 2024-01-31 NOTE — TELEPHONE ENCOUNTER
This pt called regarding medication amLODIPine (Norvasc) 2.5 MG ,ramipril (Altace) 10 MG capsule. This pt stating that he having side effects on medication.    Requesting a call back     Please advise       [phone]

## 2024-02-08 ENCOUNTER — TELEPHONE (OUTPATIENT)
Dept: FAMILY MEDICINE CLINIC | Facility: CLINIC | Age: 74
End: 2024-02-08
Payer: MEDICARE

## 2024-02-08 DIAGNOSIS — L50.9 HIVES: Primary | ICD-10-CM

## 2024-02-08 RX ORDER — METHYLPREDNISOLONE 4 MG/1
TABLET ORAL
Qty: 21 TABLET | Refills: 0 | Status: SHIPPED | OUTPATIENT
Start: 2024-02-08

## 2024-02-11 RX ORDER — HYDRALAZINE HYDROCHLORIDE 10 MG/1
10 TABLET, FILM COATED ORAL 2 TIMES DAILY
Qty: 60 TABLET | Refills: 1 | Status: SHIPPED | OUTPATIENT
Start: 2024-02-11

## 2024-02-12 ENCOUNTER — TELEPHONE (OUTPATIENT)
Dept: FAMILY MEDICINE CLINIC | Facility: CLINIC | Age: 74
End: 2024-02-12

## 2024-02-12 NOTE — TELEPHONE ENCOUNTER
"Caller: William Ritchie \"Prince\"     Relationship: [unfilled]     Best call back number:      What is your medical concern? PATIENT CALLED AND NEEDS TO VERIFY WHAT MEDICATIONS HE IS SUPPOSE TO BE TAKING FOR HIS BLOOD PRESSURE, DUE TO DR MARTINEZ HAS PRESCRIBED NEW MEDICATION AND NEED TO KNOW IF HE NEEDS TO CONTINUE TAKING THE MEDS PRIOR TO THE NEW PRESCRIPTION ORDERED.      How long has this issue been going on?      Is your provider already aware of this issue?      Have you been treated for this issue?                "

## 2024-02-13 ENCOUNTER — OFFICE VISIT (OUTPATIENT)
Dept: FAMILY MEDICINE CLINIC | Facility: CLINIC | Age: 74
End: 2024-02-13
Payer: MEDICARE

## 2024-02-13 VITALS
HEART RATE: 70 BPM | OXYGEN SATURATION: 99 % | WEIGHT: 208 LBS | DIASTOLIC BLOOD PRESSURE: 72 MMHG | HEIGHT: 72 IN | RESPIRATION RATE: 16 BRPM | SYSTOLIC BLOOD PRESSURE: 162 MMHG | TEMPERATURE: 97.9 F | BODY MASS INDEX: 28.17 KG/M2

## 2024-02-13 DIAGNOSIS — I10 BENIGN ESSENTIAL HYPERTENSION: Primary | Chronic | ICD-10-CM

## 2024-02-13 PROCEDURE — 3078F DIAST BP <80 MM HG: CPT | Performed by: FAMILY MEDICINE

## 2024-02-13 PROCEDURE — 3077F SYST BP >= 140 MM HG: CPT | Performed by: FAMILY MEDICINE

## 2024-02-13 PROCEDURE — 1160F RVW MEDS BY RX/DR IN RCRD: CPT | Performed by: FAMILY MEDICINE

## 2024-02-13 PROCEDURE — 1159F MED LIST DOCD IN RCRD: CPT | Performed by: FAMILY MEDICINE

## 2024-02-13 PROCEDURE — 99213 OFFICE O/P EST LOW 20 MIN: CPT | Performed by: FAMILY MEDICINE

## 2024-02-13 RX ORDER — HYDROCHLOROTHIAZIDE 12.5 MG/1
12.5 TABLET ORAL DAILY
Qty: 90 TABLET | Refills: 1 | Status: SHIPPED | OUTPATIENT
Start: 2024-02-13

## 2024-02-15 ENCOUNTER — TELEPHONE (OUTPATIENT)
Dept: FAMILY MEDICINE CLINIC | Facility: CLINIC | Age: 74
End: 2024-02-15
Payer: MEDICARE

## 2024-02-15 RX ORDER — METHYLPREDNISOLONE 4 MG/1
TABLET ORAL
Qty: 21 TABLET | Refills: 0 | Status: SHIPPED | OUTPATIENT
Start: 2024-02-15 | End: 2024-02-15

## 2024-02-15 RX ORDER — METHYLPREDNISOLONE 4 MG/1
TABLET ORAL
Qty: 21 TABLET | Refills: 0 | Status: SHIPPED | OUTPATIENT
Start: 2024-02-15

## 2024-03-05 ENCOUNTER — TELEPHONE (OUTPATIENT)
Dept: FAMILY MEDICINE CLINIC | Facility: CLINIC | Age: 74
End: 2024-03-05
Payer: MEDICARE

## 2024-03-05 DIAGNOSIS — I10 BENIGN ESSENTIAL HYPERTENSION: Primary | ICD-10-CM

## 2024-03-05 NOTE — TELEPHONE ENCOUNTER
"  Caller: William Ritchie \"Prince\"     Relationship: [unfilled]     Best call back number: [phone]     What is your medical concern?  PATIENT CALLED HE SEEN THE ALLERGY SPECIALIST ON 3/04/24 AND TOLD HIM TO TAKE HALF OF THE DOSAGE FOR MEDICATON     hydroCHLOROthiazide 12.5 MG tablet   12.5 MG tablet .  IF HE CONTINUED TO HAVE DRY MONTH TO DISCONTINUE ALL TOGETHER.  THEN HE PRESCRIBED VALSARTAN FOR BLOOD PRESSURE TO START TAKING WITH THE OTHER MEDICATION HYDROCHLOROTHIAZIDE 1/2 TABLET UNLESS HE HAS DRY MOUTH.  BUT THE PATIENT WANTS TO KNOW WHAT DR MARTINEZ THINKS HE SHOULD DO ON THE BLOOD PRESSURE MEDICATIONS.  ALLERGY DOCTOR ALSO ORDERED LABS FOR ANTINUCLEAR AB MULTIPLEX BE DONE TOMORROW 3/06/24.       How long has this issue been going on?      Is your provider already aware of this issue?      Have you been treated for this issue?         "

## 2024-03-06 NOTE — TELEPHONE ENCOUNTER
Message sent through my chart. Ok for hub to relay     I agree with the Allergist plan and encourage pt to do that.   Pt told to call if any further questions.

## 2024-03-21 NOTE — TELEPHONE ENCOUNTER
"  Caller: William Ritchie \"Prince\"    Relationship to patient: Self    Best call back number: [phone]    Patient is needing: HE WOULD LIKE TO SPEAK TO DR. MARTINEZ ABOUT HIS BLOOD PRESSURE MEDICATION.  IT IS MAKING HIM HAVE A VERY DRY MOUTH AND HIS SKIN IS PEELING ON HIS HEAD.  HE THINKS HE NEEDS TO CHANGE THE MEDICATION AGAIN.          "

## 2024-03-22 ENCOUNTER — NURSE TRIAGE (OUTPATIENT)
Dept: CALL CENTER | Facility: HOSPITAL | Age: 74
End: 2024-03-22
Payer: MEDICARE

## 2024-03-22 NOTE — TELEPHONE ENCOUNTER
/77, 141/80, 156/77 this morning. C/o rash on scalp and right hand numbness in 4th and 5th digit. Allergy provider changed patient's medication. Patient wants to know if he needs to change the way he takes his blood pressure medication prior to his appointment. Has appointment with Dr. Sheffield in 4 days. Reviewed protocol with patient. Advised to go to ER if symptoms become more severe. Patient verbalizes understanding. Please contact patient with any further recommendations.    Reason for Disposition   [1] Taking BP medications AND [2] feels is having side effects (e.g., impotence, cough, dizzy upon standing)    Additional Information   Negative: Difficult to awaken or acting confused (e.g., disoriented, slurred speech)   Negative: SEVERE difficulty breathing (e.g., struggling for each breath, speaks in single words)   Negative: [1] Weakness of the face, arm or leg on one side of the body AND [2] new-onset   Negative: [1] Numbness (i.e., loss of sensation) of the face, arm or leg on one side of the body AND [2] new-onset   Negative: [1] Chest pain lasts > 5 minutes AND [2] history of heart disease (i.e., heart attack, bypass surgery, angina, angioplasty, CHF)   Negative: [1] Chest pain AND [2] took nitrogylcerin AND [3] pain was not relieved   Negative: Sounds like a life-threatening emergency to the triager   Negative: Symptom is main concern (e.g., headache, chest pain)   Negative: Low blood pressure is main concern   Negative: [1] Systolic BP  >= 160 OR Diastolic >= 100 AND [2] cardiac (e.g., breathing difficulty, chest pain) or neurologic symptoms (e.g., new-onset blurred or double vision, unsteady gait)   Negative: [1] Pregnant 20 or more weeks (or postpartum < 6 weeks) AND [2] new hand or face swelling   Negative: [1] Pregnant 20 or more weeks (or postpartum < 6 weeks) AND [2] Systolic BP >= 160 OR Diastolic >= 110   Negative: [1] Systolic BP  >= 200 OR Diastolic >= 120 AND [2] having NO cardiac or  "neurologic symptoms   Negative: [1] Pregnant 20 or more weeks (or postpartum < 6 weeks) AND [2] Systolic BP  >= 140 OR Diastolic >= 90   Negative: [1] Systolic BP  >= 180 OR Diastolic >= 110 AND [2] missed most recent dose of blood pressure medication   Negative: Systolic BP  >= 180 OR Diastolic >= 110   Negative: Ran out of BP medications   Negative: Systolic BP  >= 160 OR Diastolic >= 100    Answer Assessment - Initial Assessment Questions  1. BLOOD PRESSURE: \"What is the blood pressure?\" \"Did you take at least two measurements 5 minutes apart?\"      149/77, 141/80, 156/77  2. ONSET: \"When did you take your blood pressure?\"      Just now  3. HOW: \"How did you take your blood pressure?\" (e.g., automatic home BP monitor, visiting nurse)      Automatic home BP monitor  4. HISTORY: \"Do you have a history of high blood pressure?\"      Yes   5. MEDICINES: \"Are you taking any medicines for blood pressure?\" \"Have you missed any doses recently?\"      Yes, no missed doses, recent change to medication  6. OTHER SYMPTOMS: \"Do you have any symptoms?\" (e.g., blurred vision, chest pain, difficulty breathing, headache, weakness)      Dry mouth, rash on scalp, numbness in right hand  7. PREGNANCY: \"Is there any chance you are pregnant?\" \"When was your last menstrual period?\"      NA    Protocols used: Blood Pressure - High-ADULT-[place]    "

## 2024-03-22 NOTE — TELEPHONE ENCOUNTER
CARDIO HAS SPOKE WITH OUR REFERRAL DEPT AND HAVE STATED THAT THE REFERRAL WILL NEED TO GO THROUGH A REVIEW PROCESS.   AFTER IT GOES THROUGH THEY WILL CALL THE PT AND TREVIN ASAP.

## 2024-03-25 RX ORDER — HYDROCHLOROTHIAZIDE 12.5 MG/1
6.25 TABLET ORAL DAILY
COMMUNITY
End: 2024-03-26

## 2024-03-25 RX ORDER — VALSARTAN 80 MG/1
80 TABLET ORAL DAILY
COMMUNITY
End: 2024-03-26 | Stop reason: ALTCHOICE

## 2024-03-25 NOTE — PROGRESS NOTES
Chief Complaint:   Chief Complaint   Patient presents with    Hypertension     To discuss side effects / dry mouth        William Ritchie 73 y.o. male who presents today for Medical Management of the below listed issues. He  has a problem list of   Patient Active Problem List   Diagnosis    Hyperlipidemia    Benign essential hypertension    IFG (impaired fasting glucose)    Oropharyngeal cancer    Fitting and adjustment of vascular catheter    Steroid-induced hyperglycemia    Dehydration    Acute renal injury    Anemia associated with chemotherapy    Chemotherapy-induced nausea    GERD (gastroesophageal reflux disease)    Chemotherapy induced neutropenia    Adverse effect of antineoplastic and immunosuppressive drugs    Adverse effect of radiation therapy    Pharyngitis, acute    Hypothyroidism due to acquired atrophy of thyroid    TSH (thyroid-stimulating hormone deficiency)    Calculus of gallbladder without cholecystitis without obstruction    Right leg pain    Sensitivity to the cold    Leukemoid reaction    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  Since the last visit, He has reported a rather dry mouth since going back to the hydrochlorothiazide as well as continuing the amlodipine and metoprolol.  He saw his allergy specialist on 3/4/2024 and patient called with this note:     IF HE CONTINUED TO HAVE DRY MONTH TO DISCONTINUE ALL TOGETHER.  THEN HE PRESCRIBED VALSARTAN FOR BLOOD PRESSURE TO START TAKING WITH THE OTHER MEDICATION HYDROCHLOROTHIAZIDE 1/2 TABLET UNLESS HE HAS DRY MOUTH.  BUT THE PATIENT WANTS TO KNOW WHAT DR MARTINEZ THINKS HE SHOULD DO ON THE BLOOD PRESSURE MEDICATIONS.  ALLERGY DOCTOR ALSO ORDERED LABS FOR ANTINUCLEAR AB MULTIPLEX BE DONE TOMORROW 3/06/24.       He continued having side effects, so I placed a  "referral to see cardiology, which he is doing later today.  Many of patient's problems seem to stem from the fact that he has had throat cancer and thus the radiation, leading to worsening dry mouth.     he has been compliant with   Current Outpatient Medications:     amLODIPine (Norvasc) 2.5 MG tablet, Take 1 tablet by mouth Daily., Disp: 90 tablet, Rfl: 1    ASPIRIN 81 PO, , Disp: , Rfl:     atorvastatin (LIPITOR) 20 MG tablet, Take 1 tablet by mouth Daily., Disp: 90 tablet, Rfl: 1    metoprolol tartrate (LOPRESSOR) 50 MG tablet, Take 1 tablet by mouth 2 (Two) Times a Day., Disp: 180 tablet, Rfl: 1    sildenafil (REVATIO) 20 MG tablet, Take 2-3 tabs QD prn, Disp: 30 tablet, Rfl: 11    valACYclovir (VALTREX) 500 MG tablet, TAKE 4 TABLETS BY MOUTH AT FIRST SIGN OF ATTACK AND THEN TAKE 4 TABLETS 12 HOURS LATER, Disp: , Rfl:     valsartan (DIOVAN) 80 MG tablet, Take 1 tablet by mouth Daily., Disp: , Rfl: .  He otherwise has no complaints.  All of the other chronic condition(s) listed above are stable w/o issues.    /66 Comment: enoch bp  Pulse 64   Temp 98.4 °F (36.9 °C) (Oral)   Resp 14   Ht 182.9 cm (72\")   Wt 95.3 kg (210 lb)   SpO2 97%   BMI 28.48 kg/m²     Results for orders placed or performed during the hospital encounter of 01/13/24   ECG 12 Lead Other; HTN   Result Value Ref Range    QT Interval 396 ms    QTC Interval 428 ms             The following portions of the patient's history were reviewed and updated as appropriate: allergies, current medications, past family history, past medical history, past social history, past surgical history, and problem list.    Review of Systems   Constitutional:  Negative for activity change, chills and fever.   Respiratory:  Negative for cough and shortness of breath.    Cardiovascular:  Negative for chest pain and palpitations.   Neurological:  Negative for headaches.   Psychiatric/Behavioral:  Negative for dysphoric mood.        Objective "             Physical Exam  Constitutional:       General: He is not in acute distress.     Appearance: He is well-developed.   Cardiovascular:      Rate and Rhythm: Normal rate and regular rhythm.   Pulmonary:      Effort: Pulmonary effort is normal.      Breath sounds: Normal breath sounds.   Neurological:      Mental Status: He is alert and oriented to person, place, and time.   Psychiatric:         Behavior: Behavior normal.         Thought Content: Thought content normal.     Allergist note independently reviewed by me at today's visit.        Diagnoses and all orders for this visit:    1. Benign essential hypertension (Primary)    Pt seeing cardiology later today and will discuss changes to the BP meds. Would consider raising the Diovan to 160mg, but will defer to their better judgement. Pt has been off the HCTZ x 3 weeks and reports no improvement in his salivary issues, thus this is most certainly a product of his prior cancer treatments and surgery.

## 2024-03-26 ENCOUNTER — OFFICE VISIT (OUTPATIENT)
Dept: CARDIOLOGY | Age: 74
End: 2024-03-26
Payer: MEDICARE

## 2024-03-26 ENCOUNTER — OFFICE VISIT (OUTPATIENT)
Dept: FAMILY MEDICINE CLINIC | Facility: CLINIC | Age: 74
End: 2024-03-26
Payer: MEDICARE

## 2024-03-26 VITALS
HEART RATE: 63 BPM | DIASTOLIC BLOOD PRESSURE: 72 MMHG | BODY MASS INDEX: 28.04 KG/M2 | SYSTOLIC BLOOD PRESSURE: 148 MMHG | WEIGHT: 207 LBS | HEIGHT: 72 IN

## 2024-03-26 VITALS
DIASTOLIC BLOOD PRESSURE: 66 MMHG | SYSTOLIC BLOOD PRESSURE: 150 MMHG | BODY MASS INDEX: 28.44 KG/M2 | TEMPERATURE: 98.4 F | HEART RATE: 64 BPM | OXYGEN SATURATION: 97 % | HEIGHT: 72 IN | WEIGHT: 210 LBS | RESPIRATION RATE: 14 BRPM

## 2024-03-26 DIAGNOSIS — I65.22 CAROTID ARTERY STENOSIS, ASYMPTOMATIC, LEFT: ICD-10-CM

## 2024-03-26 DIAGNOSIS — M25.529 ARTHRALGIA OF UPPER ARM, UNSPECIFIED LATERALITY: ICD-10-CM

## 2024-03-26 DIAGNOSIS — R94.31 ABNORMAL ELECTROCARDIOGRAM (ECG) (EKG): ICD-10-CM

## 2024-03-26 DIAGNOSIS — I10 BENIGN ESSENTIAL HYPERTENSION: Primary | ICD-10-CM

## 2024-03-26 DIAGNOSIS — E78.00 PURE HYPERCHOLESTEROLEMIA: ICD-10-CM

## 2024-03-26 DIAGNOSIS — R07.89 OTHER CHEST PAIN: ICD-10-CM

## 2024-03-26 DIAGNOSIS — I10 BENIGN ESSENTIAL HYPERTENSION: Primary | Chronic | ICD-10-CM

## 2024-03-26 PROCEDURE — 99212 OFFICE O/P EST SF 10 MIN: CPT | Performed by: FAMILY MEDICINE

## 2024-03-26 PROCEDURE — 1160F RVW MEDS BY RX/DR IN RCRD: CPT | Performed by: FAMILY MEDICINE

## 2024-03-26 PROCEDURE — 1159F MED LIST DOCD IN RCRD: CPT | Performed by: FAMILY MEDICINE

## 2024-03-26 PROCEDURE — 99204 OFFICE O/P NEW MOD 45 MIN: CPT | Performed by: INTERNAL MEDICINE

## 2024-03-26 PROCEDURE — 3077F SYST BP >= 140 MM HG: CPT | Performed by: INTERNAL MEDICINE

## 2024-03-26 PROCEDURE — 93000 ELECTROCARDIOGRAM COMPLETE: CPT | Performed by: INTERNAL MEDICINE

## 2024-03-26 PROCEDURE — 3078F DIAST BP <80 MM HG: CPT | Performed by: INTERNAL MEDICINE

## 2024-03-26 PROCEDURE — 3077F SYST BP >= 140 MM HG: CPT | Performed by: FAMILY MEDICINE

## 2024-03-26 PROCEDURE — 3078F DIAST BP <80 MM HG: CPT | Performed by: FAMILY MEDICINE

## 2024-03-26 RX ORDER — AMLODIPINE AND OLMESARTAN MEDOXOMIL 5; 40 MG/1; MG/1
1 TABLET ORAL DAILY
Qty: 30 TABLET | Refills: 6 | Status: SHIPPED | OUTPATIENT
Start: 2024-03-26 | End: 2024-03-29

## 2024-03-26 RX ORDER — BISOPROLOL FUMARATE AND HYDROCHLOROTHIAZIDE 10; 6.25 MG/1; MG/1
1 TABLET ORAL DAILY
Qty: 30 TABLET | Refills: 6 | Status: SHIPPED | OUTPATIENT
Start: 2024-03-26

## 2024-03-26 NOTE — PROGRESS NOTES
Ameya mendenhall referral for htn.    Subjective:        [facility]  Cardiology Consult Note    Patient Identification:  Name: William Ritchie  Age: 73 y.o.  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 Galen Morrissey    COLONOSCOPY N/A 2011    Normal colonoscopy-Dr. Tobias Emerson    ENDOSCOPY W/ PEG TUBE PLACEMENT N/A 2017    Procedure: ESOPHAGOGASTRODUODENOSCOPY WITH PERCUTANEOUS ENDOSCOPIC GASTROSTOMY TUBE INSERTION;  Surgeon: Isma Hercules MD;  Location: [facility] OR;  Service:     FINE NEEDLE ASPIRATION Left 10/13/2017    Ultrasound guidance for fine needle biopsy and fine needle aspiration with ultrasonic guidance of left neck mass-Dr. Frank Marques    INGUINAL HERNIA REPAIR Left     Dr. Brian Peres    INGUINAL HERNIA REPAIR Right     Dr. Brian Peres    LARYNGOPLASTY      Laryngoplasty with biopsy-Dr. Del Toro    AL INSJ TUNNELED CVC W/O SUBQ PORT/ AGE 5 YR/> Left  11/28/2017    Procedure: INSERTION VENOUS ACCESS DEVICE;  Surgeon: Isma Hercules MD;  Location: [facility];  Service: General    TONGUE BIOPSY / EXCISION N/A 11/01/2017    Biopsy of the left tongue base-Dr. Zane Del Toro    TONSILLECTOMY AND ADENOIDECTOMY Bilateral 11/01/2017    Dr. Zane Del Toro      Allergies:  Allergies   Allergen Reactions    Iodinated Contrast Media Itching and Rash     Had a 24 hour delayed reaction to Ct contrast    Ramipril Rash     Home Meds:  (Not in a hospital admission)    Current Meds:     Current Outpatient Medications:     amlodipine-olmesartan (Oralia) 5-40 MG per tablet, Take 1 tablet by mouth Daily., Disp: 30 tablet, Rfl: 6    ASPIRIN 81 PO, , Disp: , Rfl:     atorvastatin (LIPITOR) 20 MG tablet, Take 1 tablet by mouth Daily., Disp: 90 tablet, Rfl: 1    bisoprolol-hydrochlorothiazide (Ziac) 10-6.25 MG per tablet, Take 1 tablet by mouth Daily., Disp: 30 tablet, Rfl: 6    sildenafil (REVATIO) 20 MG tablet, Take 2-3 tabs QD prn, Disp: 30 tablet, Rfl: 11    valACYclovir (VALTREX) 500 MG tablet, TAKE 4 TABLETS BY MOUTH AT FIRST SIGN OF ATTACK AND THEN TAKE 4 TABLETS 12 HOURS LATER, Disp: , Rfl:   Social History:   Social History     Tobacco Use    Smoking status: Never     Passive exposure: Never    Smokeless tobacco: Never   Substance Use Topics    Alcohol use: Not Currently     Comment: occassional      Family History:  Family History   Problem Relation Age of Onset    Alcohol abuse Father     Diabetes Father     Cancer Brother     Malig Hyperthermia Neg Hx         Review of Systems    Constitutional: No weakness,fatigue, fever, rigors, chills   Eyes: No vision changes, eye pain   ENT/oropharynx: No difficulty swallowing, sore throat, epistaxis, changes in hearing   Cardiovascular: No chest pain, chest tightness, palpitations, paroxysmal nocturnal dyspnea, orthopnea, diaphoresis, dizziness / syncopal episode   Respiratory: No shortness of breath, dyspnea on exertion, cough, wheezing  hemoptysis   Gastrointestinal: No abdominal pain, nausea, vomiting, diarrhea, bloody stools   Genitourinary: No hematuria, dysuria   Neurological: No headache, tremors, numbness,  one-sided weakness    Musculoskeletal: No cramps, myalgias,  joint pain, joint swelling   Integument: No rash, edema           Constitutional:       Physical Exam   General:  Appears in no acute distress  Eyes: PERTL,  HEENT:  No JVD. Thyroid not visibly enlarged. No mucosal pallor or cyanosis  Respiratory: Respirations regular and unlabored at rest. BBS with good air entry in all fields. No crackles, rubs or wheezes auscultated  Cardiovascular: S1S2 Regular rate and rhythm. No murmur, rub or gallop auscultated. No carotid bruits. DP/PT pulses    . No pretibial pitting edema  Gastrointestinal: Abdomen soft, flat, non tender. Bowel sounds present. No hepatosplenomegaly. No ascites  Musculoskeletal: QURESHI x4. No abnormal movements  Extremities: No digital clubbing or cyanosis  Skin: Color pink. Skin warm and dry to touch. No rashes  No xanthoma  Neuro: AAO x3 CN II-XII grossly intact            ECG 12 Lead    Date/Time: 3/26/2024 3:02 PM  Performed by: Isaias Baca MD    Authorized by: Isaias Baca MD  Comparison: not compared with previous ECG   Previous ECG: no previous ECG available  Rhythm: sinus rhythm    Clinical impression: non-specific ECG              Cardiographics  ECG:     Telemetry:    Echocardiogram:   No results found for this or any previous visit.        Imaging  Chest X-ray:     Lab Review                               Assessment:/ Recommendations / Plan:                  ICD-10-CM ICD-9-CM   1. Benign essential hypertension  I10 401.1   2. Pure hypercholesterolemia  E78.00 272.0   3. Carotid artery stenosis, asymptomatic, left  I65.22 433.10   4. Arthralgia of upper arm, unspecified laterality  M25.529 719.42     1. Benign essential hypertension  Will change the medications    2. Pure  hypercholesterolemia  Continue current treatment    3. Carotid artery stenosis, asymptomatic, left      4. Arthralgia of upper arm, unspecified laterality  Considering the patient's symptoms as well as clinical situation and  EKG findings, along with cardiac risk factors, ischemic workup is necessary to rule out ischemic cardiomyopathy, stress induced arrhythmias, and functional capacity for diagnosis as well as prognostic consideration    Considering patient's medical condition as well as the risk factors, patient will require echocardiogram for further evaluation for the LV function, four-chamber evaluation, including the pressures, valvular function and  pericardial disease and pericardial effusion      Dc norvasc, metoprolol, valsatan,   Start simran 5/40, ziac 5/6.25    Ett, echo    Follow up    Labs/tests ordered for am      Isaias Baca MD  3/27/2024, 16:15 EDT      EMR Dragon/Transcription:   Dictated utilizing Dragon dictation

## 2024-03-27 ENCOUNTER — TELEPHONE (OUTPATIENT)
Dept: CARDIOLOGY | Facility: CLINIC | Age: 74
End: 2024-03-27

## 2024-03-27 NOTE — TELEPHONE ENCOUNTER
"Caller: William Ritchie \"Prince\"    Relationship: Self    Best call back number: [phone]     Which medication are you concerned about: AMLODIPINE    Who prescribed you this medication: DR ALCARAZ    When did you start taking this medication: NOT YET    What are your concerns: PT WAS PRESCRIBED A NEW MEDICATION AND WHEN HE TRIED TO PICK IT UP FROM THE PHARMACY, IT WAS $75 AND PT ASKING IF THERE IS ANYTHING ELSE THAT HE CAN TAKE THAT IS COMPARABLE BUT CHEAPER -     How long have you had these concerns: CALL BACK PLEASE      "

## 2024-03-27 NOTE — TELEPHONE ENCOUNTER
"  Caller: William Ritchie \"Prince\"    Relationship: Self    Best call back number: [phone]    What is the best time to reach you: ANYTIME    Who are you requesting to speak with (clinical staff, provider,  specific staff member): CLINICAL    What was the call regarding: PT ASKED IF WE COULD CALL THE NUMBER ABOVE BECAUSE OTHER NUMBER IS MESSED UP AT THE MOMENT  "

## 2024-03-28 NOTE — TELEPHONE ENCOUNTER
"  Caller: William Ritchie \"Prince\"    Relationship: Self    Best call back number: [phone]    What is the best time to reach you: ANYTIME    Who are you requesting to speak with (clinical staff, provider,  specific staff member): CLINICAL    What was the call regarding: PT WANTED TO SEE IF HE COULD BE PRESCRIBED ANOTHER MEDICATION THAT IS MORE AFFORDABLE BECAUSE THE AMLODIPINE IS $75. PT IS WANTING TO GO BACK TO VALSARTAN IF POSSIBLE OR ANOTHER MEDICATION.  "

## 2024-03-29 RX ORDER — OLMESARTAN MEDOXOMIL 40 MG/1
40 TABLET ORAL DAILY
Qty: 30 TABLET | Refills: 5 | Status: SHIPPED | OUTPATIENT
Start: 2024-03-29

## 2024-03-29 RX ORDER — AMLODIPINE BESYLATE 5 MG/1
5 TABLET ORAL DAILY
Qty: 30 TABLET | Refills: 5 | Status: SHIPPED | OUTPATIENT
Start: 2024-03-29

## 2024-04-03 ENCOUNTER — HOSPITAL ENCOUNTER (OUTPATIENT)
Dept: CARDIOLOGY | Facility: HOSPITAL | Age: 74
Discharge: HOME | End: 2024-04-03
Admitting: INTERNAL MEDICINE
Payer: MEDICARE

## 2024-04-03 VITALS — BODY MASS INDEX: 28.04 KG/M2 | WEIGHT: 207 LBS | HEIGHT: 72 IN

## 2024-04-03 PROCEDURE — 93306 TTE W/DOPPLER COMPLETE: CPT

## 2024-04-04 LAB
AORTIC DIMENSIONLESS INDEX: 0.8 (DI)
ASCENDING AORTA: 4 CM
BH CV ECHO MEAS - ACS: 2.34 CM
BH CV ECHO MEAS - AO MAX PG: 4.3 MMHG
BH CV ECHO MEAS - AO MEAN PG: 2.5 MMHG
BH CV ECHO MEAS - AO ROOT DIAM: 3.6 CM
BH CV ECHO MEAS - AO V2 MAX: 103.8 CM/SEC
BH CV ECHO MEAS - AO V2 VTI: 25.6 CM
BH CV ECHO MEAS - AVA(I,D): 3.3 CM2
BH CV ECHO MEAS - EDV(CUBED): 101.2 ML
BH CV ECHO MEAS - EDV(MOD-SP2): 122 ML
BH CV ECHO MEAS - EDV(MOD-SP4): 99 ML
BH CV ECHO MEAS - EF(MOD-BP): 62.5 %
BH CV ECHO MEAS - EF(MOD-SP2): 67.2 %
BH CV ECHO MEAS - EF(MOD-SP4): 54.5 %
BH CV ECHO MEAS - ESV(CUBED): 23.5 ML
BH CV ECHO MEAS - ESV(MOD-SP2): 40 ML
BH CV ECHO MEAS - ESV(MOD-SP4): 45 ML
BH CV ECHO MEAS - FS: 38.5 %
BH CV ECHO MEAS - IVS/LVPW: 1.12 CM
BH CV ECHO MEAS - IVSD: 0.86 CM
BH CV ECHO MEAS - LAT PEAK E' VEL: 11.2 CM/SEC
BH CV ECHO MEAS - LV MASS(C)D: 123.7 GRAMS
BH CV ECHO MEAS - LV MAX PG: 3.4 MMHG
BH CV ECHO MEAS - LV MEAN PG: 1.5 MMHG
BH CV ECHO MEAS - LV V1 MAX: 92.2 CM/SEC
BH CV ECHO MEAS - LV V1 VTI: 19.8 CM
BH CV ECHO MEAS - LVIDD: 4.7 CM
BH CV ECHO MEAS - LVIDS: 2.9 CM
BH CV ECHO MEAS - LVOT AREA: 4.2 CM2
BH CV ECHO MEAS - LVOT DIAM: 2.32 CM
BH CV ECHO MEAS - LVPWD: 0.77 CM
BH CV ECHO MEAS - MED PEAK E' VEL: 9.8 CM/SEC
BH CV ECHO MEAS - MV A DUR: 0.15 SEC
BH CV ECHO MEAS - MV A MAX VEL: 75.7 CM/SEC
BH CV ECHO MEAS - MV DEC SLOPE: 372 CM/SEC2
BH CV ECHO MEAS - MV DEC TIME: 288 SEC
BH CV ECHO MEAS - MV E MAX VEL: 85.9 CM/SEC
BH CV ECHO MEAS - MV E/A: 1.14
BH CV ECHO MEAS - MV MAX PG: 4.2 MMHG
BH CV ECHO MEAS - MV MEAN PG: 1.5 MMHG
BH CV ECHO MEAS - MV P1/2T: 85.5 MSEC
BH CV ECHO MEAS - MV V2 VTI: 34.2 CM
BH CV ECHO MEAS - MVA(P1/2T): 2.6 CM2
BH CV ECHO MEAS - MVA(VTI): 2.46 CM2
BH CV ECHO MEAS - PA ACC TIME: 0.15 SEC
BH CV ECHO MEAS - PA V2 MAX: 102.5 CM/SEC
BH CV ECHO MEAS - PULM A REVS DUR: 0.18 SEC
BH CV ECHO MEAS - PULM A REVS VEL: 31.3 CM/SEC
BH CV ECHO MEAS - PULM DIAS VEL: 67.8 CM/SEC
BH CV ECHO MEAS - PULM S/D: 1.06
BH CV ECHO MEAS - PULM SYS VEL: 72 CM/SEC
BH CV ECHO MEAS - QP/QS: 0.52
BH CV ECHO MEAS - RAP SYSTOLE: 3 MMHG
BH CV ECHO MEAS - RV MAX PG: 2.32 MMHG
BH CV ECHO MEAS - RV V1 MAX: 76.2 CM/SEC
BH CV ECHO MEAS - RV V1 VTI: 16.6 CM
BH CV ECHO MEAS - RVOT DIAM: 1.83 CM
BH CV ECHO MEAS - RVSP: 30.2 MMHG
BH CV ECHO MEAS - SV(LVOT): 84.2 ML
BH CV ECHO MEAS - SV(MOD-SP2): 82 ML
BH CV ECHO MEAS - SV(MOD-SP4): 54 ML
BH CV ECHO MEAS - SV(RVOT): 43.7 ML
BH CV ECHO MEAS - TAPSE (>1.6): 3 CM
BH CV ECHO MEAS - TR MAX PG: 27.2 MMHG
BH CV ECHO MEAS - TR MAX VEL: 260.6 CM/SEC
BH CV ECHO MEASUREMENTS AVERAGE E/E' RATIO: 8.18
BH CV VAS BP RIGHT ARM: NORMAL MMHG
BH CV XLRA - RV BASE: 3.8 CM
BH CV XLRA - RV LENGTH: 6.4 CM
BH CV XLRA - RV MID: 2.8 CM
BH CV XLRA - TDI S': 13.8 CM/SEC
LEFT ATRIUM VOLUME INDEX: 34.5 ML/M2
SINUS: 3.7 CM
STJ: 3.5 CM

## 2024-04-18 ENCOUNTER — TELEPHONE (OUTPATIENT)
Dept: CARDIOLOGY | Facility: CLINIC | Age: 74
End: 2024-04-18
Payer: MEDICARE

## 2024-04-18 NOTE — TELEPHONE ENCOUNTER
PT STATES HIS BP IS DROPPING 90'S/50'S AT TIMES AND HE HAS DIZZINESS AND LIGHTHEADEDNESS.    BISOPROLOL IN THE AM  ALMLODIPINE AND OLMESARTAN IN THE EVENING  BP THIS MORNING /59  56     [phone]    PER HERLINDA ROTH, DECREASE ZIAC TO 1/2 TABLET, IF UNABLE TO CUT IN HALF, WE CAN SEND IN A NEW PRESCRIPTION.    S/W PT. HE IS GOING TO CUT ZIAC IN HALF.

## 2024-04-22 ENCOUNTER — HOSPITAL ENCOUNTER (OUTPATIENT)
Dept: CARDIOLOGY | Facility: HOSPITAL | Age: 74
Discharge: HOME | End: 2024-04-22
Admitting: INTERNAL MEDICINE
Payer: MEDICARE

## 2024-04-22 VITALS
WEIGHT: 207 LBS | SYSTOLIC BLOOD PRESSURE: 153 MMHG | OXYGEN SATURATION: 99 % | HEIGHT: 72 IN | BODY MASS INDEX: 28.04 KG/M2 | DIASTOLIC BLOOD PRESSURE: 70 MMHG | HEART RATE: 58 BPM

## 2024-04-22 DIAGNOSIS — I10 BENIGN ESSENTIAL HYPERTENSION: Primary | ICD-10-CM

## 2024-04-22 DIAGNOSIS — R94.31 ABNORMAL EKG: ICD-10-CM

## 2024-04-22 DIAGNOSIS — M79.603: ICD-10-CM

## 2024-04-22 DIAGNOSIS — R94.39 ABNORMAL STRESS ECG WITH TREADMILL: ICD-10-CM

## 2024-04-22 PROCEDURE — 93017 CV STRESS TEST TRACING ONLY: CPT

## 2024-04-22 PROCEDURE — 93018 CV STRESS TEST I&R ONLY: CPT | Performed by: INTERNAL MEDICINE

## 2024-04-22 PROCEDURE — 93016 CV STRESS TEST SUPVJ ONLY: CPT | Performed by: INTERNAL MEDICINE

## 2024-04-23 LAB
BH CV IMMEDIATE POST TECH DATA BLOOD PRESSURE: NORMAL MMHG
BH CV IMMEDIATE POST TECH DATA HEART RATE: 94 BPM
BH CV STRESS BP STAGE 1: NORMAL
BH CV STRESS BP STAGE 2: NORMAL
BH CV STRESS BP STAGE 3: NORMAL
BH CV STRESS DURATION MIN STAGE 1: 3
BH CV STRESS DURATION MIN STAGE 2: 3
BH CV STRESS DURATION MIN STAGE 3: 2
BH CV STRESS DURATION SEC STAGE 1: 0
BH CV STRESS DURATION SEC STAGE 2: 0
BH CV STRESS DURATION SEC STAGE 3: 0
BH CV STRESS GRADE STAGE 1: 10
BH CV STRESS GRADE STAGE 2: 12
BH CV STRESS GRADE STAGE 3: 14
BH CV STRESS HR STAGE 1: 78
BH CV STRESS HR STAGE 2: 94
BH CV STRESS HR STAGE 3: 109
BH CV STRESS METS STAGE 1: 4.6
BH CV STRESS METS STAGE 2: 7.1
BH CV STRESS METS STAGE 3: 10.2
BH CV STRESS O2 STAGE 3: 100
BH CV STRESS PROTOCOL 1: NORMAL
BH CV STRESS RECOVERY BP: NORMAL MMHG
BH CV STRESS RECOVERY HR: 76 BPM
BH CV STRESS SPEED STAGE 1: 1.7
BH CV STRESS SPEED STAGE 2: 2.5
BH CV STRESS SPEED STAGE 3: 3.4
BH CV STRESS STAGE 1: 1
BH CV STRESS STAGE 2: 2
BH CV STRESS STAGE 3: 3
BH CV THREE MINUTE POST TECH DATA BLOOD PRESSURE: NORMAL MMHG
BH CV THREE MINUTE POST TECH DATA HEART RATE: 81 BPM
MAXIMAL PREDICTED HEART RATE: 145 BPM
PERCENT MAX PREDICTED HR: 75.17 %
STRESS BASELINE BP: NORMAL MMHG
STRESS BASELINE HR: 58 BPM
STRESS O2 SAT REST: 99 %
STRESS PERCENT HR: 88 %
STRESS POST ESTIMATED WORKLOAD: 10.2 METS
STRESS POST EXERCISE DUR MIN: 8 MIN
STRESS POST EXERCISE DUR SEC: 0 SEC
STRESS POST O2 SAT PEAK: 100 %
STRESS POST PEAK BP: NORMAL MMHG
STRESS POST PEAK HR: 109 BPM
STRESS TARGET HR: 123 BPM

## 2024-04-30 ENCOUNTER — HOSPITAL ENCOUNTER (OUTPATIENT)
Dept: CARDIOLOGY | Facility: HOSPITAL | Age: 74
Discharge: HOME | End: 2024-04-30
Payer: MEDICARE

## 2024-04-30 VITALS
WEIGHT: 207 LBS | DIASTOLIC BLOOD PRESSURE: 64 MMHG | SYSTOLIC BLOOD PRESSURE: 142 MMHG | OXYGEN SATURATION: 99 % | HEART RATE: 59 BPM | HEIGHT: 72 IN | BODY MASS INDEX: 28.04 KG/M2

## 2024-04-30 DIAGNOSIS — R94.39 ABNORMAL STRESS ECG WITH TREADMILL: ICD-10-CM

## 2024-04-30 DIAGNOSIS — M79.603: ICD-10-CM

## 2024-04-30 DIAGNOSIS — R94.31 ABNORMAL EKG: ICD-10-CM

## 2024-04-30 DIAGNOSIS — I10 BENIGN ESSENTIAL HYPERTENSION: ICD-10-CM

## 2024-04-30 PROCEDURE — 78452 HT MUSCLE IMAGE SPECT MULT: CPT

## 2024-04-30 PROCEDURE — 93017 CV STRESS TEST TRACING ONLY: CPT

## 2024-04-30 PROCEDURE — A9500 TC99M SESTAMIBI: HCPCS | Performed by: INTERNAL MEDICINE

## 2024-04-30 PROCEDURE — 25010000002 REGADENOSON 0.4 MG/5ML SOLUTION: Performed by: INTERNAL MEDICINE

## 2024-04-30 PROCEDURE — 0 TECHNETIUM SESTAMIBI: Performed by: INTERNAL MEDICINE

## 2024-04-30 RX ORDER — REGADENOSON 0.08 MG/ML
0.4 INJECTION, SOLUTION INTRAVENOUS
Status: COMPLETED | OUTPATIENT
Start: 2024-04-30 | End: 2024-04-30

## 2024-04-30 RX ADMIN — REGADENOSON 0.4 MG: 0.08 INJECTION, SOLUTION INTRAVENOUS at 10:08

## 2024-04-30 RX ADMIN — TECHNETIUM TC 99M SESTAMIBI 1 DOSE: 1 INJECTION INTRAVENOUS at 07:35

## 2024-04-30 RX ADMIN — TECHNETIUM TC 99M SESTAMIBI 1 DOSE: 1 INJECTION INTRAVENOUS at 10:08

## 2024-05-02 LAB
BH CV IMMEDIATE POST TECH DATA BLOOD PRESSURE: NORMAL MMHG
BH CV IMMEDIATE POST TECH DATA HEART RATE: 71 BPM
BH CV REST NUCLEAR ISOTOPE DOSE: 10.9 MCI
BH CV STRESS BP STAGE 1: NORMAL
BH CV STRESS COMMENTS STAGE 1: NORMAL
BH CV STRESS DOSE REGADENOSON STAGE 1: 0.4
BH CV STRESS DURATION MIN STAGE 1: 3
BH CV STRESS DURATION SEC STAGE 1: 0
BH CV STRESS GRADE STAGE 1: 0
BH CV STRESS HR STAGE 1: 77
BH CV STRESS METS STAGE 1: 2.3
BH CV STRESS NUCLEAR ISOTOPE DOSE: 32.2 MCI
BH CV STRESS PROTOCOL 1: NORMAL
BH CV STRESS RECOVERY BP: NORMAL MMHG
BH CV STRESS RECOVERY HR: 61 BPM
BH CV STRESS SPEED STAGE 1: 1.7
BH CV STRESS STAGE 1: 1
BH CV THREE MINUTE POST TECH DATA BLOOD PRESSURE: NORMAL MMHG
BH CV THREE MINUTE POST TECH DATA HEART RATE: 68 BPM
LV EF NUC BP: 64 %
MAXIMAL PREDICTED HEART RATE: 145 BPM
PERCENT MAX PREDICTED HR: 53.1 %
STRESS BASELINE BP: NORMAL MMHG
STRESS BASELINE HR: 59 BPM
STRESS O2 SAT REST: 99 %
STRESS PERCENT HR: 62 %
STRESS POST ESTIMATED WORKLOAD: 2.3 METS
STRESS POST EXERCISE DUR MIN: 3 MIN
STRESS POST EXERCISE DUR SEC: 0 SEC
STRESS POST PEAK BP: NORMAL MMHG
STRESS POST PEAK HR: 77 BPM
STRESS TARGET HR: 123 BPM

## 2024-05-06 ENCOUNTER — OFFICE VISIT (OUTPATIENT)
Dept: CARDIOLOGY | Facility: CLINIC | Age: 74
End: 2024-05-06
Payer: MEDICARE

## 2024-05-06 VITALS
HEART RATE: 64 BPM | BODY MASS INDEX: 27.43 KG/M2 | SYSTOLIC BLOOD PRESSURE: 148 MMHG | HEIGHT: 73 IN | WEIGHT: 207 LBS | DIASTOLIC BLOOD PRESSURE: 78 MMHG

## 2024-05-06 DIAGNOSIS — R94.31 ABNORMAL EKG: ICD-10-CM

## 2024-05-06 DIAGNOSIS — E78.00 PURE HYPERCHOLESTEROLEMIA: ICD-10-CM

## 2024-05-06 DIAGNOSIS — I10 BENIGN ESSENTIAL HYPERTENSION: Primary | ICD-10-CM

## 2024-05-06 PROCEDURE — 3078F DIAST BP <80 MM HG: CPT | Performed by: INTERNAL MEDICINE

## 2024-05-06 PROCEDURE — 3077F SYST BP >= 140 MM HG: CPT | Performed by: INTERNAL MEDICINE

## 2024-05-06 PROCEDURE — 99214 OFFICE O/P EST MOD 30 MIN: CPT | Performed by: INTERNAL MEDICINE

## 2024-05-06 RX ORDER — AMLODIPINE BESYLATE 5 MG/1
2.5 TABLET ORAL DAILY
Qty: 30 TABLET | Refills: 5 | Status: SHIPPED | OUTPATIENT
Start: 2024-05-06

## 2024-05-06 NOTE — PROGRESS NOTES
TEST RESULTS   Subjective:        William Ritchie is a 73 y.o. male who here for follow up    CC  Follow-up hypertension hyperlipidemia  HPI  73-year-old male with hypertension hyperlipidemia here for the follow-up for the test results denies any chest pains or tightness in the chest     Problems Addressed this Visit          Cardiac and Vasculature    Hyperlipidemia    Benign essential hypertension - Primary    Relevant Medications    amLODIPine (NORVASC) 5 MG tablet     Other Visit Diagnoses       Abnormal EKG              Diagnoses         Codes Comments    Benign essential hypertension    -  Primary ICD-10-CM: I10  ICD-9-CM: 401.1     Abnormal EKG     ICD-10-CM: R94.31  ICD-9-CM: 794.31     Pure hypercholesterolemia     ICD-10-CM: E78.00  ICD-9-CM: 272.0           .    The following portions of the patient's history were reviewed and updated as appropriate: allergies, current medications, past family history, past medical history, past social history, past surgical history and problem list.    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 He has never been exposed to tobacco smoke. He has never used smokeless tobacco. He reports that he does not currently use alcohol. He reports that he does not use drugs.   Family History   Problem Relation Age of Onset    Alcohol abuse Father     Diabetes Father     Cancer Brother     Malig Hyperthermia Neg Hx        Review of Systems  Constitutional: No wt loss, fever, fatigue  Gastrointestinal: No nausea, abdominal pain  Behavioral/Psych: No insomnia or anxiety   Cardiovascular no chest pains or tightness in the  "chest  Objective:       Physical Exam  /78   Pulse 64   Ht 185.4 cm (73\")   Wt 93.9 kg (207 lb)   BMI 27.31 kg/m²   General appearance: No acute changes   Neck: Trachea midline; NECK, supple, no thyromegaly or lymphadenopathy   Lungs: Normal size and shape, normal breath sounds, equal distribution of air, no rales and rhonchi   CV: S1-S2 regular, no murmurs, no rub, no gallop   Abdomen: Soft, nontender; no masses , no abnormal abdominal sounds   Extremities: No deformity , normal color , no peripheral edema   Skin: Normal temperature, turgor and texture; no rash, ulcers          Procedures      Echocardiogram:    Results for orders placed in visit on 03/26/24    Adult Transthoracic Echo Complete W/ Cont if Necessary Per Protocol    Interpretation Summary    Left ventricular ejection fraction appears to be 61 - 65%.    Left ventricular diastolic function was normal.    Estimated right ventricular systolic pressure from tricuspid regurgitation is normal (<35 mmHg).  Interpretation Summary         Findings consistent with a normal ECG stress test.    Myocardial perfusion imaging indicates a normal myocardial perfusion study with no evidence of ischemia. Impressions are consistent with a low risk study.    Left ventricular ejection fraction is normal (Calculated EF = 64%)        Current Outpatient Medications:     amLODIPine (NORVASC) 5 MG tablet, Take 0.5 tablets by mouth Daily., Disp: 30 tablet, Rfl: 5    ASPIRIN 81 PO, , Disp: , Rfl:     atorvastatin (LIPITOR) 20 MG tablet, Take 1 tablet by mouth Daily., Disp: 90 tablet, Rfl: 1    bisoprolol-hydrochlorothiazide (Ziac) 10-6.25 MG per tablet, Take 1 tablet by mouth Daily., Disp: 30 tablet, Rfl: 6    olmesartan (BENICAR) 40 MG tablet, Take 1 tablet by mouth Daily., Disp: 30 tablet, Rfl: 5    valACYclovir (VALTREX) 500 MG tablet, TAKE 4 TABLETS BY MOUTH AT FIRST SIGN OF ATTACK AND THEN TAKE 4 TABLETS 12 HOURS LATER, Disp: , Rfl:     amoxicillin-clavulanate " (AUGMENTIN) 875-125 MG per tablet, Take 1 tablet by mouth Every 12 (Twelve) Hours., Disp: 20 tablet, Rfl: 0    sildenafil (REVATIO) 20 MG tablet, Take 2-3 tabs QD prn, Disp: 30 tablet, Rfl: 11   Assessment:                Plan:          ICD-10-CM ICD-9-CM   1. Benign essential hypertension  I10 401.1   2. Abnormal EKG  R94.31 794.31   3. Pure hypercholesterolemia  E78.00 272.0     1. Benign essential hypertension  Blood pressure running low we will reduce the dose    2. Abnormal EKG  No chest pain    3. Pure hypercholesterolemia  Continue current treatment      BP RUNNING LOW    REDUCE  NORVASC 2.5 MG   Pros and cons of this new medication / change medication has been explained to  the patient    Possible side effects has been explained    Associated need of the blood  Work has been explained    Need for the compliance of the medication has been explained      SEE IN 6 MONTHS  COUNSELING:    William Morales was given to patient for the following topics: diagnostic results, risk factor reductions, impressions, risks and benefits of treatment options and importance of treatment compliance .       SMOKING COUNSELING:        Dictated using Dragon dictation

## 2024-05-08 ENCOUNTER — OFFICE VISIT (OUTPATIENT)
Dept: FAMILY MEDICINE CLINIC | Facility: CLINIC | Age: 74
End: 2024-05-08
Payer: MEDICARE

## 2024-05-08 VITALS
DIASTOLIC BLOOD PRESSURE: 75 MMHG | SYSTOLIC BLOOD PRESSURE: 164 MMHG | WEIGHT: 205 LBS | TEMPERATURE: 98.7 F | HEART RATE: 72 BPM | RESPIRATION RATE: 16 BRPM | OXYGEN SATURATION: 96 % | BODY MASS INDEX: 27.77 KG/M2 | HEIGHT: 72 IN

## 2024-05-08 DIAGNOSIS — J01.00 ACUTE NON-RECURRENT MAXILLARY SINUSITIS: Primary | ICD-10-CM

## 2024-05-08 PROCEDURE — 1159F MED LIST DOCD IN RCRD: CPT | Performed by: FAMILY MEDICINE

## 2024-05-08 PROCEDURE — 3077F SYST BP >= 140 MM HG: CPT | Performed by: FAMILY MEDICINE

## 2024-05-08 PROCEDURE — 3078F DIAST BP <80 MM HG: CPT | Performed by: FAMILY MEDICINE

## 2024-05-08 PROCEDURE — 1160F RVW MEDS BY RX/DR IN RCRD: CPT | Performed by: FAMILY MEDICINE

## 2024-05-08 PROCEDURE — 1126F AMNT PAIN NOTED NONE PRSNT: CPT | Performed by: FAMILY MEDICINE

## 2024-05-08 PROCEDURE — 99213 OFFICE O/P EST LOW 20 MIN: CPT | Performed by: FAMILY MEDICINE

## 2024-05-08 RX ORDER — AMOXICILLIN AND CLAVULANATE POTASSIUM 875; 125 MG/1; MG/1
1 TABLET, FILM COATED ORAL EVERY 12 HOURS SCHEDULED
Qty: 20 TABLET | Refills: 0 | Status: SHIPPED | OUTPATIENT
Start: 2024-05-08

## 2024-07-24 DIAGNOSIS — E78.00 PURE HYPERCHOLESTEROLEMIA: ICD-10-CM

## 2024-07-25 RX ORDER — BISOPROLOL FUMARATE AND HYDROCHLOROTHIAZIDE 10; 6.25 MG/1; MG/1
1 TABLET ORAL DAILY
Qty: 90 TABLET | Refills: 1 | Status: SHIPPED | OUTPATIENT
Start: 2024-07-25

## 2024-07-26 RX ORDER — ATORVASTATIN CALCIUM 20 MG/1
20 TABLET, FILM COATED ORAL DAILY
Qty: 90 TABLET | Refills: 1 | Status: SHIPPED | OUTPATIENT
Start: 2024-07-26

## 2024-08-02 ENCOUNTER — OFFICE VISIT (OUTPATIENT)
Age: 74
End: 2024-08-02
Payer: MEDICARE

## 2024-08-02 ENCOUNTER — HOSPITAL ENCOUNTER (OUTPATIENT)
Facility: HOSPITAL | Age: 74
Discharge: HOME | End: 2024-08-02
Payer: MEDICARE

## 2024-08-02 VITALS
BODY MASS INDEX: 27.5 KG/M2 | WEIGHT: 203 LBS | SYSTOLIC BLOOD PRESSURE: 140 MMHG | HEIGHT: 72 IN | DIASTOLIC BLOOD PRESSURE: 70 MMHG

## 2024-08-02 DIAGNOSIS — I65.23 ASYMPTOMATIC BILATERAL CAROTID ARTERY STENOSIS: ICD-10-CM

## 2024-08-02 DIAGNOSIS — R42 DIZZINESS AND GIDDINESS: ICD-10-CM

## 2024-08-02 DIAGNOSIS — I65.23 BILATERAL CAROTID ARTERY STENOSIS: Primary | ICD-10-CM

## 2024-08-02 DIAGNOSIS — I65.02 OCCLUSION OF LEFT VERTEBRAL ARTERY: ICD-10-CM

## 2024-08-02 DIAGNOSIS — I65.23 CAROTID STENOSIS, BILATERAL: ICD-10-CM

## 2024-08-02 PROCEDURE — 93880 EXTRACRANIAL BILAT STUDY: CPT

## 2024-08-02 NOTE — PROGRESS NOTES
Chief Complaint  Carotid Artery Disease    Subjective        William Ritchie presents to [facility] VASCULAR SURGERY  HPI   William Ritchie is a 74 y.o. male that has been followed in our office by Dr. Smith for carotid artery stenosis and a left vertebral artery occlusion.  He has a history of throat cancer and was treated with radiation to the left side in the remote past.  He returns today in follow up along with a carotid duplex. He  reports he has been doing well without hospitalizations or surgeries. He denies any symptoms consistent with CVA, TIA, or amaurosis fugax.  He does report occasional dizziness, though he relates this to the change in his blood pressure medication.  This occurs when he is standing from a seated position.    Review of Systems   Constitutional:  Negative for fever.   Eyes:  Negative for visual disturbance.   Cardiovascular:  Negative for leg swelling.   Gastrointestinal:  Negative for abdominal pain.   Musculoskeletal:  Negative for back pain.   Skin:  Negative for color change, pallor and wound.   Neurological:  Negative for dizziness, facial asymmetry, speech difficulty and weakness.        William Ritchie  reports that he has never smoked. He has never been exposed to tobacco smoke. He has never used smokeless tobacco..        Objective   Vital Signs:  Vitals:    08/02/24 1059   BP: 140/70      Body mass index is 27.53 kg/m².           Physical Exam  Vitals reviewed.   Constitutional:       Appearance: Normal appearance.   HENT:      Head: Normocephalic.   Cardiovascular:      Rate and Rhythm: Normal rate and regular rhythm.      Pulses: Normal pulses.           Dorsalis pedis pulses are 3+ on the right side and 3+ on the left side.        Posterior tibial pulses are 3+ on the right side and 3+ on the left side.   Pulmonary:      Effort: Pulmonary effort is normal.   Skin:     General: Skin is warm.   Neurological:      General: No focal deficit present.       Mental Status: He is alert and oriented to person, place, and time.   Psychiatric:         Mood and Affect: Mood normal.          Result Review :      Previous carotid duplex: Less than 50% stenosis bilaterally.  Occluded left vertebral artery.    Carotid duplex from today: Duplex Carotid Ultrasound CAR (08/02/2024 10:53)                    Assessment and Plan     Diagnoses and all orders for this visit:    1. Bilateral carotid artery stenosis (Primary)    2. Occlusion of left vertebral artery    3. Carotid stenosis, bilateral  -     Duplex Carotid Ultrasound CAR; Future    4. Dizziness and giddiness             Patient present today for follow up of carotid artery stenosis and left vertebral artery occlusion.  These are stable.  The patient is asymptomatic.  He is to continue his antiplatelet agent which is aspirin. He is on a statin for cholesterol control.  We discussed adequate blood pressure control.  Discussed the dizziness.  I do believe this is related to his blood pressure medication.  I do not think he has any symptoms of vertebrobasilar insufficiency.  He will return in 1 year along with a repeat carotid artery duplex.    Follow Up     Return in about 1 year (around 8/2/2025) for carotid duplex.  Patient was given instructions and counseling regarding his condition or for health maintenance advice. Please see specific information pulled into the AVS if appropriate.     HERLINDA Patiño

## 2024-08-05 LAB
BH CV XLRA MEAS LEFT CAROTID BULB EDV: 30.3 CM/SEC
BH CV XLRA MEAS LEFT CAROTID BULB PSV: 124.8 CM/SEC
BH CV XLRA MEAS LEFT DIST CCA EDV: -17.4 CM/SEC
BH CV XLRA MEAS LEFT DIST CCA PSV: -96.9 CM/SEC
BH CV XLRA MEAS LEFT DIST ICA EDV: -22.4 CM/SEC
BH CV XLRA MEAS LEFT DIST ICA PSV: -78.3 CM/SEC
BH CV XLRA MEAS LEFT ICA/CCA RATIO: 1.29
BH CV XLRA MEAS LEFT MID CCA EDV: 24.3 CM/SEC
BH CV XLRA MEAS LEFT MID CCA PSV: 131.7 CM/SEC
BH CV XLRA MEAS LEFT MID ICA EDV: -24.2 CM/SEC
BH CV XLRA MEAS LEFT MID ICA PSV: -93.8 CM/SEC
BH CV XLRA MEAS LEFT PROX CCA EDV: 20.8 CM/SEC
BH CV XLRA MEAS LEFT PROX CCA PSV: 133.4 CM/SEC
BH CV XLRA MEAS LEFT PROX ECA EDV: -19.1 CM/SEC
BH CV XLRA MEAS LEFT PROX ECA PSV: -136.9 CM/SEC
BH CV XLRA MEAS LEFT PROX ICA EDV: -24.2 CM/SEC
BH CV XLRA MEAS LEFT PROX ICA PSV: -95.1 CM/SEC
BH CV XLRA MEAS LEFT PROX SCLA PSV: 192.9 CM/SEC
BH CV XLRA MEAS LEFT VERTEBRAL A PSV: 0 CM/SEC
BH CV XLRA MEAS RIGHT CAROTID BULB EDV: -18.2 CM/SEC
BH CV XLRA MEAS RIGHT CAROTID BULB PSV: -65.9 CM/SEC
BH CV XLRA MEAS RIGHT DIST CCA EDV: 19.8 CM/SEC
BH CV XLRA MEAS RIGHT DIST CCA PSV: 88.4 CM/SEC
BH CV XLRA MEAS RIGHT DIST ICA EDV: -25.5 CM/SEC
BH CV XLRA MEAS RIGHT DIST ICA PSV: -85.7 CM/SEC
BH CV XLRA MEAS RIGHT ICA/CCA RATIO: 1
BH CV XLRA MEAS RIGHT MID CCA EDV: 25.8 CM/SEC
BH CV XLRA MEAS RIGHT MID CCA PSV: 97.1 CM/SEC
BH CV XLRA MEAS RIGHT MID ICA EDV: -23 CM/SEC
BH CV XLRA MEAS RIGHT MID ICA PSV: -88.8 CM/SEC
BH CV XLRA MEAS RIGHT PROX CCA EDV: 20.5 CM/SEC
BH CV XLRA MEAS RIGHT PROX CCA PSV: 98.2 CM/SEC
BH CV XLRA MEAS RIGHT PROX ECA EDV: 14.7 CM/SEC
BH CV XLRA MEAS RIGHT PROX ECA PSV: 146.4 CM/SEC
BH CV XLRA MEAS RIGHT PROX ICA EDV: -19.7 CM/SEC
BH CV XLRA MEAS RIGHT PROX ICA PSV: -86 CM/SEC
BH CV XLRA MEAS RIGHT PROX SCLA PSV: 250.9 CM/SEC
BH CV XLRA MEAS RIGHT VERTEBRAL A EDV: 16.2 CM/SEC
BH CV XLRA MEAS RIGHT VERTEBRAL A PSV: 56.5 CM/SEC
LEFT ARM BP: NORMAL MMHG
RIGHT ARM BP: NORMAL MMHG

## 2024-08-09 RX ORDER — OLMESARTAN MEDOXOMIL 40 MG/1
40 TABLET ORAL DAILY
Qty: 90 TABLET | Refills: 3 | Status: SHIPPED | OUTPATIENT
Start: 2024-08-09

## 2024-08-20 ENCOUNTER — TELEPHONE (OUTPATIENT)
Dept: CARDIOLOGY | Facility: CLINIC | Age: 74
End: 2024-08-20
Payer: MEDICARE

## 2024-08-20 NOTE — TELEPHONE ENCOUNTER
----- Message from Noa BUTLER sent at 8/19/2024  3:16 PM EDT -----  Regarding: WATER PILL AND BP LOW  Contact: [phone]  PT IS TAKING A WATER PILL AND IS GETTING UP THOUGH OUT THE NIGHT TO USE RESTROOM AND IS NOT GETTING ANY SLEEP.    PT IS LIGHTHEADED/DIZZINESS THOUGH OUT DAY, BP 93/51  88/54  97/48  BP RANGING    SPOKE WITH AP : STOP AMLODIPINE CALL IN NEW RX FOR BISOPROLOL WITHOUT HCTZ     LVM FOR PTIENT TO CALL

## 2024-08-21 RX ORDER — BISOPROLOL FUMARATE 10 MG/1
10 TABLET, FILM COATED ORAL DAILY
Qty: 30 TABLET | Refills: 5 | Status: SHIPPED | OUTPATIENT
Start: 2024-08-21

## 2024-09-16 RX ORDER — BISOPROLOL FUMARATE 10 MG/1
10 TABLET, FILM COATED ORAL DAILY
Qty: 30 TABLET | Refills: 5 | Status: SHIPPED | OUTPATIENT
Start: 2024-09-16

## 2024-09-27 ENCOUNTER — TELEPHONE (OUTPATIENT)
Dept: CARDIOLOGY | Facility: CLINIC | Age: 74
End: 2024-09-27
Payer: MEDICARE

## 2024-09-27 RX ORDER — BISOPROLOL FUMARATE 10 MG/1
10 TABLET, FILM COATED ORAL DAILY
Qty: 90 TABLET | Refills: 3 | Status: SHIPPED | OUTPATIENT
Start: 2024-09-27

## 2024-10-28 RX ORDER — OLMESARTAN MEDOXOMIL 40 MG/1
40 TABLET ORAL DAILY
Qty: 90 TABLET | Refills: 1 | Status: SHIPPED | OUTPATIENT
Start: 2024-10-28 | End: 2024-10-29 | Stop reason: SDUPTHER

## 2024-10-29 RX ORDER — OLMESARTAN MEDOXOMIL 40 MG/1
40 TABLET ORAL DAILY
Qty: 90 TABLET | Refills: 1 | Status: SHIPPED | OUTPATIENT
Start: 2024-10-29

## 2024-11-06 NOTE — PROGRESS NOTES
Subjective   The ABCs of the Annual Wellness Visit  Medicare Wellness Visit      William Ritchie is a 74 y.o. patient who presents for a Medicare Wellness Visit.    The following portions of the patient's history were reviewed and   updated as appropriate: allergies, current medications, past family history, past medical history, past social history, past surgical history, and problem list.    Compared to one year ago, the patient's physical   health is the same.  Compared to one year ago, the patient's mental   health is the same.    Recent Hospitalizations:  He was not admitted to the hospital during the last year.     Current Medical Providers:  Patient Care Team:  Ameya Sheffield MD as PCP - General (Family Medicine)  Ameya Aguirre MD as Referring Physician (Radiation Oncology)  Nuvia Ballesteros MD PhD as Consulting Physician (Hematology and Oncology)  Ameya Ortega II, MD as Consulting Physician (Hematology and Oncology)  Efrain Browning MD as Consulting Physician (Otolaryngology)  Jack Sharp MD as Consulting Physician (Allergy and Immunology)  Isaias Baca MD as Consulting Physician (Cardiology)  Velma Sidhu APRN as Nurse Practitioner (Vascular Surgery)    Outpatient Medications Prior to Visit   Medication Sig Dispense Refill    ASPIRIN 81 PO       bisoprolol (ZEBeta) 10 MG tablet Take 1 tablet by mouth Daily. 90 tablet 3    olmesartan (BENICAR) 40 MG tablet Take 1 tablet by mouth Daily. 90 tablet 1    amLODIPine (NORVASC) 5 MG tablet Take 0.5 tablets by mouth Daily. 30 tablet 5    atorvastatin (LIPITOR) 20 MG tablet TAKE 1 TABLET DAILY 90 tablet 1    sildenafil (REVATIO) 20 MG tablet Take 2-3 tabs QD prn 30 tablet 11    valACYclovir (VALTREX) 500 MG tablet TAKE 4 TABLETS BY MOUTH AT FIRST SIGN OF ATTACK AND THEN TAKE 4 TABLETS 12 HOURS LATER       No facility-administered medications prior to visit.     No opioid medication identified on active medication list. I  "have reviewed chart for other potential  high risk medication/s and harmful drug interactions in the elderly.      Aspirin is on active medication list. Aspirin use is indicated based on review of current medical condition/s. Pros and cons of this therapy have been discussed today. Benefits of this medication outweigh potential harm.  Patient has been encouraged to continue taking this medication.  .      Patient Active Problem List   Diagnosis    Hyperlipidemia    Benign essential hypertension    IFG (impaired fasting glucose)    Oropharyngeal cancer    Fitting and adjustment of vascular catheter    Steroid-induced hyperglycemia    Dehydration    Acute renal injury    Anemia associated with chemotherapy    Chemotherapy-induced nausea    GERD (gastroesophageal reflux disease)    Chemotherapy induced neutropenia    Adverse effect of antineoplastic and immunosuppressive drugs    Adverse effect of radiation therapy    Pharyngitis, acute    Hypothyroidism due to acquired atrophy of thyroid    TSH (thyroid-stimulating hormone deficiency)    Calculus of gallbladder without cholecystitis without obstruction    Right leg pain    Sensitivity to the cold    Leukemoid reaction    Xerostomia due to radiotherapy    Dental abscess    Carotid artery stenosis, asymptomatic, left    Erectile dysfunction    History of oropharyngeal cancer    History of head and neck cancer    Contracture, right ankle    Occlusion of left vertebral artery    Dizziness and giddiness     Advance Care Planning Advance Directive is not on file.  ACP discussion was held with the patient during this visit. Patient does not have an advance directive, declines further assistance.            Objective   Vitals:    11/07/24 1050   BP: 142/72   Pulse: 58   Resp: 14   Temp: 97.9 °F (36.6 °C)   TempSrc: Oral   SpO2: 99%   Weight: 95.3 kg (210 lb)   Height: 182.9 cm (72\")   PainSc: 0-No pain       Estimated body mass index is 28.48 kg/m² as calculated from the " "following:    Height as of this encounter: 182.9 cm (72\").    Weight as of this encounter: 95.3 kg (210 lb).            Does the patient have evidence of cognitive impairment? No                                                                                                Health  Risk Assessment    Smoking Status:  Social History     Tobacco Use   Smoking Status Never    Passive exposure: Never   Smokeless Tobacco Never     Alcohol Consumption:  Social History     Substance and Sexual Activity   Alcohol Use Not Currently    Comment: occassional       Fall Risk Screen  KENNETH Fall Risk Assessment was completed, and patient is at LOW risk for falls.Assessment completed on:2024    Depression Screenin/7/2024    10:51 AM   PHQ-2/PHQ-9 Depression Screening   Little interest or pleasure in doing things Not at all   Feeling down, depressed, or hopeless Not at all   How difficult have these problems made it for you to do your work, take care of things at home, or get along with other people? Not difficult at all     Health Habits and Functional and Cognitive Screenin/7/2024    10:00 AM   Functional & Cognitive Status   Do you have difficulty preparing food and eating? No   Do you have difficulty bathing yourself, getting dressed or grooming yourself? No   Do you have difficulty using the toilet? No   Do you have difficulty moving around from place to place? No   Do you have trouble with steps or getting out of a bed or a chair? No   Current Diet Well Balanced Diet   Dental Exam Up to date   Eye Exam Up to date   Exercise (times per week) 5 times per week   Current Exercises Include Walking   Do you need help using the phone?  No   Are you deaf or do you have serious difficulty hearing?  No   Do you need help to go to places out of walking distance? No   Do you need help shopping? No   Do you need help preparing meals?  No   Do you need help with housework?  No   Do you need help with laundry? No "   Do you need help taking your medications? No   Do you need help managing money? No   Do you ever drive or ride in a car without wearing a seat belt? No   Have you felt unusual stress, anger or loneliness in the last month? No   Who do you live with? Spouse   If you need help, do you have trouble finding someone available to you? No   Have you been bothered in the last four weeks by sexual problems? No   Do you have difficulty concentrating, remembering or making decisions? No           Age-appropriate Screening Schedule:  Refer to the list below for future screening recommendations based on patient's age, sex and/or medical conditions. Orders for these recommended tests are listed in the plan section. The patient has been provided with a written plan.    Health Maintenance List  Health Maintenance   Topic Date Due    Pneumococcal Vaccine 65+ (2 of 2 - PCV) 01/11/2025 (Originally 10/25/2016)    COVID-19 Vaccine (6 - 2024-25 season) 01/27/2025 (Originally 9/1/2024)    RSV Vaccine - Adults (1 - 1-dose 75+ series) 05/08/2025 (Originally 7/21/2023)    LIPID PANEL  12/15/2024    BMI FOLLOWUP  02/13/2025    COLORECTAL CANCER SCREENING  10/08/2025    ANNUAL WELLNESS VISIT  11/07/2025    INFLUENZA VACCINE  Completed    ZOSTER VACCINE  Completed    HEPATITIS C SCREENING  Discontinued    PROSTATE CANCER SCREENING  Discontinued    TDAP/TD VACCINES  Discontinued                                                                                                                                                CMS Preventative Services Quick Reference  Risk Factors Identified During Encounter  None Identified    The above risks/problems have been discussed with the patient.  Pertinent information has been shared with the patient in the After Visit Summary.  An After Visit Summary and PPPS were made available to the patient.    Follow Up:   Next Medicare Wellness visit to be scheduled in 1 year.         Additional E&M Note during same  "encounter follows:  Patient has additional, significant, and separately identifiable condition(s)/problem(s) that require work above and beyond the Medicare Wellness Visit     Chief Complaint  Hyperlipidemia, Erectile Dysfunction (Med refill due /No labs /[facility] ), and medicare wellness (due)    Subjective   HPI  Prince is also being seen today for additional medical problem/s.    Review of Systems   Constitutional:  Negative for chills and fever.   HENT:  Negative for congestion, ear pain and sinus pressure.    Eyes:  Negative for pain and visual disturbance.   Respiratory:  Negative for cough and shortness of breath.    Cardiovascular:  Negative for chest pain.   Gastrointestinal:  Negative for abdominal pain.   Genitourinary:  Negative for difficulty urinating and dysuria.   Skin: Negative.    Neurological: Negative.    Psychiatric/Behavioral:  Negative for dysphoric mood.               Objective   Vital Signs:  /72   Pulse 58   Temp 97.9 °F (36.6 °C) (Oral)   Resp 14   Ht 182.9 cm (72\")   Wt 95.3 kg (210 lb)   SpO2 99%   BMI 28.48 kg/m²   Physical Exam  Vitals and nursing note reviewed.   Constitutional:       General: He is not in acute distress.     Appearance: He is well-developed.   Cardiovascular:      Rate and Rhythm: Normal rate and regular rhythm.   Pulmonary:      Effort: Pulmonary effort is normal.      Breath sounds: Normal breath sounds.   Neurological:      Mental Status: He is alert and oriented to person, place, and time.   Psychiatric:         Behavior: Behavior normal.         Thought Content: Thought content normal.         The following data was reviewed by: Ameya Sheffield MD on 11/07/2024:        Assessment and Plan Additional age appropriate preventative wellness advice topics were discussed during today's preventative wellness exam(some topics already addressed during AWV portion of the note above):    Physical Activity: Advised cardiovascular activity 150 minutes per " week as tolerated. (example brisk walk for 30 minutes, 5 days a week).     Nutrition: Discussed nutrition plan with patient. Information shared in after visit summary. Goal is for a well balanced diet to enhance overall health.              Encounter for subsequent annual wellness visit (AWV) in Medicare patient    Pure hypercholesterolemia   Lipid abnormalities are stable    Plan:  Continue same medication/s without change.      Counseled patient on lifestyle modifications to help control hyperlipidemia.     Patient Treatment Goals:   LDL goal is under 100    Followup in 1 year.  Erectile dysfunction, unspecified erectile dysfunction type    Benign essential hypertension  Hypertension is stable and controlled  Continue current treatment regimen.  Blood pressure will be reassessed in 1 year.  Special screening for malignant neoplasm of prostate      Orders Placed This Encounter   Procedures    Comprehensive metabolic panel     Order Specific Question:   Release to patient     Answer:   Routine Release [[id number]]    Lipid panel     Order Specific Question:   Release to patient     Answer:   Routine Release [[id number]]    TSH     Order Specific Question:   Release to patient     Answer:   Routine Release [[id number]]    PSA     Order Specific Question:   Release to patient     Answer:   Routine Release [[id number]]    CBC and Differential     Order Specific Question:   Manual Differential     Answer:   Yes     Order Specific Question:   Release to patient     Answer:   Routine Release [[id number]]     New Medications Ordered This Visit   Medications    atorvastatin (LIPITOR) 20 MG tablet     Sig: Take 1 tablet by mouth Daily.     Dispense:  90 tablet     Refill:  3    sildenafil (REVATIO) 20 MG tablet     Sig: Take 2-3 tabs QD prn     Dispense:  30 tablet     Refill:  11        I spent 15 minutes caring for William on this date of service. This time includes time spent by me in the following activities:preparing  for the visit, performing a medically appropriate examination and/or evaluation , ordering medications, tests, or procedures, and documenting information in the medical record  Follow Up   No follow-ups on file.  Patient was given instructions and counseling regarding his condition or for health maintenance advice. Please see specific information pulled into the AVS if appropriate.

## 2024-11-07 ENCOUNTER — OFFICE VISIT (OUTPATIENT)
Dept: FAMILY MEDICINE CLINIC | Facility: CLINIC | Age: 74
End: 2024-11-07
Payer: MEDICARE

## 2024-11-07 VITALS
DIASTOLIC BLOOD PRESSURE: 72 MMHG | WEIGHT: 210 LBS | OXYGEN SATURATION: 99 % | HEART RATE: 58 BPM | SYSTOLIC BLOOD PRESSURE: 142 MMHG | BODY MASS INDEX: 28.44 KG/M2 | HEIGHT: 72 IN | RESPIRATION RATE: 14 BRPM | TEMPERATURE: 97.9 F

## 2024-11-07 DIAGNOSIS — Z00.00 ENCOUNTER FOR SUBSEQUENT ANNUAL WELLNESS VISIT (AWV) IN MEDICARE PATIENT: Primary | ICD-10-CM

## 2024-11-07 DIAGNOSIS — I10 BENIGN ESSENTIAL HYPERTENSION: ICD-10-CM

## 2024-11-07 DIAGNOSIS — E78.00 PURE HYPERCHOLESTEROLEMIA: ICD-10-CM

## 2024-11-07 DIAGNOSIS — N52.9 ERECTILE DYSFUNCTION, UNSPECIFIED ERECTILE DYSFUNCTION TYPE: ICD-10-CM

## 2024-11-07 DIAGNOSIS — Z12.5 SPECIAL SCREENING FOR MALIGNANT NEOPLASM OF PROSTATE: ICD-10-CM

## 2024-11-07 RX ORDER — ATORVASTATIN CALCIUM 20 MG/1
20 TABLET, FILM COATED ORAL DAILY
Qty: 90 TABLET | Refills: 3 | Status: SHIPPED | OUTPATIENT
Start: 2024-11-07

## 2024-11-07 RX ORDER — SILDENAFIL CITRATE 20 MG/1
TABLET ORAL
Qty: 30 TABLET | Refills: 11 | Status: SHIPPED | OUTPATIENT
Start: 2024-11-07

## 2024-11-07 NOTE — PATIENT INSTRUCTIONS
Medicare Wellness  Personal Prevention Plan of Service     Date of Office Visit:    Encounter Provider:  Ameya Sheffield MD  Place of Service:  [facility]  Patient Name: William Ritchie  :  1950    As part of the Medicare Wellness portion of your visit today, we are providing you with this personalized preventive plan of services (PPPS). This plan is based upon recommendations of the [place] Preventive Services Task Force (USPSTF) and the Advisory Committee on Immunization Practices (ACIP).    This lists the preventive care services that should be considered, and provides dates of when you are due. Items listed as completed are up-to-date and do not require any further intervention.    Health Maintenance   Topic Date Due    Pneumococcal Vaccine 65+ (2 of 2 - PCV) 2025 (Originally 10/25/2016)    COVID-19 Vaccine ( - - season) 2025 (Originally 2024)    RSV Vaccine - Adults (1 - 1-dose 75+ series) 2025 (Originally 2023)    LIPID PANEL  12/15/2024    BMI FOLLOWUP  2025    COLORECTAL CANCER SCREENING  10/08/2025    ANNUAL WELLNESS VISIT  2025    INFLUENZA VACCINE  Completed    ZOSTER VACCINE  Completed    HEPATITIS C SCREENING  Discontinued    PROSTATE CANCER SCREENING  Discontinued    TDAP/TD VACCINES  Discontinued       Orders Placed This Encounter   Procedures    Comprehensive metabolic panel     Order Specific Question:   Release to patient     Answer:   Routine Release [[id number]]    Lipid panel     Order Specific Question:   Release to patient     Answer:   Routine Release [[id number]]    TSH     Order Specific Question:   Release to patient     Answer:   Routine Release [[id number]]    PSA     Order Specific Question:   Release to patient     Answer:   Routine Release [[id number]]    CBC and Differential     Order Specific Question:   Manual Differential     Answer:   Yes     Order Specific Question:   Release to  patient     Answer:   Routine Release [[id number]]       Return in about 1 year (around 11/7/2025) for Recheck.

## 2024-11-09 LAB
ALBUMIN SERPL-MCNC: 4.3 G/DL (ref 3.8–4.8)
ALP SERPL-CCNC: 101 IU/L (ref 44–121)
ALT SERPL-CCNC: 25 IU/L (ref 0–44)
AST SERPL-CCNC: 21 IU/L (ref 0–40)
BASOPHILS # BLD AUTO: 0.1 X10E3/UL (ref 0–0.2)
BASOPHILS NFR BLD AUTO: 1 %
BILIRUB SERPL-MCNC: 0.5 MG/DL (ref 0–1.2)
BUN SERPL-MCNC: 21 MG/DL (ref 8–27)
BUN/CREAT SERPL: 21 (ref 10–24)
CALCIUM SERPL-MCNC: 9.3 MG/DL (ref 8.6–10.2)
CHLORIDE SERPL-SCNC: 98 MMOL/L (ref 96–106)
CHOLEST SERPL-MCNC: 106 MG/DL (ref 100–199)
CO2 SERPL-SCNC: 23 MMOL/L (ref 20–29)
CREAT SERPL-MCNC: 0.98 MG/DL (ref 0.76–1.27)
EGFRCR SERPLBLD CKD-EPI 2021: 80 ML/MIN/1.73
EOSINOPHIL # BLD AUTO: 0.9 X10E3/UL (ref 0–0.4)
EOSINOPHIL NFR BLD AUTO: 13 %
ERYTHROCYTE [DISTWIDTH] IN BLOOD BY AUTOMATED COUNT: 11.6 % (ref 11.6–15.4)
GLOBULIN SER CALC-MCNC: 2.2 G/DL (ref 1.5–4.5)
GLUCOSE SERPL-MCNC: 106 MG/DL (ref 70–99)
HCT VFR BLD AUTO: 41.9 % (ref 37.5–51)
HDLC SERPL-MCNC: 50 MG/DL
HGB BLD-MCNC: 14.2 G/DL (ref 13–17.7)
IMM GRANULOCYTES # BLD AUTO: 0 X10E3/UL (ref 0–0.1)
IMM GRANULOCYTES NFR BLD AUTO: 0 %
LDLC SERPL CALC-MCNC: 42 MG/DL (ref 0–99)
LYMPHOCYTES # BLD AUTO: 2 X10E3/UL (ref 0.7–3.1)
LYMPHOCYTES NFR BLD AUTO: 29 %
MCH RBC QN AUTO: 32.2 PG (ref 26.6–33)
MCHC RBC AUTO-ENTMCNC: 33.9 G/DL (ref 31.5–35.7)
MCV RBC AUTO: 95 FL (ref 79–97)
MONOCYTES # BLD AUTO: 0.7 X10E3/UL (ref 0.1–0.9)
MONOCYTES NFR BLD AUTO: 10 %
NEUTROPHILS # BLD AUTO: 3.3 X10E3/UL (ref 1.4–7)
NEUTROPHILS NFR BLD AUTO: 47 %
PLATELET # BLD AUTO: 230 X10E3/UL (ref 150–450)
POTASSIUM SERPL-SCNC: 5 MMOL/L (ref 3.5–5.2)
PROT SERPL-MCNC: 6.5 G/DL (ref 6–8.5)
PSA SERPL-MCNC: 1.9 NG/ML (ref 0–4)
RBC # BLD AUTO: 4.41 X10E6/UL (ref 4.14–5.8)
SODIUM SERPL-SCNC: 134 MMOL/L (ref 134–144)
TRIGL SERPL-MCNC: 61 MG/DL (ref 0–149)
TSH SERPL DL<=0.005 MIU/L-ACNC: 4.79 UIU/ML (ref 0.45–4.5)
VLDLC SERPL CALC-MCNC: 14 MG/DL (ref 5–40)
WBC # BLD AUTO: 7 X10E3/UL (ref 3.4–10.8)

## 2024-11-11 ENCOUNTER — OFFICE VISIT (OUTPATIENT)
Dept: CARDIOLOGY | Facility: CLINIC | Age: 74
End: 2024-11-11
Payer: MEDICARE

## 2024-11-11 VITALS
HEART RATE: 64 BPM | DIASTOLIC BLOOD PRESSURE: 82 MMHG | BODY MASS INDEX: 28.44 KG/M2 | WEIGHT: 210 LBS | SYSTOLIC BLOOD PRESSURE: 164 MMHG | HEIGHT: 72 IN | OXYGEN SATURATION: 100 %

## 2024-11-11 DIAGNOSIS — I10 BENIGN ESSENTIAL HYPERTENSION: Primary | ICD-10-CM

## 2024-11-11 DIAGNOSIS — R94.31 ABNORMAL EKG: ICD-10-CM

## 2024-11-11 PROCEDURE — 3079F DIAST BP 80-89 MM HG: CPT | Performed by: INTERNAL MEDICINE

## 2024-11-11 PROCEDURE — 3077F SYST BP >= 140 MM HG: CPT | Performed by: INTERNAL MEDICINE

## 2024-11-11 PROCEDURE — 99213 OFFICE O/P EST LOW 20 MIN: CPT | Performed by: INTERNAL MEDICINE

## 2024-11-11 RX ORDER — OLMESARTAN MEDOXOMIL 40 MG/1
20 TABLET ORAL DAILY
Qty: 90 TABLET | Refills: 1 | Status: SHIPPED | OUTPATIENT
Start: 2024-11-11

## 2024-11-11 NOTE — PROGRESS NOTES
Subjective:        William Ritchie is a 74 y.o. male who here for follow up    CC  DIZZINESS WHEN STAND UP  HPI  74 years old male with hypertension here for the follow-up he has complaints of dizziness when he stands up     Problems Addressed this Visit          Cardiac and Vasculature    Benign essential hypertension - Primary    Relevant Medications    olmesartan (BENICAR) 40 MG tablet     Other Visit Diagnoses       Abnormal EKG              Diagnoses         Codes Comments    Benign essential hypertension    -  Primary ICD-10-CM: I10  ICD-9-CM: 401.1     Abnormal EKG     ICD-10-CM: R94.31  ICD-9-CM: 794.31           .    The following portions of the patient's history were reviewed and updated as appropriate: allergies, current medications, past family history, past medical history, past social history, past surgical history and problem list.    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 He has never been exposed to tobacco smoke. He has never used smokeless tobacco. He reports that he does not currently use alcohol. He reports that he does not use drugs.   Family History   Problem Relation Age of Onset    Dementia Mother     Alcohol abuse Father     Diabetes Father     Cancer Brother     Heart disease Brother     Malig Hyperthermia Neg Hx        Review of Systems  Constitutional: No wt loss, fever, fatigue  Gastrointestinal: No nausea, abdominal pain  Behavioral/Psych: No insomnia or anxiety   Cardiovascular dizziness on standing  Objective:       Physical Exam  /82    "Pulse 64   Ht 182.9 cm (72.01\")   Wt 95.3 kg (210 lb)   SpO2 100%   BMI 28.47 kg/m²   General appearance: No acute changes   Neck: Trachea midline; NECK, supple, no thyromegaly or lymphadenopathy   Lungs: Normal size and shape, normal breath sounds, equal distribution of air, no rales and rhonchi   CV: S1-S2 regular, no murmurs, no rub, no gallop   Abdomen: Soft, nontender; no masses , no abnormal abdominal sounds   Extremities: No deformity , normal color , no peripheral edema   Skin: Normal temperature, turgor and texture; no rash, ulcers          Procedures      Echocardiogram:    Results for orders placed in visit on 03/26/24    Adult Transthoracic Echo Complete W/ Cont if Necessary Per Protocol    Interpretation Summary    Left ventricular ejection fraction appears to be 61 - 65%.    Left ventricular diastolic function was normal.    Estimated right ventricular systolic pressure from tricuspid regurgitation is normal (<35 mmHg).          Current Outpatient Medications:     ASPIRIN 81 PO, , Disp: , Rfl:     atorvastatin (LIPITOR) 20 MG tablet, Take 1 tablet by mouth Daily., Disp: 90 tablet, Rfl: 3    bisoprolol (ZEBeta) 10 MG tablet, Take 1 tablet by mouth Daily., Disp: 90 tablet, Rfl: 3    olmesartan (BENICAR) 40 MG tablet, Take 0.5 tablets by mouth Daily., Disp: 90 tablet, Rfl: 1    sildenafil (REVATIO) 20 MG tablet, Take 2-3 tabs QD prn, Disp: 30 tablet, Rfl: 11   Assessment:                Plan:          ICD-10-CM ICD-9-CM   1. Benign essential hypertension  I10 401.1   2. Abnormal EKG  R94.31 794.31     1. Benign essential hypertension  Patient understands importance of blood pressure check at home which patient does regularly and the blood pressures are well under control to the level of less than 140/90    Will reduce the dose of Benicar    2. Abnormal EKG  No chest pain      REDUCE BENICAR 20 MG    SEE IN 6 MONTHS  COUNSELING:    William Morales was given to patient for the following " topics: diagnostic results, risk factor reductions, impressions, risks and benefits of treatment options and importance of treatment compliance .       SMOKING COUNSELING:        Dictated using Dragon dictation

## 2025-01-08 DIAGNOSIS — Z85.819 HISTORY OF OROPHARYNGEAL CANCER: Primary | ICD-10-CM

## 2025-02-06 DIAGNOSIS — Z85.819 HISTORY OF OROPHARYNGEAL CANCER: Primary | ICD-10-CM

## 2025-02-10 DIAGNOSIS — I65.23 BILATERAL CAROTID ARTERY STENOSIS: Primary | ICD-10-CM

## 2025-02-19 ENCOUNTER — LAB (OUTPATIENT)
Dept: LAB | Facility: HOSPITAL | Age: 75
End: 2025-02-19
Payer: MEDICARE

## 2025-02-19 ENCOUNTER — HOSPITAL ENCOUNTER (EMERGENCY)
Facility: HOSPITAL | Age: 75
Discharge: HOME | End: 2025-02-20
Attending: EMERGENCY MEDICINE
Payer: MEDICARE

## 2025-02-19 ENCOUNTER — TELEPHONE (OUTPATIENT)
Dept: ONCOLOGY | Facility: CLINIC | Age: 75
End: 2025-02-19

## 2025-02-19 ENCOUNTER — OFFICE VISIT (OUTPATIENT)
Dept: ONCOLOGY | Facility: CLINIC | Age: 75
End: 2025-02-19
Payer: MEDICARE

## 2025-02-19 ENCOUNTER — INFUSION (OUTPATIENT)
Dept: ONCOLOGY | Facility: HOSPITAL | Age: 75
End: 2025-02-19
Payer: MEDICARE

## 2025-02-19 VITALS
HEART RATE: 52 BPM | DIASTOLIC BLOOD PRESSURE: 81 MMHG | WEIGHT: 209.8 LBS | BODY MASS INDEX: 27.8 KG/M2 | HEIGHT: 73 IN | OXYGEN SATURATION: 97 % | SYSTOLIC BLOOD PRESSURE: 196 MMHG | TEMPERATURE: 97.5 F

## 2025-02-19 DIAGNOSIS — Z85.819 HISTORY OF OROPHARYNGEAL CANCER: ICD-10-CM

## 2025-02-19 DIAGNOSIS — I10 PRIMARY HYPERTENSION: Primary | ICD-10-CM

## 2025-02-19 DIAGNOSIS — E03.4 HYPOTHYROIDISM DUE TO ACQUIRED ATROPHY OF THYROID: Primary | ICD-10-CM

## 2025-02-19 LAB
ALBUMIN SERPL-MCNC: 4.1 G/DL (ref 3.5–5.2)
ALBUMIN/GLOB SERPL: 1.5 G/DL
ALP SERPL-CCNC: 103 U/L (ref 39–117)
ALT SERPL W P-5'-P-CCNC: 22 U/L (ref 1–41)
ANION GAP SERPL CALCULATED.3IONS-SCNC: 9.7 MMOL/L (ref 5–15)
AST SERPL-CCNC: 20 U/L (ref 1–40)
BASOPHILS # BLD AUTO: 0.06 10*3/MM3 (ref 0–0.2)
BASOPHILS NFR BLD AUTO: 0.9 % (ref 0–1.5)
BILIRUB SERPL-MCNC: 0.6 MG/DL (ref 0–1.2)
BUN SERPL-MCNC: 17 MG/DL (ref 8–23)
BUN/CREAT SERPL: 17.9 (ref 7–25)
CALCIUM SPEC-SCNC: 9.5 MG/DL (ref 8.6–10.5)
CHLORIDE SERPL-SCNC: 95 MMOL/L (ref 98–107)
CO2 SERPL-SCNC: 26.3 MMOL/L (ref 22–29)
CREAT SERPL-MCNC: 0.95 MG/DL (ref 0.76–1.27)
DEPRECATED RDW RBC AUTO: 38.5 FL (ref 37–54)
EGFRCR SERPLBLD CKD-EPI 2021: 83 ML/MIN/1.73
EOSINOPHIL # BLD AUTO: 0.71 10*3/MM3 (ref 0–0.4)
EOSINOPHIL NFR BLD AUTO: 11.1 % (ref 0.3–6.2)
ERYTHROCYTE [DISTWIDTH] IN BLOOD BY AUTOMATED COUNT: 11.3 % (ref 12.3–15.4)
GLOBULIN UR ELPH-MCNC: 2.7 GM/DL
GLUCOSE SERPL-MCNC: 118 MG/DL (ref 65–99)
HCT VFR BLD AUTO: 41.4 % (ref 37.5–51)
HGB BLD-MCNC: 13.9 G/DL (ref 13–17.7)
IMM GRANULOCYTES # BLD AUTO: 0.02 10*3/MM3 (ref 0–0.05)
IMM GRANULOCYTES NFR BLD AUTO: 0.3 % (ref 0–0.5)
LYMPHOCYTES # BLD AUTO: 1.52 10*3/MM3 (ref 0.7–3.1)
LYMPHOCYTES NFR BLD AUTO: 23.8 % (ref 19.6–45.3)
MCH RBC QN AUTO: 31.6 PG (ref 26.6–33)
MCHC RBC AUTO-ENTMCNC: 33.6 G/DL (ref 31.5–35.7)
MCV RBC AUTO: 94.1 FL (ref 79–97)
MONOCYTES # BLD AUTO: 0.54 10*3/MM3 (ref 0.1–0.9)
MONOCYTES NFR BLD AUTO: 8.5 % (ref 5–12)
NEUTROPHILS NFR BLD AUTO: 3.53 10*3/MM3 (ref 1.7–7)
NEUTROPHILS NFR BLD AUTO: 55.4 % (ref 42.7–76)
NRBC BLD AUTO-RTO: 0 /100 WBC (ref 0–0.2)
PLATELET # BLD AUTO: 189 10*3/MM3 (ref 140–450)
PMV BLD AUTO: 8.6 FL (ref 6–12)
POTASSIUM SERPL-SCNC: 4.9 MMOL/L (ref 3.5–5.2)
PROT SERPL-MCNC: 6.8 G/DL (ref 6–8.5)
RBC # BLD AUTO: 4.4 10*6/MM3 (ref 4.14–5.8)
SODIUM SERPL-SCNC: 131 MMOL/L (ref 136–145)
T4 FREE SERPL-MCNC: 1.32 NG/DL (ref 0.92–1.68)
TSH SERPL DL<=0.05 MIU/L-ACNC: 4.12 UIU/ML (ref 0.27–4.2)
WBC NRBC COR # BLD AUTO: 6.38 10*3/MM3 (ref 3.4–10.8)

## 2025-02-19 PROCEDURE — 36415 COLL VENOUS BLD VENIPUNCTURE: CPT

## 2025-02-19 PROCEDURE — 84443 ASSAY THYROID STIM HORMONE: CPT | Performed by: INTERNAL MEDICINE

## 2025-02-19 PROCEDURE — 63710000001 CLONIDINE 0.1 MG TABLET: Performed by: INTERNAL MEDICINE

## 2025-02-19 PROCEDURE — 84439 ASSAY OF FREE THYROXINE: CPT | Performed by: INTERNAL MEDICINE

## 2025-02-19 PROCEDURE — A9270 NON-COVERED ITEM OR SERVICE: HCPCS | Performed by: INTERNAL MEDICINE

## 2025-02-19 PROCEDURE — 99282 EMERGENCY DEPT VISIT SF MDM: CPT

## 2025-02-19 PROCEDURE — 85025 COMPLETE CBC W/AUTO DIFF WBC: CPT

## 2025-02-19 PROCEDURE — 80053 COMPREHEN METABOLIC PANEL: CPT

## 2025-02-19 RX ORDER — CLONIDINE HYDROCHLORIDE 0.1 MG/1
0.1 TABLET ORAL ONCE
Status: COMPLETED | OUTPATIENT
Start: 2025-02-19 | End: 2025-02-19

## 2025-02-19 RX ORDER — BETAMETHASONE DIPROPIONATE 0.5 MG/G
LOTION TOPICAL
COMMUNITY
Start: 2025-01-31

## 2025-02-19 RX ORDER — KETOCONAZOLE 20 MG/ML
SHAMPOO, SUSPENSION TOPICAL
COMMUNITY
Start: 2025-01-29

## 2025-02-19 RX ADMIN — CLONIDINE HYDROCHLORIDE 0.1 MG: 0.1 TABLET ORAL at 11:41

## 2025-02-19 NOTE — PROGRESS NOTES
Subjective .     REASONS FOR FOLLOWUP:    Squamous cell carcinoma of the base of tongue, stage YEN (T1N2b), HPV status is not clear.  Started concurrent chemoradiation therapy on 12/4/2017 using cisplatin repeating every 3 weeks.    Moderate neutropenia secondary to concurrent chemoradiotherapy.  Cycle #3 cisplatin was delayed and then canceled.    Radiation therapy finished on 1/23/2018.  PEG tube was removed in May 2018.    PET scan on 3/15/2018 reported essentially complete resolution of hypermetabolic lesion at the left tongue base.  The left neck lymphadenopathy also showed blood pool activity.    Patient switched his ENT care to Dr. Browning.   CT scan on 06/18/2019 showed no evidence of disease recurrence.    Neck CT scan on 6/15/2020 reported no evidence for disease recurrence.  Neck CT scan on 6/3/2021 reported no evidence for disease recurrence.    HISTORY OF PRESENT ILLNESS:  The patient is a 74 y.o. year old male who presents today for reevaluation.    History of Present Illness  The patient presented today on 02/19/2025 for an annual follow-up evaluation.    He has a history of tongue cancer, status post concurrent chemoradiotherapy in 2018.    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 His TSH level was found to be elevated during his last visit in November, but no adjustment was made to his levothyroxine dosage. He consulted with Dr. Browning at the beginning of 2024, who found his condition to be stable.    He reports a regular diet but is uncertain about its nutritional adequacy.    He has been experiencing fluctuating blood pressure readings, with some instances of low readings around 100 systolic and others in the 150s range. He monitors his blood pressure at home daily and has observed a decrease in blood pressure following a medication adjustment during an emergency room visit. He occasionally experiences lightheadedness when his blood pressure is low. He has reduced his medication  dosage due to this side effect. He reports no headaches, dizziness, or lightheadedness. He is currently on a regimen of olmesartan and bisoprolol, taken once in the morning and once at night.    He has a cyst on his face, which was treated with a steroid injection by his dermatologist. He is scheduled for a follow-up visit in 2 weeks. The cyst is causing some discomfort in his ears, similar to the sensation experienced when ascending a mountain.      Results  Laboratory Studies on 2/19/2025  Sodium is 131. Chloride is 95. Potassium is 4.9. Kidney function is good, Cr 0.95. Calcium is 9.5. Glucose is 118. Liver panel is normal. White cells are 6380. Hemoglobin is 13.9. Platelets are 189,000.        Past Medical History:   Diagnosis Date    Allergic     Arthritis     Benign essential hypertension     Cholelithiasis     H/O complete eye exam due    H/O Neck mass     left    Hyperlipidemia     IFG (impaired fasting glucose)     Occlusion and stenosis of bilateral carotid arteries     7/13/2020    Seasonal allergies     Tongue cancer 11/01/2017    Left base of tongue squamous cell carcinoma, stage YEN, recieved chemo and radiation therapy   Carotid artery stenosis.      Past Surgical History:   Procedure Laterality Date    CHOLECYSTECTOMY  07/2018    CHOLECYSTECTOMY WITH INTRAOPERATIVE CHOLANGIOGRAM N/A 07/05/2018    Procedure: Laparoscopic cholecystectomy with intraoperative cholangiogram ;  Surgeon: Ashley Fischer MD;  Location: [facility];  Service: General    COLONOSCOPY N/A 2015    normal colonoscopy-Dr. Galen Morrissey    COLONOSCOPY N/A 05/04/2011    Normal colonoscopy-Dr. Tobias Emerson    ENDOSCOPY W/ PEG TUBE PLACEMENT N/A 11/28/2017    Procedure: ESOPHAGOGASTRODUODENOSCOPY WITH PERCUTANEOUS ENDOSCOPIC GASTROSTOMY TUBE INSERTION;  Surgeon: Isma Hercules MD;  Location: [facility] OR;  Service:     FINE NEEDLE ASPIRATION Left 10/13/2017    Ultrasound guidance for fine needle biopsy and fine needle  aspiration with ultrasonic guidance of left neck mass-Dr. Frank Marques    INGUINAL HERNIA REPAIR Left 1994    Dr. Brian Peres    INGUINAL HERNIA REPAIR Right 1993    Dr. Brian Peres    LARYNGOPLASTY      Laryngoplasty with biopsy-Dr. Del Toro    CT INSJ TUNNELED CVC W/O SUBQ PORT/ AGE 5 YR/> Left 11/28/2017    Procedure: INSERTION VENOUS ACCESS DEVICE;  Surgeon: Isma Hercules MD;  Location: [facility];  Service: General    TONGUE BIOPSY / EXCISION N/A 11/01/2017    Biopsy of the left tongue base-Dr. Zane Del Toro    TONSILLECTOMY AND ADENOIDECTOMY Bilateral 11/01/2017    Dr. Zane Del Toro   Removal PEG tube on 5/2/2018    Cholecystectomy in July 2018      HEMATOLOGIC/ONCOLOGIC HISTORY:  Mr. Ritchie is a 67 y.o. male who is here on 11/16/2017 for evaluation accompanied by his wife, referred by radiation oncologist, Dr. Aguirre, because of newly diagnosed squamous cell carcinoma of the left tongue base, stage YEN, [id number].      Patient previously only had history of mild hypertension which was controlled. Recently in early 09/2017 when he was on vacation, he felt a left neck nodule when he was shaving. He denies any pain associated with that. Patient was seen by his primary care physician, Dr. Sheffield, for evaluation and was referred to ENT, Dr. Zane Del Toro. Patient subsequently had CT of the neck examination at the [facility] on 09/22/2017. This study reported a left neck mass measuring 3.5 x 3.1 x 2.4 cm with cystic/necrotic changes located at the region of the left level II jugular chain. There were additional small homogeneous cervical lymph nodes but possibly reactive.      Patient subsequently had ultrasound of the neck on 10/13/2017 associated with fine-needle aspiration biopsy at Dr. Del Toro' office. The ultrasound reported 2 nodules in left neck. The large one measured about 3.28 cm x 2.67 cm x 3.28 cm. The 2nd smaller one measures 1.56 cm x 0.99 cm x 1.48 cm, just  below the large mass. Patient had fine-needle aspiration biopsy at the same time. Pathology evaluation from the [facility] reported poorly differentiated squamous cell carcinoma with degeneration and necrosis. The viable tumor cells exhibit strong nuclear reactivity for both p40 and p63.     Patient subsequently had tonsillectomy, biopsy of the left tongue base and adenoidectomy on 11/01/2017 by Dr. Del Toro. Pathology evaluation from [facility] reported moderately differentiated squamous cell carcinoma. The left tonsil has no evidence of malignancy. Right tonsil also was benign. The adenoid tissue was also benign.      Patient reports the left neck mass is growing. He also reports poor appetite after tonsillectomy. For the past couple of weeks since the biopsy, he lost about 6 pounds. He denies nausea or vomiting. He has actually started feeling better with improved appetite. Denies pain. Denies fever or sweating.      This patient reports he never smoked cigarette. He is only a very rare social drinker. He told me the test for HPV is ongoing.      Patient also had PET scan examination obtained on 11/14/2017. This study reported focal hypermetabolism with SUV 15.9 corresponding to the left-sided base of the tongue. There was moderate enlarged left jugular chain lymph node which is hypermetabolic but without measuring SUV. There was also mild hypermetabolism identified within several additional smaller left jugular chain lymph nodes. There was no hypermetabolic lymphadenopathy elsewhere in the neck nor foci in the chest, abdomen or pelvis.    Patient was started on concurrent chemoradiation therapy with cisplatin every 3 weeks.  He received 2 cycles chemotherapy and due to poor tolerance with acute renal injury and neutropenia, cycle #3 cisplatin was canceled.     Radiation therapy finished on 1/23/2018.    PET scan on 3/15/2018 reported essentially complete resolution of hypermetabolic lesion at the left  tongue base.  The left and neck lymphadenopathy also showing, with blood pool activity.    His CT scan examination of the neck on 6/11/2018 reported no evidence of local disease recurrence, a stable left neck adenopathy, maybe 2 mm smaller compared to the PET scan obtained in March 2018.  Laboratory study on the same day reported normal renal function with a creatinine 0.73 normal electrolytes and liver function panel, stable mild anemia with hemoglobin 12.4 and normal WBC and platelets.    Laboratory study on 9/7/2018 reported normal iron study with ferritin 274, iron 100, TIBC 274 iron saturation 36% in the normal B12 level 631 pg/mL.  Hemoglobin was 13.5 improved and normal WBC 5350, and platelets 206,000.  He also had a normal TSH 2.87, and completely normal CMP.     CT scan examination for the neck with IV contrast on 11/21/2018 showed evidence of disease recurrence.  Laboratory study reported elevated glucose otherwise normal CMP.  Patient also had mildly elevated neutrophils.  These were likely secondary to steroids use prior to the CT scan because he is allergic to IV dye.      CT Neck done on 06/18/2019 showed beam hardening artifact limits evaluation somewhat but there is no evidence of residual or recurrent tongue base mass. There is no evidence of pathologic adenopathy. 1.4 x 0.9 cm area of lucency involving the spinous process of T1, nonspecific but stable. This likely represents an atypical hemangioma. Area of lucency related to the left maxillary 1st molar suggesting a periapical abscess measuring 1.5 cm in maximum dimension. Clinical correlation and standard dental radiographs recommended.    Laboratory study 06/18/2019 showed creatine at 0.80, TSH Baseline at 1.280, Free T4 at 1.08, and free T3 at 2.33. CBC was normal besides WBC elevated at 11.6, RDW down at 11.6, ANC at elevated at 11965, absolute monocytes at 60, absolute lymphocytes at 980, and absolute immature grans 0.07. CMP normal besides  elevated glucose of 219.     CT scan examination on 6/15/2020 showed no evidence of disease recurrence.  However it reported a left carotid artery at least moderate stenosis.    Laboratory studies on 6/15/2020 reported elevated neutrophils 9090, otherwise unremarkable.  He had a normalized TSH and a normal free T4.  Chemistry lab showed elevated glucose level otherwise unremarkable.     In June 2020 patient had neck CT scan which reported carotid artery stenosis.  Will refer the patient to vascular surgery.  Patient reports he was seen by vascular surgery who recommended observation for now.      Laboratory study on 12/4/2020 reported normal CBC including Hb 14.5, platelets 200,000 WBC 6300 with ANC 3140 lymphocytes 1800 eosinophil 790 and monocytes 510.  Elevated TSH 5.26 however normal free T4 at 1.18 ng/dL.    Neck CT scan examination on 6/3/2021 reported no evidence for disease local recurrence or cervical lymphadenopathy.    Laboratory study 6/3/2021 reported normal hemoglobin platelets, however elevated neutrophils 9340 out of WBC 10,700.  Chemistry lab reported normal TSH and free T4, and unremarkable CMP except elevated glucose of 143. Both of hyperglycemia and neutrophil elevation are likely secondary to oral prednisone given prior to IV contrast for CT scan.      Laboratory studies on 12/17/2021 reported normal CBC with Hb 14.4 platelets 144,000 WBC 5780.  Normal thyroid studies with TSH 4.15, and free T4 at 1.28.  Chemistry lab reported normal CMP.    CT scan for neck obtained 6/17/2022 reported no evidence for pathologic lymphadenopathy.    Laboratory studies on 6/17/2022 reported normal CBC, normal hemoglobin 15.4 and platelets 249,000, however with slightly elevated neutrophils 7700 which is likely due to steroids prednisone given prior to the CT scan because of allergic reaction to IV dye.  He had a normal TSH, free T4 and free T3.  CMP was also unremarkable except elevated glucose 165 which is again  likely due to prednisone.      Laboratory study at his primary care physician's office on 10/10/2022 which reported normal liver function panel and renal function, glucose 113 mg/dL and potassium 5.3 mmol/L, otherwise unremarkable. He had a slightly elevated TSH of 4.85 uIU/mL.     Laboratory studies on 12/2/2022 reported hemoglobin 14.8 g/dL, MCV 93.7 fL, platelets 209,000, WBC 5900, ANC 3170 and lymphocytes 1570.     Lab study on 06/19/2023 reported normal thyroid profile including TSH, free T4, and free T3. The chemistry lab reported glucose 167, sodium 130, chloride of 94, otherwise normal CMP. CBC study showed mild leukocytosis, WBC 10,850 including neutrophils 9,720, otherwise normal hemoglobin 16.0, hematocrit 47.0%, and platelets 256,000.    The patient had CT scan examination of the neck with IV contrast on 06/19/2023. This study reported no evidence for residual or recurrent malignancy in the neck. There was moderate to severe vascular calcification involving the left internal carotid artery. There is also completed opacification of the right maxillary sinus similar to previous studies.    The patient was taking prednisone prior to the CT scan because of previous reaction to the IV dyes and this is the reason for his elevated white cells at 10,800 including the neutrophils 9,720.      MEDICATIONS    Current Outpatient Medications:     ASPIRIN 81 PO, , Disp: , Rfl:     atorvastatin (LIPITOR) 20 MG tablet, Take 1 tablet by mouth Daily., Disp: 90 tablet, Rfl: 3    betamethasone dipropionate 0.05 % lotion, APPLY TOPICALLY TO THE SCALP EVERY DAY AS NEEDED FOR FLARES, Disp: , Rfl:     bisoprolol (ZEBeta) 10 MG tablet, Take 1 tablet by mouth Daily., Disp: 90 tablet, Rfl: 3    ketoconazole (NIZORAL) 2 % shampoo, , Disp: , Rfl:     olmesartan (BENICAR) 40 MG tablet, Take 0.5 tablets by mouth Daily., Disp: 90 tablet, Rfl: 1    amLODIPine (NORVASC) 5 MG tablet, Take 1 tablet by mouth Daily., Disp: 30 tablet, Rfl:  "11    ALLERGIES:     Allergies   Allergen Reactions    Iodinated Contrast Media Itching and Rash     Had a 24 hour delayed reaction to Ct contrast    Ramipril Rash       SOCIAL HISTORY:       Social History     Social History    Marital status:      Spouse name: Janiya    Number of children: 2    Years of education: High School     Occupational History     Retired     [employer]     Social History Main Topics    Smoking status: Never Smoker    Smokeless tobacco: Never Used    Alcohol use Yes      Comment: occassional    Drug use: No    Sexual activity: No       FAMILY HISTORY:  Family History   Problem Relation Age of Onset    Dementia Mother     Alcohol abuse Father     Diabetes Father     Cancer Brother     Heart disease Brother     Malso Hyperthermia Neg Hx            Objective    Vitals:    02/19/25 1048 02/19/25 1052   BP: (!) 189/89  Comment: left arm (!) 196/81  Comment: right arm'   Pulse: 52    Temp: 97.5 °F (36.4 °C)    TempSrc: Oral    SpO2: 97%    Weight: 95.2 kg (209 lb 12.8 oz)    Height: 185.4 cm (73\")    PainSc: 0-No pain            2/19/2025    10:49 AM   Current Status   ECOG score 0     Physical Exam    Vital Signs  Blood pressure is 180/90. Heart rate is 52.  GENERAL:  Well-developed, well-nourished in no acute distress.    SKIN:  On the left preauricular area of the skin, there is a bump about 7 mm in size.    HEENT:  Normocephalic.   LYMPHATICS:  No cervical, supraclavicular adenopathy.  CHEST: Normal respiratory effort.  Lungs clear to auscultation. Good airflow.  CARDIAC:  Regular rate and rhythm. Normal S1,S2.  ABDOMEN:  Soft, no tender.  Bowel sounds normal.  EXTREMITIES:  No lower extremity edema.    RECENT LABS:    Lab Results   Component Value Date    WBC 6.38 02/19/2025    HGB 13.9 02/19/2025    HCT 41.4 02/19/2025    MCV 94.1 02/19/2025     02/19/2025     Lab Results   Component Value Date    NEUTROABS 3.53 02/19/2025     Lab Results   Component Value Date    GLUCOSE 118 (H) " 02/19/2025    BUN 17 02/19/2025    CREATININE 0.95 02/19/2025    EGFRIFNONA 83 12/17/2021    EGFRIFAFRI 93 10/07/2020    BCR 17.9 02/19/2025    K 4.9 02/19/2025    CO2 26.3 02/19/2025    CALCIUM 9.5 02/19/2025    ALBUMIN 4.1 02/19/2025    AST 20 02/19/2025    ALT 22 02/19/2025     Lab Results   Component Value Date    TSH 4.120 02/19/2025       Assessment & Plan     Assessment & Plan        1.  Left tongue base squamous cell carcinoma.   Stage YEN.  Finished 2 cycles chemotherapy with Cisplatin day 1 and day 22, with concurrent radiation on 1/23/2018.  Did not receive day #43 cisplatin as planned due to neutropenia.   His PET scan on 3/15/2018 showed essentially complete metabolic response.  The residual left neck lymph node shows only low-level blood pool activity.   Patient had CT scan examination of the neck on 6/11/2018 which showed stable left neck lymphadenopathy, probably smaller by 2 mm compared to the PET scan on 3/15/2018. This lesion is still probably dying down.     Patient switched his ENT care to Dr. Browning.   Patient had CT scan examination of the neck with IV contrast on 06/18/2019, showed no evidence of disease recurrence.    CT scan examination on 6/15/2020 showed no evidence of disease recurrence.   Physical examination on 12/4/2020 was negative for cervical lymphadenopathy.  Recommended to have a CT scan for the neck in 6 months for reevaluation.  CT of the neck obtained 6/3/2021 reported no evidence for local disease recurrence or neck lymphadenopathy.  Physical examination today is also benign.  On 12/17/2021, patient reports some discomfort in the left side of neck/submandibular area.  Otherwise negative review.  Physical examination negative for lymphadenopathy or mass.    CT of the neck with IV contrast on 6/17/2022 reported stable condition, no evidence for disease recurrence.  Physical examination today on 6/24/2022 also had no palpable lymphadenopathy in the neck supraclavicular or  axilla.  On 12/2/2022, the patient has an unremarkable physical examination and review of system. CT scan of the neck with and without contrast is recommended in 6 months for reassessment.    The patient had CT scan for the neck obtained on 06/19/2023, which showed no evidence for disease recurrence or residual tumor.   The patient presented on 12/15/2023, reports doing well with no specific complaints. Physical examination, he has no lymphadenopathy in the neck area. I recommended the patient to follow up with his ENT physician, Dr. Browning, for a checkup.  2/19/2025 patient presented for annual follow-up evaluation.  Review of systems negative.  Physical examination send no palpable lymphadenopathy in the neck area.  No evidence for disease recurrence.      2.  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    Patient has marginally elevated TSH on 03/08/2019 which was 4.47.  This may be the beginning of his hypothyroidism secondary to radiation therapy.    TSH at 1.280, Free T4 at 1.08, and free T3 at 2.33 on 06/18/2019.    Slightly elevated TSH 4.34 on 2/3/2020.     Lab study on 6/15/2020 reported normal TSH 1.47 and free T4 at 1.12 ng/DL.    Lab study on 12/4/2020 reported mildly elevated TSH 5.26, however normal free T4 at 1.18 ng/dL.    Lab study on 6/3/2021 reported normal TSH 1.33 and normal free T4 at 1.12 ng/dL.  We will continue to monitor.  On 12/17/2021, patient has upper normal TSH 4.15, and free T4 at 1.28 ng/dL.  On 6/17/2022 patient had normal TSH 1.43, free T4 at 1.21 ng/dL, and free T3 at 2.49 pg/mL.  Lab study on 10/10/2022 reported a mildly elevated TSH 4.85 uIU/mL. However, no free T4 or T3 at his primary care physician's office.    On 12/2/2022, it was discussed with the patient, and we recommended to repeat TSH and add free T3 and T4 for assessment.   06/19/2023, the patient had normal TSH, free T3, and free T4.  12/15/2023, Updated normal TSH 4.05.   Today 2/19/2025 patient  presented for reevaluation.  His TSH levels were previously noted to be high. A repeat TSH and T4 test will be conducted today to assess the need for any adjustment in his levothyroxine dosage.        3.  Tinnitus and decreased hearing.  He states he had ringing in his ears prior to cisplatin.  His tinnitus did not worsen with cisplatin chemotherapy.   No change of clinical condition.    4.  Left carotid artery stenosis, at least moderate per CT scan examination on 6/15/2020.   Patient is asymptomatic.    He was seen by vascular surgeon and recommended observation.   CT scan on 06/19/2023 reported moderate to severe calcification of the carotid arteries. The patient reports that he is due to see vascular surgeon in the coming months.    5.  Hyperglycemia.    Labs 06/18/2019 showed glucose of 219 and has been high in past as well.  This may be related to his prednisone use before CT scan examination.  However his previous glucose levels has been elevated multiple times.  He was diagnosed with DM II.    Patient had relatively normal glucose 111 in December 2019 and February 2020.   Elevated glucose 197 on 6/15/2020 on day of CT scan.  This again is possibly related to his prednisone prior to CT scan examination.   Glucose 143 on 6/3/2021.  Elevation of glucose may likely related to oral prednisone given prior to IV contrast for CT scan examination.    Normal glucose 109 on 12/17/2021.  Patient did not have steroids use.  On 6/17/2022 glucose 165, likely due to prednisone given prior to CT scan examination.  On 10/10/2022, his blood glucose was 113 mg/dL and hemoglobin A1c was 5.7%. Her diabetes are well controlled.    Laboratory study on 06/19/2023 showed elevated glucose 165. This is due to steroids Prednisone was given to him prior to the CT scan examination due to his reaction to iv dye.  Today on 12/15/2023, the patient has a glucose level of 109 mg/dL.    6.  Mild leukocytosis.  6/19/2023 patient has mild  leukocytosis, and also neutrophilia with ANC 9720.  Patient reports no any recent infection.  This is likely due to oral prednisone started the night before in preparation for his CT scan due to previous reaction to the IV dye.  No need for further evaluation.  on 12/15/2023, the patient has completely normalized WBC 6910 including neutrophils 4170, lymphocytes 1,510, however, mildly elevated eosinophils 680.   Today on 2/19/2025 His current white blood cell count is 6380, and the neutrophils 3530 with chronically mildly elevated eosinophils 710.  No further action is required at this time.    7. Hyponatremia.  His sodium level is slightly low at 131. He is advised to increase his dietary salt intake slightly.    8. Hypertension.  Today on 2/19/2025 his blood pressure is significantly elevated at 180/90 today. He is currently taking olmesartan and bisoprolol. His heart rate is low at 52 bpm, which limits the ability to increase the dose of bisoprolol. A small dose of clonidine will be administered today in the clinic to manage his elevated blood pressure. He is advised to monitor his blood pressure at home and report any significant changes.      PLAN:    Patient will be given clonidine 0.1 mg today in the clinic.    Repeat TSH and free T4 today.    Continue follow-up with ENT, Dr. Browning, for routine follow-up evaluation.  If no evidence for disease recurrence, portacatheter could be removed.  I will see patient in 12 months for reevaluation we will check CBC CMP TSH and free T4.  The patient will continue to follow up with vascular surgeon for evaluation of carotid stenosis.    I discussed with the patient about laboratory results and further management plan.  Patient voiced understanding and agreeable.      Addendum:   Latest Reference Range & Units 11/08/24 09:53 02/19/25 10:10   TSH Baseline 0.270 - 4.200 uIU/mL 4.790 (H) 4.120   Free T4 0.92 - 1.68 ng/dL  1.32     Normalized TSH and also normal free  T4.      Nuvia Ballesteros MD PhD     CC:     Ameya Sheffield M.D.    Ameya Aguirre M.D.    Isma Hercules M.D.      Efrain Browning M.D.    Christiano Smith MD

## 2025-02-19 NOTE — TELEPHONE ENCOUNTER
Patient called about the name of the medication Dr Ballesteros gave him in the CBC office due to high blood pressure 196/81. Patient was given a one time dose of Clondine.    Patient v/u

## 2025-02-20 VITALS
OXYGEN SATURATION: 98 % | SYSTOLIC BLOOD PRESSURE: 139 MMHG | RESPIRATION RATE: 16 BRPM | HEART RATE: 65 BPM | DIASTOLIC BLOOD PRESSURE: 71 MMHG | WEIGHT: 209.8 LBS | BODY MASS INDEX: 27.8 KG/M2 | TEMPERATURE: 98.4 F | HEIGHT: 73 IN

## 2025-02-20 PROCEDURE — 99283 EMERGENCY DEPT VISIT LOW MDM: CPT | Performed by: EMERGENCY MEDICINE

## 2025-02-20 NOTE — DISCHARGE INSTRUCTIONS
OK to increase the night dose of the Bisoprolol but check your BP first and take extra dose if systolic BP is >170

## 2025-02-20 NOTE — FSED PROVIDER NOTE
Subjective   History of Present Illness  74-year-old white male with history of hypertension was at his oncologist office earlier in the day his blood pressure was elevated there but completely asymptomatic the oncologist did some routine labs and the patient went home.  But by this evening when he checked his blood pressure it was elevated again at 200/100 approximately per wife statement.  Still completely asymptomatic no headache no visual loss patient does have an issue with some chronic lightheadedness but does not worse either.  No chest pain tightness pressure heaviness.  On further questioning patient adamantly denies any physical or psychological or financial stresses is not sure why his blood pressure is up.  Patient does admit that he tried clonidine earlier in the day did not seem to work.  This evening he did take an extra half dose of his medicine prior to coming in here.  Currently on Zebeta and Benicar 10 mg and half of a 40 mg tablet respectively.    History provided by:  Patient, spouse and medical records      Review of Systems   Constitutional:  Negative for fatigue.   HENT: Negative.  Negative for sinus pressure.    Eyes:  Negative for pain and visual disturbance.   Respiratory:  Negative for chest tightness and shortness of breath.    Cardiovascular: Negative.    Gastrointestinal: Negative.  Negative for nausea and vomiting.   Genitourinary:  Negative for flank pain and hematuria.   Musculoskeletal: Negative.  Negative for neck pain and neck stiffness.   Skin: Negative.    Neurological: Negative.  Negative for syncope, weakness, numbness and headaches.   Psychiatric/Behavioral: Negative.  Negative for agitation and sleep disturbance. The patient is not nervous/anxious.        Past Medical History:   Diagnosis Date    Allergic     Arthritis     Benign essential hypertension     Cholelithiasis     H/O complete eye exam due    H/O Neck mass     left    Hyperlipidemia     IFG (impaired fasting  glucose)     Occlusion and stenosis of bilateral carotid arteries     7/13/2020    Seasonal allergies     Tongue cancer 11/01/2017    Left base of tongue squamous cell carcinoma, stage YEN, recieved chemo and radiation therapy       Allergies   Allergen Reactions    Iodinated Contrast Media Itching and Rash     Had a 24 hour delayed reaction to Ct contrast    Ramipril Rash       Past Surgical History:   Procedure Laterality Date    CHOLECYSTECTOMY  07/2018    CHOLECYSTECTOMY WITH INTRAOPERATIVE CHOLANGIOGRAM N/A 07/05/2018    Procedure: Laparoscopic cholecystectomy with intraoperative cholangiogram ;  Surgeon: Ashley Fischer MD;  Location: [facility] OR;  Service: General    COLONOSCOPY N/A 2015    normal colonoscopy-Dr. Galen Morrissey    COLONOSCOPY N/A 05/04/2011    Normal colonoscopy-Dr. Tobias Emerson    ENDOSCOPY W/ PEG TUBE PLACEMENT N/A 11/28/2017    Procedure: ESOPHAGOGASTRODUODENOSCOPY WITH PERCUTANEOUS ENDOSCOPIC GASTROSTOMY TUBE INSERTION;  Surgeon: Isma Hercules MD;  Location: [facility] OR;  Service:     FINE NEEDLE ASPIRATION Left 10/13/2017    Ultrasound guidance for fine needle biopsy and fine needle aspiration with ultrasonic guidance of left neck mass-Dr. Frank Marques    INGUINAL HERNIA REPAIR Left 1994    Dr. Brian Peres    INGUINAL HERNIA REPAIR Right 1993    Dr. Brian Peres    LARYNGOPLASTY      Laryngoplasty with biopsy-Dr. Del Toro    WY INSJ TUNNELED CVC W/O SUBQ PORT/ AGE 5 YR/> Left 11/28/2017    Procedure: INSERTION VENOUS ACCESS DEVICE;  Surgeon: Isma Hercules MD;  Location: [facility];  Service: General    TONGUE BIOPSY / EXCISION N/A 11/01/2017    Biopsy of the left tongue base-Dr. aZne Del Toro    TONSILLECTOMY AND ADENOIDECTOMY Bilateral 11/01/2017    Dr. Zane Del Toro       Family History   Problem Relation Age of Onset    Dementia Mother     Alcohol abuse Father     Diabetes Father     Cancer Brother     Heart disease Brother     Malig Hyperthermia Neg Hx         Social History     Socioeconomic History    Marital status:      Spouse name: Janiya    Number of children: 2    Years of education: High School   Tobacco Use    Smoking status: Never     Passive exposure: Never    Smokeless tobacco: Never   Vaping Use    Vaping status: Never Used   Substance and Sexual Activity    Alcohol use: Not Currently     Comment: occassional    Drug use: No    Sexual activity: Yes     Partners: Female     Birth control/protection: None           Objective   Physical Exam  Vitals and nursing note reviewed.   Constitutional:       General: He is not in acute distress.     Appearance: Normal appearance. He is normal weight. He is not ill-appearing, toxic-appearing or diaphoretic.   HENT:      Head: Normocephalic.      Nose: Nose normal. No congestion.      Mouth/Throat:      Mouth: Mucous membranes are moist.      Pharynx: Oropharynx is clear.   Eyes:      Extraocular Movements: Extraocular movements intact.      Conjunctiva/sclera: Conjunctivae normal.      Pupils: Pupils are equal, round, and reactive to light.   Cardiovascular:      Rate and Rhythm: Normal rate and regular rhythm.      Pulses: Normal pulses.      Heart sounds: Normal heart sounds. No murmur heard.     No friction rub.   Pulmonary:      Effort: Pulmonary effort is normal.      Breath sounds: Normal breath sounds.   Abdominal:      General: Bowel sounds are normal.      Palpations: Abdomen is soft.      Tenderness: There is no abdominal tenderness.   Musculoskeletal:         General: No swelling. Normal range of motion.      Cervical back: Normal range of motion and neck supple.   Skin:     General: Skin is warm.      Capillary Refill: Capillary refill takes less than 2 seconds.      Coloration: Skin is not jaundiced or pale.   Neurological:      General: No focal deficit present.      Mental Status: He is alert and oriented to person, place, and time.   Psychiatric:         Mood and Affect: Mood normal.          Behavior: Behavior normal.         Thought Content: Thought content normal.         Judgment: Judgment normal.         Procedures           ED Course  ED Course as of 02/20/25 0545   Thu Feb 20, 2025   0542 I looked up the labs that the oncologist had done earlier today [GN]   0543 Which included a CBC CMP TSH and free T4.  Nothing stood out with the exception of eosinophilia normal white count of 6000 [GN]   0544 Patient does state that he tends to suffer a lot of rhinitis issues even in the winter. [GN]   0544 While sitting here in the emergency room his blood pressure came down nicely to 139/71 most likely due to the other half of the medication that he took at home nothing further needs to be done here suggested keeping a blood pressure diary twice a day and taking the additional half of his blood pressure tablet like he did tonight following up with Dr. Baca his cardiologist [GN]      ED Course User Index  [GN] Storm Joy MD                                           Medical Decision Making  Problems Addressed:  Primary hypertension: acute illness or injury        Final diagnoses:   Primary hypertension       ED Disposition  ED Disposition       ED Disposition   Discharge    Condition   Stable    Comment   --               Isaias Baca MD  [facility] [address]  [phone]    Schedule an appointment as soon as possible for a visit   for re-check and med adjustment         Medication List      No changes were made to your prescriptions during this visit.

## 2025-02-21 ENCOUNTER — OFFICE VISIT (OUTPATIENT)
Dept: CARDIOLOGY | Facility: CLINIC | Age: 75
End: 2025-02-21
Payer: MEDICARE

## 2025-02-21 VITALS
SYSTOLIC BLOOD PRESSURE: 187 MMHG | DIASTOLIC BLOOD PRESSURE: 78 MMHG | BODY MASS INDEX: 27.83 KG/M2 | HEIGHT: 73 IN | HEART RATE: 52 BPM | WEIGHT: 210 LBS

## 2025-02-21 DIAGNOSIS — I10 BENIGN ESSENTIAL HYPERTENSION: ICD-10-CM

## 2025-02-21 DIAGNOSIS — R94.31 ABNORMAL EKG: ICD-10-CM

## 2025-02-21 DIAGNOSIS — Z09 HOSPITAL DISCHARGE FOLLOW-UP: Primary | ICD-10-CM

## 2025-02-21 RX ORDER — AMLODIPINE BESYLATE 5 MG/1
5 TABLET ORAL DAILY
Qty: 30 TABLET | Refills: 11 | Status: SHIPPED | OUTPATIENT
Start: 2025-02-21

## 2025-02-21 NOTE — PROGRESS NOTES
Subjective:        William Ritchie is a 74 y.o. male who here for follow up    No chief complaint on file.      HPI    William Ritchie is a 74-year-old male who is here today for hospital follow-up for hypertension who has a history of hyperlipidemia and tongue cancer. He recently went to [facility] [place] ER for elevated blood pressure.  To the ER visit he went to his oncologist and his blood pressure was 200/100.  Prior to discharge his blood pressure stabilized.  He was without vision loss, or headaches.  He had tried clonidine earlier in the day but it did not help his blood pressure come down.    4/3/2024 echo EF 61 to 65%.  LV function is normal.    4/30/2024 stress test:  myocardial perfusion imaging indicated a normal Myocardial perfusion study with no evidence of ischemia.    8/2/2024 carotid Doppler: Right and left internal carotid artery demonstrates less than 50% stenosis.       The following portions of the patient's history were reviewed and updated as appropriate: allergies, current medications, past family history, past medical history, past social history, past surgical history and problem list.    Past Medical History:   Diagnosis Date    Allergic     Arthritis     Benign essential hypertension     Cholelithiasis     H/O complete eye exam due    H/O Neck mass     left    Hyperlipidemia     IFG (impaired fasting glucose)     Occlusion and stenosis of bilateral carotid arteries     7/13/2020    Seasonal allergies     Tongue cancer 11/01/2017    Left base of tongue squamous cell carcinoma, stage YEN, recieved chemo and radiation therapy         reports that he has never smoked. He has never been exposed to tobacco smoke. He has never used smokeless tobacco. He reports that he does not currently use alcohol. He reports that he does not use drugs.     Family History   Problem Relation Age of Onset    Dementia Mother     Alcohol abuse Father     Diabetes Father     Cancer Brother     Heart disease Brother      Malig Hyperthermia Neg Hx        ROS     Review of Systems  Constitutional: No wt loss, fever, fatigue  Gastrointestinal: No nausea, abdominal pain  Behavioral/Psych: No insomnia or anxiety  Cardiovascular denies      Objective:           Vitals and nursing note reviewed.   Constitutional:       Appearance: Well-developed.   HENT:      Head: Normocephalic.      Right Ear: External ear normal.      Left Ear: External ear normal.   Neck:      Vascular: No JVD.   Pulmonary:      Effort: Pulmonary effort is normal. No respiratory distress.      Breath sounds: Normal breath sounds. No stridor. No rales.   Cardiovascular:      Normal rate. Regular rhythm.      No gallop.    Pulses:     Intact distal pulses.   Edema:     Peripheral edema absent.   Abdominal:      General: Bowel sounds are normal. There is no distension.      Palpations: Abdomen is soft.      Tenderness: There is no abdominal tenderness. There is no guarding.   Musculoskeletal: Normal range of motion.         General: No tenderness.      Cervical back: Normal range of motion. Skin:     General: Skin is warm.   Neurological:      Mental Status: Alert and oriented to person, place, and time.      Deep Tendon Reflexes: Reflexes are normal and symmetric.   Psychiatric:         Judgment: Judgment normal.         Procedures       8/20/24  Interpretation Summary         Right internal carotid artery demonstrates a less than 50% stenosis.    Left internal carotid artery demonstrates a less than 50% stenosis.    Left vertebral artery is not visualized and may be occluded.       4/3/2024  Interpretation Summary         Left ventricular ejection fraction appears to be 61 - 65%.    Left ventricular diastolic function was normal.    Estimated right ventricular systolic pressure from tricuspid regurgitation is normal (<35 mmHg).     4/30/2024      Interpretation Summary         Findings consistent with a normal ECG stress test.    Myocardial perfusion imaging indicates a  normal myocardial perfusion study with no evidence of ischemia. Impressions are consistent with a low risk study.    Left ventricular ejection fraction is normal (Calculated EF = 64%).     Asymptomatic for chest pain. ECG is negative for ischemia.   Ectopy: none  B/P is appropriate.Baseline Hypertensive  142/65, Peak (post Lexiscan) 136/52, Recovery: 152//72  Pharmacologic study due to Beta-blocker therapy and failed Treadmill. Participated in low level exercise and tolerance is poor.      Supervised by:  Nicole PATE.    Current Outpatient Medications:     ASPIRIN 81 PO, , Disp: , Rfl:     atorvastatin (LIPITOR) 20 MG tablet, Take 1 tablet by mouth Daily., Disp: 90 tablet, Rfl: 3    betamethasone dipropionate 0.05 % lotion, APPLY TOPICALLY TO THE SCALP EVERY DAY AS NEEDED FOR FLARES, Disp: , Rfl:     bisoprolol (ZEBeta) 10 MG tablet, Take 1 tablet by mouth Daily., Disp: 90 tablet, Rfl: 3    ketoconazole (NIZORAL) 2 % shampoo, , Disp: , Rfl:     olmesartan (BENICAR) 40 MG tablet, Take 0.5 tablets by mouth Daily., Disp: 90 tablet, Rfl: 1     Assessment:        Patient Active Problem List   Diagnosis    Hyperlipidemia    Benign essential hypertension    IFG (impaired fasting glucose)    Oropharyngeal cancer    Fitting and adjustment of vascular catheter    Steroid-induced hyperglycemia    Dehydration    Acute renal injury    Anemia associated with chemotherapy    Chemotherapy-induced nausea    GERD (gastroesophageal reflux disease)    Chemotherapy induced neutropenia    Adverse effect of antineoplastic and immunosuppressive drugs    Adverse effect of radiation therapy    Pharyngitis, acute    Hypothyroidism due to acquired atrophy of thyroid    TSH (thyroid-stimulating hormone deficiency)    Calculus of gallbladder without cholecystitis without obstruction    Right leg pain    Sensitivity to the cold    Leukemoid reaction    Xerostomia due to radiotherapy    Dental abscess    Carotid artery stenosis,  asymptomatic, left    Erectile dysfunction    History of oropharyngeal cancer    History of head and neck cancer    Contracture, right ankle    Occlusion of left vertebral artery    Dizziness and giddiness               Plan:   1.  Hospital follow-up for hypertension: While in oncologist office his blood pressure was a 200/100.Today his blood pressure remains high. I will add amlodipine. He will do a blood pressure diary and monitor sodium.    Educated patient on exercising for at least 30 minutes a day for 2 to 3 days a week. Importance of controlling hypertension and blood pressure checkup on the regular basis has been explained. Hypertension as a silent killer has been discussed. Risk reduction of the weight and regular exercises to control the hypertension has been explained.    2.  History of abnormal EKG             No diagnosis found.    There are no diagnoses linked to this encounter.    COUNSELING: leandra Morales was given to patient for the following topics: diagnostic results, risk factor reductions, impressions, risks and benefits of treatment options and importance of treatment compliance .       SMOKING COUNSELING: denies    HTN: Add amlodipine.  Do a blood pressure diary and follow-up in 1 month.    Sincerely,   HERLINDA Trejo  [facility]  02/21/25  11:20 EST    EMR Dragon/Transcription disclaimer: Dictated utilizing Dragon Dictation

## 2025-02-21 NOTE — PATIENT INSTRUCTIONS
-add amlodipine with bisoprolol in morning    -The first week take do blood pressure prior to take medications. If sys <100 or dizzy then call the office for recommendations. Monitor blood pressure 1 hour and a half after taking blood pressure medications and  write the reading down along with heart rate.  Please bring these readings with you on your follow up. Call if sys <100, dizzy, or HR<60. If readings stay above 140 sys then call the office.     -monitor sodium in diet.

## 2025-02-28 ENCOUNTER — TELEPHONE (OUTPATIENT)
Dept: CARDIOLOGY | Facility: CLINIC | Age: 75
End: 2025-02-28
Payer: MEDICARE

## 2025-02-28 NOTE — TELEPHONE ENCOUNTER
BEFORE MEDICATION 147/71  54  1 1/2 HOURS AFTER MEDICATION  165/77    CURRENTLY  187/95  59    TAKING BISOPROLOL 10, AMLODIPINE 5, OLMESARTAN 20 IN THE AM  TAKING OLMESARTAN 20  IN THE EVENING. (ADDED BY ON CALL NP - UNABLE TO LOCATE NOTE)    [phone]  BP AT 12:00 PM /77  59  STATES HE HAD A COUPLE CUPS OF COFFEE THIS AM.    HE WILL CONTINUE TO MONITOR BP AND KEEP LOG.

## 2025-03-04 NOTE — PROGRESS NOTES
"Subjective   William Ritchie is a 74 y.o. male.     CC: Ear Pressure    History of Present Illness     Patient comes in today reporting some pressure and \"popping\" of his left ear for 1 month.  No trauma, but does use a Q-Tip. Some noted decrease hearing on the left side, too.       The following portions of the patient's history were reviewed and updated as appropriate: allergies, current medications, past family history, past medical history, past social history, past surgical history, and problem list.    Review of Systems   Constitutional:  Negative for activity change, chills and fever.   HENT:  Positive for ear pain, hearing loss, rhinorrhea and tinnitus.    Respiratory:  Negative for cough.    Cardiovascular:  Negative for chest pain.   Neurological:  Positive for dizziness.   Psychiatric/Behavioral:  Negative for dysphoric mood.        /64   Pulse 61   Temp 98.6 °F (37 °C) (Temporal)   Ht 185.4 cm (73\")   Wt 96.3 kg (212 lb 6.4 oz)   SpO2 98%   BMI 28.02 kg/m²     Objective   Physical Exam  Vitals and nursing note reviewed.   Constitutional:       General: He is not in acute distress.     Appearance: He is well-developed.   HENT:      Right Ear: Tympanic membrane and ear canal normal.      Left Ear: Tympanic membrane normal.      Ears:      Comments: Left TM completely occluded by wax; this is irrigated out well, and reexamination shows a normal TM.  Pulmonary:      Effort: Pulmonary effort is normal.   Neurological:      Mental Status: He is alert and oriented to person, place, and time.   Psychiatric:         Behavior: Behavior normal.         Thought Content: Thought content normal.         Assessment & Plan   Diagnoses and all orders for this visit:    1. Excessive cerumen in left ear canal (Primary)    OTC tips for maintaining clear canals discussed with patient today.             "

## 2025-03-05 ENCOUNTER — OFFICE VISIT (OUTPATIENT)
Dept: FAMILY MEDICINE CLINIC | Facility: CLINIC | Age: 75
End: 2025-03-05
Payer: MEDICARE

## 2025-03-05 VITALS
DIASTOLIC BLOOD PRESSURE: 64 MMHG | BODY MASS INDEX: 28.15 KG/M2 | TEMPERATURE: 98.6 F | OXYGEN SATURATION: 98 % | HEART RATE: 61 BPM | WEIGHT: 212.4 LBS | SYSTOLIC BLOOD PRESSURE: 162 MMHG | HEIGHT: 73 IN

## 2025-03-05 DIAGNOSIS — H61.22 EXCESSIVE CERUMEN IN LEFT EAR CANAL: Primary | ICD-10-CM

## 2025-03-05 PROCEDURE — 3077F SYST BP >= 140 MM HG: CPT | Performed by: FAMILY MEDICINE

## 2025-03-05 PROCEDURE — 99213 OFFICE O/P EST LOW 20 MIN: CPT | Performed by: FAMILY MEDICINE

## 2025-03-05 PROCEDURE — 1126F AMNT PAIN NOTED NONE PRSNT: CPT | Performed by: FAMILY MEDICINE

## 2025-03-05 PROCEDURE — 1160F RVW MEDS BY RX/DR IN RCRD: CPT | Performed by: FAMILY MEDICINE

## 2025-03-05 PROCEDURE — 1159F MED LIST DOCD IN RCRD: CPT | Performed by: FAMILY MEDICINE

## 2025-03-05 PROCEDURE — 3078F DIAST BP <80 MM HG: CPT | Performed by: FAMILY MEDICINE

## 2025-03-20 ENCOUNTER — OFFICE VISIT (OUTPATIENT)
Dept: CARDIOLOGY | Facility: CLINIC | Age: 75
End: 2025-03-20
Payer: MEDICARE

## 2025-03-20 VITALS
WEIGHT: 210 LBS | HEART RATE: 52 BPM | HEIGHT: 73 IN | DIASTOLIC BLOOD PRESSURE: 68 MMHG | BODY MASS INDEX: 27.83 KG/M2 | SYSTOLIC BLOOD PRESSURE: 172 MMHG

## 2025-03-20 DIAGNOSIS — R94.31 ABNORMAL EKG: ICD-10-CM

## 2025-03-20 DIAGNOSIS — I10 BENIGN ESSENTIAL HYPERTENSION: Primary | ICD-10-CM

## 2025-03-20 DIAGNOSIS — E78.00 PURE HYPERCHOLESTEROLEMIA: ICD-10-CM

## 2025-03-20 PROCEDURE — 99213 OFFICE O/P EST LOW 20 MIN: CPT | Performed by: INTERNAL MEDICINE

## 2025-03-20 PROCEDURE — 3077F SYST BP >= 140 MM HG: CPT | Performed by: INTERNAL MEDICINE

## 2025-03-20 PROCEDURE — 3078F DIAST BP <80 MM HG: CPT | Performed by: INTERNAL MEDICINE

## 2025-03-20 RX ORDER — OLMESARTAN MEDOXOMIL 40 MG/1
40 TABLET ORAL DAILY
Qty: 90 TABLET | Refills: 1 | Status: SHIPPED | OUTPATIENT
Start: 2025-03-20

## 2025-03-20 RX ORDER — NITROGLYCERIN 0.4 MG/1
TABLET SUBLINGUAL
Qty: 100 TABLET | Refills: 11 | Status: SHIPPED | OUTPATIENT
Start: 2025-03-20

## 2025-03-20 NOTE — PROGRESS NOTES
1 MO FOLLOW UP, BP CHECK   Subjective:        William Ritchie is a 74 y.o. male who here for follow up    CC  Follow-up hypertension hyperlipidemia abnormal EKG  HPI  74 years old male with hyperlipidemia hypertension abnormal EKG here for the follow-up after the blood pressure denies any chest pains or tightness in the chest     Problems Addressed this Visit          Cardiac and Vasculature    Hyperlipidemia    Benign essential hypertension - Primary    Relevant Medications    olmesartan (BENICAR) 40 MG tablet     Other Visit Diagnoses         Abnormal EKG              Diagnoses         Codes Comments      Benign essential hypertension    -  Primary ICD-10-CM: I10  ICD-9-CM: 401.1       Abnormal EKG     ICD-10-CM: R94.31  ICD-9-CM: 794.31       Pure hypercholesterolemia     ICD-10-CM: E78.00  ICD-9-CM: 272.0           .    The following portions of the patient's history were reviewed and updated as appropriate: allergies, current medications, past family history, past medical history, past social history, past surgical history and problem list.    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 He has never been exposed to tobacco smoke. He has never used smokeless tobacco. He reports that he does not currently use alcohol. He reports that he does not use drugs.   Family History   Problem Relation Age of Onset    Dementia Mother     Alcohol abuse Father     Diabetes Father     Cancer Brother     Heart disease Brother     Malig Hyperthermia Neg Hx        Review of  "Systems  Constitutional: No wt loss, fever, fatigue  Gastrointestinal: No nausea, abdominal pain  Behavioral/Psych: No insomnia or anxiety   Cardiovascular no chest pains or tightness in the chest  Objective:       Physical Exam  /68   Pulse 52   Ht 185.4 cm (73\")   Wt 95.3 kg (210 lb)   BMI 27.71 kg/m²   General appearance: No acute changes   Neck: Trachea midline; NECK, supple, no thyromegaly or lymphadenopathy   Lungs: Normal size and shape, normal breath sounds, equal distribution of air, no rales and rhonchi   CV: S1-S2 regular, no murmurs, no rub, no gallop   Abdomen: Soft, nontender; no masses , no abnormal abdominal sounds   Extremities: No deformity , normal color , no peripheral edema   Skin: Normal temperature, turgor and texture; no rash, ulcers          Procedures      Echocardiogram:    Results for orders placed in visit on 03/26/24    Adult Transthoracic Echo Complete W/ Cont if Necessary Per Protocol    Interpretation Summary    Left ventricular ejection fraction appears to be 61 - 65%.    Left ventricular diastolic function was normal.    Estimated right ventricular systolic pressure from tricuspid regurgitation is normal (<35 mmHg).          Current Outpatient Medications:     ASPIRIN 81 PO, , Disp: , Rfl:     atorvastatin (LIPITOR) 20 MG tablet, Take 1 tablet by mouth Daily., Disp: 90 tablet, Rfl: 3    betamethasone dipropionate 0.05 % lotion, APPLY TOPICALLY TO THE SCALP EVERY DAY AS NEEDED FOR FLARES, Disp: , Rfl:     bisoprolol (ZEBeta) 10 MG tablet, Take 1 tablet by mouth Daily., Disp: 90 tablet, Rfl: 3    ketoconazole (NIZORAL) 2 % shampoo, , Disp: , Rfl:     olmesartan (BENICAR) 40 MG tablet, Take 1 tablet by mouth Daily., Disp: 90 tablet, Rfl: 1    amLODIPine (NORVASC) 5 MG tablet, Take 1 tablet by mouth Daily., Disp: 90 tablet, Rfl: 3    nitroglycerin (NITROSTAT) 0.4 MG SL tablet, 1 under the tongue as needed for angina, may repeat q5mins for up three doses, Disp: 100 tablet, " Rfl: 11   Assessment:                Plan:          ICD-10-CM ICD-9-CM   1. Benign essential hypertension  I10 401.1   2. Abnormal EKG  R94.31 794.31   3. Pure hypercholesterolemia  E78.00 272.0     1. Benign essential hypertension  Controlled use as needed nitroglycerin for extremely high blood pressure    2. Abnormal EKG  No angina pectoris    3. Pure hypercholesterolemia  Continue current treatment    Prescriptions of the nitroglycerin and associated instructions has been given  Patient has been advised to use sublingual nitroglycerin for chest pain or equivalent symptoms, maximum of 3 with the 10 minutes interval.  If the symptoms persist patient has been advised to go to emergency room  Due to the risk of the hypotension patient has been advised to take the sublingual nitroglycerin while the patient in sitting position    Ntg prn for bp 200    3 months  COUNSELING:    William Morales was given to patient for the following topics: diagnostic results, risk factor reductions, impressions, risks and benefits of treatment options and importance of treatment compliance .       SMOKING COUNSELING:        Dictated using Dragon dictation

## 2025-03-21 RX ORDER — AMLODIPINE BESYLATE 5 MG/1
5 TABLET ORAL DAILY
Qty: 90 TABLET | Refills: 3 | Status: SHIPPED | OUTPATIENT
Start: 2025-03-21

## 2025-03-24 ENCOUNTER — TELEPHONE (OUTPATIENT)
Facility: HOSPITAL | Age: 75
End: 2025-03-24
Payer: MEDICARE

## 2025-03-24 NOTE — TELEPHONE ENCOUNTER
Called and left voicemail for patient to call our office back to schedule 1 YR Follow up with [facility] for AUG 2025

## 2025-06-12 ENCOUNTER — OFFICE VISIT (OUTPATIENT)
Dept: CARDIOLOGY | Facility: CLINIC | Age: 75
End: 2025-06-12
Payer: MEDICARE

## 2025-06-12 VITALS
RESPIRATION RATE: 14 BRPM | HEIGHT: 73 IN | BODY MASS INDEX: 27.41 KG/M2 | SYSTOLIC BLOOD PRESSURE: 154 MMHG | WEIGHT: 206.8 LBS | HEART RATE: 52 BPM | DIASTOLIC BLOOD PRESSURE: 60 MMHG

## 2025-06-12 DIAGNOSIS — M79.89 LEG SWELLING: ICD-10-CM

## 2025-06-12 DIAGNOSIS — E78.00 PURE HYPERCHOLESTEROLEMIA: ICD-10-CM

## 2025-06-12 DIAGNOSIS — R42 DIZZINESS: ICD-10-CM

## 2025-06-12 DIAGNOSIS — I10 BENIGN ESSENTIAL HYPERTENSION: Primary | ICD-10-CM

## 2025-06-12 PROCEDURE — 3078F DIAST BP <80 MM HG: CPT | Performed by: INTERNAL MEDICINE

## 2025-06-12 PROCEDURE — 3077F SYST BP >= 140 MM HG: CPT | Performed by: INTERNAL MEDICINE

## 2025-06-12 PROCEDURE — 99213 OFFICE O/P EST LOW 20 MIN: CPT | Performed by: INTERNAL MEDICINE

## 2025-06-12 NOTE — PROGRESS NOTES
Pt here for 3 month fup c/o dizziness swelling feet ankles htn    Subjective:        William Ritchie is a 74 y.o. male who here for follow up    CC  Slight dizzi, leg swelling  HPI  74 years old male with hyperlipidemia hypertension dizziness and the leg swelling here for the follow-up has occasional dizziness and leg swelling     Problems Addressed this Visit          Cardiac and Vasculature    Hyperlipidemia    Benign essential hypertension - Primary       Symptoms and Signs    Dizziness    Leg swelling     Diagnoses         Codes Comments      Benign essential hypertension    -  Primary ICD-10-CM: I10  ICD-9-CM: 401.1       Pure hypercholesterolemia     ICD-10-CM: E78.00  ICD-9-CM: 272.0       Dizziness     ICD-10-CM: R42  ICD-9-CM: 780.4       Leg swelling     ICD-10-CM: M79.89  ICD-9-CM: 729.81           .    The following portions of the patient's history were reviewed and updated as appropriate: allergies, current medications, past family history, past medical history, past social history, past surgical history and problem list.    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 He has never been exposed to tobacco smoke. He has never used smokeless tobacco. He reports that he does not currently use alcohol. He reports that he does not use drugs.   Family History   Problem Relation Age of Onset    Dementia Mother     Alcohol abuse Father     Diabetes Father     Cancer Brother     Heart disease Brother     Malig Hyperthermia Neg Hx        Review of Systems  Constitutional: No wt  "loss, fever, fatigue  Gastrointestinal: No nausea, abdominal pain  Behavioral/Psych: No insomnia or anxiety   Cardiovascular leg swelling  Objective:       Physical Exam  /60   Pulse 52   Resp 14   Ht 185.4 cm (73\")   Wt 93.8 kg (206 lb 12.8 oz)   BMI 27.28 kg/m²   General appearance: No acute changes   Neck: Trachea midline; NECK, supple, no thyromegaly or lymphadenopathy   Lungs: Normal size and shape, normal breath sounds, equal distribution of air, no rales and rhonchi   CV: S1-S2 regular, no murmurs, no rub, no gallop   Abdomen: Soft, nontender; no masses , no abnormal abdominal sounds   Extremities: No deformity , normal color , no peripheral edema   Skin: Normal temperature, turgor and texture; no rash, ulcers          Procedures      Echocardiogram:    Results for orders placed in visit on 03/26/24    Adult Transthoracic Echo Complete W/ Cont if Necessary Per Protocol    Interpretation Summary    Left ventricular ejection fraction appears to be 61 - 65%.    Left ventricular diastolic function was normal.    Estimated right ventricular systolic pressure from tricuspid regurgitation is normal (<35 mmHg).          Current Outpatient Medications:     amLODIPine (NORVASC) 5 MG tablet, Take 1 tablet by mouth Daily., Disp: 90 tablet, Rfl: 3    ASPIRIN 81 PO, , Disp: , Rfl:     atorvastatin (LIPITOR) 20 MG tablet, Take 1 tablet by mouth Daily., Disp: 90 tablet, Rfl: 3    betamethasone dipropionate 0.05 % lotion, APPLY TOPICALLY TO THE SCALP EVERY DAY AS NEEDED FOR FLARES, Disp: , Rfl:     bisoprolol (ZEBeta) 10 MG tablet, Take 1 tablet by mouth Daily., Disp: 90 tablet, Rfl: 3    ketoconazole (NIZORAL) 2 % shampoo, , Disp: , Rfl:     nitroglycerin (NITROSTAT) 0.4 MG SL tablet, 1 under the tongue as needed for angina, may repeat q5mins for up three doses, Disp: 100 tablet, Rfl: 11    olmesartan (BENICAR) 40 MG tablet, Take 1 tablet by mouth Daily., Disp: 90 tablet, Rfl: 1   Assessment:                Plan: "          ICD-10-CM ICD-9-CM   1. Benign essential hypertension  I10 401.1   2. Pure hypercholesterolemia  E78.00 272.0   3. Dizziness  R42 780.4   4. Leg swelling  M79.89 729.81     1. Benign essential hypertension  Patient understands importance of blood pressure check at home which patient does regularly and the blood pressures are well under control to the level of less than 140/90      2. Pure hypercholesterolemia  Continue current treatment    3. Dizziness  Occasional    4. Leg swelling  William Ritchie has been complaining of the leg swelling, appears dependent pedal edema, significantly contributed with a venous insufficiency.    Strong recommendations of elevating the legs as well as using support hoses, along with the control of fluids and salt restriction has been explained in details      1 yr  COUNSELING:    William Morales was given to patient for the following topics: diagnostic results, risk factor reductions, impressions, risks and benefits of treatment options and importance of treatment compliance .       SMOKING COUNSELING:        Dictated using Dragon dictation

## 2025-06-18 PROBLEM — M79.89 LEG SWELLING: Status: ACTIVE | Noted: 2025-06-18

## 2025-08-08 ENCOUNTER — OFFICE VISIT (OUTPATIENT)
Age: 75
End: 2025-08-08
Payer: MEDICARE

## 2025-08-08 ENCOUNTER — HOSPITAL ENCOUNTER (OUTPATIENT)
Facility: HOSPITAL | Age: 75
Discharge: HOME | End: 2025-08-08
Payer: MEDICARE

## 2025-08-08 VITALS
HEIGHT: 73 IN | WEIGHT: 208.5 LBS | DIASTOLIC BLOOD PRESSURE: 84 MMHG | SYSTOLIC BLOOD PRESSURE: 154 MMHG | BODY MASS INDEX: 27.63 KG/M2

## 2025-08-08 DIAGNOSIS — I65.02 OCCLUSION OF LEFT VERTEBRAL ARTERY: ICD-10-CM

## 2025-08-08 DIAGNOSIS — I65.23 BILATERAL CAROTID ARTERY STENOSIS: ICD-10-CM

## 2025-08-08 DIAGNOSIS — I65.23 CAROTID STENOSIS, BILATERAL: Primary | ICD-10-CM

## 2025-08-08 LAB
BH CV XLRA MEAS LEFT CAROTID BULB EDV: -18 CM/SEC
BH CV XLRA MEAS LEFT CAROTID BULB PSV: -103 CM/SEC
BH CV XLRA MEAS LEFT DIST CCA EDV: 23.5 CM/SEC
BH CV XLRA MEAS LEFT DIST CCA PSV: 90.9 CM/SEC
BH CV XLRA MEAS LEFT DIST ICA EDV: -22.4 CM/SEC
BH CV XLRA MEAS LEFT DIST ICA PSV: -73.9 CM/SEC
BH CV XLRA MEAS LEFT ICA/CCA RATIO: 1.13
BH CV XLRA MEAS LEFT MID CCA EDV: 22.7 CM/SEC
BH CV XLRA MEAS LEFT MID CCA PSV: 91.7 CM/SEC
BH CV XLRA MEAS LEFT MID ICA EDV: -23.6 CM/SEC
BH CV XLRA MEAS LEFT MID ICA PSV: -84.5 CM/SEC
BH CV XLRA MEAS LEFT PROX CCA EDV: 25.1 CM/SEC
BH CV XLRA MEAS LEFT PROX CCA PSV: 116.8 CM/SEC
BH CV XLRA MEAS LEFT PROX ECA EDV: -19.6 CM/SEC
BH CV XLRA MEAS LEFT PROX ECA PSV: -108.2 CM/SEC
BH CV XLRA MEAS LEFT PROX ICA EDV: 18 CM/SEC
BH CV XLRA MEAS LEFT PROX ICA PSV: 103 CM/SEC
BH CV XLRA MEAS LEFT PROX SCLA PSV: 94.9 CM/SEC
BH CV XLRA MEAS LEFT VERTEBRAL A PSV: 0 CM/SEC
BH CV XLRA MEAS RIGHT CAROTID BULB EDV: -13 CM/SEC
BH CV XLRA MEAS RIGHT CAROTID BULB PSV: -57.8 CM/SEC
BH CV XLRA MEAS RIGHT DIST CCA EDV: -16.2 CM/SEC
BH CV XLRA MEAS RIGHT DIST CCA PSV: -68.3 CM/SEC
BH CV XLRA MEAS RIGHT DIST ICA EDV: -26.7 CM/SEC
BH CV XLRA MEAS RIGHT DIST ICA PSV: -80.1 CM/SEC
BH CV XLRA MEAS RIGHT ICA/CCA RATIO: 1.19
BH CV XLRA MEAS RIGHT MID CCA EDV: 18.8 CM/SEC
BH CV XLRA MEAS RIGHT MID CCA PSV: 89.4 CM/SEC
BH CV XLRA MEAS RIGHT MID ICA EDV: -16.8 CM/SEC
BH CV XLRA MEAS RIGHT MID ICA PSV: -75.2 CM/SEC
BH CV XLRA MEAS RIGHT PROX CCA EDV: 16.5 CM/SEC
BH CV XLRA MEAS RIGHT PROX CCA PSV: 73.7 CM/SEC
BH CV XLRA MEAS RIGHT PROX ECA EDV: -11.2 CM/SEC
BH CV XLRA MEAS RIGHT PROX ECA PSV: -82 CM/SEC
BH CV XLRA MEAS RIGHT PROX ICA EDV: -17.4 CM/SEC
BH CV XLRA MEAS RIGHT PROX ICA PSV: -80.8 CM/SEC
BH CV XLRA MEAS RIGHT PROX SCLA PSV: 122 CM/SEC
BH CV XLRA MEAS RIGHT VERTEBRAL A EDV: -15.5 CM/SEC
BH CV XLRA MEAS RIGHT VERTEBRAL A PSV: -50.9 CM/SEC
LEFT ARM BP: NORMAL MMHG
RIGHT ARM BP: NORMAL MMHG

## 2025-08-08 PROCEDURE — 1160F RVW MEDS BY RX/DR IN RCRD: CPT | Performed by: NURSE PRACTITIONER

## 2025-08-08 PROCEDURE — 93880 EXTRACRANIAL BILAT STUDY: CPT

## 2025-08-08 PROCEDURE — 99213 OFFICE O/P EST LOW 20 MIN: CPT | Performed by: NURSE PRACTITIONER

## 2025-08-08 PROCEDURE — 1159F MED LIST DOCD IN RCRD: CPT | Performed by: NURSE PRACTITIONER

## 2025-08-08 PROCEDURE — 3079F DIAST BP 80-89 MM HG: CPT | Performed by: NURSE PRACTITIONER

## 2025-08-08 PROCEDURE — 3077F SYST BP >= 140 MM HG: CPT | Performed by: NURSE PRACTITIONER

## 2025-08-08 RX ORDER — SILDENAFIL CITRATE 20 MG/1
40-60 TABLET ORAL DAILY PRN
COMMUNITY
Start: 2025-07-23

## (undated) DEVICE — DRP C/ARM 41X74IN

## (undated) DEVICE — APPL CHLORAPREP W/TINT 26ML ORNG

## (undated) DEVICE — VISUALIZATION SYSTEM: Brand: CLEARIFY

## (undated) DEVICE — ADHS SKIN DERMABOND TOP ADVANCED

## (undated) DEVICE — GOWN ,SIRUS,NONREINFORCED SMALL: Brand: MEDLINE

## (undated) DEVICE — DRAPE,REIN 53X77,STERILE: Brand: MEDLINE

## (undated) DEVICE — ENDOPATH XCEL BLADELESS TROCARS WITH STABILITY SLEEVES: Brand: ENDOPATH XCEL

## (undated) DEVICE — SOL NS 500ML

## (undated) DEVICE — ENDOPATH XCEL BLUNT TIP TROCARS WITH SMOOTH SLEEVES: Brand: ENDOPATH XCEL

## (undated) DEVICE — CATH CHOLANG 4.5F18IN BRGNDY

## (undated) DEVICE — TBG 02 CRUSH RESIST LF CLR 7FT

## (undated) DEVICE — CANN NASL CO2 TRULINK W/O2 A/

## (undated) DEVICE — GLV SURG SENSICARE GREEN W/ALOE PF LF 6.5 STRL

## (undated) DEVICE — CONTAINER,SPECIMEN,OR STERILE,4OZ: Brand: MEDLINE

## (undated) DEVICE — LOU LAP CHOLE: Brand: MEDLINE INDUSTRIES, INC.

## (undated) DEVICE — Device: Brand: DEFENDO AIR/WATER/SUCTION AND BIOPSY VALVE

## (undated) DEVICE — SUT PROLN 3/0 SH D/A 36IN 8522H

## (undated) DEVICE — BITEBLOCK OMNI BLOC

## (undated) DEVICE — EXTENSION SET, MALE LUER LOCK ADAPTER WITH RETRACTABLE COLLAR

## (undated) DEVICE — ENDOPOUCH RETRIEVER SPECIMEN RETRIEVAL BAGS: Brand: ENDOPOUCH RETRIEVER

## (undated) DEVICE — ENDOCUT SCISSOR TIP, DISPOSABLE: Brand: RENEW

## (undated) DEVICE — DRSNG TELFA PAD NONADH STR 1S 3X4IN

## (undated) DEVICE — INTENDED FOR TISSUE SEPARATION, AND OTHER PROCEDURES THAT REQUIRE A SHARP SURGICAL BLADE TO PUNCTURE OR CUT.: Brand: BARD-PARKER ® CARBON RIB-BACK BLADES

## (undated) DEVICE — SOL NACL 0.9PCT 1000ML

## (undated) DEVICE — STPCK 3WY D201 DISCOFIX

## (undated) DEVICE — NDL HYPO PRECISIONGLIDE REG 25G 1 1/2

## (undated) DEVICE — GLV SURG BIOGEL LTX PF 6

## (undated) DEVICE — ANTIBACTERIAL UNDYED BRAIDED (POLYGLACTIN 910), SYNTHETIC ABSORBABLE SUTURE: Brand: COATED VICRYL

## (undated) DEVICE — DRSNG SURESITE WNDW 4X4.5

## (undated) DEVICE — LOU MINOR PROCEDURE: Brand: MEDLINE INDUSTRIES, INC.

## (undated) DEVICE — SUT VIC 0/0 UR6 27IN DYED J603H

## (undated) DEVICE — DECANT BG O JET

## (undated) DEVICE — GLV SURG BIOGEL LTX PF 8 1/2

## (undated) DEVICE — SYR LL TP 10ML STRL

## (undated) DEVICE — CATH IV INSYTE AUTOGARD 14G 1 1/2IN ORNG

## (undated) DEVICE — ENDOPATH XCEL UNIVERSAL TROCAR STABLILITY SLEEVES: Brand: ENDOPATH XCEL

## (undated) DEVICE — TUBING, SUCTION, 1/4" X 10', STRAIGHT: Brand: MEDLINE